# Patient Record
Sex: FEMALE | Race: WHITE | NOT HISPANIC OR LATINO | URBAN - METROPOLITAN AREA
[De-identification: names, ages, dates, MRNs, and addresses within clinical notes are randomized per-mention and may not be internally consistent; named-entity substitution may affect disease eponyms.]

---

## 2017-05-17 ENCOUNTER — INPATIENT (INPATIENT)
Facility: HOSPITAL | Age: 62
LOS: 24 days | Discharge: HOME HEALTH | End: 2017-06-11
Attending: SURGERY | Admitting: INTERNAL MEDICINE

## 2017-05-17 DIAGNOSIS — C16.9 MALIGNANT NEOPLASM OF STOMACH, UNSPECIFIED: ICD-10-CM

## 2017-05-17 DIAGNOSIS — G35 MULTIPLE SCLEROSIS: ICD-10-CM

## 2017-06-28 DIAGNOSIS — K31.1 ADULT HYPERTROPHIC PYLORIC STENOSIS: ICD-10-CM

## 2017-06-28 DIAGNOSIS — E78.00 PURE HYPERCHOLESTEROLEMIA, UNSPECIFIED: ICD-10-CM

## 2017-06-28 DIAGNOSIS — R32 UNSPECIFIED URINARY INCONTINENCE: ICD-10-CM

## 2017-06-28 DIAGNOSIS — J90 PLEURAL EFFUSION, NOT ELSEWHERE CLASSIFIED: ICD-10-CM

## 2017-06-28 DIAGNOSIS — G35 MULTIPLE SCLEROSIS: ICD-10-CM

## 2017-06-28 DIAGNOSIS — E11.43 TYPE 2 DIABETES MELLITUS WITH DIABETIC AUTONOMIC (POLY)NEUROPATHY: ICD-10-CM

## 2017-06-28 DIAGNOSIS — B37.0 CANDIDAL STOMATITIS: ICD-10-CM

## 2017-06-28 DIAGNOSIS — E46 UNSPECIFIED PROTEIN-CALORIE MALNUTRITION: ICD-10-CM

## 2017-06-28 DIAGNOSIS — D63.8 ANEMIA IN OTHER CHRONIC DISEASES CLASSIFIED ELSEWHERE: ICD-10-CM

## 2017-06-28 DIAGNOSIS — K31.84 GASTROPARESIS: ICD-10-CM

## 2017-06-28 DIAGNOSIS — K20.9 ESOPHAGITIS, UNSPECIFIED: ICD-10-CM

## 2017-06-28 DIAGNOSIS — D47.3 ESSENTIAL (HEMORRHAGIC) THROMBOCYTHEMIA: ICD-10-CM

## 2017-06-28 DIAGNOSIS — K26.9 DUODENAL ULCER, UNSPECIFIED AS ACUTE OR CHRONIC, WITHOUT HEMORRHAGE OR PERFORATION: ICD-10-CM

## 2017-06-28 DIAGNOSIS — E83.51 HYPOCALCEMIA: ICD-10-CM

## 2017-06-28 DIAGNOSIS — D50.9 IRON DEFICIENCY ANEMIA, UNSPECIFIED: ICD-10-CM

## 2017-06-28 DIAGNOSIS — K57.10 DIVERTICULOSIS OF SMALL INTESTINE WITHOUT PERFORATION OR ABSCESS WITHOUT BLEEDING: ICD-10-CM

## 2017-06-28 DIAGNOSIS — I10 ESSENTIAL (PRIMARY) HYPERTENSION: ICD-10-CM

## 2017-06-28 DIAGNOSIS — I42.9 CARDIOMYOPATHY, UNSPECIFIED: ICD-10-CM

## 2017-06-28 DIAGNOSIS — K44.9 DIAPHRAGMATIC HERNIA WITHOUT OBSTRUCTION OR GANGRENE: ICD-10-CM

## 2017-06-28 DIAGNOSIS — K29.70 GASTRITIS, UNSPECIFIED, WITHOUT BLEEDING: ICD-10-CM

## 2017-06-28 DIAGNOSIS — I31.3 PERICARDIAL EFFUSION (NONINFLAMMATORY): ICD-10-CM

## 2017-06-28 DIAGNOSIS — D72.829 ELEVATED WHITE BLOOD CELL COUNT, UNSPECIFIED: ICD-10-CM

## 2017-06-28 DIAGNOSIS — C16.9 MALIGNANT NEOPLASM OF STOMACH, UNSPECIFIED: ICD-10-CM

## 2017-06-28 DIAGNOSIS — Z79.4 LONG TERM (CURRENT) USE OF INSULIN: ICD-10-CM

## 2017-06-28 DIAGNOSIS — E78.5 HYPERLIPIDEMIA, UNSPECIFIED: ICD-10-CM

## 2017-08-01 ENCOUNTER — RESULT REVIEW (OUTPATIENT)
Age: 62
End: 2017-08-01

## 2017-08-01 ENCOUNTER — APPOINTMENT (OUTPATIENT)
Dept: HEMATOLOGY ONCOLOGY | Facility: CLINIC | Age: 62
End: 2017-08-01

## 2017-08-01 VITALS
RESPIRATION RATE: 14 BRPM | HEIGHT: 62 IN | HEART RATE: 88 BPM | WEIGHT: 145 LBS | TEMPERATURE: 97.9 F | BODY MASS INDEX: 26.68 KG/M2 | DIASTOLIC BLOOD PRESSURE: 76 MMHG | SYSTOLIC BLOOD PRESSURE: 133 MMHG

## 2017-08-01 DIAGNOSIS — Z86.69 PERSONAL HISTORY OF OTHER DISEASES OF THE NERVOUS SYSTEM AND SENSE ORGANS: ICD-10-CM

## 2017-08-01 DIAGNOSIS — Z86.39 PERSONAL HISTORY OF OTHER ENDOCRINE, NUTRITIONAL AND METABOLIC DISEASE: ICD-10-CM

## 2017-08-01 DIAGNOSIS — Z83.3 FAMILY HISTORY OF DIABETES MELLITUS: ICD-10-CM

## 2017-08-01 DIAGNOSIS — Z82.0 FAMILY HISTORY OF EPILEPSY AND OTHER DISEASES OF THE NERVOUS SYSTEM: ICD-10-CM

## 2017-08-02 LAB
ALBUMIN SERPL-MCNC: 3.4 G/DL
ALBUMIN/GLOB SERPL: 1.21
ALP SERPL-CCNC: 98 IU/L
ALT SERPL-CCNC: 17 IU/L
ANION GAP SERPL CALC-SCNC: 9 MEQ/L
APTT PPP: 28.7 SEC
AST SERPL-CCNC: 15 IU/L
BASOPHILS # BLD: 0.03 TH/MM3
BASOPHILS NFR BLD: 0.3 %
BILIRUB SERPL-MCNC: 0.3 MG/DL
BUN SERPL-MCNC: 16 MG/DL
BUN/CREAT SERPL: 25.8 %
CALCIUM SERPL-MCNC: 9.3 MG/DL
CHLORIDE SERPL-SCNC: 101 MEQ/L
CO2 SERPL-SCNC: 26 MEQ/L
CREAT SERPL-MCNC: 0.62 MG/DL
EOSINOPHIL # BLD: 0.14 TH/MM3
EOSINOPHIL NFR BLD: 1.6 %
ERYTHROCYTE [DISTWIDTH] IN BLOOD BY AUTOMATED COUNT: 17.9 %
GFR SERPL CREATININE-BSD FRML MDRD: 98
GLUCOSE SERPL-MCNC: 177 MG/DL
GRANULOCYTES # BLD: 6.72 TH/MM3
GRANULOCYTES NFR BLD: 77.5 %
HCT VFR BLD AUTO: 30.4 %
HGB BLD-MCNC: 9.2 G/DL
IMM GRANULOCYTES # BLD: 0.03 TH/MM3
IMM GRANULOCYTES NFR BLD: 0.3 %
INR PPP: 1
IRON SERPL-MCNC: 23 UG/DL
LYMPHOCYTES # BLD: 0.92 TH/MM3
LYMPHOCYTES NFR BLD: 10.6 %
MCH RBC QN AUTO: 21.9 PG
MCHC RBC AUTO-ENTMCNC: 30.3 G/DL
MCV RBC AUTO: 72.4 FL
MONOCYTES # BLD: 0.84 TH/MM3
MONOCYTES NFR BLD: 9.7 %
PERCENT SATURATION (NORTH): 6.4 %
PLATELET # BLD: 560 TH/MM3
PMV BLD AUTO: 9.8 FL
POTASSIUM SERPL-SCNC: 4.5 MMOL/L
PROT SERPL-MCNC: 6.2 G/DL
PROTHROMBIN TIME: 10.7 SEC
RBC # BLD AUTO: 4.2 MIL/MM3
SODIUM SERPL-SCNC: 136 MEQ/L
TIBC SERPL-MCNC: 360 UG/DL
TRANSFERRIN SERPL-MCNC: 257 MG/DL
WBC # BLD: 8.68 TH/MM3

## 2017-08-03 ENCOUNTER — RESULT REVIEW (OUTPATIENT)
Age: 62
End: 2017-08-03

## 2017-08-03 LAB
CANCER AG19-9 SERPL-ACNC: 68.2 U/ML
CEA SERPL-MCNC: 3.7 NG/ML
FERRITIN SERPL-MCNC: 10 NG/ML
HAV IGM SER-ACNC: NONREACTIVE
HBV CORE IGM SER-ACNC: NONREACTIVE
HBV SURFACE AG SER-ACNC: NONREACTIVE
HCV AB S/CO SERPL IA: 0.1 S/CO
HCV AB SER QL: NONREACTIVE

## 2017-08-15 ENCOUNTER — APPOINTMENT (OUTPATIENT)
Dept: HEMATOLOGY ONCOLOGY | Facility: CLINIC | Age: 62
End: 2017-08-15

## 2017-08-15 ENCOUNTER — OTHER (OUTPATIENT)
Age: 62
End: 2017-08-15

## 2017-08-16 ENCOUNTER — APPOINTMENT (OUTPATIENT)
Dept: CARDIOLOGY | Facility: CLINIC | Age: 62
End: 2017-08-16

## 2017-08-16 VITALS — DIASTOLIC BLOOD PRESSURE: 70 MMHG | HEART RATE: 81 BPM | SYSTOLIC BLOOD PRESSURE: 110 MMHG

## 2017-08-16 VITALS — BODY MASS INDEX: 26.13 KG/M2 | WEIGHT: 142 LBS | HEIGHT: 62 IN

## 2017-08-16 RX ORDER — ERGOCALCIFEROL (VITAMIN D2) 1250 MCG
50000 CAPSULE ORAL
Refills: 0 | Status: ACTIVE | COMMUNITY

## 2017-08-16 RX ORDER — BACLOFEN 20 MG/1
20 TABLET ORAL 4 TIMES DAILY
Refills: 0 | Status: ACTIVE | COMMUNITY

## 2017-08-16 RX ORDER — ROPINIROLE 0.5 MG/1
0.5 TABLET, FILM COATED ORAL
Refills: 0 | Status: ACTIVE | COMMUNITY

## 2017-08-16 RX ORDER — DULOXETINE HYDROCHLORIDE 60 MG/1
60 CAPSULE, DELAYED RELEASE ORAL
Refills: 0 | Status: ACTIVE | COMMUNITY

## 2017-08-16 RX ORDER — DALFAMPRIDINE 10 MG/1
10 TABLET, FILM COATED, EXTENDED RELEASE ORAL
Refills: 0 | Status: ACTIVE | COMMUNITY

## 2017-08-18 ENCOUNTER — OUTPATIENT (OUTPATIENT)
Dept: OUTPATIENT SERVICES | Facility: HOSPITAL | Age: 62
LOS: 1 days | Discharge: HOME | End: 2017-08-18

## 2017-08-18 DIAGNOSIS — C16.9 MALIGNANT NEOPLASM OF STOMACH, UNSPECIFIED: ICD-10-CM

## 2017-08-18 DIAGNOSIS — Z01.818 ENCOUNTER FOR OTHER PREPROCEDURAL EXAMINATION: ICD-10-CM

## 2017-08-18 DIAGNOSIS — C18.9 MALIGNANT NEOPLASM OF COLON, UNSPECIFIED: ICD-10-CM

## 2017-08-18 DIAGNOSIS — G35 MULTIPLE SCLEROSIS: ICD-10-CM

## 2017-08-21 ENCOUNTER — APPOINTMENT (OUTPATIENT)
Dept: CARDIOLOGY | Facility: CLINIC | Age: 62
End: 2017-08-21

## 2017-08-25 ENCOUNTER — OUTPATIENT (OUTPATIENT)
Dept: OUTPATIENT SERVICES | Facility: HOSPITAL | Age: 62
LOS: 1 days | Discharge: HOME | End: 2017-08-25

## 2017-08-25 DIAGNOSIS — C16.9 MALIGNANT NEOPLASM OF STOMACH, UNSPECIFIED: ICD-10-CM

## 2017-08-25 DIAGNOSIS — G35 MULTIPLE SCLEROSIS: ICD-10-CM

## 2017-08-28 ENCOUNTER — APPOINTMENT (OUTPATIENT)
Dept: INFUSION THERAPY | Facility: CLINIC | Age: 62
End: 2017-08-28

## 2017-08-28 LAB
BASOPHILS # BLD: 0.06 TH/MM3
BASOPHILS NFR BLD: 0.6 %
EOSINOPHIL # BLD: 0.16 TH/MM3
EOSINOPHIL NFR BLD: 1.5 %
ERYTHROCYTE [DISTWIDTH] IN BLOOD BY AUTOMATED COUNT: 22.6 %
GRANULOCYTES # BLD: 8.76 TH/MM3
GRANULOCYTES NFR BLD: 82.9 %
HCT VFR BLD AUTO: 36.5 %
HGB BLD-MCNC: 11.4 G/DL
IMM GRANULOCYTES # BLD: 0.11 TH/MM3
IMM GRANULOCYTES NFR BLD: 1 %
LYMPHOCYTES # BLD: 0.71 TH/MM3
LYMPHOCYTES NFR BLD: 6.7 %
MCH RBC QN AUTO: 23.8 PG
MCHC RBC AUTO-ENTMCNC: 31.2 G/DL
MCV RBC AUTO: 76.4 FL
MONOCYTES # BLD: 0.77 TH/MM3
MONOCYTES NFR BLD: 7.3 %
PLATELET # BLD: 410 TH/MM3
PMV BLD AUTO: 10.2 FL
RBC # BLD AUTO: 4.78 MIL/MM3
WBC # BLD: 10.57 TH/MM3

## 2017-08-30 ENCOUNTER — APPOINTMENT (OUTPATIENT)
Dept: INFUSION THERAPY | Facility: CLINIC | Age: 62
End: 2017-08-30

## 2017-08-30 VITALS
DIASTOLIC BLOOD PRESSURE: 76 MMHG | RESPIRATION RATE: 14 BRPM | TEMPERATURE: 98.3 F | HEART RATE: 98 BPM | SYSTOLIC BLOOD PRESSURE: 116 MMHG

## 2017-08-30 DIAGNOSIS — C18.9 MALIGNANT NEOPLASM OF COLON, UNSPECIFIED: ICD-10-CM

## 2017-08-30 DIAGNOSIS — E11.9 TYPE 2 DIABETES MELLITUS WITHOUT COMPLICATIONS: ICD-10-CM

## 2017-08-30 DIAGNOSIS — E66.9 OBESITY, UNSPECIFIED: ICD-10-CM

## 2017-09-11 ENCOUNTER — APPOINTMENT (OUTPATIENT)
Dept: HEMATOLOGY ONCOLOGY | Facility: CLINIC | Age: 62
End: 2017-09-11

## 2017-09-11 ENCOUNTER — APPOINTMENT (OUTPATIENT)
Dept: INFUSION THERAPY | Facility: CLINIC | Age: 62
End: 2017-09-11

## 2017-09-11 VITALS
BODY MASS INDEX: 25.76 KG/M2 | RESPIRATION RATE: 14 BRPM | DIASTOLIC BLOOD PRESSURE: 74 MMHG | SYSTOLIC BLOOD PRESSURE: 117 MMHG | WEIGHT: 140 LBS | HEART RATE: 93 BPM | TEMPERATURE: 97.4 F | HEIGHT: 62 IN

## 2017-09-11 LAB
BASOPHILS # BLD: 0.04 TH/MM3
BASOPHILS NFR BLD: 0.3 %
EOSINOPHIL # BLD: 0.14 TH/MM3
EOSINOPHIL NFR BLD: 1.1 %
ERYTHROCYTE [DISTWIDTH] IN BLOOD BY AUTOMATED COUNT: 22.1 %
GRANULOCYTES # BLD: 9.95 TH/MM3
GRANULOCYTES NFR BLD: 79.4 %
HCT VFR BLD AUTO: 36.5 %
HGB BLD-MCNC: 11.4 G/DL
IMM GRANULOCYTES # BLD: 0.13 TH/MM3
IMM GRANULOCYTES NFR BLD: 1 %
LYMPHOCYTES # BLD: 0.79 TH/MM3
LYMPHOCYTES NFR BLD: 6.3 %
MCH RBC QN AUTO: 24.3 PG
MCHC RBC AUTO-ENTMCNC: 31.2 G/DL
MCV RBC AUTO: 77.8 FL
MONOCYTES # BLD: 1.49 TH/MM3
MONOCYTES NFR BLD: 11.9 %
PLATELET # BLD: 468 TH/MM3
PMV BLD AUTO: 9.1 FL
RBC # BLD AUTO: 4.69 MIL/MM3
WBC # BLD: 12.54 TH/MM3

## 2017-09-12 LAB
ALBUMIN SERPL-MCNC: 3 G/DL
ALBUMIN/GLOB SERPL: 1.2
ALP SERPL-CCNC: 134 IU/L
ALT SERPL-CCNC: 17 IU/L
ANION GAP SERPL CALC-SCNC: 9 MEQ/L
AST SERPL-CCNC: 16 IU/L
BILIRUB SERPL-MCNC: 0.2 MG/DL
BUN SERPL-MCNC: 18 MG/DL
BUN/CREAT SERPL: 23.1 %
CALCIUM SERPL-MCNC: 9.1 MG/DL
CANCER AG19-9 SERPL-ACNC: 55.7 U/ML
CEA SERPL-MCNC: 4.1 NG/ML
CHLORIDE SERPL-SCNC: 103 MEQ/L
CO2 SERPL-SCNC: 26 MEQ/L
CREAT SERPL-MCNC: 0.78 MG/DL
GFR SERPL CREATININE-BSD FRML MDRD: 75
GLUCOSE SERPL-MCNC: 186 MG/DL
POTASSIUM SERPL-SCNC: 4.8 MMOL/L
PROT SERPL-MCNC: 5.5 G/DL
SODIUM SERPL-SCNC: 138 MEQ/L

## 2017-09-14 ENCOUNTER — APPOINTMENT (OUTPATIENT)
Dept: INFUSION THERAPY | Facility: CLINIC | Age: 62
End: 2017-09-14

## 2017-09-25 ENCOUNTER — APPOINTMENT (OUTPATIENT)
Dept: HEMATOLOGY ONCOLOGY | Facility: CLINIC | Age: 62
End: 2017-09-25

## 2017-09-25 ENCOUNTER — APPOINTMENT (OUTPATIENT)
Dept: INFUSION THERAPY | Facility: CLINIC | Age: 62
End: 2017-09-25

## 2017-09-25 VITALS
SYSTOLIC BLOOD PRESSURE: 135 MMHG | BODY MASS INDEX: 25.21 KG/M2 | TEMPERATURE: 97.2 F | WEIGHT: 137 LBS | HEIGHT: 62 IN | DIASTOLIC BLOOD PRESSURE: 72 MMHG | HEART RATE: 117 BPM | RESPIRATION RATE: 14 BRPM

## 2017-09-25 LAB
BASOPHILS # BLD: 0.02 TH/MM3
BASOPHILS NFR BLD: 0.2 %
EOSINOPHIL # BLD: 0.05 TH/MM3
EOSINOPHIL NFR BLD: 0.5 %
ERYTHROCYTE [DISTWIDTH] IN BLOOD BY AUTOMATED COUNT: 21.9 %
GRANULOCYTES # BLD: 7.81 TH/MM3
GRANULOCYTES NFR BLD: 79.3 %
HCT VFR BLD AUTO: 37.5 %
HGB BLD-MCNC: 12 G/DL
IMM GRANULOCYTES # BLD: 0.05 TH/MM3
IMM GRANULOCYTES NFR BLD: 0.5 %
LYMPHOCYTES # BLD: 0.77 TH/MM3
LYMPHOCYTES NFR BLD: 7.8 %
MCH RBC QN AUTO: 24.9 PG
MCHC RBC AUTO-ENTMCNC: 32 G/DL
MCV RBC AUTO: 78 FL
MONOCYTES # BLD: 1.15 TH/MM3
MONOCYTES NFR BLD: 11.7 %
PLATELET # BLD: 342 TH/MM3
PMV BLD AUTO: 9.2 FL
RBC # BLD AUTO: 4.81 MIL/MM3
WBC # BLD: 9.85 TH/MM3

## 2017-09-27 ENCOUNTER — APPOINTMENT (OUTPATIENT)
Dept: INFUSION THERAPY | Facility: CLINIC | Age: 62
End: 2017-09-27

## 2017-10-10 ENCOUNTER — APPOINTMENT (OUTPATIENT)
Dept: HEMATOLOGY ONCOLOGY | Facility: CLINIC | Age: 62
End: 2017-10-10

## 2017-10-11 ENCOUNTER — APPOINTMENT (OUTPATIENT)
Dept: INFUSION THERAPY | Facility: CLINIC | Age: 62
End: 2017-10-11

## 2017-10-11 LAB
BASOPHILS # BLD: 0.03 TH/MM3
BASOPHILS NFR BLD: 0.4 %
EOSINOPHIL # BLD: 0.1 TH/MM3
EOSINOPHIL NFR BLD: 1.3 %
ERYTHROCYTE [DISTWIDTH] IN BLOOD BY AUTOMATED COUNT: 22.5 %
GRANULOCYTES # BLD: 5.52 TH/MM3
GRANULOCYTES NFR BLD: 73.7 %
HCT VFR BLD AUTO: 40.3 %
HGB BLD-MCNC: 13.2 G/DL
IMM GRANULOCYTES # BLD: 0.08 TH/MM3
IMM GRANULOCYTES NFR BLD: 1.1 %
LYMPHOCYTES # BLD: 0.75 TH/MM3
LYMPHOCYTES NFR BLD: 10 %
MCH RBC QN AUTO: 25.8 PG
MCHC RBC AUTO-ENTMCNC: 32.8 G/DL
MCV RBC AUTO: 78.9 FL
MONOCYTES # BLD: 1.01 TH/MM3
MONOCYTES NFR BLD: 13.5 %
PLATELET # BLD: 324 TH/MM3
PMV BLD AUTO: 9.3 FL
RBC # BLD AUTO: 5.11 MIL/MM3
WBC # BLD: 7.49 TH/MM3

## 2017-10-13 ENCOUNTER — APPOINTMENT (OUTPATIENT)
Dept: INFUSION THERAPY | Facility: CLINIC | Age: 62
End: 2017-10-13

## 2017-10-25 ENCOUNTER — APPOINTMENT (OUTPATIENT)
Dept: INFUSION THERAPY | Facility: CLINIC | Age: 62
End: 2017-10-25

## 2017-10-27 ENCOUNTER — APPOINTMENT (OUTPATIENT)
Dept: INFUSION THERAPY | Facility: CLINIC | Age: 62
End: 2017-10-27

## 2017-10-31 ENCOUNTER — APPOINTMENT (OUTPATIENT)
Dept: HEMATOLOGY ONCOLOGY | Facility: CLINIC | Age: 62
End: 2017-10-31

## 2017-10-31 VITALS
HEART RATE: 111 BPM | RESPIRATION RATE: 16 BRPM | SYSTOLIC BLOOD PRESSURE: 111 MMHG | DIASTOLIC BLOOD PRESSURE: 67 MMHG | TEMPERATURE: 97 F

## 2017-10-31 LAB
BASOPHILS # BLD: 0.14 TH/MM3
BASOPHILS NFR BLD: 2.2 %
EOSINOPHIL # BLD: 0.07 TH/MM3
EOSINOPHIL NFR BLD: 1.1 %
ERYTHROCYTE [DISTWIDTH] IN BLOOD BY AUTOMATED COUNT: 21.2 %
GRANULOCYTES # BLD: 3.72 TH/MM3
GRANULOCYTES NFR BLD: 58.1 %
HCT VFR BLD AUTO: 41 %
HGB BLD-MCNC: 13.6 G/DL
IMM GRANULOCYTES # BLD: 0.42 TH/MM3
IMM GRANULOCYTES NFR BLD: 6.6 %
LYMPHOCYTES # BLD: 0.72 TH/MM3
LYMPHOCYTES NFR BLD: 11.3 %
MCH RBC QN AUTO: 26.9 PG
MCHC RBC AUTO-ENTMCNC: 33.2 G/DL
MCV RBC AUTO: 81 FL
MONOCYTES # BLD: 1.32 TH/MM3
MONOCYTES NFR BLD: 20.7 %
PLATELET # BLD: 447 TH/MM3
PMV BLD AUTO: 9.3 FL
RBC # BLD AUTO: 5.06 MIL/MM3
WBC # BLD: 6.39 TH/MM3

## 2017-11-01 ENCOUNTER — OUTPATIENT (OUTPATIENT)
Dept: OUTPATIENT SERVICES | Facility: HOSPITAL | Age: 62
LOS: 1 days | Discharge: HOME | End: 2017-11-01

## 2017-11-01 ENCOUNTER — APPOINTMENT (OUTPATIENT)
Dept: INFUSION THERAPY | Facility: CLINIC | Age: 62
End: 2017-11-01

## 2017-11-01 VITALS
HEART RATE: 99 BPM | RESPIRATION RATE: 18 BRPM | DIASTOLIC BLOOD PRESSURE: 78 MMHG | TEMPERATURE: 97.8 F | SYSTOLIC BLOOD PRESSURE: 120 MMHG

## 2017-11-01 DIAGNOSIS — C16.9 MALIGNANT NEOPLASM OF STOMACH, UNSPECIFIED: ICD-10-CM

## 2017-11-03 ENCOUNTER — APPOINTMENT (OUTPATIENT)
Dept: INFUSION THERAPY | Facility: CLINIC | Age: 62
End: 2017-11-03

## 2017-11-03 VITALS
HEART RATE: 90 BPM | TEMPERATURE: 96.9 F | SYSTOLIC BLOOD PRESSURE: 129 MMHG | DIASTOLIC BLOOD PRESSURE: 88 MMHG | RESPIRATION RATE: 14 BRPM

## 2017-11-14 ENCOUNTER — APPOINTMENT (OUTPATIENT)
Dept: INFUSION THERAPY | Facility: CLINIC | Age: 62
End: 2017-11-14

## 2017-11-16 ENCOUNTER — APPOINTMENT (OUTPATIENT)
Dept: INFUSION THERAPY | Facility: CLINIC | Age: 62
End: 2017-11-16

## 2017-12-04 ENCOUNTER — APPOINTMENT (OUTPATIENT)
Dept: HEMATOLOGY ONCOLOGY | Facility: CLINIC | Age: 62
End: 2017-12-04

## 2017-12-04 ENCOUNTER — APPOINTMENT (OUTPATIENT)
Dept: INFUSION THERAPY | Facility: CLINIC | Age: 62
End: 2017-12-04

## 2017-12-04 VITALS
WEIGHT: 122 LBS | HEIGHT: 62 IN | TEMPERATURE: 98 F | BODY MASS INDEX: 22.45 KG/M2 | RESPIRATION RATE: 14 BRPM | DIASTOLIC BLOOD PRESSURE: 85 MMHG | HEART RATE: 126 BPM | SYSTOLIC BLOOD PRESSURE: 115 MMHG

## 2017-12-04 LAB
BASOPHILS # BLD: 0.05 TH/MM3
BASOPHILS NFR BLD: 0.4 %
EOSINOPHIL # BLD: 0.03 TH/MM3
EOSINOPHIL NFR BLD: 0.3 %
ERYTHROCYTE [DISTWIDTH] IN BLOOD BY AUTOMATED COUNT: 17.1 %
GRANULOCYTES # BLD: 9.72 TH/MM3
GRANULOCYTES NFR BLD: 82.5 %
HCT VFR BLD AUTO: 43.2 %
HGB BLD-MCNC: 14.1 G/DL
IMM GRANULOCYTES # BLD: 0.14 TH/MM3
IMM GRANULOCYTES NFR BLD: 1.2 %
LYMPHOCYTES # BLD: 0.77 TH/MM3
LYMPHOCYTES NFR BLD: 6.5 %
MCH RBC QN AUTO: 28.1 PG
MCHC RBC AUTO-ENTMCNC: 32.6 G/DL
MCV RBC AUTO: 86.1 FL
MONOCYTES # BLD: 1.07 TH/MM3
MONOCYTES NFR BLD: 9.1 %
PLATELET # BLD: 557 TH/MM3
PMV BLD AUTO: 9.6 FL
RBC # BLD AUTO: 5.02 MIL/MM3
WBC # BLD: 11.78 TH/MM3

## 2017-12-06 ENCOUNTER — APPOINTMENT (OUTPATIENT)
Dept: INFUSION THERAPY | Facility: CLINIC | Age: 62
End: 2017-12-06

## 2017-12-06 LAB
ALBUMIN SERPL-MCNC: 3.3 G/DL
ALBUMIN/GLOB SERPL: 1.14
ALP SERPL-CCNC: 153 IU/L
ALT SERPL-CCNC: 17 IU/L
ANION GAP SERPL CALC-SCNC: 10 MEQ/L
AST SERPL-CCNC: 19 IU/L
BILIRUB SERPL-MCNC: 0.4 MG/DL
BUN SERPL-MCNC: 22 MG/DL
BUN/CREAT SERPL: 28.2 %
CALCIUM SERPL-MCNC: 9.7 MG/DL
CANCER AG19-9 SERPL-ACNC: 67.4 U/ML
CEA SERPL-MCNC: 5.1 NG/ML
CHLORIDE SERPL-SCNC: 99 MEQ/L
CO2 SERPL-SCNC: 28 MEQ/L
CREAT SERPL-MCNC: 0.78 MG/DL
GFR SERPL CREATININE-BSD FRML MDRD: 75
GLUCOSE SERPL-MCNC: 114 MG/DL
POTASSIUM SERPL-SCNC: 4.7 MMOL/L
PROT SERPL-MCNC: 6.2 G/DL
SODIUM SERPL-SCNC: 137 MEQ/L

## 2017-12-12 ENCOUNTER — APPOINTMENT (OUTPATIENT)
Dept: INFUSION THERAPY | Facility: CLINIC | Age: 62
End: 2017-12-12

## 2017-12-14 ENCOUNTER — APPOINTMENT (OUTPATIENT)
Dept: INFUSION THERAPY | Facility: CLINIC | Age: 62
End: 2017-12-14

## 2017-12-18 ENCOUNTER — OUTPATIENT (OUTPATIENT)
Dept: OUTPATIENT SERVICES | Facility: HOSPITAL | Age: 62
LOS: 1 days | Discharge: HOME | End: 2017-12-18

## 2017-12-18 DIAGNOSIS — C16.9 MALIGNANT NEOPLASM OF STOMACH, UNSPECIFIED: ICD-10-CM

## 2017-12-18 DIAGNOSIS — G35 MULTIPLE SCLEROSIS: ICD-10-CM

## 2018-01-03 ENCOUNTER — APPOINTMENT (OUTPATIENT)
Dept: INFUSION THERAPY | Facility: CLINIC | Age: 63
End: 2018-01-03

## 2018-01-08 ENCOUNTER — APPOINTMENT (OUTPATIENT)
Dept: HEMATOLOGY ONCOLOGY | Facility: CLINIC | Age: 63
End: 2018-01-08

## 2018-01-08 VITALS
DIASTOLIC BLOOD PRESSURE: 80 MMHG | TEMPERATURE: 97.8 F | WEIGHT: 115 LBS | RESPIRATION RATE: 14 BRPM | BODY MASS INDEX: 21.16 KG/M2 | SYSTOLIC BLOOD PRESSURE: 126 MMHG | HEART RATE: 102 BPM | HEIGHT: 62 IN

## 2018-01-09 LAB
ALBUMIN SERPL-MCNC: 3.5 G/DL
ALBUMIN/GLOB SERPL: 1.35
ALP SERPL-CCNC: 126 IU/L
ALT SERPL-CCNC: 13 IU/L
ANION GAP SERPL CALC-SCNC: 11 MEQ/L
AST SERPL-CCNC: 17 IU/L
BASOPHILS # BLD: 0.01 TH/MM3
BASOPHILS NFR BLD: 0.1 %
BILIRUB SERPL-MCNC: 0.3 MG/DL
BUN SERPL-MCNC: 17 MG/DL
BUN/CREAT SERPL: 23.6 %
CALCIUM SERPL-MCNC: 9.3 MG/DL
CHLORIDE SERPL-SCNC: 102 MEQ/L
CO2 SERPL-SCNC: 27 MEQ/L
CREAT SERPL-MCNC: 0.72 MG/DL
EOSINOPHIL # BLD: 0.04 TH/MM3
EOSINOPHIL NFR BLD: 0.5 %
ERYTHROCYTE [DISTWIDTH] IN BLOOD BY AUTOMATED COUNT: 13.2 %
GFR SERPL CREATININE-BSD FRML MDRD: 83
GLUCOSE SERPL-MCNC: 137 MG/DL
GRANULOCYTES # BLD: 6.5 TH/MM3
GRANULOCYTES NFR BLD: 85 %
HCT VFR BLD AUTO: 39.3 %
HGB BLD-MCNC: 12.2 G/DL
IMM GRANULOCYTES # BLD: 0.01 TH/MM3
IMM GRANULOCYTES NFR BLD: 0.1 %
LYMPHOCYTES # BLD: 0.64 TH/MM3
LYMPHOCYTES NFR BLD: 8.4 %
MCH RBC QN AUTO: 28.6 PG
MCHC RBC AUTO-ENTMCNC: 31 G/DL
MCV RBC AUTO: 92.3 FL
MONOCYTES # BLD: 0.45 TH/MM3
MONOCYTES NFR BLD: 5.9 %
PLATELET # BLD: 472 TH/MM3
PMV BLD AUTO: 10.9 FL
POTASSIUM SERPL-SCNC: 4.5 MMOL/L
PROT SERPL-MCNC: 6.1 G/DL
RBC # BLD AUTO: 4.26 MIL/MM3
SODIUM SERPL-SCNC: 140 MEQ/L
WBC # BLD: 7.65 TH/MM3

## 2018-01-30 DIAGNOSIS — G35 MULTIPLE SCLEROSIS: ICD-10-CM

## 2018-01-30 DIAGNOSIS — C16.9 MALIGNANT NEOPLASM OF STOMACH, UNSPECIFIED: ICD-10-CM

## 2018-01-31 ENCOUNTER — APPOINTMENT (OUTPATIENT)
Dept: INFUSION THERAPY | Facility: CLINIC | Age: 63
End: 2018-01-31

## 2018-01-31 LAB
BASOPHILS # BLD: 0.05 TH/MM3
BASOPHILS NFR BLD: 0.6 %
EOSINOPHIL # BLD: 0.1 TH/MM3
EOSINOPHIL NFR BLD: 1.1 %
ERYTHROCYTE [DISTWIDTH] IN BLOOD BY AUTOMATED COUNT: 12.3 %
GRANULOCYTES # BLD: 7.6 TH/MM3
GRANULOCYTES NFR BLD: 86.8 %
HCT VFR BLD AUTO: 39.3 %
HGB BLD-MCNC: 12.7 G/DL
IMM GRANULOCYTES # BLD: 0.15 TH/MM3
IMM GRANULOCYTES NFR BLD: 1.7 %
LYMPHOCYTES # BLD: 0.27 TH/MM3
LYMPHOCYTES NFR BLD: 3.1 %
MCH RBC QN AUTO: 29.2 PG
MCHC RBC AUTO-ENTMCNC: 32.3 G/DL
MCV RBC AUTO: 90.3 FL
MONOCYTES # BLD: 0.59 TH/MM3
MONOCYTES NFR BLD: 6.7 %
PLATELET # BLD: 388 TH/MM3
PMV BLD AUTO: 9.9 FL
RBC # BLD AUTO: 4.35 MIL/MM3
WBC # BLD: 8.76 TH/MM3

## 2018-02-07 ENCOUNTER — APPOINTMENT (OUTPATIENT)
Dept: INFUSION THERAPY | Facility: CLINIC | Age: 63
End: 2018-02-07

## 2018-02-14 ENCOUNTER — LABORATORY RESULT (OUTPATIENT)
Age: 63
End: 2018-02-14

## 2018-02-14 ENCOUNTER — APPOINTMENT (OUTPATIENT)
Dept: INFUSION THERAPY | Facility: CLINIC | Age: 63
End: 2018-02-14

## 2018-02-14 LAB
HCT VFR BLD CALC: 40.8 %
HGB BLD-MCNC: 13.4 G/DL
MCHC RBC-ENTMCNC: 29.1 PG
MCHC RBC-ENTMCNC: 32.8 G/DL
MCV RBC AUTO: 88.5 FL
PLATELET # BLD AUTO: 269 K/UL
PMV BLD: 10.1 FL
RBC # BLD: 4.61 M/UL
RBC # FLD: 12 %
WBC # FLD AUTO: 4.27 K/UL

## 2018-02-14 RX ORDER — DEXAMETHASONE 0.5 MG/5ML
10 ELIXIR ORAL ONCE
Qty: 0 | Refills: 0 | Status: COMPLETED | OUTPATIENT
Start: 2018-02-14 | End: 2018-02-14

## 2018-02-14 RX ORDER — PACLITAXEL 6 MG/ML
68 INJECTION, SOLUTION, CONCENTRATE INTRAVENOUS ONCE
Qty: 0 | Refills: 0 | Status: COMPLETED | OUTPATIENT
Start: 2018-02-14 | End: 2018-02-14

## 2018-02-14 RX ORDER — FAMOTIDINE 10 MG/ML
20 INJECTION INTRAVENOUS ONCE
Qty: 0 | Refills: 0 | Status: COMPLETED | OUTPATIENT
Start: 2018-02-14 | End: 2018-02-14

## 2018-02-14 RX ORDER — ONDANSETRON 8 MG/1
16 TABLET, FILM COATED ORAL ONCE
Qty: 0 | Refills: 0 | Status: COMPLETED | OUTPATIENT
Start: 2018-02-14 | End: 2018-02-14

## 2018-02-14 RX ORDER — DIPHENHYDRAMINE HCL 50 MG
50 CAPSULE ORAL ONCE
Qty: 0 | Refills: 0 | Status: COMPLETED | OUTPATIENT
Start: 2018-02-14 | End: 2018-02-14

## 2018-02-14 RX ADMIN — FAMOTIDINE 156 MILLIGRAM(S): 10 INJECTION INTRAVENOUS at 09:41

## 2018-02-14 RX ADMIN — Medication 153 MILLIGRAM(S): at 09:41

## 2018-02-14 RX ADMIN — ONDANSETRON 174 MILLIGRAM(S): 8 TABLET, FILM COATED ORAL at 09:41

## 2018-02-14 RX ADMIN — PACLITAXEL 261.33 MILLIGRAM(S): 6 INJECTION, SOLUTION, CONCENTRATE INTRAVENOUS at 11:01

## 2018-02-21 ENCOUNTER — APPOINTMENT (OUTPATIENT)
Dept: INFUSION THERAPY | Facility: CLINIC | Age: 63
End: 2018-02-21

## 2018-02-21 ENCOUNTER — LABORATORY RESULT (OUTPATIENT)
Age: 63
End: 2018-02-21

## 2018-02-21 LAB
HCT VFR BLD CALC: 38.5 %
HGB BLD-MCNC: 12.6 G/DL
MCHC RBC-ENTMCNC: 28.7 PG
MCHC RBC-ENTMCNC: 32.7 G/DL
MCV RBC AUTO: 87.7 FL
PLATELET # BLD AUTO: 312 K/UL
PMV BLD: 10.2 FL
RBC # BLD: 4.39 M/UL
RBC # FLD: 12 %
WBC # FLD AUTO: 6.95 K/UL

## 2018-02-21 RX ORDER — PACLITAXEL 6 MG/ML
68 INJECTION, SOLUTION, CONCENTRATE INTRAVENOUS ONCE
Qty: 0 | Refills: 0 | Status: COMPLETED | OUTPATIENT
Start: 2018-02-21 | End: 2018-02-21

## 2018-02-21 RX ORDER — DIPHENHYDRAMINE HCL 50 MG
50 CAPSULE ORAL ONCE
Qty: 0 | Refills: 0 | Status: COMPLETED | OUTPATIENT
Start: 2018-02-21 | End: 2018-02-21

## 2018-02-21 RX ORDER — FAMOTIDINE 10 MG/ML
20 INJECTION INTRAVENOUS ONCE
Qty: 0 | Refills: 0 | Status: COMPLETED | OUTPATIENT
Start: 2018-02-21 | End: 2018-02-21

## 2018-02-21 RX ORDER — DEXAMETHASONE 0.5 MG/5ML
10 ELIXIR ORAL ONCE
Qty: 0 | Refills: 0 | Status: COMPLETED | OUTPATIENT
Start: 2018-02-21 | End: 2018-02-21

## 2018-02-21 RX ORDER — ONDANSETRON 8 MG/1
16 TABLET, FILM COATED ORAL ONCE
Qty: 0 | Refills: 0 | Status: COMPLETED | OUTPATIENT
Start: 2018-02-21 | End: 2018-02-21

## 2018-02-21 RX ADMIN — Medication 153 MILLIGRAM(S): at 12:29

## 2018-02-21 RX ADMIN — PACLITAXEL 261.33 MILLIGRAM(S): 6 INJECTION, SOLUTION, CONCENTRATE INTRAVENOUS at 13:14

## 2018-02-21 RX ADMIN — FAMOTIDINE 156 MILLIGRAM(S): 10 INJECTION INTRAVENOUS at 12:29

## 2018-02-21 RX ADMIN — ONDANSETRON 174 MILLIGRAM(S): 8 TABLET, FILM COATED ORAL at 12:29

## 2018-02-26 ENCOUNTER — APPOINTMENT (OUTPATIENT)
Dept: HEMATOLOGY ONCOLOGY | Facility: CLINIC | Age: 63
End: 2018-02-26

## 2018-02-28 ENCOUNTER — APPOINTMENT (OUTPATIENT)
Dept: INFUSION THERAPY | Facility: CLINIC | Age: 63
End: 2018-02-28

## 2018-02-28 ENCOUNTER — LABORATORY RESULT (OUTPATIENT)
Age: 63
End: 2018-02-28

## 2018-02-28 ENCOUNTER — OUTPATIENT (OUTPATIENT)
Dept: OUTPATIENT SERVICES | Facility: HOSPITAL | Age: 63
LOS: 1 days | Discharge: HOME | End: 2018-02-28

## 2018-02-28 DIAGNOSIS — C16.9 MALIGNANT NEOPLASM OF STOMACH, UNSPECIFIED: ICD-10-CM

## 2018-02-28 LAB
HCT VFR BLD CALC: 40.6 %
HGB BLD-MCNC: 13.1 G/DL
MCHC RBC-ENTMCNC: 28.5 PG
MCHC RBC-ENTMCNC: 32.3 G/DL
MCV RBC AUTO: 88.5 FL
PLATELET # BLD AUTO: 294 K/UL
PMV BLD: 11.2 FL
RBC # BLD: 4.59 M/UL
RBC # FLD: 12.4 %
WBC # FLD AUTO: 6.86 K/UL

## 2018-02-28 RX ORDER — DIPHENHYDRAMINE HCL 50 MG
50 CAPSULE ORAL ONCE
Qty: 0 | Refills: 0 | Status: COMPLETED | OUTPATIENT
Start: 2018-02-28 | End: 2018-02-28

## 2018-02-28 RX ORDER — ONDANSETRON 8 MG/1
16 TABLET, FILM COATED ORAL ONCE
Qty: 0 | Refills: 0 | Status: COMPLETED | OUTPATIENT
Start: 2018-02-28 | End: 2018-02-28

## 2018-02-28 RX ORDER — PACLITAXEL 6 MG/ML
68 INJECTION, SOLUTION, CONCENTRATE INTRAVENOUS ONCE
Qty: 0 | Refills: 0 | Status: COMPLETED | OUTPATIENT
Start: 2018-02-28 | End: 2018-02-28

## 2018-02-28 RX ORDER — FAMOTIDINE 10 MG/ML
20 INJECTION INTRAVENOUS ONCE
Qty: 0 | Refills: 0 | Status: COMPLETED | OUTPATIENT
Start: 2018-02-28 | End: 2018-02-28

## 2018-02-28 RX ORDER — DEXAMETHASONE 0.5 MG/5ML
10 ELIXIR ORAL ONCE
Qty: 0 | Refills: 0 | Status: COMPLETED | OUTPATIENT
Start: 2018-02-28 | End: 2018-02-28

## 2018-02-28 RX ADMIN — Medication 153 MILLIGRAM(S): at 11:07

## 2018-02-28 RX ADMIN — ONDANSETRON 174 MILLIGRAM(S): 8 TABLET, FILM COATED ORAL at 11:07

## 2018-02-28 RX ADMIN — PACLITAXEL 261.33 MILLIGRAM(S): 6 INJECTION, SOLUTION, CONCENTRATE INTRAVENOUS at 12:23

## 2018-02-28 RX ADMIN — FAMOTIDINE 156 MILLIGRAM(S): 10 INJECTION INTRAVENOUS at 11:07

## 2018-03-12 ENCOUNTER — OUTPATIENT (OUTPATIENT)
Dept: OUTPATIENT SERVICES | Facility: HOSPITAL | Age: 63
LOS: 1 days | Discharge: HOME | End: 2018-03-12

## 2018-03-12 DIAGNOSIS — C16.3 MALIGNANT NEOPLASM OF PYLORIC ANTRUM: ICD-10-CM

## 2018-03-19 ENCOUNTER — APPOINTMENT (OUTPATIENT)
Dept: HEMATOLOGY ONCOLOGY | Facility: CLINIC | Age: 63
End: 2018-03-19

## 2018-03-19 ENCOUNTER — LABORATORY RESULT (OUTPATIENT)
Age: 63
End: 2018-03-19

## 2018-03-19 VITALS
TEMPERATURE: 97.8 F | SYSTOLIC BLOOD PRESSURE: 121 MMHG | DIASTOLIC BLOOD PRESSURE: 82 MMHG | HEIGHT: 62 IN | HEART RATE: 117 BPM | BODY MASS INDEX: 24.84 KG/M2 | RESPIRATION RATE: 14 BRPM | WEIGHT: 135 LBS

## 2018-03-19 LAB
HCT VFR BLD CALC: 40.5 %
HGB BLD-MCNC: 13.3 G/DL
MCHC RBC-ENTMCNC: 28.4 PG
MCHC RBC-ENTMCNC: 32.8 G/DL
MCV RBC AUTO: 86.5 FL
PLATELET # BLD AUTO: 275 K/UL
PMV BLD: 10.3 FL
RBC # BLD: 4.68 M/UL
RBC # FLD: 12.5 %
WBC # FLD AUTO: 8.66 K/UL

## 2018-04-16 ENCOUNTER — APPOINTMENT (OUTPATIENT)
Dept: INFUSION THERAPY | Facility: CLINIC | Age: 63
End: 2018-04-16

## 2018-04-16 ENCOUNTER — APPOINTMENT (OUTPATIENT)
Dept: HEMATOLOGY ONCOLOGY | Facility: CLINIC | Age: 63
End: 2018-04-16

## 2018-04-30 ENCOUNTER — APPOINTMENT (OUTPATIENT)
Dept: HEMATOLOGY ONCOLOGY | Facility: CLINIC | Age: 63
End: 2018-04-30

## 2018-04-30 ENCOUNTER — LABORATORY RESULT (OUTPATIENT)
Age: 63
End: 2018-04-30

## 2018-04-30 ENCOUNTER — APPOINTMENT (OUTPATIENT)
Dept: INFUSION THERAPY | Facility: CLINIC | Age: 63
End: 2018-04-30

## 2018-04-30 VITALS
TEMPERATURE: 97 F | HEART RATE: 89 BPM | DIASTOLIC BLOOD PRESSURE: 64 MMHG | RESPIRATION RATE: 14 BRPM | SYSTOLIC BLOOD PRESSURE: 109 MMHG

## 2018-04-30 LAB
HCT VFR BLD CALC: 35.1 %
HGB BLD-MCNC: 11.7 G/DL
MCHC RBC-ENTMCNC: 28.7 PG
MCHC RBC-ENTMCNC: 33.3 G/DL
MCV RBC AUTO: 86 FL
PLATELET # BLD AUTO: 329 K/UL
PMV BLD: 10.6 FL
RBC # BLD: 4.08 M/UL
RBC # FLD: 12.7 %
WBC # FLD AUTO: 6.35 K/UL

## 2018-05-01 LAB
ALBUMIN SERPL ELPH-MCNC: 3.8 G/DL
ALP BLD-CCNC: 137 U/L
ALT SERPL-CCNC: 19 U/L
ANION GAP SERPL CALC-SCNC: 16 MMOL/L
AST SERPL-CCNC: 16 U/L
BILIRUB SERPL-MCNC: 0.2 MG/DL
BUN SERPL-MCNC: 22 MG/DL
CALCIUM SERPL-MCNC: 9.3 MG/DL
CANCER AG19-9 SERPL-ACNC: 68.9 U/ML
CEA SERPL-MCNC: 6.5 NG/ML
CHLORIDE SERPL-SCNC: 94 MMOL/L
CO2 SERPL-SCNC: 25 MMOL/L
CREAT SERPL-MCNC: 0.7 MG/DL
GLUCOSE SERPL-MCNC: 340 MG/DL
POTASSIUM SERPL-SCNC: 4.4 MMOL/L
PROT SERPL-MCNC: 6.8 G/DL
SODIUM SERPL-SCNC: 135 MMOL/L

## 2018-05-09 ENCOUNTER — OUTPATIENT (OUTPATIENT)
Dept: OUTPATIENT SERVICES | Facility: HOSPITAL | Age: 63
LOS: 1 days | Discharge: HOME | End: 2018-05-09

## 2018-05-09 VITALS
RESPIRATION RATE: 18 BRPM | WEIGHT: 134.92 LBS | DIASTOLIC BLOOD PRESSURE: 78 MMHG | SYSTOLIC BLOOD PRESSURE: 120 MMHG | TEMPERATURE: 98 F | HEIGHT: 62 IN | HEART RATE: 83 BPM

## 2018-05-09 VITALS
RESPIRATION RATE: 18 BRPM | OXYGEN SATURATION: 95 % | DIASTOLIC BLOOD PRESSURE: 82 MMHG | HEART RATE: 76 BPM | SYSTOLIC BLOOD PRESSURE: 139 MMHG

## 2018-05-09 DIAGNOSIS — Z95.828 PRESENCE OF OTHER VASCULAR IMPLANTS AND GRAFTS: Chronic | ICD-10-CM

## 2018-05-09 DIAGNOSIS — Z90.3 ACQUIRED ABSENCE OF STOMACH [PART OF]: Chronic | ICD-10-CM

## 2018-05-09 DIAGNOSIS — E89.0 POSTPROCEDURAL HYPOTHYROIDISM: Chronic | ICD-10-CM

## 2018-05-09 DIAGNOSIS — Z90.710 ACQUIRED ABSENCE OF BOTH CERVIX AND UTERUS: Chronic | ICD-10-CM

## 2018-05-09 NOTE — ASU PATIENT PROFILE, ADULT - PSH
H/O partial thyroidectomy    H/O total hysterectomy with bilateral salpingo-oophorectomy (BSO)    Portacath in place    S/P partial gastrectomy

## 2018-05-09 NOTE — H&P PST ADULT - ASSESSMENT
64 yo F with PMH mentioned here for outpatient screening colonoscopy.  - Risks and benefits discussed.  - Will proceed with the case.

## 2018-05-11 DIAGNOSIS — Z53.09 PROCEDURE AND TREATMENT NOT CARRIED OUT BECAUSE OF OTHER CONTRAINDICATION: ICD-10-CM

## 2018-05-11 DIAGNOSIS — Z12.11 ENCOUNTER FOR SCREENING FOR MALIGNANT NEOPLASM OF COLON: ICD-10-CM

## 2018-05-11 DIAGNOSIS — K64.8 OTHER HEMORRHOIDS: ICD-10-CM

## 2018-05-11 DIAGNOSIS — E11.9 TYPE 2 DIABETES MELLITUS WITHOUT COMPLICATIONS: ICD-10-CM

## 2018-05-11 DIAGNOSIS — R15.0 INCOMPLETE DEFECATION: ICD-10-CM

## 2018-05-11 DIAGNOSIS — K57.30 DIVERTICULOSIS OF LARGE INTESTINE WITHOUT PERFORATION OR ABSCESS WITHOUT BLEEDING: ICD-10-CM

## 2018-05-25 ENCOUNTER — OUTPATIENT (OUTPATIENT)
Dept: OUTPATIENT SERVICES | Facility: HOSPITAL | Age: 63
LOS: 1 days | Discharge: HOME | End: 2018-05-25

## 2018-05-25 DIAGNOSIS — Z90.710 ACQUIRED ABSENCE OF BOTH CERVIX AND UTERUS: Chronic | ICD-10-CM

## 2018-05-25 DIAGNOSIS — E89.0 POSTPROCEDURAL HYPOTHYROIDISM: Chronic | ICD-10-CM

## 2018-05-25 DIAGNOSIS — Z95.828 PRESENCE OF OTHER VASCULAR IMPLANTS AND GRAFTS: Chronic | ICD-10-CM

## 2018-05-25 DIAGNOSIS — Z90.3 ACQUIRED ABSENCE OF STOMACH [PART OF]: Chronic | ICD-10-CM

## 2018-05-25 DIAGNOSIS — C16.9 MALIGNANT NEOPLASM OF STOMACH, UNSPECIFIED: ICD-10-CM

## 2018-06-04 ENCOUNTER — APPOINTMENT (OUTPATIENT)
Dept: INFUSION THERAPY | Facility: CLINIC | Age: 63
End: 2018-06-04

## 2018-06-04 ENCOUNTER — APPOINTMENT (OUTPATIENT)
Dept: HEMATOLOGY ONCOLOGY | Facility: CLINIC | Age: 63
End: 2018-06-04

## 2018-06-04 ENCOUNTER — LABORATORY RESULT (OUTPATIENT)
Age: 63
End: 2018-06-04

## 2018-06-04 VITALS
TEMPERATURE: 97.8 F | DIASTOLIC BLOOD PRESSURE: 70 MMHG | WEIGHT: 135 LBS | RESPIRATION RATE: 14 BRPM | SYSTOLIC BLOOD PRESSURE: 128 MMHG | HEIGHT: 62 IN | BODY MASS INDEX: 24.84 KG/M2 | HEART RATE: 11 BPM

## 2018-06-04 LAB
HCT VFR BLD CALC: 37 %
HGB BLD-MCNC: 11.9 G/DL
MCHC RBC-ENTMCNC: 28.3 PG
MCHC RBC-ENTMCNC: 32.2 G/DL
MCV RBC AUTO: 88.1 FL
PLATELET # BLD AUTO: 392 K/UL
PMV BLD: 10 FL
RBC # BLD: 4.2 M/UL
RBC # FLD: 12.9 %
WBC # FLD AUTO: 11.49 K/UL

## 2018-06-05 LAB
ALBUMIN SERPL ELPH-MCNC: 3.7 G/DL
ALP BLD-CCNC: 143 U/L
ALT SERPL-CCNC: 19 U/L
ANION GAP SERPL CALC-SCNC: 19 MMOL/L
AST SERPL-CCNC: 17 U/L
BILIRUB SERPL-MCNC: 0.2 MG/DL
BUN SERPL-MCNC: 28 MG/DL
CALCIUM SERPL-MCNC: 9 MG/DL
CANCER AG19-9 SERPL-ACNC: 45.4 U/ML
CHLORIDE SERPL-SCNC: 100 MMOL/L
CO2 SERPL-SCNC: 20 MMOL/L
CREAT SERPL-MCNC: 0.7 MG/DL
GLUCOSE SERPL-MCNC: 218 MG/DL
POTASSIUM SERPL-SCNC: 4.7 MMOL/L
PROT SERPL-MCNC: 6.2 G/DL
SODIUM SERPL-SCNC: 139 MMOL/L

## 2018-06-14 ENCOUNTER — OUTPATIENT (OUTPATIENT)
Dept: OUTPATIENT SERVICES | Facility: HOSPITAL | Age: 63
LOS: 1 days | Discharge: HOME | End: 2018-06-14

## 2018-06-14 DIAGNOSIS — Z90.3 ACQUIRED ABSENCE OF STOMACH [PART OF]: Chronic | ICD-10-CM

## 2018-06-14 DIAGNOSIS — Z90.710 ACQUIRED ABSENCE OF BOTH CERVIX AND UTERUS: Chronic | ICD-10-CM

## 2018-06-14 DIAGNOSIS — Z95.828 PRESENCE OF OTHER VASCULAR IMPLANTS AND GRAFTS: Chronic | ICD-10-CM

## 2018-06-14 DIAGNOSIS — G35 MULTIPLE SCLEROSIS: ICD-10-CM

## 2018-06-14 DIAGNOSIS — E89.0 POSTPROCEDURAL HYPOTHYROIDISM: Chronic | ICD-10-CM

## 2018-06-21 ENCOUNTER — OUTPATIENT (OUTPATIENT)
Dept: OUTPATIENT SERVICES | Facility: HOSPITAL | Age: 63
LOS: 1 days | Discharge: HOME | End: 2018-06-21

## 2018-06-21 DIAGNOSIS — G35 MULTIPLE SCLEROSIS: ICD-10-CM

## 2018-06-21 DIAGNOSIS — Z95.828 PRESENCE OF OTHER VASCULAR IMPLANTS AND GRAFTS: Chronic | ICD-10-CM

## 2018-06-21 DIAGNOSIS — Z90.710 ACQUIRED ABSENCE OF BOTH CERVIX AND UTERUS: Chronic | ICD-10-CM

## 2018-06-21 DIAGNOSIS — Z90.3 ACQUIRED ABSENCE OF STOMACH [PART OF]: Chronic | ICD-10-CM

## 2018-06-21 DIAGNOSIS — E89.0 POSTPROCEDURAL HYPOTHYROIDISM: Chronic | ICD-10-CM

## 2018-06-26 ENCOUNTER — APPOINTMENT (OUTPATIENT)
Dept: CARDIOLOGY | Facility: CLINIC | Age: 63
End: 2018-06-26

## 2018-06-26 VITALS
WEIGHT: 152 LBS | HEART RATE: 78 BPM | DIASTOLIC BLOOD PRESSURE: 82 MMHG | HEIGHT: 62 IN | SYSTOLIC BLOOD PRESSURE: 130 MMHG | BODY MASS INDEX: 27.97 KG/M2

## 2018-06-26 DIAGNOSIS — R07.9 CHEST PAIN, UNSPECIFIED: ICD-10-CM

## 2018-06-26 DIAGNOSIS — I31.3 PERICARDIAL EFFUSION (NONINFLAMMATORY): ICD-10-CM

## 2018-06-26 DIAGNOSIS — G35 MULTIPLE SCLEROSIS: ICD-10-CM

## 2018-06-26 DIAGNOSIS — Z01.818 ENCOUNTER FOR OTHER PREPROCEDURAL EXAMINATION: ICD-10-CM

## 2018-06-26 DIAGNOSIS — K59.00 CONSTIPATION, UNSPECIFIED: ICD-10-CM

## 2018-06-26 DIAGNOSIS — E11.9 TYPE 2 DIABETES MELLITUS W/OUT COMPLICATIONS: ICD-10-CM

## 2018-06-26 RX ORDER — OMEGA-3-ACID ETHYL ESTERS CAPSULES 1 G/1
1 CAPSULE, LIQUID FILLED ORAL
Qty: 360 | Refills: 0 | Status: ACTIVE | COMMUNITY
Start: 2018-05-02

## 2018-06-26 RX ORDER — INSULIN LISPRO 100 [IU]/ML
100 INJECTION, SOLUTION SUBCUTANEOUS
Qty: 15 | Refills: 0 | Status: ACTIVE | COMMUNITY
Start: 2018-05-22

## 2018-06-26 RX ORDER — FLUTICASONE PROPIONATE 50 UG/1
50 SPRAY, METERED NASAL
Qty: 16 | Refills: 0 | Status: ACTIVE | COMMUNITY
Start: 2018-06-04

## 2018-06-26 RX ORDER — MOMETASONE FUROATE 1 MG/G
0.1 CREAM TOPICAL
Qty: 30 | Refills: 0 | Status: ACTIVE | COMMUNITY
Start: 2018-05-22

## 2018-06-26 RX ORDER — LINACLOTIDE 290 UG/1
290 CAPSULE, GELATIN COATED ORAL
Qty: 30 | Refills: 0 | Status: ACTIVE | COMMUNITY
Start: 2018-05-17

## 2018-06-26 RX ORDER — BLOOD SUGAR DIAGNOSTIC
STRIP MISCELLANEOUS
Qty: 100 | Refills: 0 | Status: ACTIVE | COMMUNITY
Start: 2018-05-16

## 2018-06-26 RX ORDER — GLIPIZIDE AND METFORMIN HYDROCHLORIDE 5; 500 MG/1; MG/1
5-500 TABLET, FILM COATED ORAL
Qty: 60 | Refills: 0 | Status: ACTIVE | COMMUNITY
Start: 2018-05-16

## 2018-06-26 RX ORDER — BLOOD-GLUCOSE METER
W/DEVICE KIT MISCELLANEOUS
Qty: 1 | Refills: 0 | Status: ACTIVE | COMMUNITY
Start: 2018-05-16

## 2018-06-26 RX ORDER — PANTOPRAZOLE 40 MG/1
40 TABLET, DELAYED RELEASE ORAL
Qty: 90 | Refills: 0 | Status: ACTIVE | COMMUNITY
Start: 2017-05-16

## 2018-07-02 ENCOUNTER — APPOINTMENT (OUTPATIENT)
Dept: INFUSION THERAPY | Facility: CLINIC | Age: 63
End: 2018-07-02

## 2018-07-10 ENCOUNTER — APPOINTMENT (OUTPATIENT)
Dept: INFUSION THERAPY | Facility: CLINIC | Age: 63
End: 2018-07-10

## 2018-07-10 ENCOUNTER — OUTPATIENT (OUTPATIENT)
Dept: OUTPATIENT SERVICES | Facility: HOSPITAL | Age: 63
LOS: 1 days | Discharge: HOME | End: 2018-07-10

## 2018-07-10 DIAGNOSIS — Z90.710 ACQUIRED ABSENCE OF BOTH CERVIX AND UTERUS: Chronic | ICD-10-CM

## 2018-07-10 DIAGNOSIS — Z95.828 PRESENCE OF OTHER VASCULAR IMPLANTS AND GRAFTS: Chronic | ICD-10-CM

## 2018-07-10 DIAGNOSIS — K56.699 OTHER INTESTINAL OBSTRUCTION UNSPECIFIED AS TO PARTIAL VERSUS COMPLETE OBSTRUCTION: ICD-10-CM

## 2018-07-10 DIAGNOSIS — E89.0 POSTPROCEDURAL HYPOTHYROIDISM: Chronic | ICD-10-CM

## 2018-07-10 DIAGNOSIS — R07.9 CHEST PAIN, UNSPECIFIED: ICD-10-CM

## 2018-07-10 DIAGNOSIS — Z90.3 ACQUIRED ABSENCE OF STOMACH [PART OF]: Chronic | ICD-10-CM

## 2018-07-10 DIAGNOSIS — C16.9 MALIGNANT NEOPLASM OF STOMACH, UNSPECIFIED: ICD-10-CM

## 2018-08-09 ENCOUNTER — LABORATORY RESULT (OUTPATIENT)
Age: 63
End: 2018-08-09

## 2018-08-09 ENCOUNTER — APPOINTMENT (OUTPATIENT)
Dept: HEMATOLOGY ONCOLOGY | Facility: CLINIC | Age: 63
End: 2018-08-09

## 2018-08-09 ENCOUNTER — APPOINTMENT (OUTPATIENT)
Dept: INFUSION THERAPY | Facility: CLINIC | Age: 63
End: 2018-08-09

## 2018-08-09 VITALS
BODY MASS INDEX: 25.58 KG/M2 | DIASTOLIC BLOOD PRESSURE: 82 MMHG | HEART RATE: 103 BPM | RESPIRATION RATE: 14 BRPM | HEIGHT: 62 IN | TEMPERATURE: 98.8 F | WEIGHT: 139 LBS | SYSTOLIC BLOOD PRESSURE: 119 MMHG

## 2018-08-09 PROBLEM — E11.9 TYPE 2 DIABETES MELLITUS WITHOUT COMPLICATIONS: Chronic | Status: ACTIVE | Noted: 2018-05-09

## 2018-08-10 LAB
ALBUMIN SERPL ELPH-MCNC: 3.5 G/DL
ALP BLD-CCNC: 164 U/L
ALT SERPL-CCNC: 72 U/L
ANION GAP SERPL CALC-SCNC: 17 MMOL/L
AST SERPL-CCNC: 49 U/L
BILIRUB SERPL-MCNC: 0.2 MG/DL
BUN SERPL-MCNC: 24 MG/DL
CALCIUM SERPL-MCNC: 8.7 MG/DL
CANCER AG19-9 SERPL-ACNC: 41.5 U/ML
CHLORIDE SERPL-SCNC: 101 MMOL/L
CO2 SERPL-SCNC: 22 MMOL/L
CREAT SERPL-MCNC: 0.7 MG/DL
GLUCOSE SERPL-MCNC: 201 MG/DL
HCT VFR BLD CALC: 36.2 %
HGB BLD-MCNC: 11.6 G/DL
MCHC RBC-ENTMCNC: 27.6 PG
MCHC RBC-ENTMCNC: 32 G/DL
MCV RBC AUTO: 86 FL
PLATELET # BLD AUTO: 364 K/UL
PMV BLD: 10.1 FL
POTASSIUM SERPL-SCNC: 4 MMOL/L
PROT SERPL-MCNC: 5.9 G/DL
RBC # BLD: 4.21 M/UL
RBC # FLD: 13.9 %
SODIUM SERPL-SCNC: 140 MMOL/L
WBC # FLD AUTO: 11.17 K/UL

## 2018-08-13 LAB — CEA SERPL-MCNC: 8.5 NG/ML

## 2018-08-20 ENCOUNTER — OUTPATIENT (OUTPATIENT)
Dept: OUTPATIENT SERVICES | Facility: HOSPITAL | Age: 63
LOS: 1 days | Discharge: HOME | End: 2018-08-20

## 2018-08-20 DIAGNOSIS — E89.0 POSTPROCEDURAL HYPOTHYROIDISM: Chronic | ICD-10-CM

## 2018-08-20 DIAGNOSIS — Z90.710 ACQUIRED ABSENCE OF BOTH CERVIX AND UTERUS: Chronic | ICD-10-CM

## 2018-08-20 DIAGNOSIS — C16.9 MALIGNANT NEOPLASM OF STOMACH, UNSPECIFIED: ICD-10-CM

## 2018-08-20 DIAGNOSIS — Z90.3 ACQUIRED ABSENCE OF STOMACH [PART OF]: Chronic | ICD-10-CM

## 2018-08-20 DIAGNOSIS — Z95.828 PRESENCE OF OTHER VASCULAR IMPLANTS AND GRAFTS: Chronic | ICD-10-CM

## 2018-08-30 ENCOUNTER — APPOINTMENT (OUTPATIENT)
Dept: INFUSION THERAPY | Facility: CLINIC | Age: 63
End: 2018-08-30

## 2018-08-30 ENCOUNTER — APPOINTMENT (OUTPATIENT)
Dept: HEMATOLOGY ONCOLOGY | Facility: CLINIC | Age: 63
End: 2018-08-30

## 2018-09-06 ENCOUNTER — FORM ENCOUNTER (OUTPATIENT)
Age: 63
End: 2018-09-06

## 2018-09-07 ENCOUNTER — OUTPATIENT (OUTPATIENT)
Dept: OUTPATIENT SERVICES | Facility: HOSPITAL | Age: 63
LOS: 1 days | Discharge: HOME | End: 2018-09-07

## 2018-09-07 DIAGNOSIS — E89.0 POSTPROCEDURAL HYPOTHYROIDISM: Chronic | ICD-10-CM

## 2018-09-07 DIAGNOSIS — Z90.3 ACQUIRED ABSENCE OF STOMACH [PART OF]: Chronic | ICD-10-CM

## 2018-09-07 DIAGNOSIS — C16.9 MALIGNANT NEOPLASM OF STOMACH, UNSPECIFIED: ICD-10-CM

## 2018-09-07 DIAGNOSIS — Z90.710 ACQUIRED ABSENCE OF BOTH CERVIX AND UTERUS: Chronic | ICD-10-CM

## 2018-09-07 DIAGNOSIS — Z95.828 PRESENCE OF OTHER VASCULAR IMPLANTS AND GRAFTS: Chronic | ICD-10-CM

## 2018-09-07 LAB — GLUCOSE BLDC GLUCOMTR-MCNC: 132 MG/DL — HIGH (ref 70–99)

## 2018-09-27 ENCOUNTER — APPOINTMENT (OUTPATIENT)
Dept: INFUSION THERAPY | Facility: CLINIC | Age: 63
End: 2018-09-27

## 2018-10-10 ENCOUNTER — OUTPATIENT (OUTPATIENT)
Dept: OUTPATIENT SERVICES | Facility: HOSPITAL | Age: 63
LOS: 1 days | Discharge: HOME | End: 2018-10-10

## 2018-10-10 VITALS
SYSTOLIC BLOOD PRESSURE: 116 MMHG | WEIGHT: 143.08 LBS | HEIGHT: 62 IN | TEMPERATURE: 98 F | RESPIRATION RATE: 20 BRPM | HEART RATE: 88 BPM | DIASTOLIC BLOOD PRESSURE: 75 MMHG

## 2018-10-10 VITALS — DIASTOLIC BLOOD PRESSURE: 88 MMHG | HEART RATE: 84 BPM | SYSTOLIC BLOOD PRESSURE: 124 MMHG | RESPIRATION RATE: 18 BRPM

## 2018-10-10 DIAGNOSIS — Z90.710 ACQUIRED ABSENCE OF BOTH CERVIX AND UTERUS: Chronic | ICD-10-CM

## 2018-10-10 DIAGNOSIS — E89.0 POSTPROCEDURAL HYPOTHYROIDISM: Chronic | ICD-10-CM

## 2018-10-10 DIAGNOSIS — Z95.828 PRESENCE OF OTHER VASCULAR IMPLANTS AND GRAFTS: Chronic | ICD-10-CM

## 2018-10-10 DIAGNOSIS — Z90.3 ACQUIRED ABSENCE OF STOMACH [PART OF]: Chronic | ICD-10-CM

## 2018-10-10 LAB — GLUCOSE BLDC GLUCOMTR-MCNC: 84 MG/DL — SIGNIFICANT CHANGE UP (ref 70–99)

## 2018-10-10 RX ORDER — OMEGA-3 ACID ETHYL ESTERS 1 G
2 CAPSULE ORAL
Qty: 0 | Refills: 0 | COMMUNITY

## 2018-10-10 NOTE — CHART NOTE - NSCHARTNOTEFT_GEN_A_CORE
PACU ANESTHESIA ADMISSION NOTE      Procedure:   Post op diagnosis:      ____  Intubated  TV:______       Rate: ______      FiO2: ______    _x___  Patent Airway    _x___  Full return of protective reflexes    _x___  Full recovery from anesthesia / back to baseline status  144/83  Vitals:            T:                BP :      144/82          R: 20             Sat:   97            P:105      Mental Status:  _x___ Awake   _____ Alert   _____ Drowsy   _____ Sedated    Nausea/Vomiting:  _x___  NO       ______Yes,   See Post - Op Orders         Pain Scale (0-10):  __0___    Treatment: _x___ None    ____ See Post - Op/PCA Orders    Post - Operative Fluids:   __x__ Oral   ____ See Post - Op Orders    Plan: Discharge:   _x___Home       _____Floor     _____Critical Care    _____  Other:_________________    Comments:  No anesthesia issues or complications noted.  Discharge when criteria met.

## 2018-10-15 DIAGNOSIS — K29.50 UNSPECIFIED CHRONIC GASTRITIS WITHOUT BLEEDING: ICD-10-CM

## 2018-10-15 DIAGNOSIS — R11.2 NAUSEA WITH VOMITING, UNSPECIFIED: ICD-10-CM

## 2018-10-15 DIAGNOSIS — K20.9 ESOPHAGITIS, UNSPECIFIED: ICD-10-CM

## 2018-10-15 DIAGNOSIS — E11.9 TYPE 2 DIABETES MELLITUS WITHOUT COMPLICATIONS: ICD-10-CM

## 2018-10-15 DIAGNOSIS — K22.10 ULCER OF ESOPHAGUS WITHOUT BLEEDING: ICD-10-CM

## 2018-10-18 ENCOUNTER — RESULT REVIEW (OUTPATIENT)
Age: 63
End: 2018-10-18

## 2018-10-18 ENCOUNTER — OUTPATIENT (OUTPATIENT)
Dept: OUTPATIENT SERVICES | Facility: HOSPITAL | Age: 63
LOS: 1 days | Discharge: HOME | End: 2018-10-18

## 2018-10-18 VITALS
DIASTOLIC BLOOD PRESSURE: 80 MMHG | HEART RATE: 68 BPM | RESPIRATION RATE: 18 BRPM | OXYGEN SATURATION: 98 % | SYSTOLIC BLOOD PRESSURE: 142 MMHG

## 2018-10-18 VITALS — WEIGHT: 138.89 LBS | TEMPERATURE: 98 F | HEIGHT: 62 IN

## 2018-10-18 DIAGNOSIS — Z90.710 ACQUIRED ABSENCE OF BOTH CERVIX AND UTERUS: Chronic | ICD-10-CM

## 2018-10-18 DIAGNOSIS — Z90.3 ACQUIRED ABSENCE OF STOMACH [PART OF]: Chronic | ICD-10-CM

## 2018-10-18 DIAGNOSIS — Z95.828 PRESENCE OF OTHER VASCULAR IMPLANTS AND GRAFTS: Chronic | ICD-10-CM

## 2018-10-18 DIAGNOSIS — E89.0 POSTPROCEDURAL HYPOTHYROIDISM: Chronic | ICD-10-CM

## 2018-10-22 LAB — SURGICAL PATHOLOGY STUDY: SIGNIFICANT CHANGE UP

## 2018-10-24 ENCOUNTER — APPOINTMENT (OUTPATIENT)
Dept: CARDIOLOGY | Facility: CLINIC | Age: 63
End: 2018-10-24

## 2018-10-24 DIAGNOSIS — E11.9 TYPE 2 DIABETES MELLITUS WITHOUT COMPLICATIONS: ICD-10-CM

## 2018-10-24 DIAGNOSIS — K44.9 DIAPHRAGMATIC HERNIA WITHOUT OBSTRUCTION OR GANGRENE: ICD-10-CM

## 2018-10-24 DIAGNOSIS — Z79.84 LONG TERM (CURRENT) USE OF ORAL HYPOGLYCEMIC DRUGS: ICD-10-CM

## 2018-10-24 DIAGNOSIS — Z90.722 ACQUIRED ABSENCE OF OVARIES, BILATERAL: ICD-10-CM

## 2018-10-24 DIAGNOSIS — G35 MULTIPLE SCLEROSIS: ICD-10-CM

## 2018-10-24 DIAGNOSIS — Z90.3 ACQUIRED ABSENCE OF STOMACH [PART OF]: ICD-10-CM

## 2018-10-24 DIAGNOSIS — K29.50 UNSPECIFIED CHRONIC GASTRITIS WITHOUT BLEEDING: ICD-10-CM

## 2018-10-24 DIAGNOSIS — Z92.21 PERSONAL HISTORY OF ANTINEOPLASTIC CHEMOTHERAPY: ICD-10-CM

## 2018-10-24 DIAGNOSIS — Z85.028 PERSONAL HISTORY OF OTHER MALIGNANT NEOPLASM OF STOMACH: ICD-10-CM

## 2018-10-24 DIAGNOSIS — Z90.710 ACQUIRED ABSENCE OF BOTH CERVIX AND UTERUS: ICD-10-CM

## 2018-10-24 DIAGNOSIS — K22.10 ULCER OF ESOPHAGUS WITHOUT BLEEDING: ICD-10-CM

## 2018-10-26 ENCOUNTER — APPOINTMENT (OUTPATIENT)
Dept: INFUSION THERAPY | Facility: CLINIC | Age: 63
End: 2018-10-26

## 2018-11-26 ENCOUNTER — APPOINTMENT (OUTPATIENT)
Dept: HEMATOLOGY ONCOLOGY | Facility: CLINIC | Age: 63
End: 2018-11-26

## 2018-11-26 ENCOUNTER — APPOINTMENT (OUTPATIENT)
Dept: INFUSION THERAPY | Facility: CLINIC | Age: 63
End: 2018-11-26

## 2018-12-20 ENCOUNTER — APPOINTMENT (OUTPATIENT)
Dept: INFUSION THERAPY | Facility: CLINIC | Age: 63
End: 2018-12-20

## 2018-12-20 ENCOUNTER — OUTPATIENT (OUTPATIENT)
Dept: OUTPATIENT SERVICES | Facility: HOSPITAL | Age: 63
LOS: 1 days | Discharge: HOME | End: 2018-12-20

## 2018-12-20 ENCOUNTER — APPOINTMENT (OUTPATIENT)
Dept: HEMATOLOGY ONCOLOGY | Facility: CLINIC | Age: 63
End: 2018-12-20

## 2018-12-20 ENCOUNTER — TRANSCRIPTION ENCOUNTER (OUTPATIENT)
Age: 63
End: 2018-12-20

## 2018-12-20 ENCOUNTER — LABORATORY RESULT (OUTPATIENT)
Age: 63
End: 2018-12-20

## 2018-12-20 VITALS
RESPIRATION RATE: 14 BRPM | SYSTOLIC BLOOD PRESSURE: 143 MMHG | DIASTOLIC BLOOD PRESSURE: 79 MMHG | HEIGHT: 62 IN | HEART RATE: 86 BPM | BODY MASS INDEX: 27.6 KG/M2 | TEMPERATURE: 97 F | WEIGHT: 150 LBS

## 2018-12-20 DIAGNOSIS — Z95.828 PRESENCE OF OTHER VASCULAR IMPLANTS AND GRAFTS: Chronic | ICD-10-CM

## 2018-12-20 DIAGNOSIS — Z90.710 ACQUIRED ABSENCE OF BOTH CERVIX AND UTERUS: Chronic | ICD-10-CM

## 2018-12-20 DIAGNOSIS — Z90.3 ACQUIRED ABSENCE OF STOMACH [PART OF]: Chronic | ICD-10-CM

## 2018-12-20 DIAGNOSIS — C16.9 MALIGNANT NEOPLASM OF STOMACH, UNSPECIFIED: ICD-10-CM

## 2018-12-20 DIAGNOSIS — E89.0 POSTPROCEDURAL HYPOTHYROIDISM: Chronic | ICD-10-CM

## 2018-12-20 NOTE — PHYSICAL EXAM
[Normal] : normoactive bowel sounds, soft and nontender, no hepatosplenomegaly or masses appreciated [de-identified] : mild RUQ tenderness. liver 5 finger bcm , soft , non-tender . significant protuberance on the right , no evidence of hernia .  [de-identified] : significant scoliosis like spine malalignment .

## 2018-12-20 NOTE — HISTORY OF PRESENT ILLNESS
[de-identified] : 62 year old female with gastric cancer on adjuvant folfox , well tolerated except for one episode of mild diarrhea, she denies numbness , mouth sores , nausea or vomiting . She feels slightly worse from neurologic standpoint , mobility and gait more impaired.  She denies numbness.  [de-identified] : Had PET CT Dec 2017: Multiple FDG avid upper abdominal lymph nodes, SUV max 8.5 compatible with biological tumor activity. \par  \par Patient status post FOLFOX, still residual disease on PET CT.\par \par Patient feels ok. No nausea or vomiting. weight is stable. \par Saw Dr Arzola, plan for six weeks of RT therapy.\par \par 03/19/2018:\par \par Patient returns after completion of chemoRT, last Monday. received 4 cycles of Taxol and 6 weeks of RT.\par Feels tired and dizzy. Tolerated treatment well, with no esophagitis or vomiting.\par Has some nausea for which she takes zofran.\par Also complains of headache, could be a part of MS, due to see Dr Vance soon.\par Other review of system is negative.\par \par 04/30/18\par Pt is here for follow up for a repeat PET scan. She feels well with no symptoms of nausea, vomiting. She is able to tolerate diet\par She has c/o constipation, not better with colace, senna, miralax. Also feels tired. She passes gas. \par \par 06/04/2018:\par Since last visit, patient had a PET CT: Near complete resolution of all previously seen FDG uptake in the abdomen  consistent with good treatment response.  No new sites of disease.  One residual gideon pancreatic lymph node demonstrates a 55% decrease in  FDG uptake (SUV 3.9, previously 8.5).\par \par Colonoscopy: Normal, done last month.\par Constipation is better, on Linzess now.\par Some abdominal pain on deep palpation.\par No weight loss. No new symptoms. \par biopsy test showed MMR deficienct tumor  and PDL1 >1 % .\par \par 08/09/2018 : Mariaa returns for follow up complaining of vomiting mainly on awakening . also notes slight increase in abdominal girth. no abdominal pain  or weight loss. \par \par 08/30/2018 : Patient returns for follow up , she feels better however she continues to have mild epigastric discomfort , nausea and intermittent vomiting . There is no further weight loss. CT scan shows abnormal  duodenum and new soft tissue density around  common hepatic artery   , LFTs and tumor markers are slightly elevated . \par \par 12/20/2018 : Patient returns for follow up , she feels well and gained weight .EGD was negative , she continues to complain of dumping symptoms alternating with constipation . \par She also notes persistent painless  swelling and protuberance of the abdomen on the right side. Neuro status and mobility slightly improved . \par \par

## 2018-12-20 NOTE — ASSESSMENT
[FreeTextEntry1] : 64 y/o F PMH of MS, diagnosis of gastric ca ( HER2 negative , MMR deficient , PDL1 >1% )  s/p partial gastrectomy and completed  adjuvant folfox , well tolerated except for one episode of mild diarrhea and also completed  6 cycles of radiation therapy with concurrent taxol  in March 2018.  \par PET scan in September showed peripancreatic LN unchanged . no new sited of disease . \par Mild dumping symptoms. \par right sided abdominal protuberance , ( due to abnormal posture? ) \par will repeat blood work flush port and PET scan .

## 2018-12-21 LAB
ALBUMIN SERPL ELPH-MCNC: 3.3 G/DL
ALP BLD-CCNC: 223 U/L
ALT SERPL-CCNC: 31 U/L
ANION GAP SERPL CALC-SCNC: 15 MMOL/L
AST SERPL-CCNC: 44 U/L
BILIRUB SERPL-MCNC: 0.3 MG/DL
BUN SERPL-MCNC: 17 MG/DL
CALCIUM SERPL-MCNC: 8.6 MG/DL
CANCER AG19-9 SERPL-ACNC: 66.5 U/ML
CEA SERPL-MCNC: 6.6 NG/ML
CHLORIDE SERPL-SCNC: 102 MMOL/L
CO2 SERPL-SCNC: 24 MMOL/L
CREAT SERPL-MCNC: 0.8 MG/DL
GLUCOSE SERPL-MCNC: 94 MG/DL
HCT VFR BLD CALC: 33.9 %
HGB BLD-MCNC: 10.9 G/DL
MCHC RBC-ENTMCNC: 27.8 PG
MCHC RBC-ENTMCNC: 32.2 G/DL
MCV RBC AUTO: 86.5 FL
PLATELET # BLD AUTO: 401 K/UL
PMV BLD: 10.4 FL
POTASSIUM SERPL-SCNC: 4 MMOL/L
PROT SERPL-MCNC: 5.7 G/DL
RBC # BLD: 3.92 M/UL
RBC # FLD: 14.6 %
SODIUM SERPL-SCNC: 141 MMOL/L
WBC # FLD AUTO: 10.65 K/UL

## 2019-01-01 ENCOUNTER — APPOINTMENT (OUTPATIENT)
Dept: INFUSION THERAPY | Facility: CLINIC | Age: 64
End: 2019-01-01

## 2019-01-01 ENCOUNTER — OUTPATIENT (OUTPATIENT)
Dept: OUTPATIENT SERVICES | Facility: HOSPITAL | Age: 64
LOS: 1 days | Discharge: HOME | End: 2019-01-01

## 2019-01-01 ENCOUNTER — TRANSCRIPTION ENCOUNTER (OUTPATIENT)
Age: 64
End: 2019-01-01

## 2019-01-01 ENCOUNTER — OTHER (OUTPATIENT)
Age: 64
End: 2019-01-01

## 2019-01-01 ENCOUNTER — OUTPATIENT (OUTPATIENT)
Dept: OUTPATIENT SERVICES | Facility: HOSPITAL | Age: 64
LOS: 1 days | Discharge: HOME | End: 2019-01-01
Payer: MEDICARE

## 2019-01-01 ENCOUNTER — LABORATORY RESULT (OUTPATIENT)
Age: 64
End: 2019-01-01

## 2019-01-01 ENCOUNTER — INPATIENT (INPATIENT)
Facility: HOSPITAL | Age: 64
LOS: 9 days | Discharge: NURSING, REHAB OR LONG-TERM CARE | End: 2019-12-24
Attending: INTERNAL MEDICINE | Admitting: INTERNAL MEDICINE
Payer: MEDICARE

## 2019-01-01 ENCOUNTER — APPOINTMENT (OUTPATIENT)
Dept: HEMATOLOGY ONCOLOGY | Facility: CLINIC | Age: 64
End: 2019-01-01

## 2019-01-01 ENCOUNTER — RX RENEWAL (OUTPATIENT)
Age: 64
End: 2019-01-01

## 2019-01-01 ENCOUNTER — APPOINTMENT (OUTPATIENT)
Dept: HEMATOLOGY ONCOLOGY | Facility: CLINIC | Age: 64
End: 2019-01-01
Payer: MEDICARE

## 2019-01-01 ENCOUNTER — RECORD ABSTRACTING (OUTPATIENT)
Age: 64
End: 2019-01-01

## 2019-01-01 ENCOUNTER — FORM ENCOUNTER (OUTPATIENT)
Age: 64
End: 2019-01-01

## 2019-01-01 ENCOUNTER — OUTPATIENT (OUTPATIENT)
Dept: OUTPATIENT SERVICES | Facility: HOSPITAL | Age: 64
LOS: 1 days | End: 2019-01-01
Payer: MEDICARE

## 2019-01-01 ENCOUNTER — APPOINTMENT (OUTPATIENT)
Dept: INFUSION THERAPY | Facility: CLINIC | Age: 64
End: 2019-01-01
Payer: MEDICARE

## 2019-01-01 ENCOUNTER — RESULT REVIEW (OUTPATIENT)
Age: 64
End: 2019-01-01

## 2019-01-01 ENCOUNTER — INBOUND DOCUMENT (OUTPATIENT)
Age: 64
End: 2019-01-01

## 2019-01-01 ENCOUNTER — EMERGENCY (EMERGENCY)
Facility: HOSPITAL | Age: 64
LOS: 0 days | Discharge: HOME | End: 2019-12-11
Attending: EMERGENCY MEDICINE | Admitting: EMERGENCY MEDICINE
Payer: MEDICARE

## 2019-01-01 VITALS
HEART RATE: 103 BPM | WEIGHT: 134 LBS | BODY MASS INDEX: 24.66 KG/M2 | TEMPERATURE: 97.5 F | RESPIRATION RATE: 14 BRPM | SYSTOLIC BLOOD PRESSURE: 94 MMHG | HEIGHT: 62 IN | DIASTOLIC BLOOD PRESSURE: 50 MMHG

## 2019-01-01 VITALS
WEIGHT: 130.95 LBS | RESPIRATION RATE: 18 BRPM | HEIGHT: 62 IN | HEART RATE: 105 BPM | DIASTOLIC BLOOD PRESSURE: 64 MMHG | TEMPERATURE: 98 F | SYSTOLIC BLOOD PRESSURE: 101 MMHG

## 2019-01-01 VITALS
HEART RATE: 77 BPM | DIASTOLIC BLOOD PRESSURE: 55 MMHG | RESPIRATION RATE: 18 BRPM | TEMPERATURE: 97 F | SYSTOLIC BLOOD PRESSURE: 104 MMHG

## 2019-01-01 VITALS
HEART RATE: 111 BPM | RESPIRATION RATE: 18 BRPM | TEMPERATURE: 99 F | DIASTOLIC BLOOD PRESSURE: 60 MMHG | OXYGEN SATURATION: 98 % | SYSTOLIC BLOOD PRESSURE: 86 MMHG

## 2019-01-01 VITALS
DIASTOLIC BLOOD PRESSURE: 65 MMHG | BODY MASS INDEX: 26.13 KG/M2 | HEART RATE: 95 BPM | HEIGHT: 62 IN | RESPIRATION RATE: 14 BRPM | TEMPERATURE: 97.7 F | WEIGHT: 142 LBS | SYSTOLIC BLOOD PRESSURE: 116 MMHG

## 2019-01-01 VITALS
HEART RATE: 107 BPM | DIASTOLIC BLOOD PRESSURE: 54 MMHG | SYSTOLIC BLOOD PRESSURE: 86 MMHG | OXYGEN SATURATION: 99 % | RESPIRATION RATE: 20 BRPM | TEMPERATURE: 97 F

## 2019-01-01 VITALS
TEMPERATURE: 98 F | HEART RATE: 101 BPM | DIASTOLIC BLOOD PRESSURE: 57 MMHG | SYSTOLIC BLOOD PRESSURE: 109 MMHG | OXYGEN SATURATION: 99 % | RESPIRATION RATE: 18 BRPM

## 2019-01-01 VITALS — HEART RATE: 93 BPM | RESPIRATION RATE: 18 BRPM | SYSTOLIC BLOOD PRESSURE: 116 MMHG | DIASTOLIC BLOOD PRESSURE: 76 MMHG

## 2019-01-01 DIAGNOSIS — Z90.710 ACQUIRED ABSENCE OF BOTH CERVIX AND UTERUS: Chronic | ICD-10-CM

## 2019-01-01 DIAGNOSIS — Z90.3 ACQUIRED ABSENCE OF STOMACH [PART OF]: Chronic | ICD-10-CM

## 2019-01-01 DIAGNOSIS — Z95.828 PRESENCE OF OTHER VASCULAR IMPLANTS AND GRAFTS: Chronic | ICD-10-CM

## 2019-01-01 DIAGNOSIS — K21.9 GASTRO-ESOPHAGEAL REFLUX DISEASE WITHOUT ESOPHAGITIS: ICD-10-CM

## 2019-01-01 DIAGNOSIS — E11.9 TYPE 2 DIABETES MELLITUS WITHOUT COMPLICATIONS: ICD-10-CM

## 2019-01-01 DIAGNOSIS — C16.9 MALIGNANT NEOPLASM OF STOMACH, UNSPECIFIED: ICD-10-CM

## 2019-01-01 DIAGNOSIS — R10.13 EPIGASTRIC PAIN: ICD-10-CM

## 2019-01-01 DIAGNOSIS — E89.0 POSTPROCEDURAL HYPOTHYROIDISM: Chronic | ICD-10-CM

## 2019-01-01 DIAGNOSIS — M54.2 CERVICALGIA: ICD-10-CM

## 2019-01-01 DIAGNOSIS — R19.7 DIARRHEA, UNSPECIFIED: ICD-10-CM

## 2019-01-01 DIAGNOSIS — K20.8 OTHER ESOPHAGITIS: ICD-10-CM

## 2019-01-01 DIAGNOSIS — R11.2 NAUSEA WITH VOMITING, UNSPECIFIED: ICD-10-CM

## 2019-01-01 DIAGNOSIS — K90.0 CELIAC DISEASE: ICD-10-CM

## 2019-01-01 DIAGNOSIS — D64.9 ANEMIA, UNSPECIFIED: ICD-10-CM

## 2019-01-01 DIAGNOSIS — R11.10 VOMITING, UNSPECIFIED: ICD-10-CM

## 2019-01-01 DIAGNOSIS — C16.3 MALIGNANT NEOPLASM OF PYLORIC ANTRUM: ICD-10-CM

## 2019-01-01 DIAGNOSIS — K56.3 GALLSTONE ILEUS: ICD-10-CM

## 2019-01-01 DIAGNOSIS — R84.5 ABNORMAL MICROBIOLOGICAL FINDINGS IN SPECIMENS FROM RESPIRATORY ORGANS AND THORAX: ICD-10-CM

## 2019-01-01 DIAGNOSIS — R79.9 ABNORMAL FINDING OF BLOOD CHEMISTRY, UNSPECIFIED: ICD-10-CM

## 2019-01-01 DIAGNOSIS — K29.50 UNSPECIFIED CHRONIC GASTRITIS WITHOUT BLEEDING: ICD-10-CM

## 2019-01-01 DIAGNOSIS — R63.4 ABNORMAL WEIGHT LOSS: ICD-10-CM

## 2019-01-01 DIAGNOSIS — A09 INFECTIOUS GASTROENTERITIS AND COLITIS, UNSPECIFIED: ICD-10-CM

## 2019-01-01 DIAGNOSIS — Z90.49 ACQUIRED ABSENCE OF OTHER SPECIFIED PARTS OF DIGESTIVE TRACT: ICD-10-CM

## 2019-01-01 DIAGNOSIS — Z00.00 ENCOUNTER FOR GENERAL ADULT MEDICAL EXAMINATION W/OUT ABNORMAL FINDINGS: ICD-10-CM

## 2019-01-01 DIAGNOSIS — Z98.890 OTHER SPECIFIED POSTPROCEDURAL STATES: ICD-10-CM

## 2019-01-01 DIAGNOSIS — E83.81 HUNGRY BONE SYNDROME: ICD-10-CM

## 2019-01-01 LAB
-  AMIKACIN: SIGNIFICANT CHANGE UP
-  AMPICILLIN/SULBACTAM: SIGNIFICANT CHANGE UP
-  AMPICILLIN: SIGNIFICANT CHANGE UP
-  AZTREONAM: SIGNIFICANT CHANGE UP
-  CEFAZOLIN: SIGNIFICANT CHANGE UP
-  CEFEPIME: SIGNIFICANT CHANGE UP
-  CEFOXITIN: SIGNIFICANT CHANGE UP
-  CEFTRIAXONE: SIGNIFICANT CHANGE UP
-  CIPROFLOXACIN: SIGNIFICANT CHANGE UP
-  GENTAMICIN: SIGNIFICANT CHANGE UP
-  IMIPENEM: SIGNIFICANT CHANGE UP
-  LEVOFLOXACIN: SIGNIFICANT CHANGE UP
-  MEROPENEM: SIGNIFICANT CHANGE UP
-  NITROFURANTOIN: SIGNIFICANT CHANGE UP
-  PIPERACILLIN/TAZOBACTAM: SIGNIFICANT CHANGE UP
-  TIGECYCLINE: SIGNIFICANT CHANGE UP
-  TOBRAMYCIN: SIGNIFICANT CHANGE UP
-  TRIMETHOPRIM/SULFAMETHOXAZOLE: SIGNIFICANT CHANGE UP
ALBUMIN SERPL ELPH-MCNC: 2.1 G/DL — LOW (ref 3.5–5.2)
ALBUMIN SERPL ELPH-MCNC: 2.1 G/DL — LOW (ref 3.5–5.2)
ALBUMIN SERPL ELPH-MCNC: 2.2 G/DL — LOW (ref 3.5–5.2)
ALBUMIN SERPL ELPH-MCNC: 2.3 G/DL — LOW (ref 3.5–5.2)
ALBUMIN SERPL ELPH-MCNC: 2.3 G/DL — LOW (ref 3.5–5.2)
ALBUMIN SERPL ELPH-MCNC: 2.4 G/DL — LOW (ref 3.5–5.2)
ALBUMIN SERPL ELPH-MCNC: 2.4 G/DL — LOW (ref 3.5–5.2)
ALBUMIN SERPL ELPH-MCNC: 2.6 G/DL — LOW (ref 3.5–5.2)
ALBUMIN SERPL ELPH-MCNC: 2.7 G/DL — LOW (ref 3.5–5.2)
ALBUMIN SERPL ELPH-MCNC: 2.9 G/DL
ALBUMIN SERPL ELPH-MCNC: 3.4 G/DL
ALP BLD-CCNC: 209 U/L
ALP BLD-CCNC: 278 U/L
ALP SERPL-CCNC: 136 U/L — HIGH (ref 30–115)
ALP SERPL-CCNC: 141 U/L — HIGH (ref 30–115)
ALP SERPL-CCNC: 156 U/L — HIGH (ref 30–115)
ALP SERPL-CCNC: 160 U/L — HIGH (ref 30–115)
ALP SERPL-CCNC: 161 U/L — HIGH (ref 30–115)
ALP SERPL-CCNC: 170 U/L — HIGH (ref 30–115)
ALP SERPL-CCNC: 171 U/L — HIGH (ref 30–115)
ALP SERPL-CCNC: 173 U/L — HIGH (ref 30–115)
ALP SERPL-CCNC: 195 U/L — HIGH (ref 30–115)
ALT FLD-CCNC: 22 U/L — SIGNIFICANT CHANGE UP (ref 0–41)
ALT FLD-CCNC: 23 U/L — SIGNIFICANT CHANGE UP (ref 0–41)
ALT FLD-CCNC: 24 U/L — SIGNIFICANT CHANGE UP (ref 0–41)
ALT FLD-CCNC: 24 U/L — SIGNIFICANT CHANGE UP (ref 0–41)
ALT FLD-CCNC: 25 U/L — SIGNIFICANT CHANGE UP (ref 0–41)
ALT FLD-CCNC: 26 U/L — SIGNIFICANT CHANGE UP (ref 0–41)
ALT FLD-CCNC: 27 U/L — SIGNIFICANT CHANGE UP (ref 0–41)
ALT FLD-CCNC: 29 U/L — SIGNIFICANT CHANGE UP (ref 0–41)
ALT FLD-CCNC: 31 U/L — SIGNIFICANT CHANGE UP (ref 0–41)
ALT SERPL-CCNC: 42 U/L
ALT SERPL-CCNC: 84 U/L
ANION GAP SERPL CALC-SCNC: 10 MMOL/L — SIGNIFICANT CHANGE UP (ref 7–14)
ANION GAP SERPL CALC-SCNC: 11 MMOL/L
ANION GAP SERPL CALC-SCNC: 11 MMOL/L
ANION GAP SERPL CALC-SCNC: 11 MMOL/L — SIGNIFICANT CHANGE UP (ref 7–14)
ANION GAP SERPL CALC-SCNC: 12 MMOL/L — SIGNIFICANT CHANGE UP (ref 7–14)
ANION GAP SERPL CALC-SCNC: 13 MMOL/L — SIGNIFICANT CHANGE UP (ref 7–14)
ANION GAP SERPL CALC-SCNC: 13 MMOL/L — SIGNIFICANT CHANGE UP (ref 7–14)
ANION GAP SERPL CALC-SCNC: 14 MMOL/L — SIGNIFICANT CHANGE UP (ref 7–14)
ANION GAP SERPL CALC-SCNC: 14 MMOL/L — SIGNIFICANT CHANGE UP (ref 7–14)
ANION GAP SERPL CALC-SCNC: 15 MMOL/L
ANION GAP SERPL CALC-SCNC: 15 MMOL/L — HIGH (ref 7–14)
ANION GAP SERPL CALC-SCNC: 17 MMOL/L — HIGH (ref 7–14)
APPEARANCE UR: ABNORMAL
APTT BLD: 30.9 SEC — SIGNIFICANT CHANGE UP (ref 27–39.2)
AST SERPL-CCNC: 24 U/L — SIGNIFICANT CHANGE UP (ref 0–41)
AST SERPL-CCNC: 26 U/L — SIGNIFICANT CHANGE UP (ref 0–41)
AST SERPL-CCNC: 30 U/L — SIGNIFICANT CHANGE UP (ref 0–41)
AST SERPL-CCNC: 34 U/L — SIGNIFICANT CHANGE UP (ref 0–41)
AST SERPL-CCNC: 35 U/L — SIGNIFICANT CHANGE UP (ref 0–41)
AST SERPL-CCNC: 36 U/L — SIGNIFICANT CHANGE UP (ref 0–41)
AST SERPL-CCNC: 37 U/L — SIGNIFICANT CHANGE UP (ref 0–41)
AST SERPL-CCNC: 38 U/L — SIGNIFICANT CHANGE UP (ref 0–41)
AST SERPL-CCNC: 41 U/L — SIGNIFICANT CHANGE UP (ref 0–41)
AST SERPL-CCNC: 54 U/L
AST SERPL-CCNC: 73 U/L
BACTERIA # UR AUTO: ABNORMAL
BASE EXCESS BLDV CALC-SCNC: 0.7 MMOL/L — SIGNIFICANT CHANGE UP (ref -2–2)
BASE EXCESS BLDV CALC-SCNC: 2.6 MMOL/L — HIGH (ref -2–2)
BASOPHILS # BLD AUTO: 0.01 K/UL — SIGNIFICANT CHANGE UP (ref 0–0.2)
BASOPHILS # BLD AUTO: 0.01 K/UL — SIGNIFICANT CHANGE UP (ref 0–0.2)
BASOPHILS # BLD AUTO: 0.02 K/UL — SIGNIFICANT CHANGE UP (ref 0–0.2)
BASOPHILS # BLD AUTO: 0.03 K/UL — SIGNIFICANT CHANGE UP (ref 0–0.2)
BASOPHILS # BLD AUTO: 0.04 K/UL — SIGNIFICANT CHANGE UP (ref 0–0.2)
BASOPHILS NFR BLD AUTO: 0.1 % — SIGNIFICANT CHANGE UP (ref 0–1)
BASOPHILS NFR BLD AUTO: 0.1 % — SIGNIFICANT CHANGE UP (ref 0–1)
BASOPHILS NFR BLD AUTO: 0.2 % — SIGNIFICANT CHANGE UP (ref 0–1)
BASOPHILS NFR BLD AUTO: 0.3 % — SIGNIFICANT CHANGE UP (ref 0–1)
BASOPHILS NFR BLD AUTO: 0.4 % — SIGNIFICANT CHANGE UP (ref 0–1)
BASOPHILS NFR BLD AUTO: 0.5 % — SIGNIFICANT CHANGE UP (ref 0–1)
BILIRUB DIRECT SERPL-MCNC: 0.2 MG/DL — SIGNIFICANT CHANGE UP (ref 0–0.2)
BILIRUB INDIRECT FLD-MCNC: 0.1 MG/DL — LOW (ref 0.2–1.2)
BILIRUB SERPL-MCNC: 0.2 MG/DL — SIGNIFICANT CHANGE UP (ref 0.2–1.2)
BILIRUB SERPL-MCNC: 0.3 MG/DL
BILIRUB SERPL-MCNC: 0.3 MG/DL — SIGNIFICANT CHANGE UP (ref 0.2–1.2)
BILIRUB SERPL-MCNC: 0.4 MG/DL
BILIRUB SERPL-MCNC: 0.4 MG/DL — SIGNIFICANT CHANGE UP (ref 0.2–1.2)
BILIRUB SERPL-MCNC: 0.4 MG/DL — SIGNIFICANT CHANGE UP (ref 0.2–1.2)
BILIRUB SERPL-MCNC: <0.2 MG/DL — SIGNIFICANT CHANGE UP (ref 0.2–1.2)
BILIRUB UR-MCNC: NEGATIVE — SIGNIFICANT CHANGE UP
BLD GP AB SCN SERPL QL: SIGNIFICANT CHANGE UP
BUN SERPL-MCNC: 12 MG/DL — SIGNIFICANT CHANGE UP (ref 10–20)
BUN SERPL-MCNC: 13 MG/DL — SIGNIFICANT CHANGE UP (ref 10–20)
BUN SERPL-MCNC: 15 MG/DL — SIGNIFICANT CHANGE UP (ref 10–20)
BUN SERPL-MCNC: 15 MG/DL — SIGNIFICANT CHANGE UP (ref 10–20)
BUN SERPL-MCNC: 16 MG/DL — SIGNIFICANT CHANGE UP (ref 10–20)
BUN SERPL-MCNC: 17 MG/DL — SIGNIFICANT CHANGE UP (ref 10–20)
BUN SERPL-MCNC: 17 MG/DL — SIGNIFICANT CHANGE UP (ref 10–20)
BUN SERPL-MCNC: 18 MG/DL — SIGNIFICANT CHANGE UP (ref 10–20)
BUN SERPL-MCNC: 22 MG/DL
BUN SERPL-MCNC: 25 MG/DL — HIGH (ref 10–20)
BUN SERPL-MCNC: 26 MG/DL
BUN SERPL-MCNC: 30 MG/DL
CA-I SERPL-SCNC: 1.12 MMOL/L — SIGNIFICANT CHANGE UP (ref 1.12–1.3)
CA-I SERPL-SCNC: 1.18 MMOL/L — SIGNIFICANT CHANGE UP (ref 1.12–1.3)
CALCIUM SERPL-MCNC: 7.4 MG/DL — LOW (ref 8.5–10.1)
CALCIUM SERPL-MCNC: 7.4 MG/DL — LOW (ref 8.5–10.1)
CALCIUM SERPL-MCNC: 7.5 MG/DL — LOW (ref 8.5–10.1)
CALCIUM SERPL-MCNC: 7.6 MG/DL — LOW (ref 8.5–10.1)
CALCIUM SERPL-MCNC: 7.7 MG/DL — LOW (ref 8.5–10.1)
CALCIUM SERPL-MCNC: 7.8 MG/DL — LOW (ref 8.5–10.1)
CALCIUM SERPL-MCNC: 8 MG/DL — LOW (ref 8.5–10.1)
CALCIUM SERPL-MCNC: 8.1 MG/DL — LOW (ref 8.5–10.1)
CALCIUM SERPL-MCNC: 8.2 MG/DL — LOW (ref 8.5–10.1)
CALCIUM SERPL-MCNC: 8.2 MG/DL — LOW (ref 8.5–10.1)
CALCIUM SERPL-MCNC: 8.4 MG/DL
CALCIUM SERPL-MCNC: 8.5 MG/DL — SIGNIFICANT CHANGE UP (ref 8.5–10.1)
CALCIUM SERPL-MCNC: 8.6 MG/DL
CALCIUM SERPL-MCNC: 8.6 MG/DL
CANCER AG19-9 SERPL-ACNC: 46 U/ML
CANCER AG19-9 SERPL-ACNC: 51 U/ML
CEA SERPL-MCNC: 5.8 NG/ML
CEA SERPL-MCNC: 6 NG/ML — HIGH (ref 0–3.8)
CEA SERPL-MCNC: 7.3 NG/ML
CHLORIDE SERPL-SCNC: 102 MMOL/L — SIGNIFICANT CHANGE UP (ref 98–110)
CHLORIDE SERPL-SCNC: 103 MMOL/L — SIGNIFICANT CHANGE UP (ref 98–110)
CHLORIDE SERPL-SCNC: 104 MMOL/L — SIGNIFICANT CHANGE UP (ref 98–110)
CHLORIDE SERPL-SCNC: 105 MMOL/L
CHLORIDE SERPL-SCNC: 105 MMOL/L — SIGNIFICANT CHANGE UP (ref 98–110)
CHLORIDE SERPL-SCNC: 105 MMOL/L — SIGNIFICANT CHANGE UP (ref 98–110)
CHLORIDE SERPL-SCNC: 106 MMOL/L — SIGNIFICANT CHANGE UP (ref 98–110)
CHLORIDE SERPL-SCNC: 108 MMOL/L — SIGNIFICANT CHANGE UP (ref 98–110)
CHLORIDE SERPL-SCNC: 98 MMOL/L
CHLORIDE SERPL-SCNC: 98 MMOL/L
CK SERPL-CCNC: 35 U/L — SIGNIFICANT CHANGE UP (ref 0–225)
CO2 SERPL-SCNC: 19 MMOL/L — SIGNIFICANT CHANGE UP (ref 17–32)
CO2 SERPL-SCNC: 19 MMOL/L — SIGNIFICANT CHANGE UP (ref 17–32)
CO2 SERPL-SCNC: 21 MMOL/L
CO2 SERPL-SCNC: 21 MMOL/L — SIGNIFICANT CHANGE UP (ref 17–32)
CO2 SERPL-SCNC: 21 MMOL/L — SIGNIFICANT CHANGE UP (ref 17–32)
CO2 SERPL-SCNC: 22 MMOL/L — SIGNIFICANT CHANGE UP (ref 17–32)
CO2 SERPL-SCNC: 23 MMOL/L
CO2 SERPL-SCNC: 23 MMOL/L
CO2 SERPL-SCNC: 23 MMOL/L — SIGNIFICANT CHANGE UP (ref 17–32)
CO2 SERPL-SCNC: 25 MMOL/L — SIGNIFICANT CHANGE UP (ref 17–32)
COLOR SPEC: YELLOW — SIGNIFICANT CHANGE UP
CREAT SERPL-MCNC: 0.5 MG/DL — LOW (ref 0.7–1.5)
CREAT SERPL-MCNC: 0.6 MG/DL
CREAT SERPL-MCNC: 0.6 MG/DL
CREAT SERPL-MCNC: 0.6 MG/DL — LOW (ref 0.7–1.5)
CREAT SERPL-MCNC: 0.7 MG/DL
CREAT SERPL-MCNC: 0.7 MG/DL — SIGNIFICANT CHANGE UP (ref 0.7–1.5)
CREAT SERPL-MCNC: <0.5 MG/DL — LOW (ref 0.7–1.5)
CULTURE RESULTS: SIGNIFICANT CHANGE UP
CYTOLOGY CVX/VAG DOC THIN PREP: NORMAL
DIFF PNL FLD: NEGATIVE — SIGNIFICANT CHANGE UP
EOSINOPHIL # BLD AUTO: 0.01 K/UL — SIGNIFICANT CHANGE UP (ref 0–0.7)
EOSINOPHIL # BLD AUTO: 0.01 K/UL — SIGNIFICANT CHANGE UP (ref 0–0.7)
EOSINOPHIL # BLD AUTO: 0.04 K/UL — SIGNIFICANT CHANGE UP (ref 0–0.7)
EOSINOPHIL # BLD AUTO: 0.05 K/UL — SIGNIFICANT CHANGE UP (ref 0–0.7)
EOSINOPHIL # BLD AUTO: 0.06 K/UL — SIGNIFICANT CHANGE UP (ref 0–0.7)
EOSINOPHIL # BLD AUTO: 0.07 K/UL — SIGNIFICANT CHANGE UP (ref 0–0.7)
EOSINOPHIL # BLD AUTO: 0.07 K/UL — SIGNIFICANT CHANGE UP (ref 0–0.7)
EOSINOPHIL # BLD AUTO: 0.08 K/UL — SIGNIFICANT CHANGE UP (ref 0–0.7)
EOSINOPHIL # BLD AUTO: 0.09 K/UL — SIGNIFICANT CHANGE UP (ref 0–0.7)
EOSINOPHIL # BLD AUTO: 0.1 K/UL — SIGNIFICANT CHANGE UP (ref 0–0.7)
EOSINOPHIL # BLD AUTO: 0.1 K/UL — SIGNIFICANT CHANGE UP (ref 0–0.7)
EOSINOPHIL # BLD AUTO: 0.11 K/UL — SIGNIFICANT CHANGE UP (ref 0–0.7)
EOSINOPHIL # BLD AUTO: 0.12 K/UL — SIGNIFICANT CHANGE UP (ref 0–0.7)
EOSINOPHIL NFR BLD AUTO: 0 % — SIGNIFICANT CHANGE UP (ref 0–8)
EOSINOPHIL NFR BLD AUTO: 0.1 % — SIGNIFICANT CHANGE UP (ref 0–8)
EOSINOPHIL NFR BLD AUTO: 0.3 % — SIGNIFICANT CHANGE UP (ref 0–8)
EOSINOPHIL NFR BLD AUTO: 0.4 % — SIGNIFICANT CHANGE UP (ref 0–8)
EOSINOPHIL NFR BLD AUTO: 0.7 % — SIGNIFICANT CHANGE UP (ref 0–8)
EOSINOPHIL NFR BLD AUTO: 0.8 % — SIGNIFICANT CHANGE UP (ref 0–8)
EOSINOPHIL NFR BLD AUTO: 0.9 % — SIGNIFICANT CHANGE UP (ref 0–8)
EOSINOPHIL NFR BLD AUTO: 1 % — SIGNIFICANT CHANGE UP (ref 0–8)
EOSINOPHIL NFR BLD AUTO: 1.1 % — SIGNIFICANT CHANGE UP (ref 0–8)
EOSINOPHIL NFR BLD AUTO: 1.2 % — SIGNIFICANT CHANGE UP (ref 0–8)
EOSINOPHIL NFR BLD AUTO: 1.4 % — SIGNIFICANT CHANGE UP (ref 0–8)
EPI CELLS # UR: 1 /HPF — SIGNIFICANT CHANGE UP (ref 0–5)
ERYTHROCYTE [SEDIMENTATION RATE] IN BLOOD: 107 MM/HR — HIGH (ref 0–20)
ERYTHROCYTE [SEDIMENTATION RATE] IN BLOOD: 72 MM/HR — HIGH (ref 0–20)
ESTIMATED AVERAGE GLUCOSE: 105 MG/DL — SIGNIFICANT CHANGE UP (ref 68–114)
FERRITIN SERPL-MCNC: 175 NG/ML
FERRITIN SERPL-MCNC: 187 NG/ML
FERRITIN SERPL-MCNC: 24 NG/ML — SIGNIFICANT CHANGE UP (ref 15–150)
FOLATE SERPL-MCNC: 6.1 NG/ML
GAS PNL BLDV: 138 MMOL/L — SIGNIFICANT CHANGE UP (ref 136–145)
GAS PNL BLDV: 138 MMOL/L — SIGNIFICANT CHANGE UP (ref 136–145)
GAS PNL BLDV: SIGNIFICANT CHANGE UP
GAS PNL BLDV: SIGNIFICANT CHANGE UP
GLUCOSE BLDC GLUCOMTR-MCNC: 107 MG/DL — HIGH (ref 70–99)
GLUCOSE BLDC GLUCOMTR-MCNC: 109 MG/DL — HIGH (ref 70–99)
GLUCOSE BLDC GLUCOMTR-MCNC: 109 MG/DL — HIGH (ref 70–99)
GLUCOSE BLDC GLUCOMTR-MCNC: 110 MG/DL — HIGH (ref 70–99)
GLUCOSE BLDC GLUCOMTR-MCNC: 110 MG/DL — HIGH (ref 70–99)
GLUCOSE BLDC GLUCOMTR-MCNC: 112 MG/DL — HIGH (ref 70–99)
GLUCOSE BLDC GLUCOMTR-MCNC: 112 MG/DL — HIGH (ref 70–99)
GLUCOSE BLDC GLUCOMTR-MCNC: 114 MG/DL — HIGH (ref 70–99)
GLUCOSE BLDC GLUCOMTR-MCNC: 115 MG/DL — HIGH (ref 70–99)
GLUCOSE BLDC GLUCOMTR-MCNC: 115 MG/DL — HIGH (ref 70–99)
GLUCOSE BLDC GLUCOMTR-MCNC: 130 MG/DL — HIGH (ref 70–99)
GLUCOSE BLDC GLUCOMTR-MCNC: 135 MG/DL — HIGH (ref 70–99)
GLUCOSE BLDC GLUCOMTR-MCNC: 137 MG/DL — HIGH (ref 70–99)
GLUCOSE BLDC GLUCOMTR-MCNC: 155 MG/DL — HIGH (ref 70–99)
GLUCOSE BLDC GLUCOMTR-MCNC: 165 MG/DL — HIGH (ref 70–99)
GLUCOSE BLDC GLUCOMTR-MCNC: 165 MG/DL — HIGH (ref 70–99)
GLUCOSE BLDC GLUCOMTR-MCNC: 170 MG/DL — HIGH (ref 70–99)
GLUCOSE BLDC GLUCOMTR-MCNC: 172 MG/DL — HIGH (ref 70–99)
GLUCOSE BLDC GLUCOMTR-MCNC: 219 MG/DL — HIGH (ref 70–99)
GLUCOSE BLDC GLUCOMTR-MCNC: 220 MG/DL — HIGH (ref 70–99)
GLUCOSE BLDC GLUCOMTR-MCNC: 59 MG/DL — LOW (ref 70–99)
GLUCOSE BLDC GLUCOMTR-MCNC: 60 MG/DL — LOW (ref 70–99)
GLUCOSE BLDC GLUCOMTR-MCNC: 60 MG/DL — LOW (ref 70–99)
GLUCOSE BLDC GLUCOMTR-MCNC: 68 MG/DL — LOW (ref 70–99)
GLUCOSE BLDC GLUCOMTR-MCNC: 70 MG/DL — SIGNIFICANT CHANGE UP (ref 70–99)
GLUCOSE BLDC GLUCOMTR-MCNC: 73 MG/DL — SIGNIFICANT CHANGE UP (ref 70–99)
GLUCOSE BLDC GLUCOMTR-MCNC: 75 MG/DL — SIGNIFICANT CHANGE UP (ref 70–99)
GLUCOSE BLDC GLUCOMTR-MCNC: 75 MG/DL — SIGNIFICANT CHANGE UP (ref 70–99)
GLUCOSE BLDC GLUCOMTR-MCNC: 76 MG/DL — SIGNIFICANT CHANGE UP (ref 70–99)
GLUCOSE BLDC GLUCOMTR-MCNC: 78 MG/DL — SIGNIFICANT CHANGE UP (ref 70–99)
GLUCOSE BLDC GLUCOMTR-MCNC: 79 MG/DL — SIGNIFICANT CHANGE UP (ref 70–99)
GLUCOSE BLDC GLUCOMTR-MCNC: 81 MG/DL — SIGNIFICANT CHANGE UP (ref 70–99)
GLUCOSE BLDC GLUCOMTR-MCNC: 83 MG/DL — SIGNIFICANT CHANGE UP (ref 70–99)
GLUCOSE BLDC GLUCOMTR-MCNC: 83 MG/DL — SIGNIFICANT CHANGE UP (ref 70–99)
GLUCOSE BLDC GLUCOMTR-MCNC: 85 MG/DL — SIGNIFICANT CHANGE UP (ref 70–99)
GLUCOSE BLDC GLUCOMTR-MCNC: 88 MG/DL — SIGNIFICANT CHANGE UP (ref 70–99)
GLUCOSE BLDC GLUCOMTR-MCNC: 90 MG/DL — SIGNIFICANT CHANGE UP (ref 70–99)
GLUCOSE BLDC GLUCOMTR-MCNC: 92 MG/DL — SIGNIFICANT CHANGE UP (ref 70–99)
GLUCOSE BLDC GLUCOMTR-MCNC: 92 MG/DL — SIGNIFICANT CHANGE UP (ref 70–99)
GLUCOSE BLDC GLUCOMTR-MCNC: 95 MG/DL — SIGNIFICANT CHANGE UP (ref 70–99)
GLUCOSE BLDC GLUCOMTR-MCNC: 96 MG/DL — SIGNIFICANT CHANGE UP (ref 70–99)
GLUCOSE BLDC GLUCOMTR-MCNC: 96 MG/DL — SIGNIFICANT CHANGE UP (ref 70–99)
GLUCOSE BLDC GLUCOMTR-MCNC: 98 MG/DL — SIGNIFICANT CHANGE UP (ref 70–99)
GLUCOSE BLDC GLUCOMTR-MCNC: 98 MG/DL — SIGNIFICANT CHANGE UP (ref 70–99)
GLUCOSE BLDC GLUCOMTR-MCNC: 99 MG/DL — SIGNIFICANT CHANGE UP (ref 70–99)
GLUCOSE SERPL-MCNC: 105 MG/DL — HIGH (ref 70–99)
GLUCOSE SERPL-MCNC: 117 MG/DL — HIGH (ref 70–99)
GLUCOSE SERPL-MCNC: 137 MG/DL
GLUCOSE SERPL-MCNC: 179 MG/DL
GLUCOSE SERPL-MCNC: 214 MG/DL — HIGH (ref 70–99)
GLUCOSE SERPL-MCNC: 322 MG/DL
GLUCOSE SERPL-MCNC: 49 MG/DL — LOW (ref 70–99)
GLUCOSE SERPL-MCNC: 52 MG/DL — LOW (ref 70–99)
GLUCOSE SERPL-MCNC: 64 MG/DL — LOW (ref 70–99)
GLUCOSE SERPL-MCNC: 69 MG/DL — LOW (ref 70–99)
GLUCOSE SERPL-MCNC: 71 MG/DL — SIGNIFICANT CHANGE UP (ref 70–99)
GLUCOSE SERPL-MCNC: 73 MG/DL — SIGNIFICANT CHANGE UP (ref 70–99)
GLUCOSE SERPL-MCNC: 82 MG/DL — SIGNIFICANT CHANGE UP (ref 70–99)
GLUCOSE SERPL-MCNC: 84 MG/DL — SIGNIFICANT CHANGE UP (ref 70–99)
GLUCOSE UR QL: NEGATIVE — SIGNIFICANT CHANGE UP
HAPTOGLOB SERPL-MCNC: 217 MG/DL — HIGH (ref 34–200)
HBA1C BLD-MCNC: 5.3 % — SIGNIFICANT CHANGE UP (ref 4–5.6)
HCO3 BLDV-SCNC: 25 MMOL/L — SIGNIFICANT CHANGE UP (ref 22–29)
HCO3 BLDV-SCNC: 28 MMOL/L — SIGNIFICANT CHANGE UP (ref 22–29)
HCT VFR BLD CALC: 21.6 % — LOW (ref 37–47)
HCT VFR BLD CALC: 24.1 % — LOW (ref 37–47)
HCT VFR BLD CALC: 24.4 % — LOW (ref 37–47)
HCT VFR BLD CALC: 25.7 % — LOW (ref 37–47)
HCT VFR BLD CALC: 25.8 % — LOW (ref 37–47)
HCT VFR BLD CALC: 26.1 % — LOW (ref 37–47)
HCT VFR BLD CALC: 27.1 % — LOW (ref 37–47)
HCT VFR BLD CALC: 28.2 % — LOW (ref 37–47)
HCT VFR BLD CALC: 29.2 % — LOW (ref 37–47)
HCT VFR BLD CALC: 29.3 % — LOW (ref 37–47)
HCT VFR BLD CALC: 29.7 % — LOW (ref 37–47)
HCT VFR BLD CALC: 29.8 % — LOW (ref 37–47)
HCT VFR BLD CALC: 30.8 % — LOW (ref 37–47)
HCT VFR BLD CALC: 31 % — LOW (ref 37–47)
HCT VFR BLD CALC: 31.8 % — LOW (ref 37–47)
HCT VFR BLD CALC: 31.8 % — LOW (ref 37–47)
HCT VFR BLD CALC: 33.4 %
HCT VFR BLD CALC: 39.1 %
HCT VFR BLDA CALC: 28.3 % — LOW (ref 34–44)
HCT VFR BLDA CALC: 31.4 % — LOW (ref 34–44)
HCV AB S/CO SERPL IA: 0.15 S/CO — SIGNIFICANT CHANGE UP (ref 0–0.99)
HCV AB SERPL-IMP: SIGNIFICANT CHANGE UP
HGB BLD CALC-MCNC: 10.2 G/DL — LOW (ref 14–18)
HGB BLD CALC-MCNC: 9.2 G/DL — LOW (ref 14–18)
HGB BLD-MCNC: 10.6 G/DL
HGB BLD-MCNC: 12.5 G/DL
HGB BLD-MCNC: 6.6 G/DL — CRITICAL LOW (ref 12–16)
HGB BLD-MCNC: 7.4 G/DL — LOW (ref 12–16)
HGB BLD-MCNC: 7.5 G/DL — LOW (ref 12–16)
HGB BLD-MCNC: 7.8 G/DL — LOW (ref 12–16)
HGB BLD-MCNC: 8 G/DL — LOW (ref 12–16)
HGB BLD-MCNC: 8 G/DL — LOW (ref 12–16)
HGB BLD-MCNC: 8.3 G/DL — LOW (ref 12–16)
HGB BLD-MCNC: 8.5 G/DL — LOW (ref 12–16)
HGB BLD-MCNC: 9 G/DL — LOW (ref 12–16)
HGB BLD-MCNC: 9.1 G/DL — LOW (ref 12–16)
HGB BLD-MCNC: 9.1 G/DL — LOW (ref 12–16)
HGB BLD-MCNC: 9.2 G/DL — LOW (ref 12–16)
HGB BLD-MCNC: 9.3 G/DL — LOW (ref 12–16)
HGB BLD-MCNC: 9.4 G/DL — LOW (ref 12–16)
HGB BLD-MCNC: 9.7 G/DL — LOW (ref 12–16)
HGB BLD-MCNC: 9.8 G/DL — LOW (ref 12–16)
HYALINE CASTS # UR AUTO: 1 /LPF — SIGNIFICANT CHANGE UP (ref 0–7)
IMM GRANULOCYTES NFR BLD AUTO: 0.5 % — HIGH (ref 0.1–0.3)
IMM GRANULOCYTES NFR BLD AUTO: 0.6 % — HIGH (ref 0.1–0.3)
IMM GRANULOCYTES NFR BLD AUTO: 0.7 % — HIGH (ref 0.1–0.3)
IMM GRANULOCYTES NFR BLD AUTO: 0.8 % — HIGH (ref 0.1–0.3)
IMM GRANULOCYTES NFR BLD AUTO: 0.9 % — HIGH (ref 0.1–0.3)
IMM GRANULOCYTES NFR BLD AUTO: 1.1 % — HIGH (ref 0.1–0.3)
IMM GRANULOCYTES NFR BLD AUTO: 1.1 % — HIGH (ref 0.1–0.3)
IMM GRANULOCYTES NFR BLD AUTO: 1.3 % — HIGH (ref 0.1–0.3)
INR BLD: 1.31 RATIO — HIGH (ref 0.65–1.3)
IRON SATN MFR SERPL: 17 UG/DL — LOW (ref 35–150)
IRON SATN MFR SERPL: 39 %
IRON SATN MFR SERPL: 9 % — LOW (ref 15–50)
IRON SERPL-MCNC: 75 UG/DL
KETONES UR-MCNC: NEGATIVE — SIGNIFICANT CHANGE UP
LACTATE BLDV-MCNC: 1.5 MMOL/L — SIGNIFICANT CHANGE UP (ref 0.5–1.6)
LACTATE BLDV-MCNC: 1.6 MMOL/L — SIGNIFICANT CHANGE UP (ref 0.5–1.6)
LACTATE SERPL-SCNC: 0.9 MMOL/L — SIGNIFICANT CHANGE UP (ref 0.7–2)
LEUKOCYTE ESTERASE UR-ACNC: NEGATIVE — SIGNIFICANT CHANGE UP
LIDOCAIN IGE QN: 3 U/L — LOW (ref 7–60)
LYMPHOCYTES # BLD AUTO: 0.54 K/UL — LOW (ref 1.2–3.4)
LYMPHOCYTES # BLD AUTO: 0.56 K/UL — LOW (ref 1.2–3.4)
LYMPHOCYTES # BLD AUTO: 0.56 K/UL — LOW (ref 1.2–3.4)
LYMPHOCYTES # BLD AUTO: 0.58 K/UL — LOW (ref 1.2–3.4)
LYMPHOCYTES # BLD AUTO: 0.61 K/UL — LOW (ref 1.2–3.4)
LYMPHOCYTES # BLD AUTO: 0.62 K/UL — LOW (ref 1.2–3.4)
LYMPHOCYTES # BLD AUTO: 0.62 K/UL — LOW (ref 1.2–3.4)
LYMPHOCYTES # BLD AUTO: 0.64 K/UL — LOW (ref 1.2–3.4)
LYMPHOCYTES # BLD AUTO: 0.66 K/UL — LOW (ref 1.2–3.4)
LYMPHOCYTES # BLD AUTO: 0.69 K/UL — LOW (ref 1.2–3.4)
LYMPHOCYTES # BLD AUTO: 0.7 K/UL — LOW (ref 1.2–3.4)
LYMPHOCYTES # BLD AUTO: 0.7 K/UL — LOW (ref 1.2–3.4)
LYMPHOCYTES # BLD AUTO: 1 K/UL — LOW (ref 1.2–3.4)
LYMPHOCYTES # BLD AUTO: 3.2 % — LOW (ref 20.5–51.1)
LYMPHOCYTES # BLD AUTO: 5 % — LOW (ref 20.5–51.1)
LYMPHOCYTES # BLD AUTO: 5.6 % — LOW (ref 20.5–51.1)
LYMPHOCYTES # BLD AUTO: 5.6 % — LOW (ref 20.5–51.1)
LYMPHOCYTES # BLD AUTO: 6.2 % — LOW (ref 20.5–51.1)
LYMPHOCYTES # BLD AUTO: 6.6 % — LOW (ref 20.5–51.1)
LYMPHOCYTES # BLD AUTO: 6.8 % — LOW (ref 20.5–51.1)
LYMPHOCYTES # BLD AUTO: 7.1 % — LOW (ref 20.5–51.1)
LYMPHOCYTES # BLD AUTO: 7.1 % — LOW (ref 20.5–51.1)
LYMPHOCYTES # BLD AUTO: 7.4 % — LOW (ref 20.5–51.1)
LYMPHOCYTES # BLD AUTO: 7.6 % — LOW (ref 20.5–51.1)
LYMPHOCYTES # BLD AUTO: 7.9 % — LOW (ref 20.5–51.1)
LYMPHOCYTES # BLD AUTO: 8 % — LOW (ref 20.5–51.1)
MAGNESIUM SERPL-MCNC: 1.8 MG/DL — SIGNIFICANT CHANGE UP (ref 1.8–2.4)
MAGNESIUM SERPL-MCNC: 1.9 MG/DL — SIGNIFICANT CHANGE UP (ref 1.8–2.4)
MAGNESIUM SERPL-MCNC: 2 MG/DL — SIGNIFICANT CHANGE UP (ref 1.8–2.4)
MAGNESIUM SERPL-MCNC: 2.1 MG/DL
MCHC RBC-ENTMCNC: 26.8 PG — LOW (ref 27–31)
MCHC RBC-ENTMCNC: 26.9 PG — LOW (ref 27–31)
MCHC RBC-ENTMCNC: 27 PG — SIGNIFICANT CHANGE UP (ref 27–31)
MCHC RBC-ENTMCNC: 27.1 PG — SIGNIFICANT CHANGE UP (ref 27–31)
MCHC RBC-ENTMCNC: 27.2 PG — SIGNIFICANT CHANGE UP (ref 27–31)
MCHC RBC-ENTMCNC: 27.2 PG — SIGNIFICANT CHANGE UP (ref 27–31)
MCHC RBC-ENTMCNC: 27.3 PG — SIGNIFICANT CHANGE UP (ref 27–31)
MCHC RBC-ENTMCNC: 27.3 PG — SIGNIFICANT CHANGE UP (ref 27–31)
MCHC RBC-ENTMCNC: 27.4 PG — SIGNIFICANT CHANGE UP (ref 27–31)
MCHC RBC-ENTMCNC: 27.5 PG — SIGNIFICANT CHANGE UP (ref 27–31)
MCHC RBC-ENTMCNC: 27.8 PG — SIGNIFICANT CHANGE UP (ref 27–31)
MCHC RBC-ENTMCNC: 27.9 PG — SIGNIFICANT CHANGE UP (ref 27–31)
MCHC RBC-ENTMCNC: 28.1 PG — SIGNIFICANT CHANGE UP (ref 27–31)
MCHC RBC-ENTMCNC: 28.7 PG
MCHC RBC-ENTMCNC: 28.8 PG
MCHC RBC-ENTMCNC: 30.1 G/DL — LOW (ref 32–37)
MCHC RBC-ENTMCNC: 30.2 G/DL — LOW (ref 32–37)
MCHC RBC-ENTMCNC: 30.3 G/DL — LOW (ref 32–37)
MCHC RBC-ENTMCNC: 30.4 G/DL — LOW (ref 32–37)
MCHC RBC-ENTMCNC: 30.5 G/DL — LOW (ref 32–37)
MCHC RBC-ENTMCNC: 30.5 G/DL — LOW (ref 32–37)
MCHC RBC-ENTMCNC: 30.6 G/DL — LOW (ref 32–37)
MCHC RBC-ENTMCNC: 30.7 G/DL — LOW (ref 32–37)
MCHC RBC-ENTMCNC: 30.8 G/DL — LOW (ref 32–37)
MCHC RBC-ENTMCNC: 30.8 G/DL — LOW (ref 32–37)
MCHC RBC-ENTMCNC: 31 G/DL — LOW (ref 32–37)
MCHC RBC-ENTMCNC: 31.4 G/DL — LOW (ref 32–37)
MCHC RBC-ENTMCNC: 31.7 G/DL
MCHC RBC-ENTMCNC: 32 G/DL
MCV RBC AUTO: 87.7 FL — SIGNIFICANT CHANGE UP (ref 81–99)
MCV RBC AUTO: 87.8 FL — SIGNIFICANT CHANGE UP (ref 81–99)
MCV RBC AUTO: 88.5 FL — SIGNIFICANT CHANGE UP (ref 81–99)
MCV RBC AUTO: 88.9 FL — SIGNIFICANT CHANGE UP (ref 81–99)
MCV RBC AUTO: 89 FL — SIGNIFICANT CHANGE UP (ref 81–99)
MCV RBC AUTO: 89.1 FL — SIGNIFICANT CHANGE UP (ref 81–99)
MCV RBC AUTO: 89.5 FL — SIGNIFICANT CHANGE UP (ref 81–99)
MCV RBC AUTO: 89.5 FL — SIGNIFICANT CHANGE UP (ref 81–99)
MCV RBC AUTO: 89.6 FL — SIGNIFICANT CHANGE UP (ref 81–99)
MCV RBC AUTO: 89.8 FL — SIGNIFICANT CHANGE UP (ref 81–99)
MCV RBC AUTO: 89.9 FL
MCV RBC AUTO: 90 FL — SIGNIFICANT CHANGE UP (ref 81–99)
MCV RBC AUTO: 90.1 FL — SIGNIFICANT CHANGE UP (ref 81–99)
MCV RBC AUTO: 90.6 FL — SIGNIFICANT CHANGE UP (ref 81–99)
MCV RBC AUTO: 90.8 FL
MCV RBC AUTO: 92 FL — SIGNIFICANT CHANGE UP (ref 81–99)
METHOD TYPE: SIGNIFICANT CHANGE UP
METHYLMALONATE SERPL-SCNC: 189 NMOL/L
MONOCYTES # BLD AUTO: 0.71 K/UL — HIGH (ref 0.1–0.6)
MONOCYTES # BLD AUTO: 0.88 K/UL — HIGH (ref 0.1–0.6)
MONOCYTES # BLD AUTO: 0.88 K/UL — HIGH (ref 0.1–0.6)
MONOCYTES # BLD AUTO: 0.9 K/UL — HIGH (ref 0.1–0.6)
MONOCYTES # BLD AUTO: 0.91 K/UL — HIGH (ref 0.1–0.6)
MONOCYTES # BLD AUTO: 0.93 K/UL — HIGH (ref 0.1–0.6)
MONOCYTES # BLD AUTO: 0.94 K/UL — HIGH (ref 0.1–0.6)
MONOCYTES # BLD AUTO: 0.95 K/UL — HIGH (ref 0.1–0.6)
MONOCYTES # BLD AUTO: 0.97 K/UL — HIGH (ref 0.1–0.6)
MONOCYTES # BLD AUTO: 0.97 K/UL — HIGH (ref 0.1–0.6)
MONOCYTES # BLD AUTO: 0.98 K/UL — HIGH (ref 0.1–0.6)
MONOCYTES # BLD AUTO: 1.13 K/UL — HIGH (ref 0.1–0.6)
MONOCYTES # BLD AUTO: 1.27 K/UL — HIGH (ref 0.1–0.6)
MONOCYTES NFR BLD AUTO: 10.2 % — HIGH (ref 1.7–9.3)
MONOCYTES NFR BLD AUTO: 10.3 % — HIGH (ref 1.7–9.3)
MONOCYTES NFR BLD AUTO: 10.9 % — HIGH (ref 1.7–9.3)
MONOCYTES NFR BLD AUTO: 11.1 % — HIGH (ref 1.7–9.3)
MONOCYTES NFR BLD AUTO: 11.3 % — HIGH (ref 1.7–9.3)
MONOCYTES NFR BLD AUTO: 11.6 % — HIGH (ref 1.7–9.3)
MONOCYTES NFR BLD AUTO: 4.7 % — SIGNIFICANT CHANGE UP (ref 1.7–9.3)
MONOCYTES NFR BLD AUTO: 6.9 % — SIGNIFICANT CHANGE UP (ref 1.7–9.3)
MONOCYTES NFR BLD AUTO: 9 % — SIGNIFICANT CHANGE UP (ref 1.7–9.3)
MONOCYTES NFR BLD AUTO: 9.2 % — SIGNIFICANT CHANGE UP (ref 1.7–9.3)
MONOCYTES NFR BLD AUTO: 9.4 % — HIGH (ref 1.7–9.3)
MONOCYTES NFR BLD AUTO: 9.4 % — HIGH (ref 1.7–9.3)
MONOCYTES NFR BLD AUTO: 9.7 % — HIGH (ref 1.7–9.3)
NEUTROPHILS # BLD AUTO: 10.74 K/UL — HIGH (ref 1.4–6.5)
NEUTROPHILS # BLD AUTO: 12.25 K/UL — HIGH (ref 1.4–6.5)
NEUTROPHILS # BLD AUTO: 18.39 K/UL — HIGH (ref 1.4–6.5)
NEUTROPHILS # BLD AUTO: 6.03 K/UL — SIGNIFICANT CHANGE UP (ref 1.4–6.5)
NEUTROPHILS # BLD AUTO: 6.14 K/UL — SIGNIFICANT CHANGE UP (ref 1.4–6.5)
NEUTROPHILS # BLD AUTO: 6.82 K/UL — HIGH (ref 1.4–6.5)
NEUTROPHILS # BLD AUTO: 6.83 K/UL — HIGH (ref 1.4–6.5)
NEUTROPHILS # BLD AUTO: 6.89 K/UL — HIGH (ref 1.4–6.5)
NEUTROPHILS # BLD AUTO: 7.09 K/UL — HIGH (ref 1.4–6.5)
NEUTROPHILS # BLD AUTO: 8.14 K/UL — HIGH (ref 1.4–6.5)
NEUTROPHILS # BLD AUTO: 8.26 K/UL — HIGH (ref 1.4–6.5)
NEUTROPHILS # BLD AUTO: 8.35 K/UL — HIGH (ref 1.4–6.5)
NEUTROPHILS # BLD AUTO: 9 K/UL — HIGH (ref 1.4–6.5)
NEUTROPHILS NFR BLD AUTO: 78.8 % — HIGH (ref 42.2–75.2)
NEUTROPHILS NFR BLD AUTO: 79.2 % — HIGH (ref 42.2–75.2)
NEUTROPHILS NFR BLD AUTO: 79.2 % — HIGH (ref 42.2–75.2)
NEUTROPHILS NFR BLD AUTO: 79.8 % — HIGH (ref 42.2–75.2)
NEUTROPHILS NFR BLD AUTO: 80.1 % — HIGH (ref 42.2–75.2)
NEUTROPHILS NFR BLD AUTO: 81.2 % — HIGH (ref 42.2–75.2)
NEUTROPHILS NFR BLD AUTO: 81.6 % — HIGH (ref 42.2–75.2)
NEUTROPHILS NFR BLD AUTO: 81.9 % — HIGH (ref 42.2–75.2)
NEUTROPHILS NFR BLD AUTO: 81.9 % — HIGH (ref 42.2–75.2)
NEUTROPHILS NFR BLD AUTO: 83.3 % — HIGH (ref 42.2–75.2)
NEUTROPHILS NFR BLD AUTO: 83.4 % — HIGH (ref 42.2–75.2)
NEUTROPHILS NFR BLD AUTO: 86.8 % — HIGH (ref 42.2–75.2)
NEUTROPHILS NFR BLD AUTO: 91.3 % — HIGH (ref 42.2–75.2)
NITRITE UR-MCNC: POSITIVE
NRBC # BLD: 0 /100 WBCS — SIGNIFICANT CHANGE UP (ref 0–0)
ORGANISM # SPEC MICROSCOPIC CNT: SIGNIFICANT CHANGE UP
ORGANISM # SPEC MICROSCOPIC CNT: SIGNIFICANT CHANGE UP
PCO2 BLDV: 40 MMHG — LOW (ref 41–51)
PCO2 BLDV: 43 MMHG — SIGNIFICANT CHANGE UP (ref 41–51)
PH BLDV: 7.41 — SIGNIFICANT CHANGE UP (ref 7.26–7.43)
PH BLDV: 7.41 — SIGNIFICANT CHANGE UP (ref 7.26–7.43)
PH UR: 6 — SIGNIFICANT CHANGE UP (ref 5–8)
PHOSPHATE SERPL-MCNC: 3.7 MG/DL — SIGNIFICANT CHANGE UP (ref 2.1–4.9)
PHOSPHATE SERPL-MCNC: 3.7 MG/DL — SIGNIFICANT CHANGE UP (ref 2.1–4.9)
PHOSPHATE SERPL-MCNC: 3.9 MG/DL — SIGNIFICANT CHANGE UP (ref 2.1–4.9)
PLATELET # BLD AUTO: 289 K/UL — SIGNIFICANT CHANGE UP (ref 130–400)
PLATELET # BLD AUTO: 303 K/UL — SIGNIFICANT CHANGE UP (ref 130–400)
PLATELET # BLD AUTO: 324 K/UL — SIGNIFICANT CHANGE UP (ref 130–400)
PLATELET # BLD AUTO: 325 K/UL — SIGNIFICANT CHANGE UP (ref 130–400)
PLATELET # BLD AUTO: 341 K/UL — SIGNIFICANT CHANGE UP (ref 130–400)
PLATELET # BLD AUTO: 344 K/UL — SIGNIFICANT CHANGE UP (ref 130–400)
PLATELET # BLD AUTO: 365 K/UL — SIGNIFICANT CHANGE UP (ref 130–400)
PLATELET # BLD AUTO: 375 K/UL — SIGNIFICANT CHANGE UP (ref 130–400)
PLATELET # BLD AUTO: 378 K/UL
PLATELET # BLD AUTO: 397 K/UL — SIGNIFICANT CHANGE UP (ref 130–400)
PLATELET # BLD AUTO: 411 K/UL — HIGH (ref 130–400)
PLATELET # BLD AUTO: 412 K/UL — HIGH (ref 130–400)
PLATELET # BLD AUTO: 413 K/UL — HIGH (ref 130–400)
PLATELET # BLD AUTO: 414 K/UL — HIGH (ref 130–400)
PLATELET # BLD AUTO: 424 K/UL
PLATELET # BLD AUTO: 443 K/UL — HIGH (ref 130–400)
PLATELET # BLD AUTO: 455 K/UL — HIGH (ref 130–400)
PLATELET # BLD AUTO: 469 K/UL — HIGH (ref 130–400)
PMV BLD: 10.4 FL
PMV BLD: 10.9 FL
PO2 BLDV: 23 MMHG — SIGNIFICANT CHANGE UP (ref 20–40)
PO2 BLDV: 41 MMHG — HIGH (ref 20–40)
POTASSIUM BLDV-SCNC: 3.5 MMOL/L — SIGNIFICANT CHANGE UP (ref 3.3–5.6)
POTASSIUM BLDV-SCNC: 3.7 MMOL/L — SIGNIFICANT CHANGE UP (ref 3.3–5.6)
POTASSIUM SERPL-MCNC: 3.6 MMOL/L — SIGNIFICANT CHANGE UP (ref 3.5–5)
POTASSIUM SERPL-MCNC: 4 MMOL/L — SIGNIFICANT CHANGE UP (ref 3.5–5)
POTASSIUM SERPL-MCNC: 4.2 MMOL/L — SIGNIFICANT CHANGE UP (ref 3.5–5)
POTASSIUM SERPL-MCNC: 4.2 MMOL/L — SIGNIFICANT CHANGE UP (ref 3.5–5)
POTASSIUM SERPL-MCNC: 4.3 MMOL/L — SIGNIFICANT CHANGE UP (ref 3.5–5)
POTASSIUM SERPL-MCNC: 4.3 MMOL/L — SIGNIFICANT CHANGE UP (ref 3.5–5)
POTASSIUM SERPL-MCNC: 4.4 MMOL/L — SIGNIFICANT CHANGE UP (ref 3.5–5)
POTASSIUM SERPL-MCNC: 4.4 MMOL/L — SIGNIFICANT CHANGE UP (ref 3.5–5)
POTASSIUM SERPL-MCNC: 4.6 MMOL/L — SIGNIFICANT CHANGE UP (ref 3.5–5)
POTASSIUM SERPL-MCNC: 4.6 MMOL/L — SIGNIFICANT CHANGE UP (ref 3.5–5)
POTASSIUM SERPL-MCNC: 4.8 MMOL/L — SIGNIFICANT CHANGE UP (ref 3.5–5)
POTASSIUM SERPL-SCNC: 3.6 MMOL/L — SIGNIFICANT CHANGE UP (ref 3.5–5)
POTASSIUM SERPL-SCNC: 3.9 MMOL/L
POTASSIUM SERPL-SCNC: 4 MMOL/L — SIGNIFICANT CHANGE UP (ref 3.5–5)
POTASSIUM SERPL-SCNC: 4.2 MMOL/L — SIGNIFICANT CHANGE UP (ref 3.5–5)
POTASSIUM SERPL-SCNC: 4.2 MMOL/L — SIGNIFICANT CHANGE UP (ref 3.5–5)
POTASSIUM SERPL-SCNC: 4.3 MMOL/L
POTASSIUM SERPL-SCNC: 4.3 MMOL/L — SIGNIFICANT CHANGE UP (ref 3.5–5)
POTASSIUM SERPL-SCNC: 4.3 MMOL/L — SIGNIFICANT CHANGE UP (ref 3.5–5)
POTASSIUM SERPL-SCNC: 4.4 MMOL/L — SIGNIFICANT CHANGE UP (ref 3.5–5)
POTASSIUM SERPL-SCNC: 4.4 MMOL/L — SIGNIFICANT CHANGE UP (ref 3.5–5)
POTASSIUM SERPL-SCNC: 4.6 MMOL/L — SIGNIFICANT CHANGE UP (ref 3.5–5)
POTASSIUM SERPL-SCNC: 4.6 MMOL/L — SIGNIFICANT CHANGE UP (ref 3.5–5)
POTASSIUM SERPL-SCNC: 4.7 MMOL/L
POTASSIUM SERPL-SCNC: 4.8 MMOL/L — SIGNIFICANT CHANGE UP (ref 3.5–5)
PROT SERPL-MCNC: 4.4 G/DL — LOW (ref 6–8)
PROT SERPL-MCNC: 4.4 G/DL — LOW (ref 6–8)
PROT SERPL-MCNC: 4.5 G/DL — LOW (ref 6–8)
PROT SERPL-MCNC: 4.6 G/DL — LOW (ref 6–8)
PROT SERPL-MCNC: 4.7 G/DL — LOW (ref 6–8)
PROT SERPL-MCNC: 4.8 G/DL — LOW (ref 6–8)
PROT SERPL-MCNC: 4.9 G/DL — LOW (ref 6–8)
PROT SERPL-MCNC: 5.3 G/DL — LOW (ref 6–8)
PROT SERPL-MCNC: 5.9 G/DL — LOW (ref 6–8)
PROT SERPL-MCNC: 6 G/DL
PROT SERPL-MCNC: 6.6 G/DL
PROT UR-MCNC: SIGNIFICANT CHANGE UP
PROTHROM AB SERPL-ACNC: 15 SEC — HIGH (ref 9.95–12.87)
RBC # BLD: 2.44 M/UL — LOW (ref 4.2–5.4)
RBC # BLD: 2.71 M/UL — LOW (ref 4.2–5.4)
RBC # BLD: 2.74 M/UL — LOW (ref 4.2–5.4)
RBC # BLD: 2.87 M/UL — LOW (ref 4.2–5.4)
RBC # BLD: 2.94 M/UL — LOW (ref 4.2–5.4)
RBC # BLD: 2.95 M/UL — LOW (ref 4.2–5.4)
RBC # BLD: 3.09 M/UL — LOW (ref 4.2–5.4)
RBC # BLD: 3.17 M/UL — LOW (ref 4.2–5.4)
RBC # BLD: 3.17 M/UL — LOW (ref 4.2–5.4)
RBC # BLD: 3.27 M/UL — LOW (ref 4.2–5.4)
RBC # BLD: 3.3 M/UL — LOW (ref 4.2–5.4)
RBC # BLD: 3.31 M/UL — LOW (ref 4.2–5.4)
RBC # BLD: 3.32 M/UL — LOW (ref 4.2–5.4)
RBC # BLD: 3.37 M/UL — LOW (ref 4.2–5.4)
RBC # BLD: 3.4 M/UL — LOW (ref 4.2–5.4)
RBC # BLD: 3.53 M/UL — LOW (ref 4.2–5.4)
RBC # BLD: 3.54 M/UL — LOW (ref 4.2–5.4)
RBC # BLD: 3.68 M/UL
RBC # BLD: 4.35 M/UL
RBC # FLD: 13.1 %
RBC # FLD: 14.3 %
RBC # FLD: 15.4 % — HIGH (ref 11.5–14.5)
RBC # FLD: 15.5 % — HIGH (ref 11.5–14.5)
RBC # FLD: 15.8 % — HIGH (ref 11.5–14.5)
RBC # FLD: 15.8 % — HIGH (ref 11.5–14.5)
RBC # FLD: 15.9 % — HIGH (ref 11.5–14.5)
RBC # FLD: 15.9 % — HIGH (ref 11.5–14.5)
RBC # FLD: 16.1 % — HIGH (ref 11.5–14.5)
RBC # FLD: 16.3 % — HIGH (ref 11.5–14.5)
RBC # FLD: 16.4 % — HIGH (ref 11.5–14.5)
RBC # FLD: 16.6 % — HIGH (ref 11.5–14.5)
RBC # FLD: 16.8 % — HIGH (ref 11.5–14.5)
RBC # FLD: 17 % — HIGH (ref 11.5–14.5)
RBC # FLD: 17 % — HIGH (ref 11.5–14.5)
RBC # FLD: 17.2 % — HIGH (ref 11.5–14.5)
RBC CASTS # UR COMP ASSIST: 4 /HPF — SIGNIFICANT CHANGE UP (ref 0–4)
RETICS #: 120.1 K/UL — SIGNIFICANT CHANGE UP (ref 25–125)
RETICS/RBC NFR: 3.8 % — HIGH (ref 0.5–1.5)
SAO2 % BLDV: 35 % — SIGNIFICANT CHANGE UP
SAO2 % BLDV: 72 % — SIGNIFICANT CHANGE UP
SODIUM SERPL-SCNC: 132 MMOL/L
SODIUM SERPL-SCNC: 134 MMOL/L
SODIUM SERPL-SCNC: 136 MMOL/L — SIGNIFICANT CHANGE UP (ref 135–146)
SODIUM SERPL-SCNC: 137 MMOL/L — SIGNIFICANT CHANGE UP (ref 135–146)
SODIUM SERPL-SCNC: 138 MMOL/L — SIGNIFICANT CHANGE UP (ref 135–146)
SODIUM SERPL-SCNC: 139 MMOL/L
SODIUM SERPL-SCNC: 139 MMOL/L — SIGNIFICANT CHANGE UP (ref 135–146)
SODIUM SERPL-SCNC: 139 MMOL/L — SIGNIFICANT CHANGE UP (ref 135–146)
SODIUM SERPL-SCNC: 140 MMOL/L — SIGNIFICANT CHANGE UP (ref 135–146)
SODIUM SERPL-SCNC: 140 MMOL/L — SIGNIFICANT CHANGE UP (ref 135–146)
SODIUM SERPL-SCNC: 143 MMOL/L — SIGNIFICANT CHANGE UP (ref 135–146)
SP GR SPEC: 1.01 — SIGNIFICANT CHANGE UP (ref 1.01–1.02)
SPECIMEN SOURCE: SIGNIFICANT CHANGE UP
SURGICAL PATHOLOGY STUDY: SIGNIFICANT CHANGE UP
TIBC SERPL-MCNC: 183 UG/DL — LOW (ref 220–430)
TIBC SERPL-MCNC: 190 UG/DL
TRANSFERRIN SERPL-MCNC: 154 MG/DL — LOW (ref 200–360)
TROPONIN T SERPL-MCNC: <0.01 NG/ML — SIGNIFICANT CHANGE UP
TROPONIN T SERPL-MCNC: <0.01 NG/ML — SIGNIFICANT CHANGE UP
TSH SERPL-ACNC: 2.22 UIU/ML
TSH SERPL-MCNC: 1.88 UIU/ML — SIGNIFICANT CHANGE UP (ref 0.27–4.2)
UIBC SERPL-MCNC: 115 UG/DL
UIBC SERPL-MCNC: 166 UG/DL — SIGNIFICANT CHANGE UP (ref 110–370)
UROBILINOGEN FLD QL: SIGNIFICANT CHANGE UP
VIT B12 SERPL-MCNC: 510 PG/ML
VIT B12 SERPL-MCNC: 576 PG/ML
VIT B12 SERPL-MCNC: 584 PG/ML — SIGNIFICANT CHANGE UP (ref 232–1245)
WBC # BLD: 10.01 K/UL — SIGNIFICANT CHANGE UP (ref 4.8–10.8)
WBC # BLD: 10.52 K/UL — SIGNIFICANT CHANGE UP (ref 4.8–10.8)
WBC # BLD: 10.98 K/UL — HIGH (ref 4.8–10.8)
WBC # BLD: 12.17 K/UL — HIGH (ref 4.8–10.8)
WBC # BLD: 13.16 K/UL — HIGH (ref 4.8–10.8)
WBC # BLD: 14.1 K/UL — HIGH (ref 4.8–10.8)
WBC # BLD: 20.14 K/UL — HIGH (ref 4.8–10.8)
WBC # BLD: 7.56 K/UL — SIGNIFICANT CHANGE UP (ref 4.8–10.8)
WBC # BLD: 7.61 K/UL — SIGNIFICANT CHANGE UP (ref 4.8–10.8)
WBC # BLD: 8.53 K/UL — SIGNIFICANT CHANGE UP (ref 4.8–10.8)
WBC # BLD: 8.65 K/UL — SIGNIFICANT CHANGE UP (ref 4.8–10.8)
WBC # BLD: 8.69 K/UL — SIGNIFICANT CHANGE UP (ref 4.8–10.8)
WBC # BLD: 8.88 K/UL — SIGNIFICANT CHANGE UP (ref 4.8–10.8)
WBC # BLD: 9.32 K/UL — SIGNIFICANT CHANGE UP (ref 4.8–10.8)
WBC # BLD: 9.92 K/UL — SIGNIFICANT CHANGE UP (ref 4.8–10.8)
WBC # BLD: 9.94 K/UL — SIGNIFICANT CHANGE UP (ref 4.8–10.8)
WBC # FLD AUTO: 10.01 K/UL — SIGNIFICANT CHANGE UP (ref 4.8–10.8)
WBC # FLD AUTO: 10.52 K/UL — SIGNIFICANT CHANGE UP (ref 4.8–10.8)
WBC # FLD AUTO: 10.98 K/UL — HIGH (ref 4.8–10.8)
WBC # FLD AUTO: 12.17 K/UL — HIGH (ref 4.8–10.8)
WBC # FLD AUTO: 12.94 K/UL
WBC # FLD AUTO: 13.16 K/UL — HIGH (ref 4.8–10.8)
WBC # FLD AUTO: 14.1 K/UL — HIGH (ref 4.8–10.8)
WBC # FLD AUTO: 20.14 K/UL — HIGH (ref 4.8–10.8)
WBC # FLD AUTO: 7.56 K/UL — SIGNIFICANT CHANGE UP (ref 4.8–10.8)
WBC # FLD AUTO: 7.61 K/UL — SIGNIFICANT CHANGE UP (ref 4.8–10.8)
WBC # FLD AUTO: 8.53 K/UL — SIGNIFICANT CHANGE UP (ref 4.8–10.8)
WBC # FLD AUTO: 8.58 K/UL
WBC # FLD AUTO: 8.65 K/UL — SIGNIFICANT CHANGE UP (ref 4.8–10.8)
WBC # FLD AUTO: 8.69 K/UL — SIGNIFICANT CHANGE UP (ref 4.8–10.8)
WBC # FLD AUTO: 8.88 K/UL — SIGNIFICANT CHANGE UP (ref 4.8–10.8)
WBC # FLD AUTO: 9.32 K/UL — SIGNIFICANT CHANGE UP (ref 4.8–10.8)
WBC # FLD AUTO: 9.92 K/UL — SIGNIFICANT CHANGE UP (ref 4.8–10.8)
WBC # FLD AUTO: 9.94 K/UL — SIGNIFICANT CHANGE UP (ref 4.8–10.8)
WBC UR QL: 3 /HPF — SIGNIFICANT CHANGE UP (ref 0–5)

## 2019-01-01 PROCEDURE — 99231 SBSQ HOSP IP/OBS SF/LOW 25: CPT

## 2019-01-01 PROCEDURE — 99214 OFFICE O/P EST MOD 30 MIN: CPT

## 2019-01-01 PROCEDURE — 99233 SBSQ HOSP IP/OBS HIGH 50: CPT

## 2019-01-01 PROCEDURE — 93970 EXTREMITY STUDY: CPT | Mod: 26

## 2019-01-01 PROCEDURE — 78227 HEPATOBIL SYST IMAGE W/DRUG: CPT | Mod: 26

## 2019-01-01 PROCEDURE — 72156 MRI NECK SPINE W/O & W/DYE: CPT | Mod: 26

## 2019-01-01 PROCEDURE — 74177 CT ABD & PELVIS W/CONTRAST: CPT | Mod: 26

## 2019-01-01 PROCEDURE — 72170 X-RAY EXAM OF PELVIS: CPT | Mod: 26

## 2019-01-01 PROCEDURE — 73552 X-RAY EXAM OF FEMUR 2/>: CPT | Mod: 26,RT

## 2019-01-01 PROCEDURE — 70553 MRI BRAIN STEM W/O & W/DYE: CPT | Mod: 26

## 2019-01-01 PROCEDURE — 71045 X-RAY EXAM CHEST 1 VIEW: CPT | Mod: 26

## 2019-01-01 PROCEDURE — 88312 SPECIAL STAINS GROUP 1: CPT | Mod: 26

## 2019-01-01 PROCEDURE — 71260 CT THORAX DX C+: CPT | Mod: 26

## 2019-01-01 PROCEDURE — 76700 US EXAM ABDOM COMPLETE: CPT | Mod: 26

## 2019-01-01 PROCEDURE — G9001: CPT

## 2019-01-01 PROCEDURE — 99291 CRITICAL CARE FIRST HOUR: CPT

## 2019-01-01 PROCEDURE — 88305 TISSUE EXAM BY PATHOLOGIST: CPT | Mod: 26

## 2019-01-01 PROCEDURE — 99285 EMERGENCY DEPT VISIT HI MDM: CPT

## 2019-01-01 PROCEDURE — 73562 X-RAY EXAM OF KNEE 3: CPT | Mod: 26,RT

## 2019-01-01 PROCEDURE — 93010 ELECTROCARDIOGRAM REPORT: CPT

## 2019-01-01 PROCEDURE — 99222 1ST HOSP IP/OBS MODERATE 55: CPT

## 2019-01-01 PROCEDURE — 71250 CT THORAX DX C-: CPT | Mod: 26

## 2019-01-01 PROCEDURE — 74176 CT ABD & PELVIS W/O CONTRAST: CPT | Mod: 26

## 2019-01-01 PROCEDURE — 93010 ELECTROCARDIOGRAM REPORT: CPT | Mod: 76

## 2019-01-01 RX ORDER — FUROSEMIDE 40 MG
1 TABLET ORAL
Qty: 0 | Refills: 0 | DISCHARGE
Start: 2019-01-01

## 2019-01-01 RX ORDER — IRON SUCROSE 20 MG/ML
10 INJECTION, SOLUTION INTRAVENOUS
Qty: 40 | Refills: 0
Start: 2019-01-01 | End: 2020-01-01

## 2019-01-01 RX ORDER — SODIUM CHLORIDE 9 MG/ML
1000 INJECTION INTRAMUSCULAR; INTRAVENOUS; SUBCUTANEOUS ONCE
Refills: 0 | Status: COMPLETED | OUTPATIENT
Start: 2019-01-01 | End: 2019-01-01

## 2019-01-01 RX ORDER — ACETAMINOPHEN 500 MG
650 TABLET ORAL EVERY 6 HOURS
Refills: 0 | Status: DISCONTINUED | OUTPATIENT
Start: 2019-01-01 | End: 2019-01-01

## 2019-01-01 RX ORDER — SODIUM CHLORIDE 9 MG/ML
2200 INJECTION INTRAMUSCULAR; INTRAVENOUS; SUBCUTANEOUS ONCE
Refills: 0 | Status: COMPLETED | OUTPATIENT
Start: 2019-01-01 | End: 2019-01-01

## 2019-01-01 RX ORDER — ENOXAPARIN SODIUM 100 MG/ML
40 INJECTION SUBCUTANEOUS DAILY
Refills: 0 | Status: DISCONTINUED | OUTPATIENT
Start: 2019-01-01 | End: 2019-01-01

## 2019-01-01 RX ORDER — DEXTROSE 50 % IN WATER 50 %
12.5 SYRINGE (ML) INTRAVENOUS ONCE
Refills: 0 | Status: DISCONTINUED | OUTPATIENT
Start: 2019-01-01 | End: 2019-01-01

## 2019-01-01 RX ORDER — DALFAMPRIDINE 10 MG/1
10 TABLET, FILM COATED, EXTENDED RELEASE ORAL EVERY 12 HOURS
Refills: 0 | Status: DISCONTINUED | OUTPATIENT
Start: 2019-01-01 | End: 2019-01-01

## 2019-01-01 RX ORDER — PANTOPRAZOLE SODIUM 20 MG/1
1 TABLET, DELAYED RELEASE ORAL
Qty: 0 | Refills: 0 | DISCHARGE

## 2019-01-01 RX ORDER — TAMSULOSIN HYDROCHLORIDE 0.4 MG/1
0.4 CAPSULE ORAL AT BEDTIME
Refills: 0 | Status: DISCONTINUED | OUTPATIENT
Start: 2019-01-01 | End: 2019-01-01

## 2019-01-01 RX ORDER — METOCLOPRAMIDE HCL 10 MG
10 TABLET ORAL ONCE
Refills: 0 | Status: COMPLETED | OUTPATIENT
Start: 2019-01-01 | End: 2019-01-01

## 2019-01-01 RX ORDER — ONDANSETRON 8 MG/1
8 TABLET ORAL
Qty: 30 | Refills: 2 | Status: ACTIVE | COMMUNITY
Start: 2018-08-09 | End: 1900-01-01

## 2019-01-01 RX ORDER — CHLORHEXIDINE GLUCONATE 213 G/1000ML
1 SOLUTION TOPICAL
Refills: 0 | Status: DISCONTINUED | OUTPATIENT
Start: 2019-01-01 | End: 2019-01-01

## 2019-01-01 RX ORDER — ALEMTUZUMAB 12 MG/1.2ML
INJECTION, SOLUTION, CONCENTRATE INTRAVENOUS
Refills: 0 | Status: ACTIVE | COMMUNITY

## 2019-01-01 RX ORDER — SODIUM CHLORIDE 9 MG/ML
1000 INJECTION, SOLUTION INTRAVENOUS
Refills: 0 | Status: DISCONTINUED | OUTPATIENT
Start: 2019-01-01 | End: 2019-01-01

## 2019-01-01 RX ORDER — METOCLOPRAMIDE HCL 10 MG
10 TABLET ORAL
Refills: 0 | Status: DISCONTINUED | OUTPATIENT
Start: 2019-01-01 | End: 2019-01-01

## 2019-01-01 RX ORDER — ONDANSETRON 8 MG/1
4 TABLET, FILM COATED ORAL ONCE
Refills: 0 | Status: COMPLETED | OUTPATIENT
Start: 2019-01-01 | End: 2019-01-01

## 2019-01-01 RX ORDER — ONDANSETRON 8 MG/1
8 TABLET, FILM COATED ORAL THREE TIMES A DAY
Refills: 0 | Status: DISCONTINUED | OUTPATIENT
Start: 2019-01-01 | End: 2019-01-01

## 2019-01-01 RX ORDER — LANOLIN ALCOHOL/MO/W.PET/CERES
5 CREAM (GRAM) TOPICAL AT BEDTIME
Refills: 0 | Status: DISCONTINUED | OUTPATIENT
Start: 2019-01-01 | End: 2019-01-01

## 2019-01-01 RX ORDER — GLUCAGON INJECTION, SOLUTION 0.5 MG/.1ML
1 INJECTION, SOLUTION SUBCUTANEOUS ONCE
Refills: 0 | Status: DISCONTINUED | OUTPATIENT
Start: 2019-01-01 | End: 2019-01-01

## 2019-01-01 RX ORDER — PANTOPRAZOLE SODIUM 20 MG/1
40 TABLET, DELAYED RELEASE ORAL
Refills: 0 | Status: DISCONTINUED | OUTPATIENT
Start: 2019-01-01 | End: 2019-01-01

## 2019-01-01 RX ORDER — DALFAMPRIDINE 10 MG/1
1 TABLET, FILM COATED, EXTENDED RELEASE ORAL
Qty: 0 | Refills: 0 | DISCHARGE

## 2019-01-01 RX ORDER — IRON SUCROSE 20 MG/ML
200 INJECTION, SOLUTION INTRAVENOUS ONCE
Refills: 0 | Status: COMPLETED | OUTPATIENT
Start: 2019-01-01 | End: 2019-01-01

## 2019-01-01 RX ORDER — FUROSEMIDE 40 MG
20 TABLET ORAL ONCE
Refills: 0 | Status: DISCONTINUED | OUTPATIENT
Start: 2019-01-01 | End: 2019-01-01

## 2019-01-01 RX ORDER — SODIUM CHLORIDE 9 MG/ML
500 INJECTION INTRAMUSCULAR; INTRAVENOUS; SUBCUTANEOUS ONCE
Refills: 0 | Status: COMPLETED | OUTPATIENT
Start: 2019-01-01 | End: 2019-01-01

## 2019-01-01 RX ORDER — CEFTRIAXONE 500 MG/1
1000 INJECTION, POWDER, FOR SOLUTION INTRAMUSCULAR; INTRAVENOUS EVERY 24 HOURS
Refills: 0 | Status: DISCONTINUED | OUTPATIENT
Start: 2019-01-01 | End: 2019-01-01

## 2019-01-01 RX ORDER — METFORMIN HYDROCHLORIDE 850 MG/1
0 TABLET ORAL
Qty: 0 | Refills: 0 | DISCHARGE

## 2019-01-01 RX ORDER — FUROSEMIDE 40 MG
20 TABLET ORAL ONCE
Refills: 0 | Status: COMPLETED | OUTPATIENT
Start: 2019-01-01 | End: 2019-01-01

## 2019-01-01 RX ORDER — ONDANSETRON 8 MG/1
4 TABLET, FILM COATED ORAL EVERY 8 HOURS
Refills: 0 | Status: DISCONTINUED | OUTPATIENT
Start: 2019-01-01 | End: 2019-01-01

## 2019-01-01 RX ORDER — ONDANSETRON 8 MG/1
4 TABLET, FILM COATED ORAL EVERY 6 HOURS
Refills: 0 | Status: DISCONTINUED | OUTPATIENT
Start: 2019-01-01 | End: 2019-01-01

## 2019-01-01 RX ORDER — LINACLOTIDE 145 UG/1
1 CAPSULE, GELATIN COATED ORAL
Qty: 0 | Refills: 0 | DISCHARGE

## 2019-01-01 RX ORDER — CHOLECALCIFEROL (VITAMIN D3) 125 MCG
1 CAPSULE ORAL
Qty: 0 | Refills: 0 | DISCHARGE

## 2019-01-01 RX ORDER — DEXTROSE 50 % IN WATER 50 %
25 SYRINGE (ML) INTRAVENOUS ONCE
Refills: 0 | Status: DISCONTINUED | OUTPATIENT
Start: 2019-01-01 | End: 2019-01-01

## 2019-01-01 RX ORDER — INSULIN GLARGINE 100 [IU]/ML
6 INJECTION, SOLUTION SUBCUTANEOUS AT BEDTIME
Refills: 0 | Status: DISCONTINUED | OUTPATIENT
Start: 2019-01-01 | End: 2019-01-01

## 2019-01-01 RX ORDER — FOLIC ACID 0.8 MG
1 TABLET ORAL DAILY
Refills: 0 | Status: DISCONTINUED | OUTPATIENT
Start: 2019-01-01 | End: 2019-01-01

## 2019-01-01 RX ORDER — METOCLOPRAMIDE 5 MG/1
TABLET ORAL
Refills: 0 | Status: ACTIVE | COMMUNITY

## 2019-01-01 RX ORDER — INSULIN LISPRO 100/ML
3 VIAL (ML) SUBCUTANEOUS
Refills: 0 | Status: DISCONTINUED | OUTPATIENT
Start: 2019-01-01 | End: 2019-01-01

## 2019-01-01 RX ORDER — CEFPODOXIME PROXETIL 100 MG
200 TABLET ORAL EVERY 12 HOURS
Refills: 0 | Status: DISCONTINUED | OUTPATIENT
Start: 2019-01-01 | End: 2019-01-01

## 2019-01-01 RX ORDER — OXYBUTYNIN CHLORIDE 5 MG
1 TABLET ORAL
Qty: 0 | Refills: 0 | DISCHARGE

## 2019-01-01 RX ORDER — DULOXETINE HYDROCHLORIDE 30 MG/1
60 CAPSULE, DELAYED RELEASE ORAL DAILY
Refills: 0 | Status: DISCONTINUED | OUTPATIENT
Start: 2019-01-01 | End: 2019-01-01

## 2019-01-01 RX ORDER — FUROSEMIDE 40 MG
20 TABLET ORAL DAILY
Refills: 0 | Status: DISCONTINUED | OUTPATIENT
Start: 2019-01-01 | End: 2019-01-01

## 2019-01-01 RX ORDER — BACLOFEN 100 %
20 POWDER (GRAM) MISCELLANEOUS
Refills: 0 | Status: DISCONTINUED | OUTPATIENT
Start: 2019-01-01 | End: 2019-01-01

## 2019-01-01 RX ORDER — INSULIN LISPRO 100/ML
VIAL (ML) SUBCUTANEOUS
Refills: 0 | Status: DISCONTINUED | OUTPATIENT
Start: 2019-01-01 | End: 2019-01-01

## 2019-01-01 RX ORDER — IRON SUCROSE 20 MG/ML
200 INJECTION, SOLUTION INTRAVENOUS
Refills: 0 | Status: DISCONTINUED | OUTPATIENT
Start: 2019-01-01 | End: 2019-01-01

## 2019-01-01 RX ORDER — ROPINIROLE 8 MG/1
0.5 TABLET, FILM COATED, EXTENDED RELEASE ORAL THREE TIMES A DAY
Refills: 0 | Status: DISCONTINUED | OUTPATIENT
Start: 2019-01-01 | End: 2019-01-01

## 2019-01-01 RX ORDER — DEXTROSE 50 % IN WATER 50 %
15 SYRINGE (ML) INTRAVENOUS ONCE
Refills: 0 | Status: DISCONTINUED | OUTPATIENT
Start: 2019-01-01 | End: 2019-01-01

## 2019-01-01 RX ORDER — OXYBUTYNIN CHLORIDE 2.5 MG/1
TABLET ORAL
Refills: 0 | Status: ACTIVE | COMMUNITY

## 2019-01-01 RX ORDER — CEFTRIAXONE 500 MG/1
1000 INJECTION, POWDER, FOR SOLUTION INTRAMUSCULAR; INTRAVENOUS ONCE
Refills: 0 | Status: COMPLETED | OUTPATIENT
Start: 2019-01-01 | End: 2019-01-01

## 2019-01-01 RX ORDER — CHOLECALCIFEROL (VITAMIN D3) 125 MCG
1000 CAPSULE ORAL DAILY
Refills: 0 | Status: DISCONTINUED | OUTPATIENT
Start: 2019-01-01 | End: 2019-01-01

## 2019-01-01 RX ORDER — HEPARIN SODIUM 5000 [USP'U]/ML
5000 INJECTION INTRAVENOUS; SUBCUTANEOUS EVERY 8 HOURS
Refills: 0 | Status: DISCONTINUED | OUTPATIENT
Start: 2019-01-01 | End: 2019-01-01

## 2019-01-01 RX ORDER — INSULIN GLARGINE 100 [IU]/ML
10 INJECTION, SOLUTION SUBCUTANEOUS AT BEDTIME
Refills: 0 | Status: DISCONTINUED | OUTPATIENT
Start: 2019-01-01 | End: 2019-01-01

## 2019-01-01 RX ORDER — DALFAMPRIDINE 10 MG/1
1 TABLET, FILM COATED, EXTENDED RELEASE ORAL
Qty: 0 | Refills: 0 | DISCHARGE
Start: 2019-01-01

## 2019-01-01 RX ORDER — CEFPODOXIME PROXETIL 100 MG
200 TABLET ORAL EVERY 12 HOURS
Refills: 0 | Status: COMPLETED | OUTPATIENT
Start: 2019-01-01 | End: 2019-01-01

## 2019-01-01 RX ORDER — PANTOPRAZOLE SODIUM 20 MG/1
1 TABLET, DELAYED RELEASE ORAL
Qty: 0 | Refills: 0 | DISCHARGE
Start: 2019-01-01

## 2019-01-01 RX ORDER — INSULIN GLARGINE 100 [IU]/ML
4 INJECTION, SOLUTION SUBCUTANEOUS AT BEDTIME
Refills: 0 | Status: DISCONTINUED | OUTPATIENT
Start: 2019-01-01 | End: 2019-01-01

## 2019-01-01 RX ORDER — INSULIN GLARGINE 100 [IU]/ML
7 INJECTION, SOLUTION SUBCUTANEOUS AT BEDTIME
Refills: 0 | Status: DISCONTINUED | OUTPATIENT
Start: 2019-01-01 | End: 2019-01-01

## 2019-01-01 RX ADMIN — PANTOPRAZOLE SODIUM 40 MILLIGRAM(S): 20 TABLET, DELAYED RELEASE ORAL at 05:21

## 2019-01-01 RX ADMIN — ROPINIROLE 0.5 MILLIGRAM(S): 8 TABLET, FILM COATED, EXTENDED RELEASE ORAL at 14:27

## 2019-01-01 RX ADMIN — ROPINIROLE 0.5 MILLIGRAM(S): 8 TABLET, FILM COATED, EXTENDED RELEASE ORAL at 13:22

## 2019-01-01 RX ADMIN — Medication 20 MILLIGRAM(S): at 05:22

## 2019-01-01 RX ADMIN — Medication 200 MILLIGRAM(S): at 05:35

## 2019-01-01 RX ADMIN — Medication 1000 UNIT(S): at 11:53

## 2019-01-01 RX ADMIN — ROPINIROLE 0.5 MILLIGRAM(S): 8 TABLET, FILM COATED, EXTENDED RELEASE ORAL at 13:32

## 2019-01-01 RX ADMIN — PANTOPRAZOLE SODIUM 40 MILLIGRAM(S): 20 TABLET, DELAYED RELEASE ORAL at 17:37

## 2019-01-01 RX ADMIN — Medication 1000 UNIT(S): at 11:08

## 2019-01-01 RX ADMIN — Medication 1 MILLIGRAM(S): at 12:18

## 2019-01-01 RX ADMIN — IRON SUCROSE 110 MILLIGRAM(S): 20 INJECTION, SOLUTION INTRAVENOUS at 18:19

## 2019-01-01 RX ADMIN — Medication 1: at 17:20

## 2019-01-01 RX ADMIN — ROPINIROLE 0.5 MILLIGRAM(S): 8 TABLET, FILM COATED, EXTENDED RELEASE ORAL at 21:48

## 2019-01-01 RX ADMIN — DULOXETINE HYDROCHLORIDE 60 MILLIGRAM(S): 30 CAPSULE, DELAYED RELEASE ORAL at 12:18

## 2019-01-01 RX ADMIN — TAMSULOSIN HYDROCHLORIDE 0.4 MILLIGRAM(S): 0.4 CAPSULE ORAL at 21:54

## 2019-01-01 RX ADMIN — Medication 20 MILLIGRAM(S): at 05:06

## 2019-01-01 RX ADMIN — Medication 10 MILLIGRAM(S): at 17:17

## 2019-01-01 RX ADMIN — HEPARIN SODIUM 5000 UNIT(S): 5000 INJECTION INTRAVENOUS; SUBCUTANEOUS at 13:32

## 2019-01-01 RX ADMIN — HEPARIN SODIUM 5000 UNIT(S): 5000 INJECTION INTRAVENOUS; SUBCUTANEOUS at 05:41

## 2019-01-01 RX ADMIN — INSULIN GLARGINE 6 UNIT(S): 100 INJECTION, SOLUTION SUBCUTANEOUS at 21:48

## 2019-01-01 RX ADMIN — ROPINIROLE 0.5 MILLIGRAM(S): 8 TABLET, FILM COATED, EXTENDED RELEASE ORAL at 21:36

## 2019-01-01 RX ADMIN — Medication 10 MILLIGRAM(S): at 11:59

## 2019-01-01 RX ADMIN — ONDANSETRON 4 MILLIGRAM(S): 8 TABLET, FILM COATED ORAL at 20:43

## 2019-01-01 RX ADMIN — HEPARIN SODIUM 5000 UNIT(S): 5000 INJECTION INTRAVENOUS; SUBCUTANEOUS at 05:18

## 2019-01-01 RX ADMIN — ROPINIROLE 0.5 MILLIGRAM(S): 8 TABLET, FILM COATED, EXTENDED RELEASE ORAL at 13:36

## 2019-01-01 RX ADMIN — DULOXETINE HYDROCHLORIDE 60 MILLIGRAM(S): 30 CAPSULE, DELAYED RELEASE ORAL at 11:25

## 2019-01-01 RX ADMIN — Medication 1 MILLIGRAM(S): at 11:57

## 2019-01-01 RX ADMIN — Medication 1 MILLIGRAM(S): at 12:11

## 2019-01-01 RX ADMIN — DALFAMPRIDINE 10 MILLIGRAM(S): 10 TABLET, FILM COATED, EXTENDED RELEASE ORAL at 17:55

## 2019-01-01 RX ADMIN — PANTOPRAZOLE SODIUM 40 MILLIGRAM(S): 20 TABLET, DELAYED RELEASE ORAL at 05:06

## 2019-01-01 RX ADMIN — PANTOPRAZOLE SODIUM 40 MILLIGRAM(S): 20 TABLET, DELAYED RELEASE ORAL at 17:09

## 2019-01-01 RX ADMIN — Medication 10 MILLIGRAM(S): at 08:24

## 2019-01-01 RX ADMIN — CHLORHEXIDINE GLUCONATE 1 APPLICATION(S): 213 SOLUTION TOPICAL at 05:41

## 2019-01-01 RX ADMIN — Medication 10 MILLIGRAM(S): at 17:39

## 2019-01-01 RX ADMIN — PANTOPRAZOLE SODIUM 40 MILLIGRAM(S): 20 TABLET, DELAYED RELEASE ORAL at 05:18

## 2019-01-01 RX ADMIN — Medication 5 MILLIGRAM(S): at 01:51

## 2019-01-01 RX ADMIN — CHLORHEXIDINE GLUCONATE 1 APPLICATION(S): 213 SOLUTION TOPICAL at 05:21

## 2019-01-01 RX ADMIN — ROPINIROLE 0.5 MILLIGRAM(S): 8 TABLET, FILM COATED, EXTENDED RELEASE ORAL at 13:04

## 2019-01-01 RX ADMIN — CHLORHEXIDINE GLUCONATE 1 APPLICATION(S): 213 SOLUTION TOPICAL at 05:35

## 2019-01-01 RX ADMIN — Medication 650 MILLIGRAM(S): at 09:27

## 2019-01-01 RX ADMIN — ROPINIROLE 0.5 MILLIGRAM(S): 8 TABLET, FILM COATED, EXTENDED RELEASE ORAL at 21:22

## 2019-01-01 RX ADMIN — Medication 1000 UNIT(S): at 12:18

## 2019-01-01 RX ADMIN — HEPARIN SODIUM 5000 UNIT(S): 5000 INJECTION INTRAVENOUS; SUBCUTANEOUS at 05:06

## 2019-01-01 RX ADMIN — DULOXETINE HYDROCHLORIDE 60 MILLIGRAM(S): 30 CAPSULE, DELAYED RELEASE ORAL at 11:57

## 2019-01-01 RX ADMIN — Medication 20 MILLIGRAM(S): at 05:34

## 2019-01-01 RX ADMIN — Medication 1000 UNIT(S): at 11:32

## 2019-01-01 RX ADMIN — CEFTRIAXONE 100 MILLIGRAM(S): 500 INJECTION, POWDER, FOR SOLUTION INTRAMUSCULAR; INTRAVENOUS at 05:20

## 2019-01-01 RX ADMIN — HEPARIN SODIUM 5000 UNIT(S): 5000 INJECTION INTRAVENOUS; SUBCUTANEOUS at 13:04

## 2019-01-01 RX ADMIN — DULOXETINE HYDROCHLORIDE 60 MILLIGRAM(S): 30 CAPSULE, DELAYED RELEASE ORAL at 11:32

## 2019-01-01 RX ADMIN — ROPINIROLE 0.5 MILLIGRAM(S): 8 TABLET, FILM COATED, EXTENDED RELEASE ORAL at 05:18

## 2019-01-01 RX ADMIN — PANTOPRAZOLE SODIUM 40 MILLIGRAM(S): 20 TABLET, DELAYED RELEASE ORAL at 17:17

## 2019-01-01 RX ADMIN — Medication 200 MILLIGRAM(S): at 17:26

## 2019-01-01 RX ADMIN — PANTOPRAZOLE SODIUM 40 MILLIGRAM(S): 20 TABLET, DELAYED RELEASE ORAL at 08:37

## 2019-01-01 RX ADMIN — INSULIN GLARGINE 10 UNIT(S): 100 INJECTION, SOLUTION SUBCUTANEOUS at 21:35

## 2019-01-01 RX ADMIN — DULOXETINE HYDROCHLORIDE 60 MILLIGRAM(S): 30 CAPSULE, DELAYED RELEASE ORAL at 12:12

## 2019-01-01 RX ADMIN — PANTOPRAZOLE SODIUM 40 MILLIGRAM(S): 20 TABLET, DELAYED RELEASE ORAL at 17:26

## 2019-01-01 RX ADMIN — ROPINIROLE 0.5 MILLIGRAM(S): 8 TABLET, FILM COATED, EXTENDED RELEASE ORAL at 15:41

## 2019-01-01 RX ADMIN — Medication 1000 UNIT(S): at 11:19

## 2019-01-01 RX ADMIN — Medication 3 UNIT(S): at 17:10

## 2019-01-01 RX ADMIN — Medication 1 MILLIGRAM(S): at 11:34

## 2019-01-01 RX ADMIN — Medication 1: at 17:04

## 2019-01-01 RX ADMIN — Medication 200 MILLIGRAM(S): at 17:39

## 2019-01-01 RX ADMIN — Medication 20 MILLIGRAM(S): at 17:34

## 2019-01-01 RX ADMIN — TAMSULOSIN HYDROCHLORIDE 0.4 MILLIGRAM(S): 0.4 CAPSULE ORAL at 21:22

## 2019-01-01 RX ADMIN — ROPINIROLE 0.5 MILLIGRAM(S): 8 TABLET, FILM COATED, EXTENDED RELEASE ORAL at 21:54

## 2019-01-01 RX ADMIN — IRON SUCROSE 110 MILLIGRAM(S): 20 INJECTION, SOLUTION INTRAVENOUS at 18:33

## 2019-01-01 RX ADMIN — SODIUM CHLORIDE 1000 MILLILITER(S): 9 INJECTION INTRAMUSCULAR; INTRAVENOUS; SUBCUTANEOUS at 15:03

## 2019-01-01 RX ADMIN — ONDANSETRON 4 MILLIGRAM(S): 8 TABLET, FILM COATED ORAL at 11:55

## 2019-01-01 RX ADMIN — SODIUM CHLORIDE 1000 MILLILITER(S): 9 INJECTION INTRAMUSCULAR; INTRAVENOUS; SUBCUTANEOUS at 11:55

## 2019-01-01 RX ADMIN — ROPINIROLE 0.5 MILLIGRAM(S): 8 TABLET, FILM COATED, EXTENDED RELEASE ORAL at 14:07

## 2019-01-01 RX ADMIN — Medication 200 MILLIGRAM(S): at 05:41

## 2019-01-01 RX ADMIN — ROPINIROLE 0.5 MILLIGRAM(S): 8 TABLET, FILM COATED, EXTENDED RELEASE ORAL at 05:42

## 2019-01-01 RX ADMIN — CHLORHEXIDINE GLUCONATE 1 APPLICATION(S): 213 SOLUTION TOPICAL at 05:34

## 2019-01-01 RX ADMIN — CEFTRIAXONE 100 MILLIGRAM(S): 500 INJECTION, POWDER, FOR SOLUTION INTRAMUSCULAR; INTRAVENOUS at 05:35

## 2019-01-01 RX ADMIN — Medication 20 MILLIGRAM(S): at 05:20

## 2019-01-01 RX ADMIN — HEPARIN SODIUM 5000 UNIT(S): 5000 INJECTION INTRAVENOUS; SUBCUTANEOUS at 05:46

## 2019-01-01 RX ADMIN — DALFAMPRIDINE 10 MILLIGRAM(S): 10 TABLET, FILM COATED, EXTENDED RELEASE ORAL at 17:35

## 2019-01-01 RX ADMIN — Medication 650 MILLIGRAM(S): at 13:30

## 2019-01-01 RX ADMIN — ROPINIROLE 0.5 MILLIGRAM(S): 8 TABLET, FILM COATED, EXTENDED RELEASE ORAL at 05:21

## 2019-01-01 RX ADMIN — Medication 650 MILLIGRAM(S): at 06:47

## 2019-01-01 RX ADMIN — Medication 20 MILLIGRAM(S): at 17:17

## 2019-01-01 RX ADMIN — ENOXAPARIN SODIUM 40 MILLIGRAM(S): 100 INJECTION SUBCUTANEOUS at 12:18

## 2019-01-01 RX ADMIN — ROPINIROLE 0.5 MILLIGRAM(S): 8 TABLET, FILM COATED, EXTENDED RELEASE ORAL at 13:54

## 2019-01-01 RX ADMIN — CEFTRIAXONE 100 MILLIGRAM(S): 500 INJECTION, POWDER, FOR SOLUTION INTRAMUSCULAR; INTRAVENOUS at 05:21

## 2019-01-01 RX ADMIN — IRON SUCROSE 110 MILLIGRAM(S): 20 INJECTION, SOLUTION INTRAVENOUS at 17:56

## 2019-01-01 RX ADMIN — ROPINIROLE 0.5 MILLIGRAM(S): 8 TABLET, FILM COATED, EXTENDED RELEASE ORAL at 13:19

## 2019-01-01 RX ADMIN — Medication 3 UNIT(S): at 11:59

## 2019-01-01 RX ADMIN — Medication 1000 UNIT(S): at 11:25

## 2019-01-01 RX ADMIN — Medication 20 MILLIGRAM(S): at 18:37

## 2019-01-01 RX ADMIN — Medication 10 MILLIGRAM(S): at 11:34

## 2019-01-01 RX ADMIN — Medication 10 MILLIGRAM(S): at 10:25

## 2019-01-01 RX ADMIN — PANTOPRAZOLE SODIUM 40 MILLIGRAM(S): 20 TABLET, DELAYED RELEASE ORAL at 05:42

## 2019-01-01 RX ADMIN — PANTOPRAZOLE SODIUM 40 MILLIGRAM(S): 20 TABLET, DELAYED RELEASE ORAL at 05:41

## 2019-01-01 RX ADMIN — Medication 650 MILLIGRAM(S): at 06:17

## 2019-01-01 RX ADMIN — Medication 10 MILLIGRAM(S): at 12:28

## 2019-01-01 RX ADMIN — PANTOPRAZOLE SODIUM 40 MILLIGRAM(S): 20 TABLET, DELAYED RELEASE ORAL at 05:35

## 2019-01-01 RX ADMIN — INSULIN GLARGINE 7 UNIT(S): 100 INJECTION, SOLUTION SUBCUTANEOUS at 21:55

## 2019-01-01 RX ADMIN — Medication 10 MILLIGRAM(S): at 17:26

## 2019-01-01 RX ADMIN — Medication 1000 UNIT(S): at 11:34

## 2019-01-01 RX ADMIN — Medication 20 MILLIGRAM(S): at 05:46

## 2019-01-01 RX ADMIN — HEPARIN SODIUM 5000 UNIT(S): 5000 INJECTION INTRAVENOUS; SUBCUTANEOUS at 05:21

## 2019-01-01 RX ADMIN — CHLORHEXIDINE GLUCONATE 1 APPLICATION(S): 213 SOLUTION TOPICAL at 05:46

## 2019-01-01 RX ADMIN — CHLORHEXIDINE GLUCONATE 1 APPLICATION(S): 213 SOLUTION TOPICAL at 05:17

## 2019-01-01 RX ADMIN — Medication 10 MILLIGRAM(S): at 16:24

## 2019-01-01 RX ADMIN — PANTOPRAZOLE SODIUM 40 MILLIGRAM(S): 20 TABLET, DELAYED RELEASE ORAL at 17:11

## 2019-01-01 RX ADMIN — Medication 3 UNIT(S): at 11:57

## 2019-01-01 RX ADMIN — Medication 1: at 11:57

## 2019-01-01 RX ADMIN — Medication 1 MILLIGRAM(S): at 11:54

## 2019-01-01 RX ADMIN — HEPARIN SODIUM 5000 UNIT(S): 5000 INJECTION INTRAVENOUS; SUBCUTANEOUS at 21:54

## 2019-01-01 RX ADMIN — Medication 1 MILLIGRAM(S): at 11:25

## 2019-01-01 RX ADMIN — Medication 200 MILLIGRAM(S): at 17:17

## 2019-01-01 RX ADMIN — Medication 10 MILLIGRAM(S): at 06:00

## 2019-01-01 RX ADMIN — Medication 1 MILLIGRAM(S): at 11:33

## 2019-01-01 RX ADMIN — Medication 10 MILLIGRAM(S): at 16:38

## 2019-01-01 RX ADMIN — Medication 10 MILLIGRAM(S): at 08:07

## 2019-01-01 RX ADMIN — HEPARIN SODIUM 5000 UNIT(S): 5000 INJECTION INTRAVENOUS; SUBCUTANEOUS at 13:36

## 2019-01-01 RX ADMIN — Medication 20 MILLIGRAM(S): at 17:09

## 2019-01-01 RX ADMIN — DALFAMPRIDINE 10 MILLIGRAM(S): 10 TABLET, FILM COATED, EXTENDED RELEASE ORAL at 18:38

## 2019-01-01 RX ADMIN — ROPINIROLE 0.5 MILLIGRAM(S): 8 TABLET, FILM COATED, EXTENDED RELEASE ORAL at 21:31

## 2019-01-01 RX ADMIN — Medication 20 MILLIGRAM(S): at 13:51

## 2019-01-01 RX ADMIN — Medication 3 UNIT(S): at 17:05

## 2019-01-01 RX ADMIN — HEPARIN SODIUM 5000 UNIT(S): 5000 INJECTION INTRAVENOUS; SUBCUTANEOUS at 21:48

## 2019-01-01 RX ADMIN — Medication 10 MILLIGRAM(S): at 17:04

## 2019-01-01 RX ADMIN — ROPINIROLE 0.5 MILLIGRAM(S): 8 TABLET, FILM COATED, EXTENDED RELEASE ORAL at 13:37

## 2019-01-01 RX ADMIN — Medication 20 MILLIGRAM(S): at 05:35

## 2019-01-01 RX ADMIN — Medication 20 MILLIGRAM(S): at 05:19

## 2019-01-01 RX ADMIN — Medication 10 MILLIGRAM(S): at 05:21

## 2019-01-01 RX ADMIN — Medication 10 MILLIGRAM(S): at 17:11

## 2019-01-01 RX ADMIN — DULOXETINE HYDROCHLORIDE 60 MILLIGRAM(S): 30 CAPSULE, DELAYED RELEASE ORAL at 11:35

## 2019-01-01 RX ADMIN — Medication 10 MILLIGRAM(S): at 11:19

## 2019-01-01 RX ADMIN — SODIUM CHLORIDE 2200 MILLILITER(S): 9 INJECTION INTRAMUSCULAR; INTRAVENOUS; SUBCUTANEOUS at 09:55

## 2019-01-01 RX ADMIN — Medication 200 MILLIGRAM(S): at 17:11

## 2019-01-01 RX ADMIN — SODIUM CHLORIDE 500 MILLILITER(S): 9 INJECTION INTRAMUSCULAR; INTRAVENOUS; SUBCUTANEOUS at 15:40

## 2019-01-01 RX ADMIN — Medication 200 MILLIGRAM(S): at 17:09

## 2019-01-01 RX ADMIN — HEPARIN SODIUM 5000 UNIT(S): 5000 INJECTION INTRAVENOUS; SUBCUTANEOUS at 21:22

## 2019-01-01 RX ADMIN — Medication 20 MILLIGRAM(S): at 05:41

## 2019-01-01 RX ADMIN — PANTOPRAZOLE SODIUM 40 MILLIGRAM(S): 20 TABLET, DELAYED RELEASE ORAL at 05:46

## 2019-01-01 RX ADMIN — Medication 200 MILLIGRAM(S): at 05:20

## 2019-01-01 RX ADMIN — DALFAMPRIDINE 10 MILLIGRAM(S): 10 TABLET, FILM COATED, EXTENDED RELEASE ORAL at 05:19

## 2019-01-01 RX ADMIN — ONDANSETRON 4 MILLIGRAM(S): 8 TABLET, FILM COATED ORAL at 10:41

## 2019-01-01 RX ADMIN — Medication 1000 UNIT(S): at 12:11

## 2019-01-01 RX ADMIN — Medication 10 MILLIGRAM(S): at 15:26

## 2019-01-01 RX ADMIN — Medication 3 UNIT(S): at 17:20

## 2019-01-01 RX ADMIN — DULOXETINE HYDROCHLORIDE 60 MILLIGRAM(S): 30 CAPSULE, DELAYED RELEASE ORAL at 11:08

## 2019-01-01 RX ADMIN — HEPARIN SODIUM 5000 UNIT(S): 5000 INJECTION INTRAVENOUS; SUBCUTANEOUS at 15:41

## 2019-01-01 RX ADMIN — TAMSULOSIN HYDROCHLORIDE 0.4 MILLIGRAM(S): 0.4 CAPSULE ORAL at 21:31

## 2019-01-01 RX ADMIN — Medication 10 MILLIGRAM(S): at 11:57

## 2019-01-01 RX ADMIN — ROPINIROLE 0.5 MILLIGRAM(S): 8 TABLET, FILM COATED, EXTENDED RELEASE ORAL at 21:07

## 2019-01-01 RX ADMIN — PANTOPRAZOLE SODIUM 40 MILLIGRAM(S): 20 TABLET, DELAYED RELEASE ORAL at 17:39

## 2019-01-01 RX ADMIN — Medication 10 MILLIGRAM(S): at 11:32

## 2019-01-01 RX ADMIN — CHLORHEXIDINE GLUCONATE 1 APPLICATION(S): 213 SOLUTION TOPICAL at 05:06

## 2019-01-01 RX ADMIN — Medication 20 MILLIGRAM(S): at 17:35

## 2019-01-01 RX ADMIN — ROPINIROLE 0.5 MILLIGRAM(S): 8 TABLET, FILM COATED, EXTENDED RELEASE ORAL at 13:11

## 2019-01-01 RX ADMIN — Medication 20 MILLIGRAM(S): at 05:21

## 2019-01-01 RX ADMIN — HEPARIN SODIUM 5000 UNIT(S): 5000 INJECTION INTRAVENOUS; SUBCUTANEOUS at 21:31

## 2019-01-01 RX ADMIN — CHLORHEXIDINE GLUCONATE 1 APPLICATION(S): 213 SOLUTION TOPICAL at 05:20

## 2019-01-01 RX ADMIN — Medication 200 MILLIGRAM(S): at 05:46

## 2019-01-01 RX ADMIN — Medication 20 MILLIGRAM(S): at 17:37

## 2019-01-01 RX ADMIN — ROPINIROLE 0.5 MILLIGRAM(S): 8 TABLET, FILM COATED, EXTENDED RELEASE ORAL at 05:22

## 2019-01-01 RX ADMIN — Medication 20 MILLIGRAM(S): at 17:26

## 2019-01-01 RX ADMIN — ROPINIROLE 0.5 MILLIGRAM(S): 8 TABLET, FILM COATED, EXTENDED RELEASE ORAL at 05:35

## 2019-01-01 RX ADMIN — DULOXETINE HYDROCHLORIDE 60 MILLIGRAM(S): 30 CAPSULE, DELAYED RELEASE ORAL at 11:54

## 2019-01-01 RX ADMIN — INSULIN GLARGINE 7 UNIT(S): 100 INJECTION, SOLUTION SUBCUTANEOUS at 21:22

## 2019-01-01 RX ADMIN — Medication 100 MILLIGRAM(S): at 06:17

## 2019-01-01 RX ADMIN — Medication 10 MILLIGRAM(S): at 05:50

## 2019-01-01 RX ADMIN — Medication 1000 UNIT(S): at 11:57

## 2019-01-01 RX ADMIN — ROPINIROLE 0.5 MILLIGRAM(S): 8 TABLET, FILM COATED, EXTENDED RELEASE ORAL at 21:35

## 2019-01-01 RX ADMIN — ROPINIROLE 0.5 MILLIGRAM(S): 8 TABLET, FILM COATED, EXTENDED RELEASE ORAL at 05:46

## 2019-01-01 RX ADMIN — Medication 20 MILLIGRAM(S): at 05:18

## 2019-01-01 RX ADMIN — Medication 10 MILLIGRAM(S): at 06:09

## 2019-01-01 RX ADMIN — Medication 600 MILLIGRAM(S): at 15:36

## 2019-01-01 RX ADMIN — PANTOPRAZOLE SODIUM 40 MILLIGRAM(S): 20 TABLET, DELAYED RELEASE ORAL at 05:22

## 2019-01-01 RX ADMIN — Medication 1 MILLIGRAM(S): at 11:08

## 2019-01-01 RX ADMIN — ROPINIROLE 0.5 MILLIGRAM(S): 8 TABLET, FILM COATED, EXTENDED RELEASE ORAL at 05:41

## 2019-01-01 RX ADMIN — DULOXETINE HYDROCHLORIDE 60 MILLIGRAM(S): 30 CAPSULE, DELAYED RELEASE ORAL at 11:34

## 2019-01-01 RX ADMIN — HEPARIN SODIUM 5000 UNIT(S): 5000 INJECTION INTRAVENOUS; SUBCUTANEOUS at 05:34

## 2019-01-01 RX ADMIN — Medication 10 MILLIGRAM(S): at 08:23

## 2019-01-01 RX ADMIN — Medication 200 MILLIGRAM(S): at 05:42

## 2019-01-01 RX ADMIN — INSULIN GLARGINE 7 UNIT(S): 100 INJECTION, SOLUTION SUBCUTANEOUS at 21:31

## 2019-01-01 RX ADMIN — PANTOPRAZOLE SODIUM 40 MILLIGRAM(S): 20 TABLET, DELAYED RELEASE ORAL at 18:37

## 2019-01-01 RX ADMIN — Medication 20 MILLIGRAM(S): at 17:11

## 2019-01-01 RX ADMIN — HEPARIN SODIUM 5000 UNIT(S): 5000 INJECTION INTRAVENOUS; SUBCUTANEOUS at 13:19

## 2019-01-01 RX ADMIN — CHLORHEXIDINE GLUCONATE 1 APPLICATION(S): 213 SOLUTION TOPICAL at 05:42

## 2019-01-01 RX ADMIN — DALFAMPRIDINE 10 MILLIGRAM(S): 10 TABLET, FILM COATED, EXTENDED RELEASE ORAL at 05:07

## 2019-01-01 RX ADMIN — Medication 100 MILLIGRAM(S): at 20:39

## 2019-01-01 RX ADMIN — Medication 10 MILLIGRAM(S): at 17:56

## 2019-01-01 RX ADMIN — ROPINIROLE 0.5 MILLIGRAM(S): 8 TABLET, FILM COATED, EXTENDED RELEASE ORAL at 21:55

## 2019-01-01 RX ADMIN — DULOXETINE HYDROCHLORIDE 60 MILLIGRAM(S): 30 CAPSULE, DELAYED RELEASE ORAL at 11:18

## 2019-01-01 RX ADMIN — HEPARIN SODIUM 5000 UNIT(S): 5000 INJECTION INTRAVENOUS; SUBCUTANEOUS at 13:11

## 2019-01-01 RX ADMIN — Medication 20 MILLIGRAM(S): at 13:42

## 2019-01-01 RX ADMIN — ROPINIROLE 0.5 MILLIGRAM(S): 8 TABLET, FILM COATED, EXTENDED RELEASE ORAL at 05:06

## 2019-01-01 RX ADMIN — CEFTRIAXONE 100 MILLIGRAM(S): 500 INJECTION, POWDER, FOR SOLUTION INTRAMUSCULAR; INTRAVENOUS at 10:31

## 2019-01-01 RX ADMIN — Medication 1 MILLIGRAM(S): at 11:18

## 2019-01-01 RX ADMIN — Medication 20 MILLIGRAM(S): at 05:42

## 2019-01-01 RX ADMIN — Medication 10 MILLIGRAM(S): at 17:09

## 2019-01-01 RX ADMIN — INSULIN GLARGINE 7 UNIT(S): 100 INJECTION, SOLUTION SUBCUTANEOUS at 21:53

## 2019-01-01 RX ADMIN — HEPARIN SODIUM 5000 UNIT(S): 5000 INJECTION INTRAVENOUS; SUBCUTANEOUS at 21:55

## 2019-01-01 RX ADMIN — Medication 10 MILLIGRAM(S): at 08:32

## 2019-01-01 RX ADMIN — Medication 10 MILLIGRAM(S): at 11:54

## 2019-01-01 RX ADMIN — Medication 20 MILLIGRAM(S): at 17:05

## 2019-01-01 RX ADMIN — Medication 10 MILLIGRAM(S): at 11:25

## 2019-01-01 RX ADMIN — PANTOPRAZOLE SODIUM 40 MILLIGRAM(S): 20 TABLET, DELAYED RELEASE ORAL at 17:35

## 2019-01-01 RX ADMIN — Medication 20 MILLIGRAM(S): at 17:39

## 2019-01-01 RX ADMIN — Medication 10 MILLIGRAM(S): at 08:37

## 2019-01-17 ENCOUNTER — APPOINTMENT (OUTPATIENT)
Dept: INFUSION THERAPY | Facility: CLINIC | Age: 64
End: 2019-01-17

## 2019-02-14 ENCOUNTER — APPOINTMENT (OUTPATIENT)
Dept: INFUSION THERAPY | Facility: CLINIC | Age: 64
End: 2019-02-14

## 2019-02-28 ENCOUNTER — FORM ENCOUNTER (OUTPATIENT)
Age: 64
End: 2019-02-28

## 2019-03-01 ENCOUNTER — OUTPATIENT (OUTPATIENT)
Dept: OUTPATIENT SERVICES | Facility: HOSPITAL | Age: 64
LOS: 1 days | Discharge: HOME | End: 2019-03-01

## 2019-03-01 DIAGNOSIS — Z90.3 ACQUIRED ABSENCE OF STOMACH [PART OF]: Chronic | ICD-10-CM

## 2019-03-01 DIAGNOSIS — Z90.710 ACQUIRED ABSENCE OF BOTH CERVIX AND UTERUS: Chronic | ICD-10-CM

## 2019-03-01 DIAGNOSIS — E89.0 POSTPROCEDURAL HYPOTHYROIDISM: Chronic | ICD-10-CM

## 2019-03-01 DIAGNOSIS — C16.9 MALIGNANT NEOPLASM OF STOMACH, UNSPECIFIED: ICD-10-CM

## 2019-03-01 DIAGNOSIS — Z95.828 PRESENCE OF OTHER VASCULAR IMPLANTS AND GRAFTS: Chronic | ICD-10-CM

## 2019-03-01 LAB — GLUCOSE BLDC GLUCOMTR-MCNC: 78 MG/DL — SIGNIFICANT CHANGE UP (ref 70–99)

## 2019-03-03 ENCOUNTER — TRANSCRIPTION ENCOUNTER (OUTPATIENT)
Age: 64
End: 2019-03-03

## 2019-03-21 ENCOUNTER — APPOINTMENT (OUTPATIENT)
Dept: INFUSION THERAPY | Facility: CLINIC | Age: 64
End: 2019-03-21

## 2019-03-21 ENCOUNTER — OTHER (OUTPATIENT)
Age: 64
End: 2019-03-21

## 2019-03-21 ENCOUNTER — LABORATORY RESULT (OUTPATIENT)
Age: 64
End: 2019-03-21

## 2019-03-21 ENCOUNTER — APPOINTMENT (OUTPATIENT)
Dept: HEMATOLOGY ONCOLOGY | Facility: CLINIC | Age: 64
End: 2019-03-21

## 2019-03-21 ENCOUNTER — OUTPATIENT (OUTPATIENT)
Dept: OUTPATIENT SERVICES | Facility: HOSPITAL | Age: 64
LOS: 1 days | Discharge: HOME | End: 2019-03-21

## 2019-03-21 VITALS
RESPIRATION RATE: 14 BRPM | DIASTOLIC BLOOD PRESSURE: 76 MMHG | HEIGHT: 62 IN | HEART RATE: 94 BPM | BODY MASS INDEX: 27.6 KG/M2 | SYSTOLIC BLOOD PRESSURE: 128 MMHG | WEIGHT: 150 LBS | TEMPERATURE: 97.3 F

## 2019-03-21 DIAGNOSIS — Z90.3 ACQUIRED ABSENCE OF STOMACH [PART OF]: Chronic | ICD-10-CM

## 2019-03-21 DIAGNOSIS — Z95.828 PRESENCE OF OTHER VASCULAR IMPLANTS AND GRAFTS: Chronic | ICD-10-CM

## 2019-03-21 DIAGNOSIS — Z90.710 ACQUIRED ABSENCE OF BOTH CERVIX AND UTERUS: Chronic | ICD-10-CM

## 2019-03-21 DIAGNOSIS — E89.0 POSTPROCEDURAL HYPOTHYROIDISM: Chronic | ICD-10-CM

## 2019-03-21 LAB
ALBUMIN SERPL ELPH-MCNC: 2.8 G/DL
ALP BLD-CCNC: 291 U/L
ALT SERPL-CCNC: 76 U/L
ANION GAP SERPL CALC-SCNC: 11 MMOL/L
AST SERPL-CCNC: 78 U/L
BILIRUB SERPL-MCNC: <0.2 MG/DL
BUN SERPL-MCNC: 17 MG/DL
CALCIUM SERPL-MCNC: 8 MG/DL
CHLORIDE SERPL-SCNC: 109 MMOL/L
CHOLEST SERPL-MCNC: 139 MG/DL
CHOLEST/HDLC SERPL: 8.2 RATIO
CO2 SERPL-SCNC: 19 MMOL/L
CREAT SERPL-MCNC: 0.7 MG/DL
GLUCOSE SERPL-MCNC: 195 MG/DL
HCT VFR BLD CALC: 32.8 %
HDLC SERPL-MCNC: 17 MG/DL
HGB BLD-MCNC: 10.4 G/DL
LDLC SERPL CALC-MCNC: 100 MG/DL
MCHC RBC-ENTMCNC: 28.4 PG
MCHC RBC-ENTMCNC: 31.7 G/DL
MCV RBC AUTO: 89.6 FL
PLATELET # BLD AUTO: 362 K/UL
PMV BLD: 10.1 FL
POTASSIUM SERPL-SCNC: 4.1 MMOL/L
PROT SERPL-MCNC: 5.4 G/DL
RBC # BLD: 3.66 M/UL
RBC # FLD: 15.5 %
SODIUM SERPL-SCNC: 139 MMOL/L
TRIGL SERPL-MCNC: 157 MG/DL
WBC # FLD AUTO: 8.94 K/UL

## 2019-03-22 LAB — CANCER AG19-9 SERPL-ACNC: 49 U/ML

## 2019-03-22 NOTE — HISTORY OF PRESENT ILLNESS
[de-identified] : 62 year old female with gastric cancer on adjuvant folfox , well tolerated except for one episode of mild diarrhea, she denies numbness , mouth sores , nausea or vomiting . She feels slightly worse from neurologic standpoint , mobility and gait more impaired.  She denies numbness.  [de-identified] : Had PET CT Dec 2017: Multiple FDG avid upper abdominal lymph nodes, SUV max 8.5 compatible with biological tumor activity. \par  \par Patient status post FOLFOX, still residual disease on PET CT.\par \par Patient feels ok. No nausea or vomiting. weight is stable. \par Saw Dr Arzola, plan for six weeks of RT therapy.\par \par 03/19/2018:\par \par Patient returns after completion of chemoRT, last Monday. received 4 cycles of Taxol and 6 weeks of RT.\par Feels tired and dizzy. Tolerated treatment well, with no esophagitis or vomiting.\par Has some nausea for which she takes zofran.\par Also complains of headache, could be a part of MS, due to see Dr Vance soon.\par Other review of system is negative.\par \par 04/30/18\par Pt is here for follow up for a repeat PET scan. She feels well with no symptoms of nausea, vomiting. She is able to tolerate diet\par She has c/o constipation, not better with colace, senna, miralax. Also feels tired. She passes gas. \par \par 06/04/2018:\par Since last visit, patient had a PET CT: Near complete resolution of all previously seen FDG uptake in the abdomen  consistent with good treatment response.  No new sites of disease.  One residual gideon pancreatic lymph node demonstrates a 55% decrease in  FDG uptake (SUV 3.9, previously 8.5).\par \par Colonoscopy: Normal, done last month.\par Constipation is better, on Linzess now.\par Some abdominal pain on deep palpation.\par No weight loss. No new symptoms. \par biopsy test showed MMR deficienct tumor  and PDL1 >1 % .\par \par 08/09/2018 : Mariaa returns for follow up complaining of vomiting mainly on awakening . also notes slight increase in abdominal girth. no abdominal pain  or weight loss. \par \par 08/30/2018 : Patient returns for follow up , she feels better however she continues to have mild epigastric discomfort , nausea and intermittent vomiting . There is no further weight loss. CT scan shows abnormal  duodenum and new soft tissue density around  common hepatic artery   , LFTs and tumor markers are slightly elevated . \par \par 12/20/2018 : Patient returns for follow up , she feels well and gained weight .EGD was negative , she continues to complain of dumping symptoms alternating with constipation . \par She also notes persistent painless  swelling and protuberance of the abdomen on the right side. Neuro status and mobility slightly improved . \par \par 03/21/2019 Patient returns for routine follow up , PET scan is stable with persistent FDG avid peripancreatic adenopathy . she complains of recurrence of diarrhea, worse after meals , present also at night and relieved with imodium . also reports episodes of vomiting , mild LLQ pain and tenderness. Weight is stable . \par \par

## 2019-03-22 NOTE — ASSESSMENT
[FreeTextEntry1] : 62 y/o F PMH of MS, diagnosis of gastric ca ( HER2 negative , MMR deficient , PDL1 >1% )  s/p partial gastrectomy , adjuvant chemotherapy . Chemoradiation for regional lymph node recurrence . \par PET scan shows persistent FDG  peripancreatic adenopathy  . no new sites of disease .  Dumping symptoms. \par diarrhea. abdominal pain , rule out carcinomatosis . \par will repeat tumor markers , CMP . follow up with GI . \par she will start on IV IG per neurology for multiple sclerosis .

## 2019-03-22 NOTE — PHYSICAL EXAM
[Normal] : normoactive bowel sounds, soft and nontender, no hepatosplenomegaly or masses appreciated [de-identified] : mild LLQ  tenderness. significant protuberance on the right , no evidence of hernia .  [de-identified] : significant scoliosis like spine malalignment .

## 2019-04-01 ENCOUNTER — APPOINTMENT (OUTPATIENT)
Dept: CARDIOLOGY | Facility: CLINIC | Age: 64
End: 2019-04-01

## 2019-04-03 DIAGNOSIS — C16.9 MALIGNANT NEOPLASM OF STOMACH, UNSPECIFIED: ICD-10-CM

## 2019-06-17 NOTE — ASSESSMENT
[FreeTextEntry1] : 64 y/o F PMH of MS, diagnosis of gastric ca ( HER2 negative , MMR deficient , PDL1 >1% )  s/p partial gastrectomy , adjuvant chemotherapy . Chemoradiation for regional lymph node recurrence . \par PET scan shows persistent FDG  peripancreatic adenopathy  . no new sites of disease .  Dumping symptoms. \par diarrhea. fatty liver . Gastrin level normal .\par she will continue  on IV IG per neurology for multiple sclerosis . \par repeat blood work , check , B12 , ferritin , follow up in 3 months .

## 2019-06-17 NOTE — HISTORY OF PRESENT ILLNESS
[de-identified] : 62 year old female with gastric cancer on adjuvant folfox , well tolerated except for one episode of mild diarrhea, she denies numbness , mouth sores , nausea or vomiting . She feels slightly worse from neurologic standpoint , mobility and gait more impaired.  She denies numbness.  [de-identified] : Had PET CT Dec 2017: Multiple FDG avid upper abdominal lymph nodes, SUV max 8.5 compatible with biological tumor activity. \par  \par Patient status post FOLFOX, still residual disease on PET CT.\par \par Patient feels ok. No nausea or vomiting. weight is stable. \par Saw Dr Arzola, plan for six weeks of RT therapy.\par \par 03/19/2018:\par \par Patient returns after completion of chemoRT, last Monday. received 4 cycles of Taxol and 6 weeks of RT.\par Feels tired and dizzy. Tolerated treatment well, with no esophagitis or vomiting.\par Has some nausea for which she takes zofran.\par Also complains of headache, could be a part of MS, due to see Dr Vance soon.\par Other review of system is negative.\par \par 04/30/18\par Pt is here for follow up for a repeat PET scan. She feels well with no symptoms of nausea, vomiting. She is able to tolerate diet\par She has c/o constipation, not better with colace, senna, miralax. Also feels tired. She passes gas. \par \par 06/04/2018:\par Since last visit, patient had a PET CT: Near complete resolution of all previously seen FDG uptake in the abdomen  consistent with good treatment response.  No new sites of disease.  One residual gideon pancreatic lymph node demonstrates a 55% decrease in  FDG uptake (SUV 3.9, previously 8.5).\par \par Colonoscopy: Normal, done last month.\par Constipation is better, on Linzess now.\par Some abdominal pain on deep palpation.\par No weight loss. No new symptoms. \par biopsy test showed MMR deficienct tumor  and PDL1 >1 % .\par \par 08/09/2018 : Mariaa returns for follow up complaining of vomiting mainly on awakening . also notes slight increase in abdominal girth. no abdominal pain  or weight loss. \par \par 08/30/2018 : Patient returns for follow up , she feels better however she continues to have mild epigastric discomfort , nausea and intermittent vomiting . There is no further weight loss. CT scan shows abnormal  duodenum and new soft tissue density around  common hepatic artery   , LFTs and tumor markers are slightly elevated . \par \par 12/20/2018 : Patient returns for follow up , she feels well and gained weight .EGD was negative , she continues to complain of dumping symptoms alternating with constipation . \par She also notes persistent painless  swelling and protuberance of the abdomen on the right side. Neuro status and mobility slightly improved . \par \par 03/21/2019 Patient returns for routine follow up , PET scan is stable with persistent FDG avid peripancreatic adenopathy . she complains of recurrence of diarrhea, worse after meals , present also at night and relieved with imodium . also reports episodes of vomiting , mild LLQ pain and tenderness. Weight is stable . \par \par 06/17/2019 Patient returns for follow up for recurrent gastric cancer s/p chemoradiation . Two weeks ago she had a self limited episode of diarrhea. sonogram showed hepatomegaly with steatosis . Her weight is stable , she remains neurologically stable on Iv IG . PET scan from march showed hypermetabolically active peripancreatic and hepatoduodenal LNs , slightly increased from prior study, CA 19.9 is slightly elevated and stable . She is on po iron 2X daily , no B12 , CBC shows mild anemia. \par \par

## 2019-06-17 NOTE — PHYSICAL EXAM
[Normal] : no JVD, no calf tenderness, venous stasis changes, varices [de-identified] : mild LLQ  tenderness. significant protuberance on the right , no evidence of hernia .  [de-identified] : significant scoliosis like spine malalignment .

## 2019-09-16 PROBLEM — C16.9 GASTRIC CANCER: Status: ACTIVE | Noted: 2017-08-01

## 2019-09-17 NOTE — CONSULT LETTER
[Consult Letter:] : I had the pleasure of evaluating your patient, [unfilled]. [Dear  ___] : Dear  [unfilled], [Sincerely,] : Sincerely, [Consult Closing:] : Thank you very much for allowing me to participate in the care of this patient.  If you have any questions, please do not hesitate to contact me. [Please see my note below.] : Please see my note below. [FreeTextEntry3] : Dr. Toussaint

## 2019-09-17 NOTE — PHYSICAL EXAM
[Normal] : normoactive bowel sounds, soft and nontender, no hepatosplenomegaly or masses appreciated [de-identified] : significant scoliosis like spine malalignment . [de-identified] : mild LLQ  tenderness. significant protuberance on the right , no evidence of hernia .

## 2019-09-17 NOTE — ASSESSMENT
[FreeTextEntry1] : 63 y/o F returns for f/u visit of her gastric cancer S/P partial gastrectomy in 5/2017. Final pathology showed PT3N2\par HER 2 -ve, PD-L1 >1%, MMR deficient (HMSH2, HMSH6) poorly differentiated gastric adenocarcinoma.\par S/P adjuvant FOLFOX x 6 finished in Dec 2017\par Course c/b recurrence in regional LNs s/p CRT (4 cycles of taxol + 6 weeks of RT, finished in Mar 2018)\par \par \par A/P:\par #Gastric cancer:  Last EGD in 10/2018 did not show any e/o malignancy. Last PET in 3/2019 continued to show Persistent focal increased FDG uptake at peripancreatic/hepatoduodenal ligament station, max SUV 8, previously 4.4, suggestive of lymphadenopathy. \par --Pt now c/o projectile vomiting of 2 weeks duration with no relief from zofran. HIDA negative\par --Scheduled for repeat EGD on 10/1/19 by Dr Velazquez\par --Repeat CEA and PET, routine bloodwork\par \par #Vomiting: Unclear etiology (H/o hiatal hernia vs gastroparesis)\par --On PPI and zofran. Consider reglan\par --HIDA negative. F/U with GI for EGD\par --PET scan, labs\par \par #Anemia: Check iron studies, B12, folate\par \par #Fatty liver with hepatomegaly: F/U with GI

## 2019-09-17 NOTE — HISTORY OF PRESENT ILLNESS
[de-identified] : 62 year old female with gastric cancer on adjuvant folfox , well tolerated except for one episode of mild diarrhea, she denies numbness , mouth sores , nausea or vomiting . She feels slightly worse from neurologic standpoint , mobility and gait more impaired.  She denies numbness.  [de-identified] : Had PET CT Dec 2017: Multiple FDG avid upper abdominal lymph nodes, SUV max 8.5 compatible with biological tumor activity. \par  \par Patient status post FOLFOX, still residual disease on PET CT.\par \par Patient feels ok. No nausea or vomiting. weight is stable. \par Saw Dr Arzola, plan for six weeks of RT therapy.\par \par 03/19/2018:\par \par Patient returns after completion of chemoRT, last Monday. received 4 cycles of Taxol and 6 weeks of RT.\par Feels tired and dizzy. Tolerated treatment well, with no esophagitis or vomiting.\par Has some nausea for which she takes zofran.\par Also complains of headache, could be a part of MS, due to see Dr Vance soon.\par Other review of system is negative.\par \par 04/30/18\par Pt is here for follow up for a repeat PET scan. She feels well with no symptoms of nausea, vomiting. She is able to tolerate diet\par She has c/o constipation, not better with colace, senna, miralax. Also feels tired. She passes gas. \par \par 06/04/2018:\par Since last visit, patient had a PET CT: Near complete resolution of all previously seen FDG uptake in the abdomen  consistent with good treatment response.  No new sites of disease.  One residual gideon pancreatic lymph node demonstrates a 55% decrease in  FDG uptake (SUV 3.9, previously 8.5).\par \par Colonoscopy: Normal, done last month.\par Constipation is better, on Linzess now.\par Some abdominal pain on deep palpation.\par No weight loss. No new symptoms. \par biopsy test showed MMR deficienct tumor  and PDL1 >1 % .\par \par 08/09/2018 : Mariaa returns for follow up complaining of vomiting mainly on awakening . also notes slight increase in abdominal girth. no abdominal pain  or weight loss. \par \par 08/30/2018 : Patient returns for follow up , she feels better however she continues to have mild epigastric discomfort , nausea and intermittent vomiting . There is no further weight loss. CT scan shows abnormal  duodenum and new soft tissue density around  common hepatic artery   , LFTs and tumor markers are slightly elevated . \par \par 12/20/2018 : Patient returns for follow up , she feels well and gained weight .EGD was negative , she continues to complain of dumping symptoms alternating with constipation . \par She also notes persistent painless  swelling and protuberance of the abdomen on the right side. Neuro status and mobility slightly improved . \par \par 03/21/2019 Patient returns for routine follow up , PET scan is stable with persistent FDG avid peripancreatic adenopathy . she complains of recurrence of diarrhea, worse after meals , present also at night and relieved with imodium . also reports episodes of vomiting , mild LLQ pain and tenderness. Weight is stable . \par \par 06/17/2019 Patient returns for follow up for recurrent gastric cancer s/p chemoradiation . Two weeks ago she had a self limited episode of diarrhea. sonogram showed hepatomegaly with steatosis . Her weight is stable , she remains neurologically stable on Iv IG . PET scan from march showed hypermetabolically active peripancreatic and hepatoduodenal LNs , slightly increased from prior study, CA 19.9 is slightly elevated and stable . She is on po iron 2X daily , no B12 , CBC shows mild anemia. \par \par 9/16/19: Pt returns for a f/u visit. She c/o projectile vomiting not preceded nausea. No relief with zofran. Pt had a HIDA scan which was negative. She will also undergo EGD by Dr Velazquez on Oct 1st, 2019. Last EGD in 10/2018 did not show any e/o malignancy. Last PET in 3/2019 continued to show Persistent focal increased FDG uptake at peripancreatic/hepatoduodenal ligament station, max SUV 8, previously 4.4, suggestive of lymphadenopathy.\par  \par

## 2019-10-09 NOTE — CHART NOTE - NSCHARTNOTEFT_GEN_A_CORE
PACU ANESTHESIA ADMISSION NOTE      Procedure:   Post op diagnosis:      ____  Intubated  TV:______       Rate: ______      FiO2: ______    _x___  Patent Airway    _x___  Full return of protective reflexes    _x___  Full recovery from anesthesia / back to baseline status    Vitals:            T:                BP :  107/63              R:   18           97P:100      Mental Status:  _x___ Awake   _____ Alert   _____ Drowsy   _____ Sedated    Nausea/Vomiting:  _x___  NO       ______Yes,   See Post - Op Orders         Pain Scale (0-10):  __0___    Treatment: _x___ None    ____ See Post - Op/PCA Orders    Post - Operative Fluids:   __x__ Oral   ____ See Post - Op Orders    Plan: Discharge:   _x___Home       _____Floor     _____Critical Care    _____  Other:_________________    Comments:  No anesthesia issues or complications noted.  Discharge when criteria met.

## 2019-11-05 PROBLEM — Z00.00 ENCOUNTER FOR PREVENTIVE HEALTH EXAMINATION: Status: ACTIVE | Noted: 2017-06-11

## 2019-12-11 NOTE — ED PROVIDER NOTE - PHYSICAL EXAMINATION
CONST: Well appearing in NAD  EYES: PERRL, EOMI, Sclera and conjunctiva clear. Vision 20/20  ENT: No nasal discharge. TM's clear B/L without drainage. Oropharynx normal appearing, no erythema or exudates. No abscess or swelling. Uvula midline. No temporal artery or mastoid tenderness.  NECK: Non-tender, normal ROM, neck supple   CARD: Normal S1 S2; Normal rate and rhythm  RESP: Equal BS B/L, No wheezes, rhonchi or rales. No respiratory distress.   GI: Soft, non-tender, non-distended. no rebound or guarding. +transverse linear surgical scar under epigastrium to umbilicus. no cva tenderness. normal BS  MS: Normal ROM in all extremities. No midline spinal tenderness. no calf tenderness or swelling.   SKIN: Warm, dry, no acute rashes. Good turgor  NEURO: A&Ox3, No focal deficits. Strength 5/5 with no sensory deficits. CN II-XII intact. CONST: Well appearing in NAD  EYES: PERRL, EOMI, Sclera and conjunctiva clear. Vision 20/20  ENT: No nasal discharge. TM's clear B/L without drainage. Oropharynx normal appearing, no erythema or exudates. No abscess or swelling. Uvula midline. No temporal artery or mastoid tenderness.  NECK: Non-tender, normal ROM, neck supple   CARD: Normal S1 S2; Normal rate and rhythm  RESP: Equal BS B/L, No wheezes, rhonchi or rales. No respiratory distress.   GI: Soft, non-tender, non-distended. no rebound or guarding. +transverse linear surgical scar under epigastrium to umbilicus. no cva tenderness. normal BS. ZACHERY: No stool in vault, no BRBPR.  MS: Normal ROM in all extremities. No midline spinal tenderness. no calf tenderness or swelling.   SKIN: Warm, dry, no acute rashes. Good turgor  NEURO: A&Ox3, No focal deficits. Strength 5/5 with no sensory deficits. CN II-XII intact.

## 2019-12-11 NOTE — ED PROVIDER NOTE - OBJECTIVE STATEMENT
64 year old female, pmh DM, MS, gastric cancer s/p remission x1 year, who presents with dark brown vomiting x3 days. Patient with gastric cancer with 30% of stomach resection, last chemotherapy x1 year ago. 64 year old female, pmh DM, MS, gastric cancer s/p remission x1 year, who presents with dark brown vomiting x3 days. Patient with gastric cancer with 30% of stomach resection, last chemotherapy x1 year ago. She has been recommended to take reglan daily for gastroparesis 2/2 stomach resection, however, as per patient's daughter, has not been taking it xseveral days. Patient has not been taking her glipizide x2 weeks as per PMD recommendations given patient's recent finger sticks have been low, last a1c 5.5. Patient has been passing flatus, last BM this morning, normal stool. No fever, chills, headache, neck pain, chest pain, SOB, rash. No recent new foods, no recent travel.

## 2019-12-11 NOTE — ED PROVIDER NOTE - CLINICAL SUMMARY MEDICAL DECISION MAKING FREE TEXT BOX
65yo F history of MS on monthly IVIG, DM2, gastric CA sp partial gastrectomy followed by Dr. Toussaint no longer on active treatment, MARY BSO, GI followed by Dr. Velazquez presenting with nausea/vomiting x5d sx getting progressively worse. Per pt, she is supposed to be taking Reglan daily but hasn't taken for the past week. Vomitus dark. No BRBPR, no melena. No anticoagulation/antiplatelets. No abdominal pain, diarrhea, no other hx abdominal surgeries, no other complaints. labs ekg imaging reviewed. dw family. 65yo F history of MS on monthly IVIG, DM2, gastric CA sp partial gastrectomy followed by Dr. Toussaint no longer on active treatment, MARY BSO, GI followed by Dr. Velazquez presenting with nausea/vomiting x5d sx getting progressively worse. Per pt, she is supposed to be taking Reglan daily but hasn't taken for the past week. Vomitus dark. No BRBPR, no melena. No anticoagulation/antiplatelets. No abdominal pain, diarrhea, no other hx abdominal surgeries, no other complaints. labs ekg imaging reviewed. dw family. Pt feeling improved, tolerating PO, abdomen soft, non-tender, non-distended, no rebound, no guarding. Aware of all results, given a copy of all available results, comfortable with discharge and follow-up outpatient, strict return precautions given. Endorses understanding of all of this and aware that they can return at any time for new or concerning symptoms. No further questions or concerns at this time

## 2019-12-11 NOTE — ED PROVIDER NOTE - PROGRESS NOTE DETAILS
Patient states she is "feeling better" after zofran, reglan and fluids. Patient states she is hungry and is requesting to eat. patient's family requesting social work. spoke with , Lubna, she states she is unable to come down to see patient today, however, would like for the patient's family to have her number to discuss further care plan. Patient daughter agreeable to plan and will contact her tomorrow. patient tolerating PO. has reglan prescription at home, will continue to take for current symptoms. patient to follow-up with GI and PMD.

## 2019-12-11 NOTE — ED PROVIDER NOTE - ATTENDING CONTRIBUTION TO CARE
63yo F history of MS on monthly IVIG, DM2, gastric CA sp partial gastrectomy followed by Dr. Toussaint no longer on active treatment, MARY BSO, GI followed by Dr. Velazquez presenting with nausea/vomiting x5d sx getting progressively worse. Per pt, she is supposed to be taking Reglan daily but hasn't taken for the past week. Vomitus dark. No BRBPR, no melena. No anticoagulation/antiplatelets. No abdominal pain, diarrhea, no other hx abdominal surgeries, no other complaints.   Constitutional: chronically ill appearing   Eyes: PERRLA. Extraocular movements intact, no entrapment. Conjunctiva normal.   ENT: No nasal discharge. dry mucus membranes.  Neck: Supple, non tender, full range of motion.  CV: regular tachycardic no murmurs, rubs, or gallops. +S1S2.   Pulm: Clear to auscultation bilaterally. Normal work of breathing.  Abd: soft +epigastric ttp no rebound no guarding ND +BS.   Ext: Warm and well perfused x4, moving all extremities, no edema.   Psy: Cooperative, appropriate.   Skin: Warm, dry, no rash  Neuro: CN2-12 grossly intact no sensory or motor deficits throughout, no drift

## 2019-12-11 NOTE — ED PROVIDER NOTE - PROVIDER TOKENS
PROVIDER:[TOKEN:[[id number]],FOLLOWUP:[1-3 Days]],FREE:[LAST:[your primary care doctor],PHONE:[(   )    -],FAX:[(   )    -],FOLLOWUP:[1-3 Days]]

## 2019-12-11 NOTE — ED ADULT NURSE NOTE - OBJECTIVE STATEMENT
Pt c/o nausea and vomiting dark brown for a few days. Pt has not been taking prescribed Reglan for several days. Pt is A&Ox4. Denies fever, chest pain, abdominal pain or SOB. Last BM this AM.

## 2019-12-11 NOTE — ED PROVIDER NOTE - CARE PROVIDER_API CALL
Jc Velazquez (DO)  Gastroenterology  [address] [place] [id number]  Phone: [phone]  Fax: [phone]  Follow Up Time: 1-3 Days    your primary care doctor,   Phone: (   )    -  Fax: (   )    -  Follow Up Time: 1-3 Days

## 2019-12-11 NOTE — ED PROVIDER NOTE - NSFOLLOWUPINSTRUCTIONS_ED_ALL_ED_FT
Please follow-up with your primary care doctor and gastroenterology in 1-3 days.   Please continue to take Reglan for treatment of your current symptoms.   Please be aware of any signs or symptoms that should prompt your return to the emergency department.  Please contact  for further evaluation and plan for your care. : Lubna, [phone].    Acute Nausea and Vomiting    WHAT YOU NEED TO KNOW:    Acute nausea and vomiting start suddenly, worsen quickly, and last a short time.    DISCHARGE INSTRUCTIONS:    Return to the emergency department if:     You see blood in your vomit or your bowel movements.      You have sudden, severe pain in your chest and upper abdomen after hard vomiting or retching.      You have swelling in your neck and chest.       You are dizzy, cold, and thirsty and your eyes and mouth are dry.      You are urinating very little or not at all.      You have muscle weakness, leg cramps, and trouble breathing.       Your heart is beating much faster than normal.       You continue to vomit for more than 48 hours.     Contact your healthcare provider if:     You have frequent dry heaves (vomiting but nothing comes out).      Your nausea and vomiting does not get better or go away after you use medicine.      You have questions or concerns about your condition or treatment.    Medicines: You may need any of the following:     Medicines may be given to calm your stomach and stop your vomiting. You may also need medicines to help you feel more relaxed or to stop nausea and vomiting caused by motion sickness.      Gastrointestinal stimulants are used to help empty your stomach and bowels. This may help decrease nausea and vomiting.      Take your medicine as directed. Contact your healthcare provider if you think your medicine is not helping or if you have side effects. Tell him or her if you are allergic to any medicine. Keep a list of the medicines, vitamins, and herbs you take. Include the amounts, and when and why you take them. Bring the list or the pill bottles to follow-up visits. Carry your medicine list with you in case of an emergency.    Prevent or manage acute nausea and vomiting:     Do not drink alcohol. Alcohol may upset or irritate your stomach. Too much alcohol can also cause acute nausea and vomiting.      Control stress. Headaches due to stress may cause nausea and vomiting. Find ways to relax and manage your stress. Get more rest and sleep.      Drink more liquids as directed. Vomiting can lead to dehydration. It is important to drink more liquids to help replace lost body fluids. Ask your healthcare provider how much liquid to drink each day and which liquids are best for you. Your provider may recommend that you drink an oral rehydration solution (ORS). ORS contains water, salts, and sugar that are needed to replace the lost body fluids. Ask what kind of ORS to use, how much to drink, and where to get it.      Eat smaller meals, more often. Eat small amounts of food every 2 to 3 hours, even if you are not hungry. Food in your stomach may decrease your nausea.      Talk to your healthcare provider before you take over-the-counter (OTC) medicines. These medicines can cause serious problems if you use certain other medicines, or you have a medical condition. You may have problems if you use too much or use them for longer than the label says. Follow directions on the label carefully.     Follow up with your healthcare provider as directed: Write down your questions so you remember to ask them during your follow-up visits.       © Copyright [employer] 2019 All illustrations and images included in CareNotes are the copyrighted property of [employer] or [employer].        Dehydration    WHAT YOU NEED TO KNOW:    Dehydration is a condition that develops when your body does not have enough fluid. You may become dehydrated if you do not drink enough water or lose too much fluid. Fluid loss may also cause loss of electrolytes (minerals), such as sodium.    DISCHARGE INSTRUCTIONS:    Return to the emergency department if:     You have a seizure.      You are confused or cannot think clearly.      You are extremely sleepy, or another person cannot wake you.       You become dizzy or faint when you stand.      You are not able to urinate.      You have trouble breathing.      You have a fast or irregular heartbeat.      Your hands or feet are cold, or your face is pale.     Contact your healthcare provider if:     You have trouble drinking liquids because you are vomiting.      Your symptoms get worse.      You have a fever.       You feel very weak or tired.      You have questions or concerns about your condition or care.    Follow up with your healthcare provider as directed: Write down your questions so you remember to ask them during your visits.     Prevent or manage dehydration:     Drink liquids as directed. Liquids that contain water, sugar, and minerals can help your body hold in fluid and help prevent dehydration. Drink liquids throughout the day, not just when you feel thirsty. Men should drink about 3 liters (13 eight-ounce cups) of liquid each day. Women should drink about 2 liters (9 eight-ounce cups) of liquid each day. Drink even more liquid if you will be outdoors, in the sun for a long time, or exercising.       Stay cool. Limit the time you spend outdoors during the hottest part of the day. Dress in lightweight clothes.       Keep track of how often you urinate. If you urinate less than usual or your urine is darker, drink more liquids.         © Copyright [employer] 2019 All illustrations and images included in CareNotes are the copyrighted property of [employer] or [employer].

## 2019-12-11 NOTE — ED PROVIDER NOTE - PATIENT PORTAL LINK FT
You can access the FollowMyHealth Patient Portal offered by [facility] by registering at the following website: [email or web address]. By joining [facility]’s FollowMyHealth portal, you will also be able to view your health information using other applications (apps) compatible with our system.

## 2019-12-11 NOTE — ED PROVIDER NOTE - NS ED ROS FT
Review of Systems:  	•	CONSTITUTIONAL - no fever, no diaphoresis, no chills  	•	SKIN - no rash  	•	HEMATOLOGIC - no bleeding, no bruising  	•	EYES - no eye pain, no blurry vision  	•	ENT - no sore throat   	•	RESPIRATORY - no shortness of breath, no cough  	•	CARDIAC - no chest pain, no palpitations  	•	GI - +vomiting. no abdominal pain, no diarrhea, no constipation  	•	GENITO-URINARY - no dysuria, no hematuria, no increased urinary frequency   	•	MUSCULOSKELETAL - no joint paint, no swelling, no redness  	•	NEUROLOGIC - no weakness, no headache, no paresthesias, no LOC  	•	PSYCH - no anxiety, non suicidal, non homicidal, no hallucination, no depression

## 2019-12-14 NOTE — H&P ADULT - HISTORY OF PRESENT ILLNESS
63 Y/O F with PMHx of MS on IVIG (last dose 3 wks ago), DM type II, Gastric cancer s/p resection and chemo currently in remission (Dr Toussaint), presented to the hospital s/p mechanical fall. Per pt she had progressively worsening lower extremity weakness that started about 2 wks ago, she has chronic RLE weakness and numbness, she ambulates with a walker but no she has weakness in her LLE associated with difficulty ambulation. Previously she would be able to get off the bed and walk with a walker but now she needs assistance with everything. This morning while she was going to use restroom, she felt dizzy described as spinning sensations, her legs gave way and she fell on the floor. Daughter is the HCP and she was helping her mother with walking while this happened. Pt hit her left knee and is complaining of pain in that area. She denied hitting her head, hx of LOC, blacking out, palpitations or any abnormal jerky movements of her limbs. Pt recently had worsening nausea and vomiting that she gets from gastroparesis and gastric resection, she ran out of her medications and it got worse so she came to ER 2 days ago. She was resuscitated with fluids and was sent home, her vomiting has improved since then. Pt had suspicious lesion in the duodenal loop, per daughter pt had EGD and Bx was -ve for malignancy? Pt denied any hx of fever, chills, nausea, vomiting, diarrhea, constipation, melena, pain in abdomen, sob, chest pain, cough, increased urinary frequency, dysuria, headache, any changes in weight, recent travel.     In the ED pt was found to have BP of 86/54, , RR 20/min, SPO2 96% on RA, Temp 96.7. She was given I/V fluid bolus 2.2 L NS and her vitals improved. Her UA showed nitrites so she was given Rocephin?

## 2019-12-14 NOTE — ED ADULT NURSE REASSESSMENT NOTE - NS ED NURSE REASSESS COMMENT FT1
Pt a&ox3, moved to ED hold chakraborty 15. Pt in NAD, report given to RN. JEAN MARIE, pending neuro and social work consults.

## 2019-12-14 NOTE — H&P ADULT - NSHPPHYSICALEXAM_GEN_ALL_CORE
PHYSICAL EXAM:  GENERAL: NAD, lying in bed comfortably  HEAD:  Atraumatic, Normocephalic  EYES: EOMI, PERRLA, conjunctiva and sclera clear  ENT: Moist mucous membranes  NECK: Supple, No JVD  CHEST/LUNG: Clear to auscultation bilaterally; No rales, rhonchi, wheezing, or rubs. Unlabored respirations  HEART: Regular rate and rhythm; No murmurs, rubs, or gallops  ABDOMEN: Bowel sounds present; Soft, Nontender, Nondistended. No hepatomegaly  EXTREMITIES: brisk capillary refill. No clubbing, cyanosis, or edema  NERVOUS SYSTEM:  Alert & Oriented X3, speech clear. RLE weakness power 1/5, LLE weakness power 2/5, b/l UE power 5/5, RLE sensory loss to touch.  SKIN: No rashes or lesions

## 2019-12-14 NOTE — CONSULT NOTE ADULT - SUBJECTIVE AND OBJECTIVE BOX
Pt seen, examined.  C/o worsening of ambulation (had 2 falls; hit right knee, no head trauma) due to right leg more weakness x 1 week.    Hx SPMS, failed mltiple meds in the past (copaxone, Tysabri (due to ROSA virus), now on IVIG qmonth, last dose about 2 weeks ago (?on 11/28/19).  As a baseline woalks w/ cane or walker.  On IVIG over a year; first felt better (more leg strength) but lately does not feel any effect from IVIG.  Had Brain MRI done about a month ago - c/w MS; no new(enhancing) lesions.    On exam - awake, alert, speech fluent, follows commands,   EOMI. Face symmetric, Motor 4/5 x 2 UE, 4-/5 LLE, 2/5 RLE (prox. and distal)  No focal sensory loss appreciated. Gait - deferred.    CBC - low Hb - 9.0  CMP - unremark.  U/A - positive nitrite    A/P. Worsening of right leg weakness w worsening of ambulation. S/p fall w/ right knee trauma/pain.  Hx - see above  U/A - c/w UTI    Likely MS exacerbation due to UTI  - treat UTI  - will consider IV Solumedrol if no improvement in 2 days of UTI treatment  - no need to repeat Brain MRI (just recently done)  - follow xray right knee r/o fx  - rehab eval  - address low Hb  - follow Glucose (Hx DM)  - In general - follow w/ Dr. Vance - her Neurologist- as outpt - seems like not benefiting from IVIG anymore - to discuss options of further treatment of MS  Please, call if any questions

## 2019-12-14 NOTE — H&P ADULT - ATTENDING COMMENTS
The patient was seen and examined at bedside.   Agree with a/p above by resident.  Plan of carte d/w the daughter at bedside.    Will follow clinically and the urine cx / neuro eval ( Dr Vance group).

## 2019-12-14 NOTE — ED ADULT NURSE NOTE - CHIEF COMPLAINT QUOTE
hx of MS base line able to walk with walker - last 2 weeks patient reports weakness. fell today  unable to standno head injury no loc. patient has foul smell of urine noted upon triage.

## 2019-12-14 NOTE — PATIENT PROFILE ADULT - HAS THE PATIENT BEEN ADMITTED FROM SHORT TERM REHAB?
Nursing Note by Karime Zavala MA at 11/16/17 03:54 PM     Author:  Karime Zavala MA Service:  (none) Author Type:  Medical Assistant     Filed:  11/16/17 03:54 PM Encounter Date:  11/16/2017 Status:  Signed     :  Karime Zavala MA (Medical Assistant)            Patient can be reached at[DA1.1T] [phone][[email or web address]]. May leave a message.[[email or web address]]        Revision History        User Key Date/Time User Provider Type Action    > DA1.1 11/16/17 03:54 PM Karime Zavala MA Medical Assistant Sign    M - Manual, T - Template            
no

## 2019-12-14 NOTE — H&P ADULT - NSICDXPASTSURGICALHX_GEN_ALL_CORE_FT
PAST SURGICAL HISTORY:  H/O partial thyroidectomy     H/O total hysterectomy with bilateral salpingo-oophorectomy (BSO)     Portacath in place     S/P partial gastrectomy

## 2019-12-14 NOTE — H&P ADULT - ASSESSMENT
63 Y/O F with PMHx of MS on IVIG (last dose 3 wks ago), DM type II, Gastric cancer s/p resection and chemo currently in remission (Dr Toussaint), presented to the hospital s/p mechanical fall. Per pt she had progressively worsening lower extremity weakness that started about 2 wks ago, she has chronic RLE weakness and numbness, she ambulates with a walker but no she has weakness in her LLE associated with difficulty ambulation.    # Left Lower extremity weakness leading mechanical fall from MS flare vs generalized weakness from N/V and UTI  - f/u X rays of Left lower extremity  - will check for TSH, B12, RPR, Urine Cx.  - pt has chronic RLE weakness now has left lower extremity weakness, will hold off on MRI and steroids as per neuro  - c/w baclofen Cymbalta and Ropinirol. If pt continues to be dizzy f/u with neuro for medications adjustment  - c/w Rocephin 1gm q 24  - fall risk/PT/Rehab eval  - if no improvement f/u neuro again for possible MRI and steroids vs IVIG    # DM type II  - hold oral meds;  check fs;  start insulin basal, bolus, s/s prn if finger stick glucose >180 mg persistently    # Gastric cancer s/p gastrectomy and chemo  - currently in remission  - most recent CT scan showed possible duodenal loop that could be related to malignancy  - per daughter most recent EGD was -ve  - c/w reglan, zofran prn and small frequent meals  - outpt f/u with Dr Toussaint     DVT PPx: Hep 5000 s/c   GI PPX: Protonix 40 mg PO   Diet: DASH/Carb consistent  Activity: Increase as tolerated  Dispo: from home, pending PT eval  Code Status: full code 65 Y/O F with PMHx of MS on IVIG (last dose 3 wks ago), DM type II, Gastric cancer s/p resection and chemo currently in remission (Dr Toussaint), presented to the hospital s/p mechanical fall. Per pt she had progressively worsening lower extremity weakness that started about 2 wks ago, she has chronic RLE weakness and numbness, she ambulates with a walker but no she has weakness in her LLE associated with difficulty ambulation.    # Left Lower extremity weakness leading mechanical fall from MS flare vs generalized weakness from N/V and UTI  - f/u X rays of Left lower extremity  - will check for TSH, B12, RPR, Urine Cx.  - pt has chronic RLE weakness now has left lower extremity weakness, will hold off on MRI and steroids as per neuro  - c/w baclofen Cymbalta and Ropinirol. If pt continues to be dizzy f/u with neuro for medications adjustment. F/U orthostatic VS  - c/w Rocephin 1gm q 24 per neuro pt had outpt UA that was +ve  - fall risk/PT/Rehab eval  - if no improvement f/u neuro again for possible MRI and steroids vs IVIG    # DM type II  - hold oral meds;  check fs;  start insulin basal, bolus, s/s prn if finger stick glucose >180 mg persistently    # Gastric cancer s/p gastrectomy and chemo  - currently in remission  - most recent CT scan showed possible duodenal loop that could be related to malignancy  - per daughter most recent EGD was -ve  - c/w reglan, zofran prn and small frequent meals  - outpt f/u with Dr Toussaint     DVT PPx: Lovenox s/c   GI PPX: Protonix 40 mg PO   Diet: DASH/Carb consistent  Activity: Increase as tolerated  Dispo: from home, pending PT eval  Code Status: full code

## 2019-12-14 NOTE — ED PROVIDER NOTE - CARE PLAN
Principal Discharge DX:	Sepsis  Secondary Diagnosis:	Inability to ambulate due to right knee  Secondary Diagnosis:	Multiple sclerosis

## 2019-12-14 NOTE — ED PROVIDER NOTE - PHYSICAL EXAMINATION
CONSTITUTIONAL: Well-developed; dehydrated; in no acute distress.   SKIN: warm, dry  HEAD: Normocephalic; atraumatic.  EYES: PERRL, EOMI, normal sclera and conjunctiva   ENT: No nasal discharge; airway clear.  NECK: Supple; non tender.  CARD: S1, S2 normal; no murmurs, gallops, or rubs. Regular rate and rhythm.   RESP: No wheezes, rales or rhonchi.  ABD: soft ntnd  EXT: Normal ROM.  No clubbing, cyanosis or edema.   LYMPH: No acute cervical adenopathy.  NEURO: Alert, oriented, grossly unremarkable. 0/5 strength in RLE. 3/5 in RLE, now with pain. no other focal deficits.  PSYCH: Cooperative, appropriate.

## 2019-12-14 NOTE — H&P ADULT - NSICDXPASTMEDICALHX_GEN_ALL_CORE_FT
PAST MEDICAL HISTORY:  DM type 2 (diabetes mellitus, type 2)     Multiple sclerosis     Stomach cancer

## 2019-12-14 NOTE — H&P ADULT - NSHPLABSRESULTS_GEN_ALL_CORE
9.0    .10 )-----------( 397      ( 14 Dec 2019 09:44 )             29.2           140  |  103  |  16  ----------------------------<  105<H>  4.0   |  22  |  0.6<L>    Ca    8.2<L>      14 Dec 2019 09:44  Mg     1.8         TPro  5.3<L>  /  Alb  2.7<L>  /  TBili  0.3  /  DBili  x   /  AST  35  /  ALT  29  /  AlkPhos  171<H>                Urinalysis Basic - ( 14 Dec 2019 09:54 )    Color: Yellow / Appearance: Slightly Turbid / S.013 / pH: x  Gluc: x / Ketone: Negative  / Bili: Negative / Urobili: <2 mg/dL   Blood: x / Protein: Trace / Nitrite: Positive   Leuk Esterase: Negative / RBC: 4 /HPF / WBC 3 /HPF   Sq Epi: x / Non Sq Epi: 1 /HPF / Bacteria: Many        PT/INR - ( 14 Dec 2019 09:44 )   PT: 15.00 sec;   INR: 1.31 ratio         PTT - ( 14 Dec 2019 09:44 )  PTT:30.9 sec    Lactate Trend      CARDIAC MARKERS ( 14 Dec 2019 09:44 )  x     / <0.01 ng/mL / 35 U/L / x     / x            CAPILLARY BLOOD GLUCOSE        < from: Xray Pelvis AP only (19 @ 10:44) >    IMPRESSION:    No acute fracture or dislocation of the hips or pelvis. Diffuse   osteopenia. Partially visualized periosteal reaction along the medial   aspect of the right femoral diaphysis is nonspecific in the absence of   other findings. Radiographs of the right femur are recommended for   further evaluation.    < end of copied text >

## 2019-12-14 NOTE — ED PROVIDER NOTE - CLINICAL SUMMARY MEDICAL DECISION MAKING FREE TEXT BOX
hypotension on arrival - fluid-responsive - nitrite+ urine - no traumatic injury - concern for ms exacerbation - neuro to evaluate - social work and PT to discuss need for services/rehab

## 2019-12-14 NOTE — PATIENT PROFILE ADULT - LIVES WITH
pt lives in the side apartment of the home that her daughter owns/adult child(kellen) pt lives in the side apartment of the home that her daughter owns/alone

## 2019-12-14 NOTE — ED PROVIDER NOTE - NS ED ROS FT
Eyes:  No visual changes, eye pain or discharge.  ENMT:  No hearing changes, pain, discharge or infections. No neck pain or stiffness.  Cardiac:  No chest pain, SOB or edema. No chest pain with exertion.  Respiratory:  No cough or respiratory distress. No hemoptysis. No history of asthma or RAD.  GI:  No nausea, vomiting, diarrhea or abdominal pain.  :  No dysuria, frequency or burning.  MS:  No myalgia, muscle weakness. +R knee pain. no back pain.  Neuro:  No headache. +weakness. No LOC.  Skin:  No skin rash.   Endocrine: No history of thyroid disease or diabetes.

## 2019-12-14 NOTE — ED PROVIDER NOTE - ATTENDING CONTRIBUTION TO CARE
Pt here for weakness typical of her MS flare (last flare 3 years ago).  Gets IVIG q1 month and last dose 3 weeks ago.  Seen in ED 3d ago for vomiting - since resolved.  No diarrhea.  Denies bloody emesis/stools.  No fever, no cough, no dysuria.  Today fell onto knees.  Notes R knee pain (usually doesn't feel in that leg).  Pain to L knee that feels like her MS flare.    Exam: stable pelvis, soft NT abdomen, no chest wall tenderness, NCAT, LE weakness, no other signs of trauma, calm, cooperative, no blood in rectal vault  Plan: labs, ivf, cx, xr, neuro consult, social work, likely admission

## 2019-12-14 NOTE — ED PROVIDER NOTE - OBJECTIVE STATEMENT
pt is a 64 yof w/ MS on IVIG last dose 3 weeks ago, DM, gastritis diagnosed on endoscopy 3 months ago here for 2 weeks of weakness, resulting in a mechanical fall this morning injuring right knee. denies head trauma, seizure, syncope, abd pain, n/v/d, lightheadedness, fever, sob, cough, cp

## 2019-12-14 NOTE — ED ADULT NURSE NOTE - OBJECTIVE STATEMENT
Pt a&ox3, dtr at bedside. Pt came to ED s/p fall to B/L knees today. Pt denies LOC or HT, denies AC. Pt has hx of MS, baseline walks with walker at home, over past 2 weeks worsening weakness in left leg, normally weak in right leg. Pt denies CP/SOB, denies fever/chills, + foul smelling urine. Jensen placed, urine sent to lab. Lab work sent. Pt hypotensive on arrival currently BP WNL.

## 2019-12-15 NOTE — CONSULT NOTE ADULT - ASSESSMENT
63 Y/O F with PMHx of MS on IVIG (last dose 3 wks ago), DM type II, Gastric cancer s/p resection and chemo currently in remission (Dr Toussaint), presented to the hospital s/p mechanical fall. Per pt she had progressively worsening lower extremity weakness that started about 2 wks ago    # UTI / MS flare   - Continue Rocephin for UTI.   - MS - Neuro on board.     # Right femoral periosteal reaction:   - Obtain Xrays of right femur   - Check ESR 65 Y/O F with PMHx of MS on IVIG (last dose 3 wks ago), DM type II, Gastric cancer s/p resection and chemo currently in remission (Dr Toussaint), presented to the hospital s/p mechanical fall. Per pt she had progressively worsening lower extremity weakness that started about 2 wks ago    # UTI / MS flare   - Continue Rocephin for UTI.   - F/U cx.   - MS - Neuro on board.     # Right femoral periosteal reaction:   - Obtain Xrays of right femur   - Check ESR

## 2019-12-15 NOTE — CONSULT NOTE ADULT - SUBJECTIVE AND OBJECTIVE BOX
HPI :   65 Y/O F with PMHx of MS on IVIG (last dose 3 wks ago), DM type II, Gastric cancer s/p resection and chemo currently in remission (Dr Toussaint), presented to the hospital s/p mechanical fall. Per pt she had progressively worsening lower extremity weakness that started about 2 wks ago, she has chronic RLE weakness and numbness, she ambulates with a walker but no she has weakness in her LLE associated with difficulty ambulation. Previously she would be able to get off the bed and walk with a walker but now she needs assistance with everything. This morning while she was going to use restroom, she felt dizzy described as spinning sensations, her legs gave way and she fell on the floor. Daughter is the HCP and she was helping her mother with walking while this happened. Pt hit her left knee and is complaining of pain in that area. She denied hitting her head, hx of LOC, blacking out, palpitations or any abnormal jerky movements of her limbs. Pt recently had worsening nausea and vomiting that she gets from gastroparesis and gastric resection, she ran out of her medications and it got worse so she came to ER 2 days ago. She was resuscitated with fluids and was sent home, her vomiting has improved since then. Pt had suspicious lesion in the duodenal loop, per daughter pt had EGD and Bx was -ve for malignancy? Pt denied any hx of fever, chills, nausea, vomiting, diarrhea, constipation, melena, pain in abdomen, sob, chest pain, cough, increased urinary frequency, dysuria, headache, any changes in weight, recent travel.     In the ED pt was found to have BP of 86/54, , RR 20/min, SPO2 96% on RA, Temp 96.7. She was given I/V fluid bolus 2.2 L NS and her vitals improved. Her UA showed nitrites so she was given Rocephin?    PAST MEDICAL HISTORY:  DM type 2 (diabetes mellitus, type 2)     Multiple sclerosis     Stomach cancer.     PAST SURGICAL HISTORY:  H/O partial thyroidectomy     H/O total hysterectomy with bilateral salpingo-oophorectomy (BSO)     Portacath in place     S/P partial gastrectomy.        acetaminophen   Tablet .. 650 milliGRAM(s) Oral every 6 hours PRN  baclofen 20 milliGRAM(s) Oral two times a day  cefTRIAXone   IVPB 1000 milliGRAM(s) IV Intermittent every 24 hours  chlorhexidine 4% Liquid 1 Application(s) Topical <User Schedule>  cholecalciferol 1000 Unit(s) Oral daily  dextrose 40% Gel 15 Gram(s) Oral once PRN  dextrose 5%. 1000 milliLiter(s) IV Continuous <Continuous>  dextrose 50% Injectable 12.5 Gram(s) IV Push once  dextrose 50% Injectable 25 Gram(s) IV Push once  dextrose 50% Injectable 25 Gram(s) IV Push once  DULoxetine 60 milliGRAM(s) Oral daily  enoxaparin Injectable 40 milliGRAM(s) SubCutaneous daily  folic acid 1 milliGRAM(s) Oral daily  glucagon  Injectable 1 milliGRAM(s) IntraMuscular once PRN  guaiFENesin   Syrup  (Sugar-Free) 100 milliGRAM(s) Oral every 6 hours PRN  insulin glargine Injectable (LANTUS) 10 Unit(s) SubCutaneous at bedtime  insulin lispro (HumaLOG) corrective regimen sliding scale   SubCutaneous three times a day before meals  insulin lispro Injectable (HumaLOG) 3 Unit(s) SubCutaneous three times a day before meals  metoclopramide 10 milliGRAM(s) Oral three times a day before meals  ondansetron Injectable 4 milliGRAM(s) IV Push every 8 hours PRN  pantoprazole    Tablet 40 milliGRAM(s) Oral before breakfast  rOPINIRole 0.5 milliGRAM(s) Oral three times a day    cefTRIAXone   IVPB 1000 milliGRAM(s) IV Intermittent every 24 hours    Vital Signs Last 24 Hrs  T(C): 36.8 (15 Dec 2019 12:45), Max: 37.4 (14 Dec 2019 18:39)  T(F): 98.3 (15 Dec 2019 12:45), Max: 99.3 (14 Dec 2019 18:39)  HR: 94 (15 Dec 2019 12:45) (69 - 95)  BP: 102/56 (15 Dec 2019 12:45) (90/55 - 119/65)  BP(mean): --  RR: 18 (15 Dec 2019 12:45) (18 - 18)  SpO2: 97% (14 Dec 2019 18:00) (97% - 97%)    LABS:                        7.5    13.16 )-----------( 344      ( 15 Dec 2019 12:12 )             24.4     12-15    138  |  106  |  18  ----------------------------<  117<H>  4.4   |  19  |  0.6<L>    Ca    7.5<L>      15 Dec 2019 06:29  Mg     1.8     12-15    TPro  4.4<L>  /  Alb  2.1<L>  /  TBili  0.2  /  DBili  x   /  AST  26  /  ALT  23  /  AlkPhos  141<H>  12-15      Urinalysis Basic - ( 14 Dec 2019 09:54 )    Color: Yellow / Appearance: Slightly Turbid / S.013 / pH: x  Gluc: x / Ketone: Negative  / Bili: Negative / Urobili: <2 mg/dL   Blood: x / Protein: Trace / Nitrite: Positive   Leuk Esterase: Negative / RBC: 4 /HPF / WBC 3 /HPF   Sq Epi: x / Non Sq Epi: 1 /HPF / Bacteria: Many    EXAM:  XR PELVIS AP ONLY 1-2 VIEWS            PROCEDURE DATE:  2019            INTERPRETATION:  CLINICAL HISTORY / REASON FOR EXAM: Fall.    TECHNIQUE: Frontal radiographs of the pelvis. Evaluation is partially   limited by under penetration due to the patient's body habitus.    COMPARISON: Radiograph of the pelvis dated 2010.    FINDINGS/  IMPRESSION:    No acute fracture or dislocation of the hips or pelvis. Diffuse   osteopenia. Partially visualized periosteal reaction along the medial   aspect of the right femoral diaphysis is nonspecific in the absence of   other findings. Radiographs of the right femur are recommended for   further evaluation.      EXAM:  XR CHEST FRONTAL 1V            PROCEDURE DATE:  2019            INTERPRETATION:  Clinical History/Reason for Exam:  Sepsis  Comparison : Chest x-ray-  2019      Findings:    Technique/Positioning: Frontal film. Leads overlie thorax obscuring   anatomy. Rotated to the right    Support devices: Right Port-A-Cath, satisfactory position.      Cardiac/mediastinum/hilum: Rotated cardiac silhouette, unchanged    Lung parenchyma/ Pleura: No consolidation effusion or pneumothorax.      Skeleton/soft tissues: Stable      Impression:    No radiographic evidence of acute cardiopulmonary disease.    EXAM:  CT ABDOMEN AND PELVIS IC            PROCEDURE DATE:  2019            INTERPRETATION:  CLINICAL STATEMENT: Nausea, vomiting. History of gastric   cancer.      TECHNIQUE: Contiguous axial CT images were obtained from the lower chest   tothe pubic symphysis following administration of 100cc Optiray 320   intravenous contrast.  Oral contrast was not administered.  Reformatted   images in the coronal and sagittal planes were acquired.    COMPARISON CT: Abdomen and pelvis dated 2019.    OTHER STUDIES USED FOR CORRELATION: None.       FINDINGS:    LOWER CHEST: Fluid-filled, mildly distended distal esophagus. Trace right   pleural effusion. Bibasilar atelectasis, greater on the right. Mild   bibasilar bronchiectasis.    HEPATOBILIARY: Hepatic steatosis. No intrahepatic or extrahepatic biliary   ductal dilatation. Contracted gallbladder.    SPLEEN: Unremarkable.    PANCREAS: Diminutive but otherwise unremarkable.    ADRENAL GLANDS: Unremarkable.    KIDNEYS: Symmetric nephrograms. No hydronephrosis or renal calculus.   Excreted intravenous contrast is demonstrated in the bilateral collecting   systems. There are bilateral renal cysts and subcentimeter hypodensities   which are too small to further characterize.    ABDOMINOPELVIC NODES: Unremarkable.    PELVIC ORGANS: Prior hysterectomy. Excreted contrast is demonstrated   within a distended urinary bladder.    PERITONEUM/MESENTERY/BOWEL:   The patient is status post partial gastrectomy with Billroth II type   anatomy. The residual stomach is distended with copious debris.  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   Redemonstration of soft tissue attenuation surrounding the common hepatic   artery (series 4 image 95-99), not significantly changed from prior.    Duodenal diverticulum. Colonic diverticulosis. No evidence of bowel   obstruction, pneumoperitoneum, or ascites. Small bowel is fluid-filled.   Normal appendix.    BONES/SOFT TISSUES: No suspicious osseous lesion. Degenerative changes of   the spine     OTHER: Calcified atherosclerotic disease of the aorta and branch vessels      IMPRESSION:     1.  No evidence of bowel obstruction or colitis.   2.  Distended stomach filled with debris.   3.  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 An FDG avid focus was seen immediately superior to   this region on prior PET/CT. Redemonstration of soft tissue attenuation   surrounding the common hepatic artery also similar to multiple prior   studies dating to 2018, possibly representing recurrent tumor.   Continued attention to these areas is recommended on followup CT/PET/CT. HPI :   65 Y/O F with PMHx of MS on IVIG (last dose 3 wks ago), DM type II, Gastric cancer s/p resection and chemo currently in remission (Dr Toussaint), presented to the hospital s/p mechanical fall. Per pt she had progressively worsening lower extremity weakness that started about 2 wks ago, she has chronic RLE weakness and numbness, she ambulates with a walker but no she has weakness in her LLE associated with difficulty ambulation. Previously she would be able to get off the bed and walk with a walker but now she needs assistance with everything. This morning while she was going to use restroom, she felt dizzy described as spinning sensations, her legs gave way and she fell on the floor. Daughter is the HCP and she was helping her mother with walking while this happened. Pt hit her left knee and is complaining of pain in that area. She denied hitting her head, hx of LOC, blacking out, palpitations or any abnormal jerky movements of her limbs. Pt recently had worsening nausea and vomiting that she gets from gastroparesis and gastric resection, she ran out of her medications and it got worse so she came to ER 2 days ago. She was resuscitated with fluids and was sent home, her vomiting has improved since then. Pt had suspicious lesion in the duodenal loop, per daughter pt had EGD and Bx was -ve for malignancy? Pt denied any hx of fever, chills, nausea, vomiting, diarrhea, constipation, melena, pain in abdomen, sob, chest pain, cough, increased urinary frequency, dysuria, headache, any changes in weight, recent travel.     In the ED pt was found to have BP of 86/54, , RR 20/min, SPO2 96% on RA, Temp 96.7. She was given I/V fluid bolus 2.2 L NS and her vitals improved. Her UA showed nitrites so she was given Rocephin?    PAST MEDICAL HISTORY:  DM type 2 (diabetes mellitus, type 2)     Multiple sclerosis     Stomach cancer.     PAST SURGICAL HISTORY:  H/O partial thyroidectomy     H/O total hysterectomy with bilateral salpingo-oophorectomy (BSO)     Portacath in place     S/P partial gastrectomy.      Awake, alert, on cell phone, tim cath site clean,   Abd firm, non tender, healed surgical scar,   No hip pain or tenderness.     acetaminophen   Tablet .. 650 milliGRAM(s) Oral every 6 hours PRN  baclofen 20 milliGRAM(s) Oral two times a day  cefTRIAXone   IVPB 1000 milliGRAM(s) IV Intermittent every 24 hours  chlorhexidine 4% Liquid 1 Application(s) Topical <User Schedule>  cholecalciferol 1000 Unit(s) Oral daily  dextrose 40% Gel 15 Gram(s) Oral once PRN  dextrose 5%. 1000 milliLiter(s) IV Continuous <Continuous>  dextrose 50% Injectable 12.5 Gram(s) IV Push once  dextrose 50% Injectable 25 Gram(s) IV Push once  dextrose 50% Injectable 25 Gram(s) IV Push once  DULoxetine 60 milliGRAM(s) Oral daily  enoxaparin Injectable 40 milliGRAM(s) SubCutaneous daily  folic acid 1 milliGRAM(s) Oral daily  glucagon  Injectable 1 milliGRAM(s) IntraMuscular once PRN  guaiFENesin   Syrup  (Sugar-Free) 100 milliGRAM(s) Oral every 6 hours PRN  insulin glargine Injectable (LANTUS) 10 Unit(s) SubCutaneous at bedtime  insulin lispro (HumaLOG) corrective regimen sliding scale   SubCutaneous three times a day before meals  insulin lispro Injectable (HumaLOG) 3 Unit(s) SubCutaneous three times a day before meals  metoclopramide 10 milliGRAM(s) Oral three times a day before meals  ondansetron Injectable 4 milliGRAM(s) IV Push every 8 hours PRN  pantoprazole    Tablet 40 milliGRAM(s) Oral before breakfast  rOPINIRole 0.5 milliGRAM(s) Oral three times a day    cefTRIAXone   IVPB 1000 milliGRAM(s) IV Intermittent every 24 hours    Vital Signs Last 24 Hrs  T(C): 36.8 (15 Dec 2019 12:45), Max: 37.4 (14 Dec 2019 18:39)  T(F): 98.3 (15 Dec 2019 12:45), Max: 99.3 (14 Dec 2019 18:39)  HR: 94 (15 Dec 2019 12:45) (69 - 95)  BP: 102/56 (15 Dec 2019 12:45) (90/55 - 119/65)  BP(mean): --  RR: 18 (15 Dec 2019 12:45) (18 - 18)  SpO2: 97% (14 Dec 2019 18:00) (97% - 97%)    LABS:                        7.5    13.16 )-----------( 344      ( 15 Dec 2019 12:12 )             24.4     12-15    138  |  106  |  18  ----------------------------<  117<H>  4.4   |  19  |  0.6<L>    Ca    7.5<L>      15 Dec 2019 06:29  Mg     1.8     12-15    TPro  4.4<L>  /  Alb  2.1<L>  /  TBili  0.2  /  DBili  x   /  AST  26  /  ALT  23  /  AlkPhos  141<H>  12-15      Urinalysis Basic - ( 14 Dec 2019 09:54 )    Color: Yellow / Appearance: Slightly Turbid / S.013 / pH: x  Gluc: x / Ketone: Negative  / Bili: Negative / Urobili: <2 mg/dL   Blood: x / Protein: Trace / Nitrite: Positive   Leuk Esterase: Negative / RBC: 4 /HPF / WBC 3 /HPF   Sq Epi: x / Non Sq Epi: 1 /HPF / Bacteria: Many    EXAM:  XR PELVIS AP ONLY 1-2 VIEWS            PROCEDURE DATE:  2019            INTERPRETATION:  CLINICAL HISTORY / REASON FOR EXAM: Fall.    TECHNIQUE: Frontal radiographs of the pelvis. Evaluation is partially   limited by under penetration due to the patient's body habitus.    COMPARISON: Radiograph of the pelvis dated 2010.    FINDINGS/  IMPRESSION:    No acute fracture or dislocation of the hips or pelvis. Diffuse   osteopenia. Partially visualized periosteal reaction along the medial   aspect of the right femoral diaphysis is nonspecific in the absence of   other findings. Radiographs of the right femur are recommended for   further evaluation.      EXAM:  XR CHEST FRONTAL 1V            PROCEDURE DATE:  2019            INTERPRETATION:  Clinical History/Reason for Exam:  Sepsis  Comparison : Chest x-ray-  2019      Findings:    Technique/Positioning: Frontal film. Leads overlie thorax obscuring   anatomy. Rotated to the right    Support devices: Right Port-A-Cath, satisfactory position.      Cardiac/mediastinum/hilum: Rotated cardiac silhouette, unchanged    Lung parenchyma/ Pleura: No consolidation effusion or pneumothorax.      Skeleton/soft tissues: Stable      Impression:    No radiographic evidence of acute cardiopulmonary disease.    EXAM:  CT ABDOMEN AND PELVIS IC            PROCEDURE DATE:  2019            INTERPRETATION:  CLINICAL STATEMENT: Nausea, vomiting. History of gastric   cancer.      TECHNIQUE: Contiguous axial CT images were obtained from the lower chest   tothe pubic symphysis following administration of 100cc Optiray 320   intravenous contrast.  Oral contrast was not administered.  Reformatted   images in the coronal and sagittal planes were acquired.    COMPARISON CT: Abdomen and pelvis dated 2019.    OTHER STUDIES USED FOR CORRELATION: None.       FINDINGS:    LOWER CHEST: Fluid-filled, mildly distended distal esophagus. Trace right   pleural effusion. Bibasilar atelectasis, greater on the right. Mild   bibasilar bronchiectasis.    HEPATOBILIARY: Hepatic steatosis. No intrahepatic or extrahepatic biliary   ductal dilatation. Contracted gallbladder.    SPLEEN: Unremarkable.    PANCREAS: Diminutive but otherwise unremarkable.    ADRENAL GLANDS: Unremarkable.    KIDNEYS: Symmetric nephrograms. No hydronephrosis or renal calculus.   Excreted intravenous contrast is demonstrated in the bilateral collecting   systems. There are bilateral renal cysts and subcentimeter hypodensities   which are too small to further characterize.    ABDOMINOPELVIC NODES: Unremarkable.    PELVIC ORGANS: Prior hysterectomy. Excreted contrast is demonstrated   within a distended urinary bladder.    PERITONEUM/MESENTERY/BOWEL:   The patient is status post partial gastrectomy with Billroth II type   anatomy. The residual stomach is distended with copious debris.  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   Redemonstration of soft tissue attenuation surrounding the common hepatic   artery (series 4 image 95-99), not significantly changed from prior.    Duodenal diverticulum. Colonic diverticulosis. No evidence of bowel   obstruction, pneumoperitoneum, or ascites. Small bowel is fluid-filled.   Normal appendix.    BONES/SOFT TISSUES: No suspicious osseous lesion. Degenerative changes of   the spine     OTHER: Calcified atherosclerotic disease of the aorta and branch vessels      IMPRESSION:     1.  No evidence of bowel obstruction or colitis.   2.  Distended stomach filled with debris.   3.  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 An FDG avid focus was seen immediately superior to   this region on prior PET/CT. Redemonstration of soft tissue attenuation   surrounding the common hepatic artery also similar to multiple prior   studies dating to 2018, possibly representing recurrent tumor.   Continued attention to these areas is recommended on followup CT/PET/CT.

## 2019-12-15 NOTE — PROGRESS NOTE ADULT - SUBJECTIVE AND OBJECTIVE BOX
LEFTY CARRASQUILLO 64y Female  MRN#: [id number]   Hospital Day: 1d    SUBJECTIVE  Patient is a 64y old Female who presents with a chief complaint of lower extremity weakness leading to mechanical fall (14 Dec 2019 12:01)  Currently admitted to medicine with the primary diagnosis of Sepsis    INTERVAL HPI AND OVERNIGHT EVENTS:  Patient was examined and seen at bedside. This morning she is resting comfortably in bed and reports no issues or overnight events.    REVIEW OF SYMPTOMS:  CONSTITUTIONAL: No weakness, fevers or chills; No headaches  EYES: No visual changes, eye pain, or discharge  ENT: No vertigo; No ear pain or change in hearing; No sore throat or difficulty swallowing  NECK: No pain or stiffness  RESPIRATORY: No cough, wheezing, or hemoptysis; No shortness of breath  CARDIOVASCULAR: No chest pain or palpitations  GASTROINTESTINAL: No abdominal or epigastric pain; No nausea, vomiting, or hematemesis; No diarrhea or constipation; No melena or hematochezia  GENITOURINARY: No dysuria, frequency or hematuria  MUSCULOSKELETAL: No joint pain, no muscle pain, no weakness  NEUROLOGICAL: No numbness or weakness  SKIN: No itching or rashes      OBJECTIVE  PAST MEDICAL & SURGICAL HISTORY  Multiple sclerosis  DM type 2 (diabetes mellitus, type 2)  Stomach cancer  Portacath in place  S/P partial gastrectomy  H/O total hysterectomy with bilateral salpingo-oophorectomy (BSO)  H/O partial thyroidectomy    ALLERGIES:  No Known Allergies    MEDICATIONS:  STANDING MEDICATIONS  baclofen 20 milliGRAM(s) Oral two times a day  cefTRIAXone   IVPB 1000 milliGRAM(s) IV Intermittent every 24 hours  chlorhexidine 4% Liquid 1 Application(s) Topical <User Schedule>  cholecalciferol 1000 Unit(s) Oral daily  DULoxetine 60 milliGRAM(s) Oral daily  enoxaparin Injectable 40 milliGRAM(s) SubCutaneous daily  folic acid 1 milliGRAM(s) Oral daily  metoclopramide 10 milliGRAM(s) Oral three times a day before meals  pantoprazole    Tablet 40 milliGRAM(s) Oral before breakfast  rOPINIRole 0.5 milliGRAM(s) Oral three times a day    PRN MEDICATIONS  acetaminophen   Tablet .. 650 milliGRAM(s) Oral every 6 hours PRN  guaiFENesin   Syrup  (Sugar-Free) 100 milliGRAM(s) Oral every 6 hours PRN  ondansetron Injectable 4 milliGRAM(s) IV Push every 8 hours PRN      VITAL SIGNS: Last 24 Hours  T(C): 37.2 (15 Dec 2019 05:00), Max: 37.4 (14 Dec 2019 18:39)  T(F): 98.9 (15 Dec 2019 05:00), Max: 99.3 (14 Dec 2019 18:39)  HR: 89 (15 Dec 2019 05:00) (66 - 107)  BP: 90/55 (15 Dec 2019 05:00) (86/54 - 119/65)  BP(mean): --  RR: 18 (15 Dec 2019 05:00) (18 - 20)  SpO2: 97% (14 Dec 2019 18:00) (97% - 100%)    LABS:                        9.0    14.10 )-----------( 397      ( 14 Dec 2019 09:44 )             29.2         140  |  103  |  16  ----------------------------<  105<H>  4.0   |  22  |  0.6<L>    Ca    8.2<L>      14 Dec 2019 09:44  Mg     1.8         TPro  5.3<L>  /  Alb  2.7<L>  /  TBili  0.3  /  DBili  x   /  AST  35  /  ALT  29  /  AlkPhos  171<H>      PT/INR - ( 14 Dec 2019 09:44 )   PT: 15.00 sec;   INR: 1.31 ratio         PTT - ( 14 Dec 2019 09:44 )  PTT:30.9 sec  Urinalysis Basic - ( 14 Dec 2019 09:54 )    Color: Yellow / Appearance: Slightly Turbid / S.013 / pH: x  Gluc: x / Ketone: Negative  / Bili: Negative / Urobili: <2 mg/dL   Blood: x / Protein: Trace / Nitrite: Positive   Leuk Esterase: Negative / RBC: 4 /HPF / WBC 3 /HPF   Sq Epi: x / Non Sq Epi: 1 /HPF / Bacteria: Many        Creatine Kinase, Serum: 35 U/L (19 @ 09:44)  Troponin T, Serum: <0.01 ng/mL (19 @ 09:44)      CARDIAC MARKERS ( 14 Dec 2019 09:44 )  x     / <0.01 ng/mL / 35 U/L / x     / x          RADIOLOGY:  < from: Xray Knee 3 Views, Right (19 @ 10:43) >  Findings/  Impression:    No acute fracture or dislocation.    Bony demineralization.    Suprapatellar effusion.    < end of copied text >  < from: Xray Chest 1 View AP/PA (19 @ 10:43) >  Findings:    Technique/Positioning: Frontal film. Leads overlie thorax obscuring   anatomy. Rotated to the right    Support devices: Right Port-A-Cath, satisfactory position.      Cardiac/mediastinum/hilum: Rotated cardiac silhouette, unchanged    Lung parenchyma/ Pleura: No consolidation effusion or pneumothorax.      Skeleton/soft tissues: Stable      Impression:    No radiographic evidence of acute cardiopulmonary disease.    < end of copied text >  < from: Xray Pelvis AP only (19 @ 10:44) >  FINDINGS/  IMPRESSION:    No acute fracture or dislocation of the hips or pelvis. Diffuse   osteopenia. Partially visualized periosteal reaction along the medial   aspect of the right femoral diaphysis is nonspecific in the absence of   other findings. Radiographs of the right femur are recommended for   further evaluation.    < end of copied text >  < from: CT Abdomen and Pelvis w/ IV Cont (19 @ 13:11) >  IMPRESSION:     1.  No evidence of bowel obstruction or colitis.   2.  Distended stomach filled with debris.   3.  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 An FDG avid focus was seen immediately superior to   this region on prior PET/CT. Redemonstration of soft tissue attenuation   surrounding the common hepatic artery also similar to multiple prior   studies dating to 2018, possibly representing recurrent tumor.   Continued attention to these areas is recommended on followup CT/PET/CT.    < end of copied text >      PHYSICAL EXAM:  CONSTITUTIONAL: No acute distress, well-developed, well-groomed, AAOx3  HEAD: Atraumatic, normocephalic  EYES: EOM intact, PERRLA, conjunctiva and sclera clear  ENT: Supple, no masses, no thyromegaly, no bruits, no JVD; moist mucous membranes  PULMONARY: Clear to auscultation bilaterally; no wheezes, rales, or rhonchi  CARDIOVASCULAR: Regular rate and rhythm; no murmurs, rubs, or gallops  GASTROINTESTINAL: Soft, non-tender, non-distended; bowel sounds present  MUSCULOSKELETAL: 2+ peripheral pulses; no clubbing, no cyanosis, no edema  NEUROLOGY: non-focal  SKIN: No rashes or lesions; warm and dry    ASSESSMENT & PLAN  #    PAST MEDICAL & SURGICAL HISTORY:  Multiple sclerosis  DM type 2 (diabetes mellitus, type 2)  Stomach cancer  Portacath in place  S/P partial gastrectomy  H/O total hysterectomy with bilateral salpingo-oophorectomy (BSO)  H/O partial thyroidectomy      #Misc  - DVT Prophylaxis:  - Diet:  - GI Prophylaxis:  - Activity:  - IV Fluids:  - Code Status:    Dispo: LEFTY CARRASQUILLO 64y Female  MRN#: [id number]   Hospital Day: 1d    SUBJECTIVE  Patient is a 64y old Female who presents with a chief complaint of lower extremity weakness leading to mechanical fall (14 Dec 2019 12:01)  Currently admitted to medicine with the primary diagnosis of Sepsis    INTERVAL HPI AND OVERNIGHT EVENTS:  Patient was examined and seen at bedside. This morning she is resting comfortably in bed. Accompanied by daughter, son-in-law, and grandson at bedside.    She states that she was nauseous yesterday but that has since resolved. She would like to work with physical therapy here so that she may receive services at work.    REVIEW OF SYMPTOMS:  CONSTITUTIONAL: No weakness, fevers or chills; No headaches  EYES: No visual changes, eye pain, or discharge  ENT: No vertigo; (+) left ear pain and slight hard of hearing; No sore throat or difficulty swallowing  NECK: No pain or stiffness  RESPIRATORY: No cough, wheezing, or hemoptysis; No shortness of breath  CARDIOVASCULAR: No chest pain or palpitations  GASTROINTESTINAL: No abdominal or epigastric pain; No nausea, vomiting, or hematemesis; No diarrhea or constipation; No melena or hematochezia  GENITOURINARY: No dysuria, frequency or hematuria  MUSCULOSKELETAL: No joint pain, no muscle pain, no weakness  NEUROLOGICAL: No numbness or weakness  SKIN: No itching or rashes    OBJECTIVE  PAST MEDICAL & SURGICAL HISTORY  Multiple sclerosis  DM type 2 (diabetes mellitus, type 2)  Stomach cancer  Portacath in place  S/P partial gastrectomy  H/O total hysterectomy with bilateral salpingo-oophorectomy (BSO)  H/O partial thyroidectomy    ALLERGIES:  No Known Allergies    MEDICATIONS:  STANDING MEDICATIONS  baclofen 20 milliGRAM(s) Oral two times a day  cefTRIAXone   IVPB 1000 milliGRAM(s) IV Intermittent every 24 hours  chlorhexidine 4% Liquid 1 Application(s) Topical <User Schedule>  cholecalciferol 1000 Unit(s) Oral daily  DULoxetine 60 milliGRAM(s) Oral daily  enoxaparin Injectable 40 milliGRAM(s) SubCutaneous daily  folic acid 1 milliGRAM(s) Oral daily  metoclopramide 10 milliGRAM(s) Oral three times a day before meals  pantoprazole    Tablet 40 milliGRAM(s) Oral before breakfast  rOPINIRole 0.5 milliGRAM(s) Oral three times a day    PRN MEDICATIONS  acetaminophen   Tablet .. 650 milliGRAM(s) Oral every 6 hours PRN  guaiFENesin   Syrup  (Sugar-Free) 100 milliGRAM(s) Oral every 6 hours PRN  ondansetron Injectable 4 milliGRAM(s) IV Push every 8 hours PRN      VITAL SIGNS: Last 24 Hours  T(C): 37.2 (15 Dec 2019 05:00), Max: 37.4 (14 Dec 2019 18:39)  T(F): 98.9 (15 Dec 2019 05:00), Max: 99.3 (14 Dec 2019 18:39)  HR: 89 (15 Dec 2019 05:00) (66 - 107)  BP: 90/55 (15 Dec 2019 05:00) (86/54 - 119/65)  BP(mean): --  RR: 18 (15 Dec 2019 05:00) (18 - 20)  SpO2: 97% (14 Dec 2019 18:00) (97% - 100%)    LABS:                        9.0    14.10 )-----------( 397      ( 14 Dec 2019 09:44 )             29.2         140  |  103  |  16  ----------------------------<  105<H>  4.0   |  22  |  0.6<L>    Ca    8.2<L>      14 Dec 2019 09:44  Mg     1.8         TPro  5.3<L>  /  Alb  2.7<L>  /  TBili  0.3  /  DBili  x   /  AST  35  /  ALT  29  /  AlkPhos  171<H>      PT/INR - ( 14 Dec 2019 09:44 )   PT: 15.00 sec;   INR: 1.31 ratio         PTT - ( 14 Dec 2019 09:44 )  PTT:30.9 sec  Urinalysis Basic - ( 14 Dec 2019 09:54 )    Color: Yellow / Appearance: Slightly Turbid / S.013 / pH: x  Gluc: x / Ketone: Negative  / Bili: Negative / Urobili: <2 mg/dL   Blood: x / Protein: Trace / Nitrite: Positive   Leuk Esterase: Negative / RBC: 4 /HPF / WBC 3 /HPF   Sq Epi: x / Non Sq Epi: 1 /HPF / Bacteria: Many        Creatine Kinase, Serum: 35 U/L ( @ 09:44)  Troponin T, Serum: <0.01 ng/mL (19 @ 09:44)      CARDIAC MARKERS ( 14 Dec 2019 09:44 )  x     / <0.01 ng/mL / 35 U/L / x     / x          RADIOLOGY:  < from: Xray Knee 3 Views, Right (19 @ 10:43) >  Findings/  Impression:    No acute fracture or dislocation.    Bony demineralization.    Suprapatellar effusion.    < end of copied text >  < from: Xray Chest 1 View AP/PA (19 @ 10:43) >  Findings:    Technique/Positioning: Frontal film. Leads overlie thorax obscuring   anatomy. Rotated to the right    Support devices: Right Port-A-Cath, satisfactory position.      Cardiac/mediastinum/hilum: Rotated cardiac silhouette, unchanged    Lung parenchyma/ Pleura: No consolidation effusion or pneumothorax.      Skeleton/soft tissues: Stable      Impression:    No radiographic evidence of acute cardiopulmonary disease.    < end of copied text >  < from: Xray Pelvis AP only (19 @ 10:44) >  FINDINGS/  IMPRESSION:    No acute fracture or dislocation of the hips or pelvis. Diffuse   osteopenia. Partially visualized periosteal reaction along the medial   aspect of the right femoral diaphysis is nonspecific in the absence of   other findings. Radiographs of the right femur are recommended for   further evaluation.    < end of copied text >  < from: CT Abdomen and Pelvis w/ IV Cont (19 @ 13:11) >  IMPRESSION:     1.  No evidence of bowel obstruction or colitis.   2.  Distended stomach filled with debris.   3.  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 An FDG avid focus was seen immediately superior to   this region on prior PET/CT. Redemonstration of soft tissue attenuation   surrounding the common hepatic artery also similar to multiple prior   studies dating to 2018, possibly representing recurrent tumor.   Continued attention to these areas is recommended on followup CT/PET/CT.    < end of copied text >      PHYSICAL EXAM:  CONSTITUTIONAL: No acute distress, well-developed, well-groomed, AAOx3  HEAD: Atraumatic, normocephalic  EYES: EOM intact, PERRLA, conjunctiva and sclera clear  ENT: Supple, no masses, no thyromegaly, no bruits, no JVD; moist mucous membranes; cerumen in left external ear canal; tympanic membrane is translucent and non-bulging  PULMONARY: Clear to auscultation bilaterally; no wheezes, rales, or rhonchi  CARDIOVASCULAR: Regular rate and rhythm; no murmurs, rubs, or gallops  GASTROINTESTINAL: Soft, non-tender, non-distended; bowel sounds present  MUSCULOSKELETAL: 2+ peripheral pulses; no clubbing, no cyanosis, no edema  NEUROLOGY: 4/5 strength in bilateral upper extremities, 4/5 strength in left lower extremity, 2/5 muscle strength in right lower extremity, reflexes are 2+/4 throughout, did not witness gait  SKIN: No rashes or lesions; warm and dry    ASSESSMENT & PLAN  Patient is a 65yo female with PMH of multiple sclerosis on IVIG, gastric cancer s/p gastrectomy s/p chemotherapy, and diabetes mellitus who presented to the hospital s/p mechanical fall. Patient states that she has always had right lower extremity weakness, but the left lower extremity has become progressively weaker over the past month.     #S/p mechanical fall secondary to UTI vs multiple sclerosis flare  - Patient last received IVIG for MS on 2019-2019  - However, patient feels that she is not benefitting from IVIG treatment any longer  - Patient cancelled her next appointment for IVIG  - TSH and vitamin B12 within normal limits  - Neurology consult appreciated  - Continue with ceftriaxone 1g IV Q24H  - Continue with baclofen 20mg PO BID  - Continue with duloxetine 60mg PO QD  - Continue with ropinirole 0.5mg PO TID  - Continue with acetaminophen 650mg PO Q6H PRN pain  - Follows outpatient with Dr. Vance  - Pending physiatry and PT/rehab evaluation    #Possible acute on chronic anemia  - Hemoglobin on admission: 9.0  - Hemoglobin in AM was 7.4  - Patient is hemodynamically stable with no signs of acute bleed  - Follow repeat CBC and BUN at 11am    #Left ear pain likely secondary to cerumen impaction  - Will irrigate left ear with hydrogen peroxide    #Diabetes mellitus type 2  - Hold home oral medications  - Monitor fingerstick glucose  - If fingerstick glucose >180, will start basal-bolus insulin     #Gastric cancer s/p gastrectomy s/p chemotherapy  - Currently in remission  - Per daughter, most recent endoscopy was negative  - Most recent CT scan shows possible duodenal loop  that could be related to malignancy  - Continue with ondansetron 4mg IV Q8H PRN nausea/vomiting  - Continue with metoclopramide 10mg PO TID before meals  - Follows outpatient with Dr. Toussaint    #Suspected vitamin D deficiency  - Continue with cholecalciferol 1000 units PO QD    #Misc  - DVT Prophylaxis: Lovenox 40mg SQ QD  - Diet: DASH/TLC, carbohydrate consistent with evening snack  - GI Prophylaxis: pantoprazole 40mg PO QD  - Activity: increase as tolerated  - IV Fluids: Not indicated  - Code Status: Full Code    Dispo: From home, would like to receive home services on discharge LEFTY CARRASQUILLO 64y Female  MRN#: [id number]   Hospital Day: 1d    SUBJECTIVE  Patient is a 64y old Female who presents with a chief complaint of lower extremity weakness leading to mechanical fall (14 Dec 2019 12:01)  Currently admitted to medicine with the primary diagnosis of Sepsis    INTERVAL HPI AND OVERNIGHT EVENTS:  Patient was examined and seen at bedside. This morning she is resting comfortably in bed. Accompanied by daughter, son-in-law, and grandson at bedside.    She states that she was nauseous yesterday but that has since resolved. She would like to work with physical therapy here so that she may receive services at work.    REVIEW OF SYMPTOMS:  CONSTITUTIONAL: No weakness, fevers or chills; No headaches  EYES: No visual changes, eye pain, or discharge  ENT: No vertigo; (+) left ear pain and slight hard of hearing; No sore throat or difficulty swallowing  NECK: No pain or stiffness  RESPIRATORY: No cough, wheezing, or hemoptysis; No shortness of breath  CARDIOVASCULAR: No chest pain or palpitations  GASTROINTESTINAL: No abdominal or epigastric pain; No nausea, vomiting, or hematemesis; No diarrhea or constipation; No melena or hematochezia  GENITOURINARY: No dysuria, frequency or hematuria  MUSCULOSKELETAL: No joint pain, no muscle pain, no weakness  NEUROLOGICAL: No numbness or weakness  SKIN: No itching or rashes    OBJECTIVE  PAST MEDICAL & SURGICAL HISTORY  Multiple sclerosis  DM type 2 (diabetes mellitus, type 2)  Stomach cancer  Portacath in place  S/P partial gastrectomy  H/O total hysterectomy with bilateral salpingo-oophorectomy (BSO)  H/O partial thyroidectomy    ALLERGIES:  No Known Allergies    MEDICATIONS:  STANDING MEDICATIONS  baclofen 20 milliGRAM(s) Oral two times a day  cefTRIAXone   IVPB 1000 milliGRAM(s) IV Intermittent every 24 hours  chlorhexidine 4% Liquid 1 Application(s) Topical <User Schedule>  cholecalciferol 1000 Unit(s) Oral daily  DULoxetine 60 milliGRAM(s) Oral daily  enoxaparin Injectable 40 milliGRAM(s) SubCutaneous daily  folic acid 1 milliGRAM(s) Oral daily  metoclopramide 10 milliGRAM(s) Oral three times a day before meals  pantoprazole    Tablet 40 milliGRAM(s) Oral before breakfast  rOPINIRole 0.5 milliGRAM(s) Oral three times a day    PRN MEDICATIONS  acetaminophen   Tablet .. 650 milliGRAM(s) Oral every 6 hours PRN  guaiFENesin   Syrup  (Sugar-Free) 100 milliGRAM(s) Oral every 6 hours PRN  ondansetron Injectable 4 milliGRAM(s) IV Push every 8 hours PRN      VITAL SIGNS: Last 24 Hours  T(C): 37.2 (15 Dec 2019 05:00), Max: 37.4 (14 Dec 2019 18:39)  T(F): 98.9 (15 Dec 2019 05:00), Max: 99.3 (14 Dec 2019 18:39)  HR: 89 (15 Dec 2019 05:00) (66 - 107)  BP: 90/55 (15 Dec 2019 05:00) (86/54 - 119/65)  BP(mean): --  RR: 18 (15 Dec 2019 05:00) (18 - 20)  SpO2: 97% (14 Dec 2019 18:00) (97% - 100%)    LABS:                        9.0    14.10 )-----------( 397      ( 14 Dec 2019 09:44 )             29.2         140  |  103  |  16  ----------------------------<  105<H>  4.0   |  22  |  0.6<L>    Ca    8.2<L>      14 Dec 2019 09:44  Mg     1.8         TPro  5.3<L>  /  Alb  2.7<L>  /  TBili  0.3  /  DBili  x   /  AST  35  /  ALT  29  /  AlkPhos  171<H>      PT/INR - ( 14 Dec 2019 09:44 )   PT: 15.00 sec;   INR: 1.31 ratio         PTT - ( 14 Dec 2019 09:44 )  PTT:30.9 sec  Urinalysis Basic - ( 14 Dec 2019 09:54 )    Color: Yellow / Appearance: Slightly Turbid / S.013 / pH: x  Gluc: x / Ketone: Negative  / Bili: Negative / Urobili: <2 mg/dL   Blood: x / Protein: Trace / Nitrite: Positive   Leuk Esterase: Negative / RBC: 4 /HPF / WBC 3 /HPF   Sq Epi: x / Non Sq Epi: 1 /HPF / Bacteria: Many        Creatine Kinase, Serum: 35 U/L ( @ 09:44)  Troponin T, Serum: <0.01 ng/mL (19 @ 09:44)      CARDIAC MARKERS ( 14 Dec 2019 09:44 )  x     / <0.01 ng/mL / 35 U/L / x     / x          RADIOLOGY:  < from: Xray Knee 3 Views, Right (19 @ 10:43) >  Findings/  Impression:    No acute fracture or dislocation.    Bony demineralization.    Suprapatellar effusion.    < end of copied text >  < from: Xray Chest 1 View AP/PA (19 @ 10:43) >  Findings:    Technique/Positioning: Frontal film. Leads overlie thorax obscuring   anatomy. Rotated to the right    Support devices: Right Port-A-Cath, satisfactory position.      Cardiac/mediastinum/hilum: Rotated cardiac silhouette, unchanged    Lung parenchyma/ Pleura: No consolidation effusion or pneumothorax.      Skeleton/soft tissues: Stable      Impression:    No radiographic evidence of acute cardiopulmonary disease.    < end of copied text >  < from: Xray Pelvis AP only (19 @ 10:44) >  FINDINGS/  IMPRESSION:    No acute fracture or dislocation of the hips or pelvis. Diffuse   osteopenia. Partially visualized periosteal reaction along the medial   aspect of the right femoral diaphysis is nonspecific in the absence of   other findings. Radiographs of the right femur are recommended for   further evaluation.    < end of copied text >  < from: CT Abdomen and Pelvis w/ IV Cont (19 @ 13:11) >  IMPRESSION:     1.  No evidence of bowel obstruction or colitis.   2.  Distended stomach filled with debris.   3.  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 An FDG avid focus was seen immediately superior to   this region on prior PET/CT. Redemonstration of soft tissue attenuation   surrounding the common hepatic artery also similar to multiple prior   studies dating to 2018, possibly representing recurrent tumor.   Continued attention to these areas is recommended on followup CT/PET/CT.    < end of copied text >      PHYSICAL EXAM:  CONSTITUTIONAL: No acute distress, well-developed, well-groomed, AAOx3  HEAD: Atraumatic, normocephalic  EYES: EOM intact, PERRLA, conjunctiva and sclera clear  ENT: Supple, no masses, no thyromegaly, no bruits, no JVD; moist mucous membranes; cerumen in left external ear canal; tympanic membrane is translucent and non-bulging  PULMONARY: Clear to auscultation bilaterally; no wheezes, rales, or rhonchi  CARDIOVASCULAR: Regular rate and rhythm; no murmurs, rubs, or gallops  GASTROINTESTINAL: Soft, non-tender, non-distended; bowel sounds present  MUSCULOSKELETAL: 2+ peripheral pulses; no clubbing, no cyanosis, no edema  NEUROLOGY: 4/5 strength in bilateral upper extremities, 4/5 strength in left lower extremity, 2/5 muscle strength in right lower extremity, reflexes are 2+/4 throughout, did not witness gait  SKIN: No rashes or lesions; warm and dry    ASSESSMENT & PLAN  Patient is a 65yo female with PMH of multiple sclerosis on IVIG, gastric cancer s/p gastrectomy s/p chemotherapy, and diabetes mellitus who presented to the hospital s/p mechanical fall. Patient states that she has always had right lower extremity weakness, but the left lower extremity has become progressively weaker over the past month.     #S/p mechanical fall secondary to UTI vs multiple sclerosis flare  - Patient last received IVIG for MS on 2019-2019  - However, patient feels that she is not benefitting from IVIG treatment any longer  - Patient cancelled her next appointment for IVIG  - TSH and vitamin B12 within normal limits  - Neurology consult appreciated  - Underlying UTI can present as multiple sclerosis flare  - Will treat UTI first to see if there is improvement  - Patient recently had MRI last month that was consistent with multiple sclerosis  - Per neurology, no need for another MRI this admission  - If no improvement with antibiotics, will follow up with neurology for possible steroids vs IVIG  - Continue with ceftriaxone 1g IV Q24H  - Continue with baclofen 20mg PO BID  - Continue with duloxetine 60mg PO QD  - Continue with ropinirole 0.5mg PO TID  - Continue with acetaminophen 650mg PO Q6H PRN pain  - Follow urine and blood cultures  - Follows outpatient with Dr. Vance  - Pending physiatry and PT/rehab evaluation    #Possible acute on chronic anemia  - Hemoglobin on admission: 9.0  - Hemoglobin in AM was 7.4  - Patient is hemodynamically stable with no signs of acute bleed  - Follow repeat CBC and BUN at 11am    #Left ear pain likely secondary to cerumen impaction  - Will irrigate left ear with hydrogen peroxide    #Diabetes mellitus type 2  - Hold home oral medications  - Monitor fingerstick glucose  - If fingerstick glucose >180, will start basal-bolus insulin     #Gastric cancer s/p gastrectomy s/p chemotherapy  - Currently in remission  - Per daughter, most recent endoscopy was negative  - Most recent CT scan shows possible duodenal loop  that could be related to malignancy  - Continue with ondansetron 4mg IV Q8H PRN nausea/vomiting  - Continue with metoclopramide 10mg PO TID before meals  - Follows outpatient with Dr. Toussaint    #Suspected vitamin D deficiency  - Continue with cholecalciferol 1000 units PO QD    #Misc  - DVT Prophylaxis: Lovenox 40mg SQ QD  - Diet: DASH/TLC, carbohydrate consistent with evening snack  - GI Prophylaxis: pantoprazole 40mg PO QD  - Activity: increase as tolerated  - IV Fluids: Not indicated  - Code Status: Full Code    Dispo: From home, would like to receive home services on discharge

## 2019-12-16 NOTE — CONSULT NOTE ADULT - SUBJECTIVE AND OBJECTIVE BOX
Patient is a 64y old  Female who presents with a chief complaint of lower extremity weakness leading to mechanical fall (16 Dec 2019 11:06)    HPI:  63 Y/O F with PMHx of MS on IVIG (last dose 3 wks ago), DM type II, Gastric cancer s/p resection and chemo currently in remission (Dr Toussaint), presented to the hospital s/p mechanical fall. Per pt she had progressively worsening lower extremity weakness that started about 2 wks ago, she has chronic RLE weakness and numbness, she ambulates with a walker but no she has weakness in her LLE associated with difficulty ambulation. Previously she would be able to get off the bed and walk with a walker but now she needs assistance with everything. This morning while she was going to use restroom, she felt dizzy described as spinning sensations, her legs gave way and she fell on the floor. Daughter is the HCP and she was helping her mother with walking while this happened. Pt hit her left knee and is complaining of pain in that area. She denied hitting her head, hx of LOC, blacking out, palpitations or any abnormal jerky movements of her limbs. Pt recently had worsening nausea and vomiting that she gets from gastroparesis and gastric resection, she ran out of her medications and it got worse so she came to ER 2 days ago. She was resuscitated with fluids and was sent home, her vomiting has improved since then. Pt had suspicious lesion in the duodenal loop, per daughter pt had EGD and Bx was -ve for malignancy? Pt denied any hx of fever, chills, nausea, vomiting, diarrhea, constipation, melena, pain in abdomen, sob, chest pain, cough, increased urinary frequency, dysuria, headache, any changes in weight, recent travel.     In the ED pt was found to have BP of 86/54, , RR 20/min, SPO2 96% on RA, Temp 96.7. She was given I/V fluid bolus 2.2 L NS and her vitals improved. Her UA showed nitrites so she was given Rocephin? (14 Dec 2019 11:23)      PAST MEDICAL & SURGICAL HISTORY:  Multiple sclerosis  DM type 2 (diabetes mellitus, type 2)  Stomach cancer  Portacath in place  S/P partial gastrectomy  H/O total hysterectomy with bilateral salpingo-oophorectomy (BSO)  H/O partial thyroidectomy      Hospital Course: /p mechanical fall secondary to UTI vs multiple sclerosis flare  - Patient last received IVIG for MS on 11/28/2019-11/30/2019  - However, patient feels that she is not benefitting from IVIG treatment any longer  - Patient cancelled her next appointment for IVIG  - TSH and vitamin B12 within normal limits  - Underlying UTI can present as multiple sclerosis flare  - Will treat UTI first to see if there is improvement  - Patient recently had MRI last month that was consistent with multiple sclerosis  - Per neurology, no need for another MRI this admission  - Follow urine and blood cultures  - Follows outpatient with Dr. Vance  - Pending physiatry and PT/rehab evaluation  - c/w current medical mgmt.     #Acute on chronic anemia  - Hemoglobin on admission: 9.0  - Hemoglobin in AM was 6.6  - Patient is hemodynamically stable with no signs of acute bleed  - Will transfuse 1 unit PRBC  - Keep active type and screen    # Weight loss x 2-3 weeks for persistent vomiting mostly after meal.     Has hx of stomach cancer s/p chemo /RTX 2016    Will f/u with Dr Toussaint re CT A/P findings as below.     CT A/P 12/16/19     < from: CT Abdomen and Pelvis No Cont (12.16.19 @ 11:00) >  IMPRESSION:   1.  Since December 11, 2019, no evidence of an intraperitoneal or   retroperitoneal hematoma.    2.  Increased debris-filled distention of the stomach and distal   esophagus without definite evidence for obstruction, nonspecific.    3.  New small volume ascites.    4.  New diffuse soft tissue anasarca.    5.  Increased small bilateral pleural effusions and bibasilar atelectasis.    #Left ear pain likely secondary to cerumen impaction  - Will irrigate left ear with hydrogen peroxide    #Diabetes mellitus type 2  - Continue with insulin glargine 10 units SQ QHS  - Continue with insulin lispro 3 units SQ TID  - Insulin sliding scale coverage  - Monitor fingerstick glucose    #Gastric cancer s/p gastrectomy s/p chemotherapy  - Currently in remission  - Per daughter, most recent endoscopy was negative  - Most recent CT scan shows possible duodenal loop  that could be related to malignancy  - Continue with ondansetron 4mg IV Q8H PRN nausea/vomiting  - Continue with metoclopramide 10mg PO TID before meals  - Follows outpatient with Dr. Toussaint    #Suspected vitamin D deficiency  - Continue with cholecalciferol 1000 units PO QD    #Misc  - DVT Prophylaxis: Lovenox 40mg SQ QD  - Diet: DASH/TLC, carbohydrate consistent with evening snack  - GI Prophylaxis: pantoprazole 40mg PO QD  - Activity: increase as tolerated    TODAY'S SUBJECTIVE & REVIEW OF SYMPTOMS:     Constitutional WNL   Cardio WNL   Resp WNL   GI WNL  Heme WNL  Endo WNL  Skin WNL  MSK WNL  Neuro WNL  Cognitive WNL  Psych WNL  +incontinence urine    MEDICATIONS  (STANDING):  baclofen 20 milliGRAM(s) Oral two times a day  cefTRIAXone   IVPB 1000 milliGRAM(s) IV Intermittent every 24 hours  chlorhexidine 4% Liquid 1 Application(s) Topical <User Schedule>  cholecalciferol 1000 Unit(s) Oral daily  dextrose 5%. 1000 milliLiter(s) (50 mL/Hr) IV Continuous <Continuous>  dextrose 50% Injectable 12.5 Gram(s) IV Push once  dextrose 50% Injectable 25 Gram(s) IV Push once  dextrose 50% Injectable 25 Gram(s) IV Push once  DULoxetine 60 milliGRAM(s) Oral daily  folic acid 1 milliGRAM(s) Oral daily  insulin glargine Injectable (LANTUS) 10 Unit(s) SubCutaneous at bedtime  insulin lispro (HumaLOG) corrective regimen sliding scale   SubCutaneous three times a day before meals  insulin lispro Injectable (HumaLOG) 3 Unit(s) SubCutaneous three times a day before meals  metoclopramide 10 milliGRAM(s) Oral three times a day before meals  pantoprazole    Tablet 40 milliGRAM(s) Oral before breakfast  rOPINIRole 0.5 milliGRAM(s) Oral three times a day    MEDICATIONS  (PRN):  acetaminophen   Tablet .. 650 milliGRAM(s) Oral every 6 hours PRN Moderate Pain (4 - 6)  dextrose 40% Gel 15 Gram(s) Oral once PRN Blood Glucose LESS THAN 70 milliGRAM(s)/deciliter  glucagon  Injectable 1 milliGRAM(s) IntraMuscular once PRN Glucose LESS THAN 70 milligrams/deciliter  guaiFENesin   Syrup  (Sugar-Free) 100 milliGRAM(s) Oral every 6 hours PRN Cough  ondansetron Injectable 4 milliGRAM(s) IV Push every 8 hours PRN Nausea and/or Vomiting      FAMILY HISTORY:      Allergies    No Known Allergies    Intolerances        SOCIAL HISTORY:    [    ] Etoh  [    ] Smoking  [    ] Substance abuse     Home Environment:  [    ] Home Alone  [  x  ] Lives with Family DAUGHTER I UPSTAIRS APT  [    ] Home Health Aid    Dwelling:  [  x  ] Apartment  [ x   ] Private House  [    ] Adult Home  [    ] Skilled Nursing Facility      [    ] Short Term  [    ] Long Term  [  x  ] Stairs    3 OUTSIDE                       [    ] Elevator     FUNCTIONAL STATUS PTA: (Check all that apply)  Ambulation: [  x   ]Independent    [    ] Dependent     [    ] Non-Ambulatory  Assistive Device: [    ] SA Cane  [    ]  Q Cane  [ x   ] Walker  [    ]  Wheelchair  ADL : [ x   ] Independent  [    ]  Dependent   +ROLLATOR , NO BRACE RLE    Vital Signs Last 24 Hrs  T(C): 37.3 (16 Dec 2019 05:00), Max: 37.3 (16 Dec 2019 05:00)  T(F): 99.1 (16 Dec 2019 05:00), Max: 99.1 (16 Dec 2019 05:00)  HR: 89 (16 Dec 2019 05:00) (88 - 94)  BP: 100/53 (16 Dec 2019 05:00) (95/52 - 108/61)  BP(mean): --  RR: 18 (16 Dec 2019 05:00) (18 - 19)  SpO2: 96% (16 Dec 2019 08:00) (96% - 96%)      PHYSICAL EXAM: Alert & Oriented X3 being transfused  GENERAL: NAD, well-groomed, well-developed  HEAD:  Atraumatic, Normocephalic  EYES: EOMI, PERRLA, conjunctiva and sclera clear  NECK: Supple  CHEST/LUNG: Clear bilaterally  HEART: Regular rate and rhythm  ABDOMEN: Soft, Nontender, Nondistended; Bowel sounds present  EXTREMITIES:  no calf tenderness,no edema BLES    NERVOUS SYSTEM:  Cranial Nerves 2-12 intact [  x  ] Abnormal  [    ]  ROM: WFL all extremities [ x   ]  Abnormal [     ]  Motor Strength: WFL all extremities  [    ]  Abnormal [  x  ]RLE with gross extension hip only, LLE 3/5  Sensation: intact to light touch [ x   ] Abnormal [    ]  Reflexes: Symmetric [    ]  Abnormal [    ]    FUNCTIONAL STATUS:  Bed Mobility: [   ]  Independent [    ]  Supervision [ x   ]  Needs Assistance [  ]  N/A  Transfers: [    ]  Independent [    ]  Supervision [    ]  Needs Assistance [  x  ]  N/A    Ambulation:  [    ]  Independent [    ]  Supervision [    ]  Needs Assistance [ x   ]  N/A   ADL:  [    ]   Independent [    ] Requires Assistance [x    ] N/A       LABS:                        6.6    8.88  )-----------( 289      ( 16 Dec 2019 06:36 )             21.6     12-16    136  |  102  |  17  ----------------------------<  73  4.3   |  22  |  0.6<L>    Ca    7.4<L>      16 Dec 2019 06:36  Mg     1.8     12-16    TPro  4.4<L>  /  Alb  2.2<L>  /  TBili  <0.2  /  DBili  x   /  AST  24  /  ALT  22  /  AlkPhos  136<H>  12-16          RADIOLOGY & ADDITIONAL STUDIES:

## 2019-12-16 NOTE — PROGRESS NOTE ADULT - SUBJECTIVE AND OBJECTIVE BOX
LEFTY CARRASQUILLO  64y  Female      Patient is a 64y old  Female who presents with a chief complaint of lower extremity weakness leading to mechanical fall (16 Dec 2019 10:19)      INTERVAL HPI/OVERNIGHT EVENTS:      ******************************* REVIEW OF SYSTEMS:**********************************************  Remains comfortable.    All other review of systems negative    *********************** VITALS ******************************************    T(F): 99.1 (12-16-19 @ 05:00)  HR: 89 (12-16-19 @ 05:00) (88 - 94)  BP: 100/53 (12-16-19 @ 05:00) (95/52 - 108/61)  RR: 18 (12-16-19 @ 05:00) (18 - 19)  SpO2: 96% (12-16-19 @ 08:00) (96% - 96%)            ******************************** PHYSICAL EXAM:**************************************************  GENERAL: NAD    PSYCH: no agitation, baseline mentation  HEENT:     NERVOUS SYSTEM:  Alert & Oriented X3     PULMONARY: GERALD, CTA    CARDIOVASCULAR: S1S2 RRR    GI: Soft, NT, ND; BS present.    EXTREMITIES:  2+ Peripheral Pulses, No clubbing, cyanosis, or edema    LYMPH: No lymphadenopathy noted    SKIN: No rashes or lesions    ******************************************************************************************        **************************** LABS *******************************************************                          6.6    8.88  )-----------( 289      ( 16 Dec 2019 06:36 )             21.6     12-16    136  |  102  |  17  ----------------------------<  73  4.3   |  22  |  0.6<L>    Ca    7.4<L>      16 Dec 2019 06:36  Mg     1.8     12-16    TPro  4.4<L>  /  Alb  2.2<L>  /  TBili  <0.2  /  DBili  x   /  AST  24  /  ALT  22  /  AlkPhos  136<H>  12-16          Lactate Trend  12-15 @ 06:29 Lactate:0.9         CAPILLARY BLOOD GLUCOSE      POCT Blood Glucose.: 96 mg/dL (16 Dec 2019 07:31)          **************************Active Medications *******************************************  No Known Allergies      acetaminophen   Tablet .. 650 milliGRAM(s) Oral every 6 hours PRN  baclofen 20 milliGRAM(s) Oral two times a day  cefTRIAXone   IVPB 1000 milliGRAM(s) IV Intermittent every 24 hours  chlorhexidine 4% Liquid 1 Application(s) Topical <User Schedule>  cholecalciferol 1000 Unit(s) Oral daily  dextrose 40% Gel 15 Gram(s) Oral once PRN  dextrose 5%. 1000 milliLiter(s) IV Continuous <Continuous>  dextrose 50% Injectable 12.5 Gram(s) IV Push once  dextrose 50% Injectable 25 Gram(s) IV Push once  dextrose 50% Injectable 25 Gram(s) IV Push once  DULoxetine 60 milliGRAM(s) Oral daily  enoxaparin Injectable 40 milliGRAM(s) SubCutaneous daily  folic acid 1 milliGRAM(s) Oral daily  glucagon  Injectable 1 milliGRAM(s) IntraMuscular once PRN  guaiFENesin   Syrup  (Sugar-Free) 100 milliGRAM(s) Oral every 6 hours PRN  insulin glargine Injectable (LANTUS) 10 Unit(s) SubCutaneous at bedtime  insulin lispro (HumaLOG) corrective regimen sliding scale   SubCutaneous three times a day before meals  insulin lispro Injectable (HumaLOG) 3 Unit(s) SubCutaneous three times a day before meals  metoclopramide 10 milliGRAM(s) Oral three times a day before meals  ondansetron Injectable 4 milliGRAM(s) IV Push every 8 hours PRN  pantoprazole    Tablet 40 milliGRAM(s) Oral before breakfast  rOPINIRole 0.5 milliGRAM(s) Oral three times a day      ***************************************************  RADIOLOGY & ADDITIONAL TESTS:    Imaging Personally Reviewed:  [ ] YES  [ ] NO    HEALTH ISSUES - PROBLEM Dx:

## 2019-12-16 NOTE — PROGRESS NOTE ADULT - SUBJECTIVE AND OBJECTIVE BOX
Neurology consult    LEFTY [id number]    HPI:  65 Y/O F with PMHx of MS on IVIG (last dose 3 wks ago), DM type II, Gastric cancer s/p resection and chemo currently in remission (Dr Toussaint), presented to the hospital s/p mechanical fall. Per pt she had progressively worsening lower extremity weakness that started about 2 wks ago, she has chronic RLE weakness and numbness, she ambulates with a walker but no she has weakness in her LLE associated with difficulty ambulation. Previously she would be able to get off the bed and walk with a walker but now she needs assistance with everything. This morning while she was going to use restroom, she felt dizzy described as spinning sensations, her legs gave way and she fell on the floor. Daughter is the HCP and she was helping her mother with walking while this happened. Pt hit her left knee and is complaining of pain in that area. She denied hitting her head, hx of LOC, blacking out, palpitations or any abnormal jerky movements of her limbs. Pt recently had worsening nausea and vomiting that she gets from gastroparesis and gastric resection, she ran out of her medications and it got worse so she came to ER 2 days ago. She was resuscitated with fluids and was sent home, her vomiting has improved since then. Pt had suspicious lesion in the duodenal loop, per daughter pt had EGD and Bx was -ve for malignancy? Pt denied any hx of fever, chills, nausea, vomiting, diarrhea, constipation, melena, pain in abdomen, sob, chest pain, cough, increased urinary frequency, dysuria, headache, any changes in weight, recent travel.     In the ED pt was found to have BP of 86/54, , RR 20/min, SPO2 96% on RA, Temp 96.7. She was given I/V fluid bolus 2.2 L NS and her vitals improved. Her UA showed nitrites so she was given Rocephin? (14 Dec 2019 11:23)          MEDICATIONS    acetaminophen   Tablet .. 650 milliGRAM(s) Oral every 6 hours PRN  baclofen 20 milliGRAM(s) Oral two times a day  cefTRIAXone   IVPB 1000 milliGRAM(s) IV Intermittent every 24 hours  chlorhexidine 4% Liquid 1 Application(s) Topical <User Schedule>  cholecalciferol 1000 Unit(s) Oral daily  dextrose 40% Gel 15 Gram(s) Oral once PRN  dextrose 5%. 1000 milliLiter(s) IV Continuous <Continuous>  dextrose 50% Injectable 12.5 Gram(s) IV Push once  dextrose 50% Injectable 25 Gram(s) IV Push once  dextrose 50% Injectable 25 Gram(s) IV Push once  DULoxetine 60 milliGRAM(s) Oral daily  enoxaparin Injectable 40 milliGRAM(s) SubCutaneous daily  folic acid 1 milliGRAM(s) Oral daily  glucagon  Injectable 1 milliGRAM(s) IntraMuscular once PRN  guaiFENesin   Syrup  (Sugar-Free) 100 milliGRAM(s) Oral every 6 hours PRN  insulin glargine Injectable (LANTUS) 10 Unit(s) SubCutaneous at bedtime  insulin lispro (HumaLOG) corrective regimen sliding scale   SubCutaneous three times a day before meals  insulin lispro Injectable (HumaLOG) 3 Unit(s) SubCutaneous three times a day before meals  metoclopramide 10 milliGRAM(s) Oral three times a day before meals  ondansetron Injectable 4 milliGRAM(s) IV Push every 8 hours PRN  pantoprazole    Tablet 40 milliGRAM(s) Oral before breakfast  rOPINIRole 0.5 milliGRAM(s) Oral three times a day      PAST MEDICAL & SURGICAL HISTORY:  Multiple sclerosis  DM type 2 (diabetes mellitus, type 2)  Stomach cancer  Portacath in place  S/P partial gastrectomy  H/O total hysterectomy with bilateral salpingo-oophorectomy (BSO)  H/O partial thyroidectomy       Family history: No history of dementia, strokes, or seizures   FAMILY HISTORY:    SOCIAL HISTORY --     Allergies    No Known Allergies    Intolerances            Vital Signs Last 24 Hrs  T(C): 37.3 (16 Dec 2019 05:00), Max: 37.3 (16 Dec 2019 05:00)  T(F): 99.1 (16 Dec 2019 05:00), Max: 99.1 (16 Dec 2019 05:00)  HR: 89 (16 Dec 2019 05:00) (88 - 94)  BP: 100/53 (16 Dec 2019 05:00) (95/52 - 108/61)  BP(mean): --  RR: 18 (16 Dec 2019 05:00) (18 - 19)  SpO2: 96% (16 Dec 2019 08:00) (96% - 96%)    SEPSIS INABILITY TO AMBULATE DUE TO RIGHT KNEE MULTIPLE SCLEROSIS  ^FALL/RIGHT KNEE AND ANKLE PAIN  Handoff  MEWS Score  Multiple sclerosis  DM type 2 (diabetes mellitus, type 2)  Stomach cancer  Sepsis  Portacath in place  S/P partial gastrectomy  H/O total hysterectomy with bilateral salpingo-oophorectomy (BSO)  H/O partial thyroidectomy  FALL/RIGHT KNEE AND ANKLE PAIN  2  Multiple sclerosis  Inability to ambulate due to right knee    LABS:  CBC Full  -  ( 16 Dec 2019 06:36 )  WBC Count : 8.88 K/uL  RBC Count : 2.44 M/uL  Hemoglobin : 6.6 g/dL  Hematocrit : 21.6 %  Platelet Count - Automated : 289 K/uL  Mean Cell Volume : 88.5 fL  Mean Cell Hemoglobin : 27.0 pg  Mean Cell Hemoglobin Concentration : 30.6 g/dL  Auto Neutrophil # : 7.09 K/uL  Auto Lymphocyte # : 0.70 K/uL  Auto Monocyte # : 0.97 K/uL  Auto Eosinophil # : 0.06 K/uL  Auto Basophil # : 0.01 K/uL  Auto Neutrophil % : 79.8 %  Auto Lymphocyte % : 7.9 %  Auto Monocyte % : 10.9 %  Auto Eosinophil % : 0.7 %  Auto Basophil % : 0.1 %    Urinalysis Basic - ( 14 Dec 2019 09:54 )    Color: Yellow / Appearance: Slightly Turbid / S.013 / pH: x  Gluc: x / Ketone: Negative  / Bili: Negative / Urobili: <2 mg/dL   Blood: x / Protein: Trace / Nitrite: Positive   Leuk Esterase: Negative / RBC: 4 /HPF / WBC 3 /HPF   Sq Epi: x / Non Sq Epi: 1 /HPF / Bacteria: Many      -    136  |  102  |  17  ----------------------------<  73  4.3   |  22  |  0.6<L>    Ca    7.4<L>      16 Dec 2019 06:36  Mg     1.8     -    TPro  4.4<L>  /  Alb  2.2<L>  /  TBili  <0.2  /  DBili  x   /  AST  24  /  ALT  22  /  AlkPhos  136<H>  -    Hemoglobin A1C:     LIVER FUNCTIONS - ( 16 Dec 2019 06:36 )  Alb: 2.2 g/dL / Pro: 4.4 g/dL / ALK PHOS: 136 U/L / ALT: 22 U/L / AST: 24 U/L / GGT: x

## 2019-12-16 NOTE — CONSULT NOTE ADULT - ASSESSMENT
IMPRESSION: Rehab of MS with decline , falls , anemia Hx gastric CA    PRECAUTIONS: [  x  ] Cardiac  [    ] Respiratory  [    ] Seizures [    ] Contact Isolation  [    ] Droplet Isolation  [    ] Other    Weight Bearing Status:     RECOMMENDATION:    Out of Bed to Chair     DVT/Decubiti Prophylaxis    REHAB PLAN:     [   x  ] Bedside P/T 3-5 times a week   [   x  ] Bedside O/T  2-3 times a week   [     ] No Rehab Therapy Indicated   [     ]  Speech Therapy   Conditioning/ROM                                 ADL  Bed Mobility                                            Conditioning/ROM  Transfers                                                  Bed Mobility  Sitting /Standing Balance                      Transfers                                        Gait Training                                            Sitting/Standing Balance  Stair Training [  x ]Applicable                 Home equipment Eval                                                                     Splinting  [   ] Only      GOALS:   ADL   [   x ]   Independent         Transfers  [  x  ] Independent            Ambulation  [x     ] Independent     [   x  ] With device                            [    ]  CG                                               [    ]  CG                                                    [     ] CG                            [    ] Min A                                          [    ] Min A                                                [     ] Min  A          DISCHARGE PLAN:   [     ]  Good candidate for Intensive Rehabilitation/Hospital based                                             Will tolerate 3hrs Intensive Rehab Daily                                       [      ]  Short Term Rehab in Skilled Nursing Facility                                       [    x  ]  Home with Outpatient or VN services                                VS                                     [    x  ]  Possible Candidate for Intensive Hospital based Rehab

## 2019-12-16 NOTE — PROGRESS NOTE ADULT - SUBJECTIVE AND OBJECTIVE BOX
LEFTY CARRASQUILLO 64y Female  MRN#: [id number]   Hospital Day: 2d    SUBJECTIVE  Patient is a 64y old Female who presents with a chief complaint of lower extremity weakness leading to mechanical fall (16 Dec 2019 09:49)  Currently admitted to medicine with the primary diagnosis of Sepsis    INTERVAL HPI AND OVERNIGHT EVENTS:  Patient was examined and seen at bedside. This morning she is resting comfortably in bed and reports no issues or overnight events. Patient states that her leg strength is back to baseline and would like to work with physical therapy today. She does not feel safe getting out of bed unassisted.    REVIEW OF SYMPTOMS:  CONSTITUTIONAL: No weakness, fevers or chills; No headaches  EYES: No visual changes, eye pain, or discharge  ENT: No vertigo; No ear pain or change in hearing; No sore throat or difficulty swallowing  NECK: No pain or stiffness  RESPIRATORY: No cough, wheezing, or hemoptysis; No shortness of breath  CARDIOVASCULAR: No chest pain or palpitations  GASTROINTESTINAL: No abdominal or epigastric pain; No nausea, vomiting, or hematemesis; No diarrhea or constipation; No melena or hematochezia  GENITOURINARY: No dysuria, frequency or hematuria  MUSCULOSKELETAL: No joint pain, no muscle pain, no weakness  NEUROLOGICAL: No numbness or weakness  SKIN: No itching or rashes    OBJECTIVE  PAST MEDICAL & SURGICAL HISTORY  Multiple sclerosis  DM type 2 (diabetes mellitus, type 2)  Stomach cancer  Portacath in place  S/P partial gastrectomy  H/O total hysterectomy with bilateral salpingo-oophorectomy (BSO)  H/O partial thyroidectomy    ALLERGIES:  No Known Allergies    MEDICATIONS:  STANDING MEDICATIONS  baclofen 20 milliGRAM(s) Oral two times a day  cefTRIAXone   IVPB 1000 milliGRAM(s) IV Intermittent every 24 hours  chlorhexidine 4% Liquid 1 Application(s) Topical <User Schedule>  cholecalciferol 1000 Unit(s) Oral daily  dextrose 5%. 1000 milliLiter(s) IV Continuous <Continuous>  dextrose 50% Injectable 12.5 Gram(s) IV Push once  dextrose 50% Injectable 25 Gram(s) IV Push once  dextrose 50% Injectable 25 Gram(s) IV Push once  DULoxetine 60 milliGRAM(s) Oral daily  enoxaparin Injectable 40 milliGRAM(s) SubCutaneous daily  folic acid 1 milliGRAM(s) Oral daily  insulin glargine Injectable (LANTUS) 10 Unit(s) SubCutaneous at bedtime  insulin lispro (HumaLOG) corrective regimen sliding scale   SubCutaneous three times a day before meals  insulin lispro Injectable (HumaLOG) 3 Unit(s) SubCutaneous three times a day before meals  metoclopramide 10 milliGRAM(s) Oral three times a day before meals  pantoprazole    Tablet 40 milliGRAM(s) Oral before breakfast  rOPINIRole 0.5 milliGRAM(s) Oral three times a day    PRN MEDICATIONS  acetaminophen   Tablet .. 650 milliGRAM(s) Oral every 6 hours PRN  dextrose 40% Gel 15 Gram(s) Oral once PRN  glucagon  Injectable 1 milliGRAM(s) IntraMuscular once PRN  guaiFENesin   Syrup  (Sugar-Free) 100 milliGRAM(s) Oral every 6 hours PRN  ondansetron Injectable 4 milliGRAM(s) IV Push every 8 hours PRN      VITAL SIGNS: Last 24 Hours  T(C): 37.3 (16 Dec 2019 05:00), Max: 37.3 (16 Dec 2019 05:00)  T(F): 99.1 (16 Dec 2019 05:00), Max: 99.1 (16 Dec 2019 05:00)  HR: 89 (16 Dec 2019 05:00) (88 - 94)  BP: 100/53 (16 Dec 2019 05:00) (95/52 - 108/61)  BP(mean): --  RR: 18 (16 Dec 2019 05:00) (18 - 19)  SpO2: 96% (16 Dec 2019 08:00) (96% - 96%)    LABS:                        6.6    8.88  )-----------( 289      ( 16 Dec 2019 06:36 )             21.6     12-16    136  |  102  |  17  ----------------------------<  73  4.3   |  22  |  0.6<L>    Ca    7.4<L>      16 Dec 2019 06:36  Mg     1.8     12-16    TPro  4.4<L>  /  Alb  2.2<L>  /  TBili  <0.2  /  DBili  x   /  AST  24  /  ALT  22  /  AlkPhos  136<H>  12-16    Sedimentation Rate, Erythrocyte: 107 mm/Hr <H> (12-15-19 @ 21:39)      Culture - Blood (collected 14 Dec 2019 16:25)  Source: .Blood Blood  Preliminary Report (16 Dec 2019 02:06):    No growth to date.    Culture - Urine (collected 14 Dec 2019 09:54)  Source: .Urine Clean Catch (Midstream)  Preliminary Report (16 Dec 2019 07:08):    >100,000 CFU/ml Gram Negative Rods    Culture - Blood (collected 14 Dec 2019 09:44)  Source: .Blood Blood-Peripheral  Preliminary Report (15 Dec 2019 18:01):    No growth to date.    Culture - Blood (collected 14 Dec 2019 09:44)  Source: .Blood Blood-Peripheral  Preliminary Report (15 Dec 2019 18:01):    No growth to date    RADIOLOGY:  < from: Xray Pelvis AP only (12.14.19 @ 10:44) >  FINDINGS/  IMPRESSION:    No acute fracture or dislocation of the hips or pelvis. Diffuse   osteopenia. Partially visualized periosteal reaction along the medial   aspect of the right femoral diaphysis is nonspecific in the absence of   other findings. Radiographs of the right femur are recommended for   further evaluation.    < end of copied text >  < from: Xray Femur 2 Views, Right (12.15.19 @ 17:47) >  INTERPRETATION:  Clinical History / Reason for exam: Status post fall    Technique: 4 views of the right femur   Findings/  impression: Bones are osteopenic without acutely displaced fracture. Knee   degenerative changes with joint effusion better assessed on    knee x-ray of 12/14/2019. Vascular calcifications .    < end of copied text >      PHYSICAL EXAM:  CONSTITUTIONAL: No acute distress, well-developed, well-groomed, AAOx3  HEAD: Atraumatic, normocephalic  EYES: EOM intact, PERRLA, conjunctiva and sclera clear  ENT: Supple, no masses, no thyromegaly, no bruits, no JVD; moist mucous membranes  PULMONARY: Clear to auscultation bilaterally; no wheezes, rales, or rhonchi  CARDIOVASCULAR: Regular rate and rhythm; no murmurs, rubs, or gallops  GASTROINTESTINAL: Soft, non-tender, non-distended; bowel sounds present  MUSCULOSKELETAL: 2+ peripheral pulses; no clubbing, no cyanosis, no edema  NEUROLOGY: non-focal  SKIN: No rashes or lesions; warm and dry    ASSESSMENT & PLAN  Patient is a 63yo female with PMH of multiple sclerosis on IVIG, gastric cancer s/p gastrectomy s/p chemotherapy, and diabetes mellitus who presented to the hospital s/p mechanical fall. Patient states that she has always had right lower extremity weakness, but the left lower extremity has become progressively weaker over the past month.     #S/p mechanical fall secondary to UTI vs multiple sclerosis flare  - Patient last received IVIG for MS on 11/28/2019-11/30/2019  - However, patient feels that she is not benefitting from IVIG treatment any longer  - Patient cancelled her next appointment for IVIG  - TSH and vitamin B12 within normal limits  - Neurology consult appreciated  - Underlying UTI can present as multiple sclerosis flare  - Will treat UTI first to see if there is improvement  - Patient recently had MRI last month that was consistent with multiple sclerosis  - Per neurology, no need for another MRI this admission  - If no improvement with antibiotics, will follow up with neurology for possible steroids vs IVIG  - Continue with ceftriaxone 1g IV Q24H  - Continue with baclofen 20mg PO BID  - Continue with duloxetine 60mg PO QD  - Continue with ropinirole 0.5mg PO TID  - Continue with acetaminophen 650mg PO Q6H PRN pain  - Follow urine and blood cultures  - Follows outpatient with Dr. Vance  - Pending physiatry and PT/rehab evaluation    #Acute on chronic anemia  - Hemoglobin on admission: 9.0  - Hemoglobin in AM was 6.6  - Patient is hemodynamically stable with no signs of acute bleed  - Will transfuse 1 unit PRBC  - Keep active type and screen  -     #Left ear pain likely secondary to cerumen impaction  - Will irrigate left ear with hydrogen peroxide    #Diabetes mellitus type 2  - Continue with insulin glargine 10 units SQ QHS  - Continue with insulin lispro 3 units SQ TID  - Insulin sliding scale coverage  - Monitor fingerstick glucose    #Gastric cancer s/p gastrectomy s/p chemotherapy  - Currently in remission  - Per daughter, most recent endoscopy was negative  - Most recent CT scan shows possible duodenal loop  that could be related to malignancy  - Continue with ondansetron 4mg IV Q8H PRN nausea/vomiting  - Continue with metoclopramide 10mg PO TID before meals  - Follows outpatient with Dr. Toussaint    #Suspected vitamin D deficiency  - Continue with cholecalciferol 1000 units PO QD    #Misc  - DVT Prophylaxis: Lovenox 40mg SQ QD  - Diet: DASH/TLC, carbohydrate consistent with evening snack  - GI Prophylaxis: pantoprazole 40mg PO QD  - Activity: increase as tolerated  - IV Fluids: Not indicated  - Code Status: Full Code    Dispo: From home, would like to receive home services on discharge LEFTY CARRASQUILLO 64y Female  MRN#: [id number]   Hospital Day: 2d    SUBJECTIVE  Patient is a 64y old Female who presents with a chief complaint of lower extremity weakness leading to mechanical fall (16 Dec 2019 09:49)  Currently admitted to medicine with the primary diagnosis of Sepsis    INTERVAL HPI AND OVERNIGHT EVENTS:  Patient was examined and seen at bedside. This morning she is resting comfortably in bed and reports no issues or overnight events. Patient states that her leg strength is back to baseline and would like to work with physical therapy today. She does not feel safe getting out of bed unassisted.    REVIEW OF SYMPTOMS:  CONSTITUTIONAL: No weakness, fevers or chills; No headaches  EYES: No visual changes, eye pain, or discharge  ENT: No vertigo; No ear pain or change in hearing; No sore throat or difficulty swallowing  NECK: No pain or stiffness  RESPIRATORY: No cough, wheezing, or hemoptysis; No shortness of breath  CARDIOVASCULAR: No chest pain or palpitations  GASTROINTESTINAL: No abdominal or epigastric pain; No nausea, vomiting, or hematemesis; No diarrhea or constipation; No melena or hematochezia  GENITOURINARY: No dysuria, frequency or hematuria  MUSCULOSKELETAL: No joint pain, no muscle pain, no weakness  NEUROLOGICAL: No numbness or weakness  SKIN: No itching or rashes    OBJECTIVE  PAST MEDICAL & SURGICAL HISTORY  Multiple sclerosis  DM type 2 (diabetes mellitus, type 2)  Stomach cancer  Portacath in place  S/P partial gastrectomy  H/O total hysterectomy with bilateral salpingo-oophorectomy (BSO)  H/O partial thyroidectomy    ALLERGIES:  No Known Allergies    MEDICATIONS:  STANDING MEDICATIONS  baclofen 20 milliGRAM(s) Oral two times a day  cefTRIAXone   IVPB 1000 milliGRAM(s) IV Intermittent every 24 hours  chlorhexidine 4% Liquid 1 Application(s) Topical <User Schedule>  cholecalciferol 1000 Unit(s) Oral daily  dextrose 5%. 1000 milliLiter(s) IV Continuous <Continuous>  dextrose 50% Injectable 12.5 Gram(s) IV Push once  dextrose 50% Injectable 25 Gram(s) IV Push once  dextrose 50% Injectable 25 Gram(s) IV Push once  DULoxetine 60 milliGRAM(s) Oral daily  enoxaparin Injectable 40 milliGRAM(s) SubCutaneous daily  folic acid 1 milliGRAM(s) Oral daily  insulin glargine Injectable (LANTUS) 10 Unit(s) SubCutaneous at bedtime  insulin lispro (HumaLOG) corrective regimen sliding scale   SubCutaneous three times a day before meals  insulin lispro Injectable (HumaLOG) 3 Unit(s) SubCutaneous three times a day before meals  metoclopramide 10 milliGRAM(s) Oral three times a day before meals  pantoprazole    Tablet 40 milliGRAM(s) Oral before breakfast  rOPINIRole 0.5 milliGRAM(s) Oral three times a day    PRN MEDICATIONS  acetaminophen   Tablet .. 650 milliGRAM(s) Oral every 6 hours PRN  dextrose 40% Gel 15 Gram(s) Oral once PRN  glucagon  Injectable 1 milliGRAM(s) IntraMuscular once PRN  guaiFENesin   Syrup  (Sugar-Free) 100 milliGRAM(s) Oral every 6 hours PRN  ondansetron Injectable 4 milliGRAM(s) IV Push every 8 hours PRN      VITAL SIGNS: Last 24 Hours  T(C): 37.3 (16 Dec 2019 05:00), Max: 37.3 (16 Dec 2019 05:00)  T(F): 99.1 (16 Dec 2019 05:00), Max: 99.1 (16 Dec 2019 05:00)  HR: 89 (16 Dec 2019 05:00) (88 - 94)  BP: 100/53 (16 Dec 2019 05:00) (95/52 - 108/61)  BP(mean): --  RR: 18 (16 Dec 2019 05:00) (18 - 19)  SpO2: 96% (16 Dec 2019 08:00) (96% - 96%)    LABS:                        6.6    8.88  )-----------( 289      ( 16 Dec 2019 06:36 )             21.6     12-16    136  |  102  |  17  ----------------------------<  73  4.3   |  22  |  0.6<L>    Ca    7.4<L>      16 Dec 2019 06:36  Mg     1.8     12-16    TPro  4.4<L>  /  Alb  2.2<L>  /  TBili  <0.2  /  DBili  x   /  AST  24  /  ALT  22  /  AlkPhos  136<H>  12-16    Sedimentation Rate, Erythrocyte: 107 mm/Hr <H> (12-15-19 @ 21:39)      Culture - Blood (collected 14 Dec 2019 16:25)  Source: .Blood Blood  Preliminary Report (16 Dec 2019 02:06):    No growth to date.    Culture - Urine (collected 14 Dec 2019 09:54)  Source: .Urine Clean Catch (Midstream)  Preliminary Report (16 Dec 2019 07:08):    >100,000 CFU/ml Gram Negative Rods    Culture - Blood (collected 14 Dec 2019 09:44)  Source: .Blood Blood-Peripheral  Preliminary Report (15 Dec 2019 18:01):    No growth to date.    Culture - Blood (collected 14 Dec 2019 09:44)  Source: .Blood Blood-Peripheral  Preliminary Report (15 Dec 2019 18:01):    No growth to date    RADIOLOGY:  < from: Xray Pelvis AP only (12.14.19 @ 10:44) >  FINDINGS/  IMPRESSION:    No acute fracture or dislocation of the hips or pelvis. Diffuse   osteopenia. Partially visualized periosteal reaction along the medial   aspect of the right femoral diaphysis is nonspecific in the absence of   other findings. Radiographs of the right femur are recommended for   further evaluation.    < end of copied text >  < from: Xray Femur 2 Views, Right (12.15.19 @ 17:47) >  INTERPRETATION:  Clinical History / Reason for exam: Status post fall    Technique: 4 views of the right femur   Findings/  impression: Bones are osteopenic without acutely displaced fracture. Knee   degenerative changes with joint effusion better assessed on    knee x-ray of 12/14/2019. Vascular calcifications .    < end of copied text >      PHYSICAL EXAM:  CONSTITUTIONAL: No acute distress, well-developed, well-groomed, AAOx3  HEAD: Atraumatic, normocephalic  EYES: EOM intact, PERRLA, conjunctiva and sclera clear  ENT: Supple, no masses, no thyromegaly, no bruits, no JVD; moist mucous membranes  PULMONARY: Clear to auscultation bilaterally; no wheezes, rales, or rhonchi  CARDIOVASCULAR: Regular rate and rhythm; no murmurs, rubs, or gallops  GASTROINTESTINAL: Soft, non-tender, non-distended; bowel sounds present  MUSCULOSKELETAL: 2+ peripheral pulses; no clubbing, no cyanosis, no edema  NEUROLOGY: 4/5 strength in bilateral upper extremities, 4/5 strength in left lower extremity, 2/5 muscle strength in right lower extremity, reflexes are 2+/4 throughout, did not witness gait  SKIN: No rashes or lesions; warm and dry    ASSESSMENT & PLAN  Patient is a 63yo female with PMH of multiple sclerosis on IVIG, gastric cancer s/p gastrectomy s/p chemotherapy, and diabetes mellitus who presented to the hospital s/p mechanical fall. Patient states that she has always had right lower extremity weakness, but the left lower extremity has become progressively weaker over the past month.     #S/p mechanical fall secondary to UTI vs multiple sclerosis flare  - Patient last received IVIG for MS on 11/28/2019-11/30/2019  - However, patient feels that she is not benefitting from IVIG treatment any longer  - Patient cancelled her next appointment for IVIG  - TSH and vitamin B12 within normal limits  - Neurology consult appreciated  - Underlying UTI can present as multiple sclerosis flare  - Will treat UTI first to see if there is improvement  - Patient recently had MRI last month that was consistent with multiple sclerosis  - Per neurology, no need for another MRI this admission  - If no improvement with antibiotics, will follow up with neurology for possible steroids vs IVIG  - Continue with ceftriaxone 1g IV Q24H  - Continue with baclofen 20mg PO BID  - Continue with duloxetine 60mg PO QD  - Continue with ropinirole 0.5mg PO TID  - Continue with acetaminophen 650mg PO Q6H PRN pain  - Blood Cx: no growth to date  - Urine Cx: >100,000 Gram negative rods  - Follow urine culture for sensitivities  - Follows outpatient with Dr. Vance  - Pending physiatry and PT/rehab evaluation    #Acute on chronic iron deficiency anemia  - Hemoglobin on admission: 9.0  - Hemoglobin in AM was 6.6  - Patient is hemodynamically stable with no signs of acute bleed  - Will transfuse 1 unit PRBC today  - Keep active type and screen  - Iron studies show iron deficiency anemia  - Follow ferritin    #Left ear pain likely secondary to cerumen impaction, resolved  - S/p hydrogen peroxide irrigation  - Patient did not complain of ear pain today    #Diabetes mellitus type 2  - Continue with insulin glargine 10 units SQ QHS  - Continue with insulin lispro 3 units SQ TID  - Insulin sliding scale coverage  - Monitor fingerstick glucose    #Gastric cancer s/p gastrectomy s/p chemotherapy  - Currently in remission  - Per daughter, most recent endoscopy was negative  - Most recent CT scan shows possible duodenal loop  that could be related to malignancy  - Continue with ondansetron 4mg IV Q8H PRN nausea/vomiting  - Continue with metoclopramide 10mg PO TID before meals  - Follows outpatient with Dr. Toussaint    #Suspected vitamin D deficiency  - Continue with cholecalciferol 1000 units PO QD    #Misc  - DVT Prophylaxis: Lovenox 40mg SQ QD  - Diet: DASH/TLC, carbohydrate consistent with evening snack  - GI Prophylaxis: pantoprazole 40mg PO QD  - Activity: increase as tolerated  - IV Fluids: Not indicated  - Code Status: Full Code    Dispo: From home, would like to receive home services on discharge

## 2019-12-16 NOTE — PROGRESS NOTE ADULT - ASSESSMENT
Patient is a 65yo female with PMH of multiple sclerosis on IVIG, gastric cancer s/p gastrectomy s/p chemotherapy, and diabetes mellitus who presented to the hospital s/p mechanical fall. Patient states that she has always had right lower extremity weakness, but the left lower extremity has become progressively weaker over the past month.     #S/p mechanical fall secondary to UTI vs multiple sclerosis flare  - Patient last received IVIG for MS on 11/28/2019-11/30/2019  - However, patient feels that she is not benefitting from IVIG treatment any longer  - Patient cancelled her next appointment for IVIG  - TSH and vitamin B12 within normal limits  - Underlying UTI can present as multiple sclerosis flare  - Will treat UTI first to see if there is improvement  - Patient recently had MRI last month that was consistent with multiple sclerosis  - Per neurology, no need for another MRI this admission  - Follow urine and blood cultures  - Follows outpatient with Dr. Vance  - Pending physiatry and PT/rehab evaluation  - c/w current medical mgmt.     #Acute on chronic anemia  - Hemoglobin on admission: 9.0  - Hemoglobin in AM was 6.6  - Patient is hemodynamically stable with no signs of acute bleed  - Will transfuse 1 unit PRBC  - Keep active type and screen    #Left ear pain likely secondary to cerumen impaction  - Will irrigate left ear with hydrogen peroxide    #Diabetes mellitus type 2  - Continue with insulin glargine 10 units SQ QHS  - Continue with insulin lispro 3 units SQ TID  - Insulin sliding scale coverage  - Monitor fingerstick glucose    #Gastric cancer s/p gastrectomy s/p chemotherapy  - Currently in remission  - Per daughter, most recent endoscopy was negative  - Most recent CT scan shows possible duodenal loop  that could be related to malignancy  - Continue with ondansetron 4mg IV Q8H PRN nausea/vomiting  - Continue with metoclopramide 10mg PO TID before meals  - Follows outpatient with Dr. Toussaint    #Suspected vitamin D deficiency  - Continue with cholecalciferol 1000 units PO QD    #Misc  - DVT Prophylaxis: Lovenox 40mg SQ QD  - Diet: DASH/TLC, carbohydrate consistent with evening snack  - GI Prophylaxis: pantoprazole 40mg PO QD  - Activity: increase as tolerated  - IV Fluids: Not indicated  - Code Status: Full Code    #Progress Note Handoff  Pending (specify):  Clinical improvement // Urine cx / PT  Family discussion: with the daughter re plan of care at bedside.   Disposition: Home with [facility] vs STR Patient is a 65yo female with PMH of multiple sclerosis on IVIG, gastric cancer s/p gastrectomy s/p chemotherapy, and diabetes mellitus who presented to the hospital s/p mechanical fall. Patient states that she has always had right lower extremity weakness, but the left lower extremity has become progressively weaker over the past month.     #S/p mechanical fall secondary to UTI vs multiple sclerosis flare  - Patient last received IVIG for MS on 11/28/2019-11/30/2019  - However, patient feels that she is not benefitting from IVIG treatment any longer  - Patient cancelled her next appointment for IVIG  - TSH and vitamin B12 within normal limits  - Underlying UTI can present as multiple sclerosis flare  - Will treat UTI first to see if there is improvement  - Patient recently had MRI last month that was consistent with multiple sclerosis  - Per neurology, no need for another MRI this admission  - Follow urine and blood cultures  - Follows outpatient with Dr. Vance  - Pending physiatry and PT/rehab evaluation  - c/w current medical mgmt.     #Acute on chronic anemia  - Hemoglobin on admission: 9.0  - Hemoglobin in AM was 6.6  - Patient is hemodynamically stable with no signs of acute bleed  - Will transfuse 1 unit PRBC  - Keep active type and screen    # Weight loss x 2-3 weeks for persistent vomiting mostly after meal.     Has hx of stomach cancer s/p chemo /RTX 2016    Will f/u with Dr Breana redman CT A/P findings as below.     CT A/P 12/16/19     < from: CT Abdomen and Pelvis No Cont (12.16.19 @ 11:00) >  IMPRESSION:   1.  Since December 11, 2019, no evidence of an intraperitoneal or   retroperitoneal hematoma.    2.  Increased debris-filled distention of the stomach and distal   esophagus without definite evidence for obstruction, nonspecific.    3.  New small volume ascites.    4.  New diffuse soft tissue anasarca.    5.  Increased small bilateral pleural effusions and bibasilar atelectasis.    #Left ear pain likely secondary to cerumen impaction  - Will irrigate left ear with hydrogen peroxide    #Diabetes mellitus type 2  - Continue with insulin glargine 10 units SQ QHS  - Continue with insulin lispro 3 units SQ TID  - Insulin sliding scale coverage  - Monitor fingerstick glucose    #Gastric cancer s/p gastrectomy s/p chemotherapy  - Currently in remission  - Per daughter, most recent endoscopy was negative  - Most recent CT scan shows possible duodenal loop  that could be related to malignancy  - Continue with ondansetron 4mg IV Q8H PRN nausea/vomiting  - Continue with metoclopramide 10mg PO TID before meals  - Follows outpatient with Dr. Toussaint    #Suspected vitamin D deficiency  - Continue with cholecalciferol 1000 units PO QD    #Misc  - DVT Prophylaxis: Lovenox 40mg SQ QD  - Diet: DASH/TLC, carbohydrate consistent with evening snack  - GI Prophylaxis: pantoprazole 40mg PO QD  - Activity: increase as tolerated  - IV Fluids: Not indicated  - Code Status: Full Code    #Progress Note Handoff  Pending (specify):  Clinical improvement // Urine cx / PT  Family discussion: with the daughter re plan of care at bedside.   Disposition: Home with [facility] vs STR

## 2019-12-16 NOTE — PROGRESS NOTE ADULT - ASSESSMENT
Pt seen, examined.  States that legs are better - left and right leg strength is back to baseline - mod. right LE paresis   But pt states she does think she will be able to get up.  On exam - awake, alert, speech fluent, follows commands. EOMI. Motor 4/5 x 2 UE,  right leg has  2.5/5; left leg - 4/5.  DTR +1 b/l. No focal sensory loss. Toes - downgoing b/l. Gait - deferred    Hb 6.6  ESR - 107  CK - 35  Mg 1.8    A/P. Worsening of legs weakness x 1 week. Hx SPMS - on IVIG qmo (last infusion - 11/28/19). Not happy w/ IVIG effects since recently  Hx Gastric Ca 3 years ago. Had CT abd done now. Dr. Mcgarry - is her Hem/Onc  Current UTI - being treated w/ a/b  already for 3 - 4 days - legs strength improving, as per pt  - get Rehab eval ASAP- start getting pt OOB  Very low Hb today - 6.6 - reportedly is going to get transfusion. ESR - 107      Is going to be seen by her Hem/Onc  Dr. Mcgarry today   - cont. a/b for UTI  - I still hold IV Solumedrol for possible MS exacerbation - could you please, get Hem/Onc and ID clearance for steroids (Hx Gastric Ca, UTI)

## 2019-12-17 NOTE — PROGRESS NOTE ADULT - ASSESSMENT
Patient is a 63yo female with PMH of multiple sclerosis on IVIG, gastric cancer s/p gastrectomy s/p chemotherapy, and diabetes mellitus who presented to the hospital s/p mechanical fall. Patient states that she has always had right lower extremity weakness, but the left lower extremity has become progressively weaker over the past month.     #S/p mechanical fall secondary to multiple sclerosis flare from UTI  - Patient last received IVIG for MS on 11/28/2019-11/30/2019  - Patient recently had MRI last month that was consistent with multiple sclerosis  - Per neurology, no need for another MRI this admission  -  urine cx >> > 100k cfu Ecoli      and blood culture > Negative  - Follows outpatient with Dr. Vance  - Pending physiatry and PT/rehab evaluation    #Acute on chronic anemia  - Hemoglobin on admission: 9.0  - Hemoglobin in AM was 6.6  - Patient is hemodynamically stable with no signs of acute bleed  - s/p 1 unit PRBC yesterday.   - Keep active type and screen    # Weight loss x 2-3 weeks for persistent vomiting mostly after meal.     Has hx of stomach cancer s/p chemo /RTX 2016    Will f/u with Dr Toussaint re CT A/P findings as below.     CT A/P 12/16/19     < from: CT Abdomen and Pelvis No Cont (12.16.19 @ 11:00) >  IMPRESSION:   1.  Since December 11, 2019, no evidence of an intraperitoneal or   retroperitoneal hematoma.    2.  Increased debris-filled distention of the stomach and distal   esophagus without definite evidence for obstruction, nonspecific.    3.  New small volume ascites.    4.  New diffuse soft tissue anasarca.    5.  Increased small bilateral pleural effusions and bibasilar atelectasis.    #Left ear pain likely secondary to cerumen impaction  - will try Debrox if not successful with hydrogen peroxide.     #Diabetes mellitus type 2  - Continue with insulin glargine 10 units SQ QHS  - Continue with insulin lispro 3 units SQ TID  - Insulin sliding scale coverage  - Monitor fingerstick glucose    #Gastric cancer s/p gastrectomy s/p chemotherapy  - Currently in remission  - Per daughter, most recent endoscopy was negative  - Most recent CT scan shows possible duodenal loop  that could be related to malignancy  - Continue with ondansetron 4mg IV Q8H PRN nausea/vomiting  - Continue with metoclopramide 10mg PO TID before meals  - Follows outpatient with Dr. Toussaint    #Suspected vitamin D deficiency  - Continue with cholecalciferol 1000 units PO QD    #Misc  - DVT Prophylaxis: Lovenox 40mg SQ QD  - Diet: DASH/TLC, carbohydrate consistent with evening snack  - GI Prophylaxis: pantoprazole 40mg PO QD  - Activity: increase as tolerated  - IV Fluids: Not indicated  - Code Status: Full Code    #Progress Note Handoff  Pending (specify):   // Urine cx / PT  Family discussion: NA, d/w the patient re plan of care at bedside.   Disposition: Home with [facility] vs STR

## 2019-12-17 NOTE — PROGRESS NOTE ADULT - SUBJECTIVE AND OBJECTIVE BOX
Neurology consult    LEFTY [id number]    HPI:  65 Y/O F with PMHx of MS on IVIG (last dose 3 wks ago), DM type II, Gastric cancer s/p resection and chemo currently in remission (Dr Toussaint), presented to the hospital s/p mechanical fall. Per pt she had progressively worsening lower extremity weakness that started about 2 wks ago, she has chronic RLE weakness and numbness, she ambulates with a walker but no she has weakness in her LLE associated with difficulty ambulation. Previously she would be able to get off the bed and walk with a walker but now she needs assistance with everything. This morning while she was going to use restroom, she felt dizzy described as spinning sensations, her legs gave way and she fell on the floor. Daughter is the HCP and she was helping her mother with walking while this happened. Pt hit her left knee and is complaining of pain in that area. She denied hitting her head, hx of LOC, blacking out, palpitations or any abnormal jerky movements of her limbs. Pt recently had worsening nausea and vomiting that she gets from gastroparesis and gastric resection, she ran out of her medications and it got worse so she came to ER 2 days ago. She was resuscitated with fluids and was sent home, her vomiting has improved since then. Pt had suspicious lesion in the duodenal loop, per daughter pt had EGD and Bx was -ve for malignancy? Pt denied any hx of fever, chills, nausea, vomiting, diarrhea, constipation, melena, pain in abdomen, sob, chest pain, cough, increased urinary frequency, dysuria, headache, any changes in weight, recent travel.     In the ED pt was found to have BP of 86/54, , RR 20/min, SPO2 96% on RA, Temp 96.7. She was given I/V fluid bolus 2.2 L NS and her vitals improved. Her UA showed nitrites so she was given Rocephin? (14 Dec 2019 11:23)          MEDICATIONS    acetaminophen   Tablet .. 650 milliGRAM(s) Oral every 6 hours PRN  baclofen 20 milliGRAM(s) Oral two times a day  cefTRIAXone   IVPB 1000 milliGRAM(s) IV Intermittent every 24 hours  chlorhexidine 4% Liquid 1 Application(s) Topical <User Schedule>  cholecalciferol 1000 Unit(s) Oral daily  dextrose 40% Gel 15 Gram(s) Oral once PRN  dextrose 5%. 1000 milliLiter(s) IV Continuous <Continuous>  dextrose 50% Injectable 12.5 Gram(s) IV Push once  dextrose 50% Injectable 25 Gram(s) IV Push once  dextrose 50% Injectable 25 Gram(s) IV Push once  DULoxetine 60 milliGRAM(s) Oral daily  folic acid 1 milliGRAM(s) Oral daily  glucagon  Injectable 1 milliGRAM(s) IntraMuscular once PRN  guaiFENesin   Syrup  (Sugar-Free) 100 milliGRAM(s) Oral every 6 hours PRN  insulin lispro (HumaLOG) corrective regimen sliding scale   SubCutaneous three times a day before meals  insulin lispro Injectable (HumaLOG) 3 Unit(s) SubCutaneous three times a day before meals  metoclopramide 10 milliGRAM(s) Oral three times a day before meals  ondansetron Injectable 4 milliGRAM(s) IV Push every 8 hours PRN  pantoprazole    Tablet 40 milliGRAM(s) Oral before breakfast  rOPINIRole 0.5 milliGRAM(s) Oral three times a day  tamsulosin 0.4 milliGRAM(s) Oral at bedtime      PAST MEDICAL & SURGICAL HISTORY:  Multiple sclerosis  DM type 2 (diabetes mellitus, type 2)  Stomach cancer  Portacath in place  S/P partial gastrectomy  H/O total hysterectomy with bilateral salpingo-oophorectomy (BSO)  H/O partial thyroidectomy       Family history: No history of dementia, strokes, or seizures   FAMILY HISTORY:    SOCIAL HISTORY --     Allergies    No Known Allergies    Intolerances            Vital Signs Last 24 Hrs  T(C): 37 (17 Dec 2019 05:00), Max: 37.3 (16 Dec 2019 21:41)  T(F): 98.6 (17 Dec 2019 05:00), Max: 99.1 (16 Dec 2019 21:41)  HR: 85 (17 Dec 2019 05:00) (85 - 91)  BP: 108/54 (17 Dec 2019 05:00) (108/54 - 111/56)  BP(mean): --  RR: 18 (17 Dec 2019 05:00) (17 - 18)  SpO2: 95% (17 Dec 2019 07:32) (95% - 95%)    SEPSIS INABILITY TO AMBULATE DUE TO RIGHT KNEE MULTIPLE SCLEROSIS  ^FALL/RIGHT KNEE AND ANKLE PAIN  Handoff  MEWS Score  Multiple sclerosis  DM type 2 (diabetes mellitus, type 2)  Stomach cancer  Sepsis  Portacath in place  S/P partial gastrectomy  H/O total hysterectomy with bilateral salpingo-oophorectomy (BSO)  H/O partial thyroidectomy  FALL/RIGHT KNEE AND ANKLE PAIN  2  Multiple sclerosis  Inability to ambulate due to right knee      LABS:  CBC Full  -  ( 17 Dec 2019 06:18 )  WBC Count : 8.69 K/uL  RBC Count : 2.94 M/uL  Hemoglobin : 8.0 g/dL  Hematocrit : 25.8 %  Platelet Count - Automated : 303 K/uL        12-17    138  |  103  |  16  ----------------------------<  52<L>  4.2   |  21  |  0.6<L>    Ca    7.4<L>      17 Dec 2019 06:18  Mg     1.9     12-17    TPro  4.4<L>  /  Alb  2.2<L>  /  TBili  <0.2  /  DBili  x   /  AST  24  /  ALT  22  /  AlkPhos  136<H>  12-16    Hemoglobin A1C:     LIVER FUNCTIONS - ( 16 Dec 2019 06:36 )  Alb: 2.2 g/dL / Pro: 4.4 g/dL / ALK PHOS: 136 U/L / ALT: 22 U/L / AST: 24 U/L / GGT: x

## 2019-12-17 NOTE — PHYSICAL THERAPY INITIAL EVALUATION ADULT - CRITERIA FOR SKILLED THERAPEUTIC INTERVENTIONS
rehab potential/functional limitations in following categories/risk reduction/prevention/impairments found/therapy frequency/anticipated equipment needs at discharge

## 2019-12-17 NOTE — PHYSICAL THERAPY INITIAL EVALUATION ADULT - PERTINENT HX OF CURRENT PROBLEM, REHAB EVAL
Patient is a 64y old  Female who presents with a chief complaint of lower extremity weakness leading to mechanical fall

## 2019-12-17 NOTE — PROGRESS NOTE ADULT - SUBJECTIVE AND OBJECTIVE BOX
LEFTY CARRASQUILLO 64y Female  MRN#: [id number]   Hospital Day: 3d    SUBJECTIVE  Patient is a 64y old Female who presents with a chief complaint of lower extremity weakness leading to mechanical fall (17 Dec 2019 09:47)  Currently admitted to medicine with the primary diagnosis of Sepsis    INTERVAL HPI AND OVERNIGHT EVENTS:  Patient was examined and seen at bedside. This morning she is resting comfortably in bed and reports no issues or overnight events. Patient states that she feels less fatigued compared to yesterday. She also states that her leg strength is back to baseline.    Patient was also hypoglycemic this morning to 60, which resolved after eating breakfast.    REVIEW OF SYMPTOMS:  CONSTITUTIONAL: No weakness, fevers or chills; No headaches  EYES: No visual changes, eye pain, or discharge  ENT: No vertigo; No ear pain or change in hearing; No sore throat or difficulty swallowing  NECK: No pain or stiffness  RESPIRATORY: No cough, wheezing, or hemoptysis; No shortness of breath  CARDIOVASCULAR: No chest pain or palpitations  GASTROINTESTINAL: No abdominal or epigastric pain; No nausea, vomiting, or hematemesis; No diarrhea or constipation; No melena or hematochezia  GENITOURINARY: No dysuria, frequency or hematuria  MUSCULOSKELETAL: No joint pain, no muscle pain, no weakness  NEUROLOGICAL: No numbness or weakness  SKIN: No itching or rashes    OBJECTIVE  PAST MEDICAL & SURGICAL HISTORY  Multiple sclerosis  DM type 2 (diabetes mellitus, type 2)  Stomach cancer  Portacath in place  S/P partial gastrectomy  H/O total hysterectomy with bilateral salpingo-oophorectomy (BSO)  H/O partial thyroidectomy    ALLERGIES:  No Known Allergies    MEDICATIONS:  STANDING MEDICATIONS  baclofen 20 milliGRAM(s) Oral two times a day  cefTRIAXone   IVPB 1000 milliGRAM(s) IV Intermittent every 24 hours  chlorhexidine 4% Liquid 1 Application(s) Topical <User Schedule>  cholecalciferol 1000 Unit(s) Oral daily  dextrose 5%. 1000 milliLiter(s) IV Continuous <Continuous>  dextrose 50% Injectable 12.5 Gram(s) IV Push once  dextrose 50% Injectable 25 Gram(s) IV Push once  dextrose 50% Injectable 25 Gram(s) IV Push once  DULoxetine 60 milliGRAM(s) Oral daily  folic acid 1 milliGRAM(s) Oral daily  insulin glargine Injectable (LANTUS) 7 Unit(s) SubCutaneous at bedtime  insulin lispro (HumaLOG) corrective regimen sliding scale   SubCutaneous three times a day before meals  insulin lispro Injectable (HumaLOG) 3 Unit(s) SubCutaneous three times a day before meals  metoclopramide 10 milliGRAM(s) Oral three times a day before meals  pantoprazole    Tablet 40 milliGRAM(s) Oral before breakfast  rOPINIRole 0.5 milliGRAM(s) Oral three times a day  tamsulosin 0.4 milliGRAM(s) Oral at bedtime    PRN MEDICATIONS  acetaminophen   Tablet .. 650 milliGRAM(s) Oral every 6 hours PRN  dextrose 40% Gel 15 Gram(s) Oral once PRN  glucagon  Injectable 1 milliGRAM(s) IntraMuscular once PRN  guaiFENesin   Syrup  (Sugar-Free) 100 milliGRAM(s) Oral every 6 hours PRN  ondansetron Injectable 4 milliGRAM(s) IV Push every 8 hours PRN      VITAL SIGNS: Last 24 Hours  T(C): 37 (17 Dec 2019 05:00), Max: 37.3 (16 Dec 2019 21:41)  T(F): 98.6 (17 Dec 2019 05:00), Max: 99.1 (16 Dec 2019 21:41)  HR: 85 (17 Dec 2019 05:00) (85 - 91)  BP: 108/54 (17 Dec 2019 05:00) (108/54 - 111/56)  BP(mean): --  RR: 18 (17 Dec 2019 05:00) (17 - 18)  SpO2: 95% (17 Dec 2019 07:32) (95% - 95%)    LABS:                        8.0    8.69  )-----------( 303      ( 17 Dec 2019 06:18 )             25.8     12-17    138  |  103  |  16  ----------------------------<  52<L>  4.2   |  21  |  0.6<L>    Ca    7.4<L>      17 Dec 2019 06:18  Mg     1.9     12-17    TPro  4.4<L>  /  Alb  2.2<L>  /  TBili  <0.2  /  DBili  x   /  AST  24  /  ALT  22  /  AlkPhos  136<H>  12-16    Culture - Blood (collected 14 Dec 2019 16:25)  Source: .Blood Blood  Preliminary Report (16 Dec 2019 02:06):    No growth to date.    RADIOLOGY:  No interval imaging performed     PHYSICAL EXAM:  CONSTITUTIONAL: No acute distress, well-developed, well-groomed, AAOx3  HEAD: Atraumatic, normocephalic  EYES: EOM intact, PERRLA, conjunctiva and sclera clear  ENT: Supple, no masses, no thyromegaly, no bruits, no JVD; moist mucous membranes  PULMONARY: Clear to auscultation bilaterally; no wheezes, rales, or rhonchi  CARDIOVASCULAR: Regular rate and rhythm; no murmurs, rubs, or gallops  GASTROINTESTINAL: Soft, non-tender, non-distended; bowel sounds present  MUSCULOSKELETAL: 2+ peripheral pulses; no clubbing, no cyanosis, no edema  NEUROLOGY: 4/5 strength in bilateral upper extremities, 4/5 strength in left lower extremity, 2/5 muscle strength in right lower extremity, reflexes are 2-/4 throughout, did not witness gait  SKIN: No rashes or lesions; warm and dry    ASSESSMENT & PLAN  Patient is a 63yo female with PMH of multiple sclerosis on IVIG, gastric cancer s/p gastrectomy s/p chemotherapy, and diabetes mellitus who presented to the hospital s/p mechanical fall. Patient states that she has always had right lower extremity weakness, but the left lower extremity has become progressively weaker over the past month.     #S/p mechanical fall secondary to UTI vs multiple sclerosis flare  - Patient last received IVIG for MS on 11/28/2019-11/30/2019  - However, patient feels that she is not benefitting from IVIG treatment any longer  - Patient cancelled her next appointment for IVIG  - TSH and vitamin B12 within normal limits  - Neurology consult appreciated  - Underlying UTI can present as multiple sclerosis flare  - Will treat UTI first to see if there is improvement  - Patient recently had MRI last month that was consistent with multiple sclerosis  - Per neurology, no need for another MRI this admission  - If no improvement with antibiotics, will follow up with neurology for possible steroids vs IVIG  - Continue with ceftriaxone 1g IV Q24H  - Continue with baclofen 20mg PO BID  - Continue with duloxetine 60mg PO QD  - Continue with ropinirole 0.5mg PO TID  - Continue with acetaminophen 650mg PO Q6H PRN pain  - Blood Cx: no growth to date  - Urine Cx: >100,000 Gram negative rods  - Follow urine culture for sensitivities  - Follows outpatient with Dr. Vance  - Pending physiatry and PT/rehab evaluation    #Acute on chronic iron deficiency anemia  - Hemoglobin on admission: 9.0  - Hemoglobin on 12/16/2019 was 6.6  - S/p 1 unit PRBC on 12/16/2019  - Hemoglobin today 8.0  - Patient is hemodynamically stable with no signs of acute bleed  - Pending hem/onc consult  - Keep active type and screen  - Iron studies show iron deficiency anemia    #Left ear pain likely secondary to cerumen impaction, resolved  - S/p hydrogen peroxide irrigation    #Diabetes mellitus type 2  - Patient was hypoglycemic to 60 this morning  - Decrease insulin glargine to 7 units SQ QHS  - Continue with insulin lispro 3 units SQ TID  - Insulin sliding scale coverage  - Monitor fingerstick glucose    #Urinary retention  - Bladder scans have shown 300+mL of retained urine on 3 separate occasions  - Patient is s/p straight catheter x3  - Start tamsulosin 0.4mg PO QHS  - Will require Jensen catheter and possible urology consult if retention persists    #Gastric cancer s/p gastrectomy s/p chemotherapy  - Currently in remission  - Per daughter, most recent endoscopy was negative  - Most recent CT scan shows possible duodenal loop  that could be related to malignancy  - Continue with ondansetron 4mg IV Q8H PRN nausea/vomiting  - Continue with metoclopramide 10mg PO TID before meals  - Follows outpatient with Dr. Toussaint  - Given recent anemia and history of gastric cancer, will consult Dr. Toussaint    #Suspected vitamin D deficiency  - Continue with cholecalciferol 1000 units PO QD    #Misc  - DVT Prophylaxis: Lovenox 40mg SQ QD  - Diet: DASH/TLC, carbohydrate consistent with evening snack  - GI Prophylaxis: pantoprazole 40mg PO QD  - Activity: increase as tolerated  - IV Fluids: Not indicated  - Code Status: Full Code    Dispo: From home, would like to receive home services on discharge

## 2019-12-17 NOTE — PROGRESS NOTE ADULT - ASSESSMENT
Pt seen, examined.  Pt believes her legs strength is back to baseline.    Awake, alert, speech fluent, follows commands. EOMI. Motor 4/5 x 2 UE, 2+/5 RLE, 4/5 LLE. DTR +1 b/l.   No focal sensory loss. Gait - deferred.    A/P. Hx SPMS with lower paraparesis, right leg worse than left. On IVIG monthly; last treatment - on 11/28/19        Worsening of legs strength/ambulation x 1-2 weeks - likely due to UTI  - improved   - on a/b for UTI  - no need for IV Solumedrol (improved after started on a/b for UTI)  - follow PT recommendations  - follow w/ pt's neurologist Dr. Vance after d/c - pt is not happy w/ IVIG - might discuss other options of Rx  New today - urine retention; s/p straight catheterization  - med. team is aware  Acute anemia upon chronic. S/p transfusion. Hb went from 6 to 8  - anemia w/up;  consider Hem/Onc eval (Dr. Mcgarry - pt has a H x Gastric Ca)    Please, recall if any questions

## 2019-12-17 NOTE — CONSULT NOTE ADULT - ATTENDING COMMENTS
Patient examined by myself , above read , agree with recommendation , also correct iron deficiency with venofer , awaiting GI evaluation for likely upper GI bleed . Patient examined by myself , above read , agree with recommendation , also correct iron deficiency with venofer , awaiting GI evaluation for likely upper GI bleed ( reticulocytosis with no evidence of hemolysis ) .

## 2019-12-17 NOTE — PROGRESS NOTE ADULT - SUBJECTIVE AND OBJECTIVE BOX
INTERVAL HPI/OVERNIGHT EVENTS:    No Known Allergies    Awake, alert, tolerating orals,  tim cath site clean,   Abd firm, non tender, healed surgical scar,   No hip pain or tenderness.   Xrays discussed.       acetaminophen   Tablet .. 650 milliGRAM(s) Oral every 6 hours PRN  baclofen 20 milliGRAM(s) Oral two times a day  cefTRIAXone   IVPB 1000 milliGRAM(s) IV Intermittent every 24 hours  chlorhexidine 4% Liquid 1 Application(s) Topical <User Schedule>  cholecalciferol 1000 Unit(s) Oral daily  dextrose 40% Gel 15 Gram(s) Oral once PRN  dextrose 5%. 1000 milliLiter(s) IV Continuous <Continuous>  dextrose 50% Injectable 12.5 Gram(s) IV Push once  dextrose 50% Injectable 25 Gram(s) IV Push once  dextrose 50% Injectable 25 Gram(s) IV Push once  DULoxetine 60 milliGRAM(s) Oral daily  folic acid 1 milliGRAM(s) Oral daily  glucagon  Injectable 1 milliGRAM(s) IntraMuscular once PRN  guaiFENesin   Syrup  (Sugar-Free) 100 milliGRAM(s) Oral every 6 hours PRN  insulin glargine Injectable (LANTUS) 7 Unit(s) SubCutaneous at bedtime  insulin lispro (HumaLOG) corrective regimen sliding scale   SubCutaneous three times a day before meals  insulin lispro Injectable (HumaLOG) 3 Unit(s) SubCutaneous three times a day before meals  metoclopramide 10 milliGRAM(s) Oral three times a day before meals  ondansetron Injectable 4 milliGRAM(s) IV Push every 8 hours PRN  pantoprazole    Tablet 40 milliGRAM(s) Oral two times a day  rOPINIRole 0.5 milliGRAM(s) Oral three times a day  tamsulosin 0.4 milliGRAM(s) Oral at bedtime    cefTRIAXone   IVPB 1000 milliGRAM(s) IV Intermittent every 24 hours    Vital Signs Last 24 Hrs  T(C): 36.2 (17 Dec 2019 13:31), Max: 37.3 (16 Dec 2019 21:41)  T(F): 97.2 (17 Dec 2019 13:31), Max: 99.1 (16 Dec 2019 21:41)  HR: 86 (17 Dec 2019 13:31) (85 - 86)  BP: 104/64 (17 Dec 2019 13:31) (104/64 - 108/54)  BP(mean): --  RR: 17 (17 Dec 2019 13:31) (17 - 18)  SpO2: 95% (17 Dec 2019 07:32) (95% - 95%)    LABS:                        8.0    8.69  )-----------( 303      ( 17 Dec 2019 06:18 )             25.8         138  |  103  |  16  ----------------------------<  52<L>  4.2   |  21  |  0.6<L>    Ca    7.4<L>      17 Dec 2019 06:18  Mg     1.9         TPro  4.4<L>  /  Alb  2.2<L>  /  TBili  <0.2  /  DBili  x   /  AST  24  /  ALT  22  /  AlkPhos  136<H>                              7.5    13.16 )-----------( 344      ( 15 Dec 2019 12:12 )             24.4         Urinalysis Basic - ( 14 Dec 2019 09:54 )    Color: Yellow / Appearance: Slightly Turbid / S.013 / pH: x  Gluc: x / Ketone: Negative  / Bili: Negative / Urobili: <2 mg/dL   Blood: x / Protein: Trace / Nitrite: Positive   Leuk Esterase: Negative / RBC: 4 /HPF / WBC 3 /HPF   Sq Epi: x / Non Sq Epi: 1 /HPF / Bacteria: Many    Culture - Blood (19 @ 16:25)    Specimen Source: .Blood Blood    Culture Results:   No growth to date.    Culture - Urine (19 @ 09:54)    -  Amikacin: S <=16    -  Ampicillin: R >16 These ampicillin results predict results for amoxicillin    -  Ampicillin/Sulbactam: I  Enterobacter, Citrobacter, and Serratia may develop resistance during prolonged therapy (3-4 days)    -  Aztreonam: S <=4    -  Cefazolin: S <=8 ([id number]) For uncomplicated UTI with K. pneumoniae, E. coli, or P. mirablis: OLU <=16 is sensitive and OLU >=32 is resistant. This also predicts results for oral agents cefaclor, cefdinir, cefpodoxime, cefprozil, cefuroxime axetil, cephalexin and locarbef for uncomplicated UTI. Note that some isolates may be susceptible to these agents while testing resistant to cefazolin.    -  Cefepime: S <=4    -  Cefoxitin: S <=8    -  Ceftriaxone: S <=1 Enterobacter, Citrobacter, and Serratia may develop resistance during prolonged therapy    -  Ciprofloxacin: S <=1    -  Gentamicin: S <=4    -  Imipenem: S <=1    -  Levofloxacin: S <=2    -  Meropenem: S <=1    -  Nitrofurantoin: S <=32 Should not be used to treat pyelonephritis    -  Piperacillin/Tazobactam: S <=16    -  Tigecycline: S <=2    -  Tobramycin: S <=4    -  Trimethoprim/Sulfamethoxazole: S <=2/38    Specimen Source: .Urine Clean Catch (Midstream)    Culture Results:   >100,000 CFU/ml Escherichia coli    Organism Identification: Escherichia coli    Organism: Escherichia coli    Method Type: OLU      EXAM:  CT ABDOMEN AND PELVIS            PROCEDURE DATE:  2019          IMPRESSION:   1.  Since 2019, no evidence of an intraperitoneal or   retroperitoneal hematoma.    2.  Increased debris-filled distention of the stomach and distal   esophagus without definite evidence for obstruction, nonspecific.    3.  New small volume ascites.    4.  New diffuse soft tissue anasarca.    5.  Increased small bilateral pleural effusions and bibasilar atelectasis.      EXAM:  XR FEMUR 2 VIEWS RT            PROCEDURE DATE:  12/15/2019      INTERPRETATION:  Clinical History / Reason for exam: Status post fall    Technique: 4 views of the right femur   Findings/  impression: Bones are osteopenic without acutely displaced fracture. Knee   degenerative changes with joint effusion better assessed on    knee x-ray of 2019. Vascular calcifications .    MELVIN TORRES M.D., ATTENDING RADIOLOGIST  This document has been electronically signed. Dec 16 2019  9:57AM    EXAM:  XR PELVIS AP ONLY 1-2 VIEWS            PROCEDURE DATE:  2019        INTERPRETATION:  CLINICAL HISTORY / REASON FOR EXAM: Fall.    TECHNIQUE: Frontal radiographs of the pelvis. Evaluation is partially   limited by under penetration due to the patient's body habitus.    COMPARISON: Radiograph of the pelvis dated 2010.    FINDINGS/  IMPRESSION:    No acute fracture or dislocation of the hips or pelvis. Diffuse   osteopenia. Partially visualized periosteal reaction along the medial   aspect of the right femoral diaphysis is nonspecific in the absence of   other findings. Radiographs of the right femur are recommended for   further evaluation.      EXAM:  XR CHEST FRONTAL 1V            PROCEDURE DATE:  2019      INTERPRETATION:  Clinical History/Reason for Exam:  Sepsis  Comparison : Chest x-ray-  2019      Findings:    Technique/Positioning: Frontal film. Leads overlie thorax obscuring   anatomy. Rotated to the right    Support devices: Right Port-A-Cath, satisfactory position.      Cardiac/mediastinum/hilum: Rotated cardiac silhouette, unchanged    Lung parenchyma/ Pleura: No consolidation effusion or pneumothorax.      Skeleton/soft tissues: Stable      Impression:    No radiographic evidence of acute cardiopulmonary disease.    EXAM:  CT ABDOMEN AND PELVIS IC            PROCEDURE DATE:  2019            INTERPRETATION:  CLINICAL STATEMENT: Nausea, vomiting. History of gastric   cancer.      TECHNIQUE: Contiguous axial CT images were obtained from the lower chest   tothe pubic symphysis following administration of 100cc Optiray 320   intravenous contrast.  Oral contrast was not administered.  Reformatted   images in the coronal and sagittal planes were acquired.    COMPARISON CT: Abdomen and pelvis dated 2019.    OTHER STUDIES USED FOR CORRELATION: None.       FINDINGS:    LOWER CHEST: Fluid-filled, mildly distended distal esophagus. Trace right   pleural effusion. Bibasilar atelectasis, greater on the right. Mild   bibasilar bronchiectasis.    HEPATOBILIARY: Hepatic steatosis. No intrahepatic or extrahepatic biliary   ductal dilatation. Contracted gallbladder.    SPLEEN: Unremarkable.    PANCREAS: Diminutive but otherwise unremarkable.    ADRENAL GLANDS: Unremarkable.    KIDNEYS: Symmetric nephrograms. No hydronephrosis or renal calculus.   Excreted intravenous contrast is demonstrated in the bilateral collecting   systems. There are bilateral renal cysts and subcentimeter hypodensities   which are too small to further characterize.    ABDOMINOPELVIC NODES: Unremarkable.    PELVIC ORGANS: Prior hysterectomy. Excreted contrast is demonstrated   within a distended urinary bladder.    PERITONEUM/MESENTERY/BOWEL:   The patient is status post partial gastrectomy with Billroth II type   anatomy. The residual stomach is distended with copious debris.  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   Redemonstration of soft tissue attenuation surrounding the common hepatic   artery (series 4 image 95-99), not significantly changed from prior.    Duodenal diverticulum. Colonic diverticulosis. No evidence of bowel   obstruction, pneumoperitoneum, or ascites. Small bowel is fluid-filled.   Normal appendix.    BONES/SOFT TISSUES: No suspicious osseous lesion. Degenerative changes of   the spine     OTHER: Calcified atherosclerotic disease of the aorta and branch vessels      IMPRESSION:     1.  No evidence of bowel obstruction or colitis.   2.  Distended stomach filled with debris.   3.  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 An FDG avid focus was seen immediately superior to   this region on prior PET/CT. Redemonstration of soft tissue attenuation   surrounding the common hepatic artery also similar to multiple prior   studies dating to 2018, possibly representing recurrent tumor.   Continued attention to these areas is recommended on followup CT/PET/CT.

## 2019-12-17 NOTE — CONSULT NOTE ADULT - ASSESSMENT
63 Y/O F with PMHx of MS on IVIG (last dose 3 wks ago), DM type II, Gastric cancer s/p resection and chemo currently in remission (Dr Toussaint), presented to the hospital s/p mechanical fall.  Pt has H/O stage III gastric cancer s/p chemo RT , Heme-onc is now consulted for acute drop in H/H.     # H/O Gastric Cancer Stage III s/p Bilroth II partial gastrectomy followed by chemo RT , In clinical remission since then  # Gastroparesis with intractable vomiting   # Last EGD showing Erosive gastritis   # h/o Normocytic anemia ; Now has acute drop in H/H   - Doubt findings suggestive of recurrence of disease as PET scan findings have been stable for > 1 year . Also recent biopsy was negative but pt did have severe erosive esophagitis on EGD . Drop in H/H could be from slow oozing of blood from erosive stomach lining complicated by repeated vomiting . Will recommend to check Stool for occult blood.  - Also check CEA levels.  - Iron profile noted , will recommend to give IV venofer .  - Monitor CBC , transfuse if Hb < 7 or if actively bleeding .   - Consider GI eval.   - Will recommend out pt PET scan .       # Fall sec to UTI vrs MS flare :  - no objection to solumedrol.   -needs rehab .     will f/u with pt. 63 Y/O F with PMHx of MS on IVIG (last dose 3 wks ago), DM type II, Gastric cancer s/p resection and chemo currently in remission (Dr Toussaint), presented to the hospital s/p mechanical fall.  Pt has H/O stage III gastric cancer s/p chemo RT , Heme-onc is now consulted for acute drop in H/H.     # H/O Gastric Cancer Stage III s/p Bilroth II partial gastrectomy followed by chemo RT , In clinical remission since then  # Gastroparesis with intractable vomiting   # Last EGD showing Erosive gastritis   # h/o Normocytic anemia ; Now has acute drop in H/H GI bleed vrs hemolysis   - Doubt findings suggestive of recurrence of disease as PET scan findings have been stable for > 1 year . Also recent biopsy was negative but pt did have severe erosive esophagitis on EGD . Drop in H/H could be from slow oozing of blood from erosive stomach lining complicated by repeated vomiting . Will recommend to check Stool for occult blood.  - Also check CEA levels.  - Iron profile noted , will recommend to give IV venofer .  - Doubt hemolysis but recommend checking , retic and haptoglobin level.  - Monitor CBC , transfuse if Hb < 7 or if actively bleeding .   - Consider GI eval.   - Will recommend out pt PET scan .       # Fall sec to UTI vrs MS flare :  - no objection to solumedrol.   -needs rehab .     will f/u with pt.

## 2019-12-17 NOTE — PHYSICAL THERAPY INITIAL EVALUATION ADULT - GENERAL OBSERVATIONS, REHAB EVAL
Pt is pending blood transfusion 2* to Hgb currently 6.6. Will f/u for PT when medically appropriate.
16:00-16:25. Pt encountered semifowler in bed in NAD, + sequentials B LE's, pt agreeable to PT. Chris POTTER checked glucometer 79 glucose.

## 2019-12-17 NOTE — CONSULT NOTE ADULT - SUBJECTIVE AND OBJECTIVE BOX
Patient is a 64y old  Female who presents with a chief complaint of lower extremity weakness leading to mechanical fall (17 Dec 2019 11:59)      HPI:  65 Y/O F with PMHx of MS on IVIG (last dose 3 wks ago), DM type II, Gastric cancer s/p resection and chemo currently in remission (Dr Toussaint), presented to the hospital s/p mechanical fall. Per pt she had progressively worsening lower extremity weakness that started about 2 wks ago, she has chronic RLE weakness and numbness, she ambulates with a walker but no she has weakness in her LLE associated with difficulty ambulation. Previously she would be able to get off the bed and walk with a walker but now she needs assistance with everything. This morning while she was going to use restroom, she felt dizzy described as spinning sensations, her legs gave way and she fell on the floor. Daughter is the HCP and she was helping her mother with walking while this happened. Pt hit her left knee and is complaining of pain in that area. She denied hitting her head, hx of LOC, blacking out, palpitations or any abnormal jerky movements of her limbs. Pt recently had worsening nausea and vomiting that she gets from gastroparesis and gastric resection, she ran out of her medications and it got worse so she came to ER 2 days ago. She was resuscitated with fluids and was sent home, her vomiting has improved since then. Pt had suspicious lesion in the duodenal loop, per daughter pt had EGD and Bx was -ve for malignancy? Pt denied any hx of fever, chills, nausea, vomiting, diarrhea, constipation, melena, pain in abdomen, sob, chest pain, cough, increased urinary frequency, dysuria, headache, any changes in weight, recent travel.     In the ED pt was found to have BP of 86/54, , RR 20/min, SPO2 96% on RA, Temp 96.7. She was given I/V fluid bolus 2.2 L NS and her vitals improved. Her UA showed nitrites so she was given Rocephin? (14 Dec 2019 11:23)    Heme-Onc History :  Pt was diagnosed with Gastric cancer in 2017 , had Bilroth II Partial Gastrectomy on 5/24/17 . Was then given 4 cycles of FOLFOX followed by RT with cepcitabine and after RT was given 2 more cycles of FOLFOX to complete 6 cycles in total. Pt is in clinical remission since then , has been c/o gastric paresis with recurrent vomiting . Pt had EGD and biopsy done by GI in Oct that was negative. Last  PET scan showed Persistent focal increased FDG uptake at peripancreatic/hepatoduodenal   ligament station, max SUV 8, previously 4.4, suggestive of lymphadenopathy. Last CEA was 5.8 . Pt reports that for past 2 wks she has been having intractabel vomiting , but denies any hematemesis or melena . Was brought to ER after fall at home. In hospital on presentation pt had Hb of 9 with MCV of 88 , which dropped to 7.7 next day . No active bleeding reported . Ct abd and pelvis negative for retroperitoneal bleed .            PAST MEDICAL & SURGICAL HISTORY:  Multiple sclerosis  DM type 2 (diabetes mellitus, type 2)  Stomach cancer  Portacath in place  S/P partial gastrectomy  H/O total hysterectomy with bilateral salpingo-oophorectomy (BSO)  H/O partial thyroidectomy      SOCIAL HISTORY:  Denies h/o smoking , alcohol or drug use   Allergies    No Known Allergies      HOME MEDICATIONS:  baclofen 20 mg oral tablet: orally 2 times a day (14 Dec 2019 11:42)  DULoxetine 60 mg oral delayed release capsule: 1 cap(s) orally once a day (14 Dec 2019 11:42)  folic acid 1 mg oral tablet: 1 tab(s) orally once a day (14 Dec 2019 11:42)  glimepiride 4 mg oral tablet: 1 tab(s) orally once a day (14 Dec 2019 11:42)  pantoprazole 40 mg oral delayed release tablet: 1 tab(s) orally once a day (14 Dec 2019 11:42)  Reglan 10 mg oral tablet: 1 tab(s) orally 3 times a day (before meals) (14 Dec 2019 11:42)  rOPINIRole 0.5 mg oral tablet: 1 tab(s) orally 3 times a day (14 Dec 2019 11:42)  Vitamin D2 50,000 intl units (1.25 mg) oral capsule: 1 cap(s) orally once a week (14 Dec 2019 11:42)  Zofran 8 mg oral tablet: 1 tab(s) orally 3 times a day (14 Dec 2019 11:42)      Vital Signs Last 24 Hrs  T(C): 37 (17 Dec 2019 05:00), Max: 37.3 (16 Dec 2019 21:41)  T(F): 98.6 (17 Dec 2019 05:00), Max: 99.1 (16 Dec 2019 21:41)  HR: 85 (17 Dec 2019 05:00) (85 - 91)  BP: 108/54 (17 Dec 2019 05:00) (108/54 - 111/56)  BP(mean): --  RR: 18 (17 Dec 2019 05:00) (17 - 18)  SpO2: 95% (17 Dec 2019 07:32) (95% - 95%)    PHYSICAL EXAM  General: lying on bed NAD   HEENT: clear oropharynx, anicteric sclera, pink conjunctiva  CV: normal S1/S2 with no murmur rubs or gallops  Lungs: positive air movement b/l ant lungs,clear to auscultation, no wheezes, no rales  Abdomen: soft non-tender non-distended, no hepatosplenomegaly  Ext: b/l upper extremities swelling   Skin: no rashes and no petechiae  Neuro: alert and oriented X 4, no focal deficits    MEDICATIONS  (STANDING):  baclofen 20 milliGRAM(s) Oral two times a day  cefTRIAXone   IVPB 1000 milliGRAM(s) IV Intermittent every 24 hours  chlorhexidine 4% Liquid 1 Application(s) Topical <User Schedule>  cholecalciferol 1000 Unit(s) Oral daily  dextrose 5%. 1000 milliLiter(s) (50 mL/Hr) IV Continuous <Continuous>  dextrose 50% Injectable 12.5 Gram(s) IV Push once  dextrose 50% Injectable 25 Gram(s) IV Push once  dextrose 50% Injectable 25 Gram(s) IV Push once  DULoxetine 60 milliGRAM(s) Oral daily  folic acid 1 milliGRAM(s) Oral daily  insulin glargine Injectable (LANTUS) 7 Unit(s) SubCutaneous at bedtime  insulin lispro (HumaLOG) corrective regimen sliding scale   SubCutaneous three times a day before meals  insulin lispro Injectable (HumaLOG) 3 Unit(s) SubCutaneous three times a day before meals  metoclopramide 10 milliGRAM(s) Oral three times a day before meals  pantoprazole    Tablet 40 milliGRAM(s) Oral two times a day  rOPINIRole 0.5 milliGRAM(s) Oral three times a day  tamsulosin 0.4 milliGRAM(s) Oral at bedtime    MEDICATIONS  (PRN):  acetaminophen   Tablet .. 650 milliGRAM(s) Oral every 6 hours PRN Moderate Pain (4 - 6)  dextrose 40% Gel 15 Gram(s) Oral once PRN Blood Glucose LESS THAN 70 milliGRAM(s)/deciliter  glucagon  Injectable 1 milliGRAM(s) IntraMuscular once PRN Glucose LESS THAN 70 milligrams/deciliter  guaiFENesin   Syrup  (Sugar-Free) 100 milliGRAM(s) Oral every 6 hours PRN Cough  ondansetron Injectable 4 milliGRAM(s) IV Push every 8 hours PRN Nausea and/or Vomiting      LABS:                          8.0    8.69  )-----------( 303      ( 17 Dec 2019 06:18 )             25.8         Mean Cell Volume : 87.8 fL  Mean Cell Hemoglobin : 27.2 pg  Mean Cell Hemoglobin Concentration : 31.0 g/dL  Auto Neutrophil # : 6.89 K/uL  Auto Lymphocyte # : 0.62 K/uL  Auto Monocyte # : 0.98 K/uL  Auto Eosinophil # : 0.10 K/uL  Auto Basophil # : 0.04 K/uL  Auto Neutrophil % : 79.2 %  Auto Lymphocyte % : 7.1 %  Auto Monocyte % : 11.3 %  Auto Eosinophil % : 1.2 %  Auto Basophil % : 0.5 %      Serial CBC's  12-17 @ 06:18  Hct-25.8 / Hgb-8.0 / Plat-303 / RBC-2.94 / WBC-8.69  Serial CBC's  12-16 @ 18:25  Hct-27.1 / Hgb-8.3 / Plat-324 / RBC-3.09 / WBC-9.32  Serial CBC's  12-16 @ 06:36  Hct-21.6 / Hgb-6.6 / Plat-289 / RBC-2.44 / WBC-8.88  Serial CBC's  12-15 @ 12:12  Hct-24.4 / Hgb-7.5 / Plat-344 / RBC-2.74 / WBC-13.16  Serial CBC's  12-15 @ 06:29  Hct-24.1 / Hgb-7.4 / Plat-325 / RBC-2.71 / WBC-10.01  Serial CBC's  12-14 @ 09:44  Hct-29.2 / Hgb-9.0 / Plat-397 / RBC-3.30 / WBC-14.10      12-17    138  |  103  |  16  ----------------------------<  52<L>  4.2   |  21  |  0.6<L>    Ca    7.4<L>      17 Dec 2019 06:18  Mg     1.9     12-17    TPro  4.4<L>  /  Alb  2.2<L>  /  TBili  <0.2  /  DBili  x   /  AST  24  /  ALT  22  /  AlkPhos  136<H>  12-16          Iron - Total Binding Capacity.: 183 ug/dL (12-16 @ 10:50)  Ferritin, Serum: 24 ng/mL (12-16 @ 10:50)  Vitamin B12, Serum: 584 pg/mL (12-14 @ 16:37)                    Culture - Blood (collected 14 Dec 2019 16:25)  Source: .Blood Blood  Preliminary Report (16 Dec 2019 02:06):    No growth to date.            BLOOD SMEAR INTERPRETATION:       RADIOLOGY & ADDITIONAL STUDIES:    < from: CT Abdomen and Pelvis w/ IV Cont (12.11.19 @ 13:11) >    IMPRESSION:     1.  No evidence of bowel obstruction or colitis.   2.  Distended stomach filled with debris.   3.  Redemonstration of lobulated appearance of the blind ending duodenal   limb, similar to multiple prior studies dating to 8/20/2018- appearance   may be related to collapse/underdistention with recurrent tumor not   entirely excluded. An FDG avid focus was seen immediately superior to   this region on prior PET/CT. Redemonstration of soft tissue attenuation   surrounding the common hepatic artery also similar to multiple prior   studies dating to 8/20/2018, possibly representing recurrent tumor.   Continued attention to these areas is recommended on followup CT/PET/CT.        < end of copied text >  < from: CT Abdomen and Pelvis No Cont (12.16.19 @ 11:00) >  IMPRESSION:   1.  Since December 11, 2019, no evidence of an intraperitoneal or   retroperitoneal hematoma.    2.  Increased debris-filled distention of the stomach and distal   esophagus without definite evidence for obstruction, nonspecific.    3.  New small volume ascites.    4.  New diffuse soft tissue anasarca.    5.  Increased small bilateral pleural effusions and bibasilar atelectasis.        < end of copied text >

## 2019-12-17 NOTE — PROGRESS NOTE ADULT - ASSESSMENT
65 Y/O F with PMHx of MS on IVIG (last dose 3 wks ago), DM type II, Gastric cancer s/p resection and chemo currently in remission (Dr Toussaint), presented to the hospital s/p mechanical fall. Per pt she had progressively worsening lower extremity weakness that started about 2 wks ago    # UTI / retention / possible MS flare   - Day 5 of  Rocephin.   - Pan sensitive E Coli  -  PO Vantin 200 mg q 12 for 5 more days  - No h/o diarrhea such as c-difficle.     # Right femoral periosteal reaction:   - Dedicated Xrays did not reveal periostitis.   - Sedimentation Rate, Erythrocyte: 72 mm/Hr

## 2019-12-17 NOTE — PROGRESS NOTE ADULT - SUBJECTIVE AND OBJECTIVE BOX
LEFTY CRARASQUILLO  64y  Female      Patient is a 64y old  Female who presents with a chief complaint of lower extremity weakness leading to mechanical fall (17 Dec 2019 09:47)      INTERVAL HPI/OVERNIGHT EVENTS:      ******************************* REVIEW OF SYSTEMS:**********************************************    feels better, back to baseline of muscle strength.   All other review of systems negative    *********************** VITALS ******************************************    T(F): 98.6 (12-17-19 @ 05:00)  HR: 85 (12-17-19 @ 05:00) (85 - 91)  BP: 108/54 (12-17-19 @ 05:00) (108/54 - 111/56)  RR: 18 (12-17-19 @ 05:00) (17 - 18)  SpO2: 95% (12-17-19 @ 07:32) (95% - 95%)    12-16-19 @ 07:01  -  12-17-19 @ 07:00  --------------------------------------------------------  IN: 0 mL / OUT: 2100 mL / NET: -2100 mL    12-17-19 @ 07:01  -  12-17-19 @ 12:01  --------------------------------------------------------  IN: 120 mL / OUT: 550 mL / NET: -430 mL            12-16-19 @ 07:01 - 12-17-19 @ 07:00  --------------------------------------------------------  IN: 0 mL / OUT: 2100 mL / NET: -2100 mL    12-17-19 @ 07:01  - 12-17-19 @ 12:01  --------------------------------------------------------  IN: 120 mL / OUT: 550 mL / NET: -430 mL        ******************************** PHYSICAL EXAM:**************************************************  GENERAL: NAD    PSYCH: no agitation, baseline mentation  HEENT:     NERVOUS SYSTEM:  Alert & Oriented X3,   PULMONARY: GERALD, CTA    CARDIOVASCULAR: S1S2 RRR    GI: Soft, NT, ND; BS present.    EXTREMITIES:  2+ Peripheral Pulses, No clubbing, cyanosis, or edema    LYMPH: No lymphadenopathy noted    SKIN: No rashes or lesions    ******************************************************************************************        **************************** LABS *******************************************************                          8.0    8.69  )-----------( 303      ( 17 Dec 2019 06:18 )             25.8     12-17    138  |  103  |  16  ----------------------------<  52<L>  4.2   |  21  |  0.6<L>    Ca    7.4<L>      17 Dec 2019 06:18  Mg     1.9     12-17    TPro  4.4<L>  /  Alb  2.2<L>  /  TBili  <0.2  /  DBili  x   /  AST  24  /  ALT  22  /  AlkPhos  136<H>  12-16          Lactate Trend  12-15 @ 06:29 Lactate:0.9         CAPILLARY BLOOD GLUCOSE      POCT Blood Glucose.: 70 mg/dL (17 Dec 2019 11:25)          **************************Active Medications *******************************************  No Known Allergies      acetaminophen   Tablet .. 650 milliGRAM(s) Oral every 6 hours PRN  baclofen 20 milliGRAM(s) Oral two times a day  cefTRIAXone   IVPB 1000 milliGRAM(s) IV Intermittent every 24 hours  chlorhexidine 4% Liquid 1 Application(s) Topical <User Schedule>  cholecalciferol 1000 Unit(s) Oral daily  dextrose 40% Gel 15 Gram(s) Oral once PRN  dextrose 5%. 1000 milliLiter(s) IV Continuous <Continuous>  dextrose 50% Injectable 12.5 Gram(s) IV Push once  dextrose 50% Injectable 25 Gram(s) IV Push once  dextrose 50% Injectable 25 Gram(s) IV Push once  DULoxetine 60 milliGRAM(s) Oral daily  folic acid 1 milliGRAM(s) Oral daily  glucagon  Injectable 1 milliGRAM(s) IntraMuscular once PRN  guaiFENesin   Syrup  (Sugar-Free) 100 milliGRAM(s) Oral every 6 hours PRN  insulin glargine Injectable (LANTUS) 7 Unit(s) SubCutaneous at bedtime  insulin lispro (HumaLOG) corrective regimen sliding scale   SubCutaneous three times a day before meals  insulin lispro Injectable (HumaLOG) 3 Unit(s) SubCutaneous three times a day before meals  metoclopramide 10 milliGRAM(s) Oral three times a day before meals  ondansetron Injectable 4 milliGRAM(s) IV Push every 8 hours PRN  pantoprazole    Tablet 40 milliGRAM(s) Oral before breakfast  rOPINIRole 0.5 milliGRAM(s) Oral three times a day  tamsulosin 0.4 milliGRAM(s) Oral at bedtime      ***************************************************  RADIOLOGY & ADDITIONAL TESTS:    Imaging Personally Reviewed:  [ ] YES  [ ] NO    HEALTH ISSUES - PROBLEM Dx:

## 2019-12-18 NOTE — PROGRESS NOTE ADULT - SUBJECTIVE AND OBJECTIVE BOX
Progress Note:  Provider Speciality                            Hospitalist      LEFTY CARRASQUILLO [id number] 64y Female     CHIEF PRESENTING COMPLAINT:  Patient is a 64y old  Female who presents with a chief complaint of lower extremity weakness leading to mechanical fall (18 Dec 2019 12:10)        SUBJECTIVE:  Patient was seen and examined at bedside. Reports improvement in  presenting complaint. No significant overnight events reported.     HISTORY OF PRESENTING ILLNESS:  HPI:  63 Y/O F with PMHx of MS on IVIG (last dose 3 wks ago), DM type II, Gastric cancer s/p resection and chemo currently in remission (Dr Toussaint), presented to the hospital s/p mechanical fall. Per pt she had progressively worsening lower extremity weakness that started about 2 wks ago, she has chronic RLE weakness and numbness, she ambulates with a walker but no she has weakness in her LLE associated with difficulty ambulation. Previously she would be able to get off the bed and walk with a walker but now she needs assistance with everything. This morning while she was going to use restroom, she felt dizzy described as spinning sensations, her legs gave way and she fell on the floor. Daughter is the HCP and she was helping her mother with walking while this happened. Pt hit her left knee and is complaining of pain in that area. She denied hitting her head, hx of LOC, blacking out, palpitations or any abnormal jerky movements of her limbs. Pt recently had worsening nausea and vomiting that she gets from gastroparesis and gastric resection, she ran out of her medications and it got worse so she came to ER 2 days ago. She was resuscitated with fluids and was sent home, her vomiting has improved since then. Pt had suspicious lesion in the duodenal loop, per daughter pt had EGD and Bx was -ve for malignancy? Pt denied any hx of fever, chills, nausea, vomiting, diarrhea, constipation, melena, pain in abdomen, sob, chest pain, cough, increased urinary frequency, dysuria, headache, any changes in weight, recent travel.     In the ED pt was found to have BP of 86/54, , RR 20/min, SPO2 96% on RA, Temp 96.7. She was given I/V fluid bolus 2.2 L NS and her vitals improved. Her UA showed nitrites so she was given Rocephin? (14 Dec 2019 11:23)        REVIEW OF SYSTEMS:  Patient denies any headache, any vision complaints, runny nose, fever, chills, sore throat. Denies chest pain, shortness of breath, palpitation. Denies nausea, vomiting, abdominal pain, diarrhoea, Denies urinary burning, urgency, frequency, dysuria. Denies weakness in any part of the body or numbness.   At least 10 systems were reviewed in ROS. All systems reviewed  are within normal limits except for the complaints as described in Subjective.    PAST MEDICAL & SURGICAL HISTORY:  PAST MEDICAL & SURGICAL HISTORY:  Multiple sclerosis  DM type 2 (diabetes mellitus, type 2)  Stomach cancer  Portacath in place  S/P partial gastrectomy  H/O total hysterectomy with bilateral salpingo-oophorectomy (BSO)  H/O partial thyroidectomy          VITAL SIGNS:  Vital Signs Last 24 Hrs  T(C): 36.6 (18 Dec 2019 13:00), Max: 36.7 (17 Dec 2019 20:38)  T(F): 97.9 (18 Dec 2019 13:00), Max: 98.1 (17 Dec 2019 20:38)  HR: 100 (18 Dec 2019 13:00) (86 - 100)  BP: 114/58 (18 Dec 2019 13:00) (99/56 - 114/58)  BP(mean): --  RR: 18 (18 Dec 2019 13:00) (17 - 18)  SpO2: 94% (17 Dec 2019 20:12) (94% - 94%)          PHYSICAL EXAMINATION:  Not in acute distress  General: + pallor, no icterus  HEENT:   EOMI, no JVD,.  Heart: S1+S2 audible  Lungs: bilateral  fair air entry, no wheezing, no crepitations.  Abdomen: Soft, non-tender, non-distended , no  rigidity or guarding.  CNS: AAOx3, CN  grossly intact.  Extremities:  No edema            CONSULTS:  Consultant(s) Notes Reviewed by me.   Care Discussed with Consultants/Other Providers where required.        MEDICATIONS:  MEDICATIONS  (STANDING):  baclofen 20 milliGRAM(s) Oral two times a day  cefpodoxime 200 milliGRAM(s) Oral every 12 hours  chlorhexidine 4% Liquid 1 Application(s) Topical <User Schedule>  cholecalciferol 1000 Unit(s) Oral daily  dextrose 5%. 1000 milliLiter(s) (50 mL/Hr) IV Continuous <Continuous>  dextrose 50% Injectable 12.5 Gram(s) IV Push once  dextrose 50% Injectable 25 Gram(s) IV Push once  dextrose 50% Injectable 25 Gram(s) IV Push once  DULoxetine 60 milliGRAM(s) Oral daily  folic acid 1 milliGRAM(s) Oral daily  insulin glargine Injectable (LANTUS) 7 Unit(s) SubCutaneous at bedtime  insulin lispro (HumaLOG) corrective regimen sliding scale   SubCutaneous three times a day before meals  insulin lispro Injectable (HumaLOG) 3 Unit(s) SubCutaneous three times a day before meals  iron sucrose IVPB 200 milliGRAM(s) IV Intermittent every 7 days  metoclopramide 10 milliGRAM(s) Oral three times a day before meals  pantoprazole    Tablet 40 milliGRAM(s) Oral two times a day  rOPINIRole 0.5 milliGRAM(s) Oral three times a day  tamsulosin 0.4 milliGRAM(s) Oral at bedtime    MEDICATIONS  (PRN):  acetaminophen   Tablet .. 650 milliGRAM(s) Oral every 6 hours PRN Moderate Pain (4 - 6)  dextrose 40% Gel 15 Gram(s) Oral once PRN Blood Glucose LESS THAN 70 milliGRAM(s)/deciliter  glucagon  Injectable 1 milliGRAM(s) IntraMuscular once PRN Glucose LESS THAN 70 milligrams/deciliter  guaiFENesin   Syrup  (Sugar-Free) 100 milliGRAM(s) Oral every 6 hours PRN Cough  ondansetron Injectable 4 milliGRAM(s) IV Push every 8 hours PRN Nausea and/or Vomiting            ASSESSMENT:    Patient is a 65yo female with PMH of multiple sclerosis on IVIG, gastric cancer s/p gastrectomy s/p chemotherapy, and diabetes mellitus who presented to the hospital s/p mechanical fall. Patient states that she has always had right lower extremity weakness, but the left lower extremity has become progressively weaker over the past month.       ASSESSMENT:  Principal Diagnosis:  Low suspicion for MS flare  Urinary tract infection secondary to E coli  Acute on chronic zctack-msisuiwjbiosyb-clukldit iron deficiancy- low suspicion for GIB  Iron deficiency    Associated Active Comorbid Conditions:  chronic normocytic anmeia  Diabetes mellitis type 2   history of gastric CA, billroth procedure & chemo  Suspected vitamin D deficiency    PLAN:   - continue with Vantin x 5 days for Urinary tract infection secondary to E coli, pansensitive  -no MS flare currently  -Low suspicion for GIB  -Iron deficiancy secondary to gastric surgery- Fe supplementation  -Keep Hb >7. Monitor hemoglobin/hematocrit   - Insulin sliding scale with coverage wih meals and QHS . keep current dose of lantus & lispro  -Electrolyte supplementation and follow up with BMP. Keep K+>4, Mg>2   -Gastric cancer s/p gastrectomy s/p chemotherapy. Follows outpatient with Dr. Toussaint  -Suspected vitamin D deficiency- Continue with cholecalciferol 1000 units PO QD    Progress note Handoff:   Pending: GI consult, PT  Discussion: Diagnosis, current management plan and further plan of care discussed with patient  and housestaff in morning  rounds.  Disposition: Anticipate discharge in 24-48 hrs Progress Note:  Provider Speciality                            Hospitalist      LEFTY CARRASQUILLO [id number] 64y Female     CHIEF PRESENTING COMPLAINT:  Patient is a 64y old  Female who presents with a chief complaint of lower extremity weakness leading to mechanical fall (18 Dec 2019 12:10)        SUBJECTIVE:  Patient was seen and examined at bedside. Reports improvement in  presenting complaint. No significant overnight events reported.     HISTORY OF PRESENTING ILLNESS:  HPI:  63 Y/O F with PMHx of MS on IVIG (last dose 3 wks ago), DM type II, Gastric cancer s/p resection and chemo currently in remission (Dr Toussaint), presented to the hospital s/p mechanical fall. Per pt she had progressively worsening lower extremity weakness that started about 2 wks ago, she has chronic RLE weakness and numbness, she ambulates with a walker but no she has weakness in her LLE associated with difficulty ambulation. Previously she would be able to get off the bed and walk with a walker but now she needs assistance with everything. This morning while she was going to use restroom, she felt dizzy described as spinning sensations, her legs gave way and she fell on the floor. Daughter is the HCP and she was helping her mother with walking while this happened. Pt hit her left knee and is complaining of pain in that area. She denied hitting her head, hx of LOC, blacking out, palpitations or any abnormal jerky movements of her limbs. Pt recently had worsening nausea and vomiting that she gets from gastroparesis and gastric resection, she ran out of her medications and it got worse so she came to ER 2 days ago. She was resuscitated with fluids and was sent home, her vomiting has improved since then. Pt had suspicious lesion in the duodenal loop, per daughter pt had EGD and Bx was -ve for malignancy? Pt denied any hx of fever, chills, nausea, vomiting, diarrhea, constipation, melena, pain in abdomen, sob, chest pain, cough, increased urinary frequency, dysuria, headache, any changes in weight, recent travel.     In the ED pt was found to have BP of 86/54, , RR 20/min, SPO2 96% on RA, Temp 96.7. She was given I/V fluid bolus 2.2 L NS and her vitals improved. Her UA showed nitrites so she was given Rocephin? (14 Dec 2019 11:23)        REVIEW OF SYSTEMS:  Patient denies any headache, any vision complaints, runny nose, fever, chills, sore throat. Denies chest pain, shortness of breath, palpitation. Denies nausea, vomiting, abdominal pain, diarrhoea, Denies urinary burning, urgency, frequency, dysuria. Denies weakness in any part of the body or numbness.   At least 10 systems were reviewed in ROS. All systems reviewed  are within normal limits except for the complaints as described in Subjective.    PAST MEDICAL & SURGICAL HISTORY:  PAST MEDICAL & SURGICAL HISTORY:  Multiple sclerosis  DM type 2 (diabetes mellitus, type 2)  Stomach cancer  Portacath in place  S/P partial gastrectomy  H/O total hysterectomy with bilateral salpingo-oophorectomy (BSO)  H/O partial thyroidectomy          VITAL SIGNS:  Vital Signs Last 24 Hrs  T(C): 36.6 (18 Dec 2019 13:00), Max: 36.7 (17 Dec 2019 20:38)  T(F): 97.9 (18 Dec 2019 13:00), Max: 98.1 (17 Dec 2019 20:38)  HR: 100 (18 Dec 2019 13:00) (86 - 100)  BP: 114/58 (18 Dec 2019 13:00) (99/56 - 114/58)  BP(mean): --  RR: 18 (18 Dec 2019 13:00) (17 - 18)  SpO2: 94% (17 Dec 2019 20:12) (94% - 94%)          PHYSICAL EXAMINATION:  Not in acute distress  General: + pallor, no icterus  HEENT:   EOMI, no JVD,.  Heart: S1+S2 audible  Lungs: bilateral  fair air entry, no wheezing, no crepitations.  Abdomen: Soft, non-tender, non-distended , no  rigidity or guarding.  CNS: AAOx3, CN  grossly intact.  Extremities:  No edema            CONSULTS:  Consultant(s) Notes Reviewed by me.   Care Discussed with Consultants/Other Providers where required.        MEDICATIONS:  MEDICATIONS  (STANDING):  baclofen 20 milliGRAM(s) Oral two times a day  cefpodoxime 200 milliGRAM(s) Oral every 12 hours  chlorhexidine 4% Liquid 1 Application(s) Topical <User Schedule>  cholecalciferol 1000 Unit(s) Oral daily  dextrose 5%. 1000 milliLiter(s) (50 mL/Hr) IV Continuous <Continuous>  dextrose 50% Injectable 12.5 Gram(s) IV Push once  dextrose 50% Injectable 25 Gram(s) IV Push once  dextrose 50% Injectable 25 Gram(s) IV Push once  DULoxetine 60 milliGRAM(s) Oral daily  folic acid 1 milliGRAM(s) Oral daily  insulin glargine Injectable (LANTUS) 7 Unit(s) SubCutaneous at bedtime  insulin lispro (HumaLOG) corrective regimen sliding scale   SubCutaneous three times a day before meals  insulin lispro Injectable (HumaLOG) 3 Unit(s) SubCutaneous three times a day before meals  iron sucrose IVPB 200 milliGRAM(s) IV Intermittent every 7 days  metoclopramide 10 milliGRAM(s) Oral three times a day before meals  pantoprazole    Tablet 40 milliGRAM(s) Oral two times a day  rOPINIRole 0.5 milliGRAM(s) Oral three times a day  tamsulosin 0.4 milliGRAM(s) Oral at bedtime    MEDICATIONS  (PRN):  acetaminophen   Tablet .. 650 milliGRAM(s) Oral every 6 hours PRN Moderate Pain (4 - 6)  dextrose 40% Gel 15 Gram(s) Oral once PRN Blood Glucose LESS THAN 70 milliGRAM(s)/deciliter  glucagon  Injectable 1 milliGRAM(s) IntraMuscular once PRN Glucose LESS THAN 70 milligrams/deciliter  guaiFENesin   Syrup  (Sugar-Free) 100 milliGRAM(s) Oral every 6 hours PRN Cough  ondansetron Injectable 4 milliGRAM(s) IV Push every 8 hours PRN Nausea and/or Vomiting            ASSESSMENT:    Patient is a 65yo female with PMH of multiple sclerosis on IVIG, gastric cancer s/p gastrectomy s/p chemotherapy, and diabetes mellitus who presented to the hospital s/p mechanical fall. Patient states that she has always had right lower extremity weakness, but the left lower extremity has become progressively weaker over the past month.       ASSESSMENT:  Principal Diagnosis:  Low suspicion for MS flare  Urinary tract infection secondary to E coli  Acute on chronic iqfgnu-qznvzujxwbdyoc-xsgoanoo iron deficiancy- low suspicion for GIB  Iron deficiency  Gastroparesis  Urinary retention    Associated Active Comorbid Conditions:  chronic normocytic anmeia  Diabetes mellitis type 2   history of gastric CA, billroth procedure & chemo  Suspected vitamin D deficiency    PLAN:   - continue with Vantin x 5 days for Urinary tract infection secondary to E coli sensitive to cephalosporins  -no MS flare currently  -Low suspicion for GIB  -Iron deficiancy secondary to gastric surgery- Fe supplementation  -Keep Hb >7. Monitor hemoglobin/hematocrit   - Insulin sliding scale with coverage wih meals and QHS . keep current dose of lantus & lispro  -Electrolyte supplementation and follow up with BMP. Keep K+>4, Mg>2   -gastroparesis- RTC reglan  -Gastric cancer s/p gastrectomy s/p chemotherapy. Follows outpatient with Dr. Toussaint  -Suspected vitamin D deficiency- Continue with cholecalciferol 1000 units PO QD  -Urinary retention- voiding trial    Progress note Handoff:   Pending: GI consult, PT  Discussion: Diagnosis, current management plan and further plan of care discussed with patient  and housestaff in morning  rounds.  Disposition: Anticipate discharge in 24-48 hrs

## 2019-12-18 NOTE — PROGRESS NOTE ADULT - SUBJECTIVE AND OBJECTIVE BOX
INTERVAL HPI/OVERNIGHT EVENTS:    No Known Allergies    Awake, alert, tolerating orals, no diarrhea  right arm ecchymosis at site of prior IV.  tim cath site clean,   Abd firm, non tender, healed surgical scar,   No hip pain or tenderness.     acetaminophen   Tablet .. 650 milliGRAM(s) Oral every 6 hours PRN  baclofen 20 milliGRAM(s) Oral two times a day  cefpodoxime 200 milliGRAM(s) Oral every 12 hours  chlorhexidine 4% Liquid 1 Application(s) Topical <User Schedule>  cholecalciferol 1000 Unit(s) Oral daily  dextrose 40% Gel 15 Gram(s) Oral once PRN  dextrose 5%. 1000 milliLiter(s) IV Continuous <Continuous>  dextrose 50% Injectable 12.5 Gram(s) IV Push once  dextrose 50% Injectable 25 Gram(s) IV Push once  dextrose 50% Injectable 25 Gram(s) IV Push once  DULoxetine 60 milliGRAM(s) Oral daily  folic acid 1 milliGRAM(s) Oral daily  glucagon  Injectable 1 milliGRAM(s) IntraMuscular once PRN  guaiFENesin   Syrup  (Sugar-Free) 100 milliGRAM(s) Oral every 6 hours PRN  insulin glargine Injectable (LANTUS) 7 Unit(s) SubCutaneous at bedtime  insulin lispro (HumaLOG) corrective regimen sliding scale   SubCutaneous three times a day before meals  insulin lispro Injectable (HumaLOG) 3 Unit(s) SubCutaneous three times a day before meals  iron sucrose IVPB 200 milliGRAM(s) IV Intermittent every 7 days  metoclopramide 10 milliGRAM(s) Oral three times a day before meals  ondansetron Injectable 4 milliGRAM(s) IV Push every 8 hours PRN  pantoprazole    Tablet 40 milliGRAM(s) Oral two times a day  rOPINIRole 0.5 milliGRAM(s) Oral three times a day  tamsulosin 0.4 milliGRAM(s) Oral at bedtime    cefpodoxime 200 milliGRAM(s) Oral every 12 hours    Vital Signs Last 24 Hrs  T(C): 36.6 (18 Dec 2019 13:00), Max: 36.7 (17 Dec 2019 20:38)  T(F): 97.9 (18 Dec 2019 13:00), Max: 98.1 (17 Dec 2019 20:38)  HR: 100 (18 Dec 2019 13:00) (86 - 100)  BP: 114/58 (18 Dec 2019 13:00) (99/56 - 114/58)  BP(mean): --  RR: 18 (18 Dec 2019 13:00) (17 - 18)  SpO2: 94% (17 Dec 2019 20:12) (94% - 94%)    LABS:                        8.5    9.92  )-----------( 341      ( 18 Dec 2019 05:49 )             28.2     1218    138  |  103  |  16  ----------------------------<  84  4.8   |  21  |  0.6<L>                        7.5    13.16 )-----------( 344      ( 15 Dec 2019 12:12 )             24.4         Urinalysis Basic - ( 14 Dec 2019 09:54 )    Color: Yellow / Appearance: Slightly Turbid / S.013 / pH: x  Gluc: x / Ketone: Negative  / Bili: Negative / Urobili: <2 mg/dL   Blood: x / Protein: Trace / Nitrite: Positive   Leuk Esterase: Negative / RBC: 4 /HPF / WBC 3 /HPF   Sq Epi: x / Non Sq Epi: 1 /HPF / Bacteria: Many    Culture - Blood (19 @ 16:25)    Specimen Source: .Blood Blood    Culture Results:   No growth to date.    Culture - Urine (19 @ 09:54)    -  Amikacin: S <=16    -  Ampicillin: R >16 These ampicillin results predict results for amoxicillin    -  Ampicillin/Sulbactam: I 16/8 Enterobacter, Citrobacter, and Serratia may develop resistance during prolonged therapy (3-4 days)    -  Aztreonam: S <=4    -  Cefazolin: S <=8 ([id number]) For uncomplicated UTI with K. pneumoniae, E. coli, or P. mirablis: OLU <=16 is sensitive and OLU >=32 is resistant. This also predicts results for oral agents cefaclor, cefdinir, cefpodoxime, cefprozil, cefuroxime axetil, cephalexin and locarbef for uncomplicated UTI. Note that some isolates may be susceptible to these agents while testing resistant to cefazolin.    -  Cefepime: S <=4    -  Cefoxitin: S <=8    -  Ceftriaxone: S <=1 Enterobacter, Citrobacter, and Serratia may develop resistance during prolonged therapy    -  Ciprofloxacin: S <=1    -  Gentamicin: S <=4    -  Imipenem: S <=1    -  Levofloxacin: S <=2    -  Meropenem: S <=1    -  Nitrofurantoin: S <=32 Should not be used to treat pyelonephritis    -  Piperacillin/Tazobactam: S <=16    -  Tigecycline: S <=2    -  Tobramycin: S <=4    -  Trimethoprim/Sulfamethoxazole: S <=2/38    Specimen Source: .Urine Clean Catch (Midstream)    Culture Results:   >100,000 CFU/ml Escherichia coli    Organism Identification: Escherichia coli    Organism: Escherichia coli    Method Type: OLU      EXAM:  CT ABDOMEN AND PELVIS            PROCEDURE DATE:  2019          IMPRESSION:   1.  Since 2019, no evidence of an intraperitoneal or   retroperitoneal hematoma.    2.  Increased debris-filled distention of the stomach and distal   esophagus without definite evidence for obstruction, nonspecific.    3.  New small volume ascites.    4.  New diffuse soft tissue anasarca.    5.  Increased small bilateral pleural effusions and bibasilar atelectasis.      EXAM:  XR FEMUR 2 VIEWS RT            PROCEDURE DATE:  12/15/2019      INTERPRETATION:  Clinical History / Reason for exam: Status post fall    Technique: 4 views of the right femur   Findings/  impression: Bones are osteopenic without acutely displaced fracture. Knee   degenerative changes with joint effusion better assessed on    knee x-ray of 2019. Vascular calcifications .    MELVIN TORRES M.D., ATTENDING RADIOLOGIST  This document has been electronically signed. Dec 16 2019  9:57AM    EXAM:  XR PELVIS AP ONLY 1-2 VIEWS            PROCEDURE DATE:  2019        INTERPRETATION:  CLINICAL HISTORY / REASON FOR EXAM: Fall.    TECHNIQUE: Frontal radiographs of the pelvis. Evaluation is partially   limited by under penetration due to the patient's body habitus.    COMPARISON: Radiograph of the pelvis dated 2010.    FINDINGS/  IMPRESSION:    No acute fracture or dislocation of the hips or pelvis. Diffuse   osteopenia. Partially visualized periosteal reaction along the medial   aspect of the right femoral diaphysis is nonspecific in the absence of   other findings. Radiographs of the right femur are recommended for   further evaluation.      EXAM:  XR CHEST FRONTAL 1V            PROCEDURE DATE:  2019      INTERPRETATION:  Clinical History/Reason for Exam:  Sepsis  Comparison : Chest x-ray-  2019      Findings:    Technique/Positioning: Frontal film. Leads overlie thorax obscuring   anatomy. Rotated to the right    Support devices: Right Port-A-Cath, satisfactory position.      Cardiac/mediastinum/hilum: Rotated cardiac silhouette, unchanged    Lung parenchyma/ Pleura: No consolidation effusion or pneumothorax.      Skeleton/soft tissues: Stable      Impression:    No radiographic evidence of acute cardiopulmonary disease.    EXAM:  CT ABDOMEN AND PELVIS IC            PROCEDURE DATE:  2019            INTERPRETATION:  CLINICAL STATEMENT: Nausea, vomiting. History of gastric   cancer.      TECHNIQUE: Contiguous axial CT images were obtained from the lower chest   tothe pubic symphysis following administration of 100cc Optiray 320   intravenous contrast.  Oral contrast was not administered.  Reformatted   images in the coronal and sagittal planes were acquired.    COMPARISON CT: Abdomen and pelvis dated 2019.    OTHER STUDIES USED FOR CORRELATION: None.       FINDINGS:    LOWER CHEST: Fluid-filled, mildly distended distal esophagus. Trace right   pleural effusion. Bibasilar atelectasis, greater on the right. Mild   bibasilar bronchiectasis.    HEPATOBILIARY: Hepatic steatosis. No intrahepatic or extrahepatic biliary   ductal dilatation. Contracted gallbladder.    SPLEEN: Unremarkable.    PANCREAS: Diminutive but otherwise unremarkable.    ADRENAL GLANDS: Unremarkable.    KIDNEYS: Symmetric nephrograms. No hydronephrosis or renal calculus.   Excreted intravenous contrast is demonstrated in the bilateral collecting   systems. There are bilateral renal cysts and subcentimeter hypodensities   which are too small to further characterize.    ABDOMINOPELVIC NODES: Unremarkable.    PELVIC ORGANS: Prior hysterectomy. Excreted contrast is demonstrated   within a distended urinary bladder.    PERITONEUM/MESENTERY/BOWEL:   The patient is status post partial gastrectomy with Billroth II type   anatomy. The residual stomach is distended with copious debris.  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   Redemonstration of soft tissue attenuation surrounding the common hepatic   artery (series 4 image [id number]), not significantly changed from prior.    Duodenal diverticulum. Colonic diverticulosis. No evidence of bowel   obstruction, pneumoperitoneum, or ascites. Small bowel is fluid-filled.   Normal appendix.    BONES/SOFT TISSUES: No suspicious osseous lesion. Degenerative changes of   the spine     OTHER: Calcified atherosclerotic disease of the aorta and branch vessels      IMPRESSION:     1.  No evidence of bowel obstruction or colitis.   2.  Distended stomach filled with debris.   3.  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 An FDG avid focus was seen immediately superior to   this region on prior PET/CT. Redemonstration of soft tissue attenuation   surrounding the common hepatic artery also similar to multiple prior   studies dating to 2018, possibly representing recurrent tumor.   Continued attention to these areas is recommended on followup CT/PET/CT.

## 2019-12-18 NOTE — PROGRESS NOTE ADULT - SUBJECTIVE AND OBJECTIVE BOX
LEFTY CARRASQUILLO 64y Female  MRN#: [id number]   Hospital Day: 4d    SUBJECTIVE  Patient is a 64y old Female who presents with a chief complaint of lower extremity weakness leading to mechanical fall (17 Dec 2019 14:39)  Currently admitted to medicine with the primary diagnosis of Sepsis    INTERVAL HPI AND OVERNIGHT EVENTS:  Patient was examined and seen at bedside. This morning she is resting comfortably in bed and reports no issues or overnight events.    REVIEW OF SYMPTOMS:  CONSTITUTIONAL: No weakness, fevers or chills; No headaches  EYES: No visual changes, eye pain, or discharge  ENT: No vertigo; No ear pain or change in hearing; No sore throat or difficulty swallowing  NECK: No pain or stiffness  RESPIRATORY: No cough, wheezing, or hemoptysis; No shortness of breath  CARDIOVASCULAR: No chest pain or palpitations  GASTROINTESTINAL: No abdominal or epigastric pain; No nausea, vomiting, or hematemesis; No diarrhea or constipation; No melena or hematochezia  GENITOURINARY: No dysuria, frequency or hematuria  MUSCULOSKELETAL: No joint pain, no muscle pain, no weakness  NEUROLOGICAL: No numbness or weakness  SKIN: No itching or rashes    OBJECTIVE  PAST MEDICAL & SURGICAL HISTORY  Multiple sclerosis  DM type 2 (diabetes mellitus, type 2)  Stomach cancer  Portacath in place  S/P partial gastrectomy  H/O total hysterectomy with bilateral salpingo-oophorectomy (BSO)  H/O partial thyroidectomy    ALLERGIES:  No Known Allergies    MEDICATIONS:  STANDING MEDICATIONS  baclofen 20 milliGRAM(s) Oral two times a day  cefpodoxime 200 milliGRAM(s) Oral every 12 hours  chlorhexidine 4% Liquid 1 Application(s) Topical <User Schedule>  cholecalciferol 1000 Unit(s) Oral daily  dextrose 5%. 1000 milliLiter(s) IV Continuous <Continuous>  dextrose 50% Injectable 12.5 Gram(s) IV Push once  dextrose 50% Injectable 25 Gram(s) IV Push once  dextrose 50% Injectable 25 Gram(s) IV Push once  DULoxetine 60 milliGRAM(s) Oral daily  folic acid 1 milliGRAM(s) Oral daily  insulin glargine Injectable (LANTUS) 7 Unit(s) SubCutaneous at bedtime  insulin lispro (HumaLOG) corrective regimen sliding scale   SubCutaneous three times a day before meals  insulin lispro Injectable (HumaLOG) 3 Unit(s) SubCutaneous three times a day before meals  metoclopramide 10 milliGRAM(s) Oral three times a day before meals  pantoprazole    Tablet 40 milliGRAM(s) Oral two times a day  rOPINIRole 0.5 milliGRAM(s) Oral three times a day  tamsulosin 0.4 milliGRAM(s) Oral at bedtime    PRN MEDICATIONS  acetaminophen   Tablet .. 650 milliGRAM(s) Oral every 6 hours PRN  dextrose 40% Gel 15 Gram(s) Oral once PRN  glucagon  Injectable 1 milliGRAM(s) IntraMuscular once PRN  guaiFENesin   Syrup  (Sugar-Free) 100 milliGRAM(s) Oral every 6 hours PRN  ondansetron Injectable 4 milliGRAM(s) IV Push every 8 hours PRN      VITAL SIGNS: Last 24 Hours  T(C): 36.6 (18 Dec 2019 05:00), Max: 36.7 (17 Dec 2019 20:38)  T(F): 97.9 (18 Dec 2019 05:00), Max: 98.1 (17 Dec 2019 20:38)  HR: 88 (18 Dec 2019 05:00) (86 - 88)  BP: 109/58 (18 Dec 2019 05:00) (99/56 - 109/58)  BP(mean): --  RR: 18 (18 Dec 2019 05:00) (17 - 18)  SpO2: 94% (17 Dec 2019 20:12) (94% - 94%)    LABS:                        8.5    9.92  )-----------( 341      ( 18 Dec 2019 05:49 )             28.2     12-18    138  |  103  |  16  ----------------------------<  84  4.8   |  21  |  0.6<L>    Ca    7.7<L>      18 Dec 2019 05:49  Mg     2.0     12-18    RADIOLOGY:  < from: CT Abdomen and Pelvis No Cont (12.16.19 @ 11:00) >  IMPRESSION:   1.  Since December 11, 2019, no evidence of an intraperitoneal or   retroperitoneal hematoma.    2.  Increased debris-filled distention of the stomach and distal   esophagus without definite evidence for obstruction, nonspecific.    3.  New small volume ascites.    4.  New diffuse soft tissue anasarca.    5.  Increased small bilateral pleural effusions and bibasilar atelectasis.    < end of copied text >      PHYSICAL EXAM:  CONSTITUTIONAL: No acute distress, well-developed, well-groomed, AAOx3  HEAD: Atraumatic, normocephalic  EYES: EOM intact, PERRLA, conjunctiva and sclera clear  ENT: Supple, no masses, no thyromegaly, no bruits, no JVD; moist mucous membranes  PULMONARY: Clear to auscultation bilaterally; no wheezes, rales, or rhonchi  CARDIOVASCULAR: Regular rate and rhythm; no murmurs, rubs, or gallops  GASTROINTESTINAL: Soft, non-tender, non-distended; bowel sounds present  MUSCULOSKELETAL: 2+ peripheral pulses; no clubbing, no cyanosis, (+) edema in bilateral upper extremities  NEUROLOGY: 4/5 strength in bilateral upper extremities, 4/5 strength in left lower extremity, 2/5 muscle strength in right lower extremity, reflexes are 2-/4 throughout, did not witness gait  SKIN: No rashes or lesions; warm and dry    ASSESSMENT & PLAN  Patient is a 65yo female with PMH of multiple sclerosis on IVIG, gastric cancer s/p gastrectomy s/p chemotherapy, and diabetes mellitus who presented to the hospital s/p mechanical fall. Patient states that she has always had right lower extremity weakness, but the left lower extremity has become progressively weaker over the past month.     #S/p mechanical fall secondary to UTI vs multiple sclerosis flare, resolving  - Patient last received IVIG for MS on 11/28/2019-11/30/2019  - However, patient feels that she is not benefitting from IVIG treatment any longer  - Patient cancelled her next appointment for IVIG  - TSH and vitamin B12 within normal limits  - Neurology consult appreciated  - Underlying UTI can present as multiple sclerosis flare  - Patient current back to baseline  - Patient recently had MRI last month that was consistent with multiple sclerosis  - Per neurology, no need for another MRI or IV solumedrol this admission  - Discontinue ceftriaxone  - Start cefpodoxime 200mg PO Q12H for 5 days (Day #1)  - Continue with baclofen 20mg PO BID  - Continue with duloxetine 60mg PO QD  - Continue with ropinirole 0.5mg PO TID  - Continue with acetaminophen 650mg PO Q6H PRN pain  - Blood Cx: no growth to date  - Urine Cx: >100,000 E. coli resistant to ampicillin  - Follows outpatient with Dr. Vance for neurology    #Acute on chronic iron deficiency anemia  - Hemoglobin on admission: 9.0  - Hemoglobin on 12/16/2019 was 6.6  - S/p 1 unit PRBC on 12/16/2019  - Hemoglobin today 8.0  - Patient is hemodynamically stable with no signs of acute bleed  - Pending hem/onc consult  - Keep active type and screen  - Iron studies show iron deficiency anemia  - Heme/onc consult appreciated  - Start venofer 200mg IV Q7D  - GI consult pending    #Left ear pain likely secondary to cerumen impaction, resolved  - S/p hydrogen peroxide irrigation    #Diabetes mellitus type 2  - Patient was hypoglycemic to 60 this morning  - Continue with insulin glargine 7 units SQ QHS  - Continue with insulin lispro 3 units SQ TID  - Insulin sliding scale coverage  - Monitor fingerstick glucose    #Urinary retention  - Bladder scans have shown 300+mL of retained urine on 3 separate occasions  - Patient is s/p straight catheter x3  - Jensen catheter in place  - Continue with tamsulosin 0.4mg PO QHS    #Gastric cancer s/p gastrectomy s/p chemotherapy  - Currently in remission  - Per daughter, most recent endoscopy was negative  - Most recent CT scan shows possible duodenal loop  that could be related to malignancy  - Continue with ondansetron 4mg IV Q8H PRN nausea/vomiting  - Continue with metoclopramide 10mg PO TID before meals  - Follows outpatient with Dr. Toussaint  - Hem/onc consult appreciated  - Follow haptoglobin and CEA  - Reticulocyte percentage elevated  - Will need outpatient PET scan    #Suspected vitamin D deficiency  - Continue with cholecalciferol 1000 units PO QD    #Misc  - DVT Prophylaxis: SCDs  - Diet: DASH/TLC, carbohydrate consistent with evening snack  - GI Prophylaxis: pantoprazole 40mg PO QD  - Activity: increase as tolerated  - IV Fluids: Not indicated  - Code Status: Full Code    Dispo: Pending GI evaluation

## 2019-12-18 NOTE — CONSULT NOTE ADULT - ASSESSMENT
gastri cancer   anemia secondary to Br 2 surgery  gastroparesis  ,plan- agree with fe transfusion   reglan before meals

## 2019-12-18 NOTE — CONSULT NOTE ADULT - REASON FOR ADMISSION
lower extremity weakness leading to mechanical fall

## 2019-12-18 NOTE — CONSULT NOTE ADULT - SUBJECTIVE AND OBJECTIVE BOX
Chief Complaint: Patient is a 64y old  Female who presents with a chief complaint of lower extremity weakness leading to mechanical fall (18 Dec 2019 10:17)gi called for anemia. feels well today had n/v last week soledad wilkins. known h[facility]        Review Of Systems:    Medications:  acetaminophen   Tablet .. 650 milliGRAM(s) Oral every 6 hours PRN  baclofen 20 milliGRAM(s) Oral two times a day  cefpodoxime 200 milliGRAM(s) Oral every 12 hours  chlorhexidine 4% Liquid 1 Application(s) Topical <User Schedule>  cholecalciferol 1000 Unit(s) Oral daily  dextrose 40% Gel 15 Gram(s) Oral once PRN  dextrose 5%. 1000 milliLiter(s) IV Continuous <Continuous>  dextrose 50% Injectable 12.5 Gram(s) IV Push once  dextrose 50% Injectable 25 Gram(s) IV Push once  dextrose 50% Injectable 25 Gram(s) IV Push once  DULoxetine 60 milliGRAM(s) Oral daily  folic acid 1 milliGRAM(s) Oral daily  glucagon  Injectable 1 milliGRAM(s) IntraMuscular once PRN  guaiFENesin   Syrup  (Sugar-Free) 100 milliGRAM(s) Oral every 6 hours PRN  insulin glargine Injectable (LANTUS) 7 Unit(s) SubCutaneous at bedtime  insulin lispro (HumaLOG) corrective regimen sliding scale   SubCutaneous three times a day before meals  insulin lispro Injectable (HumaLOG) 3 Unit(s) SubCutaneous three times a day before meals  iron sucrose IVPB 200 milliGRAM(s) IV Intermittent every 7 days  metoclopramide 10 milliGRAM(s) Oral three times a day before meals  ondansetron Injectable 4 milliGRAM(s) IV Push every 8 hours PRN  pantoprazole    Tablet 40 milliGRAM(s) Oral two times a day  rOPINIRole 0.5 milliGRAM(s) Oral three times a day  tamsulosin 0.4 milliGRAM(s) Oral at bedtime      PMHX/PSHX:  Multiple sclerosis  DM type 2 (diabetes mellitus, type 2)  Stomach cancer  Portacath in place  S/P partial gastrectomy  H/O total hysterectomy with bilateral salpingo-oophorectomy (BSO)  H/O partial thyroidectomy      Family history:      Social History:       Allergies:  No Known Allergies            PHYSICAL EXAM:   Vital Signs:  Vital Signs Last 24 Hrs  T(C): 36.6 (18 Dec 2019 05:00), Max: 36.7 (17 Dec 2019 20:38)  T(F): 97.9 (18 Dec 2019 05:00), Max: 98.1 (17 Dec 2019 20:38)  HR: 88 (18 Dec 2019 05:00) (86 - 88)  BP: 109/58 (18 Dec 2019 05:00) (99/56 - 109/58)  BP(mean): --  RR: 18 (18 Dec 2019 05:00) (17 - 18)  SpO2: 94% (17 Dec 2019 20:12) (94% - 94%)  Daily     Daily     T(C): 36.6 (12-18-19 @ 05:00), Max: 36.7 (12-17-19 @ 20:38)  HR: 88 (12-18-19 @ 05:00) (86 - 88)  BP: 109/58 (12-18-19 @ 05:00) (99/56 - 109/58)  RR: 18 (12-18-19 @ 05:00) (17 - 18)  SpO2: 94% (12-17-19 @ 20:12) (94% - 94%)    GENERAL:  Appears stated age, well-groomed, well-nourished, no distress  ABDOMEN:  Soft, non-tender, non-distended, normoactive bowel sounds,  no masses ,no hepato-splenomegaly, no signs of chronic liver disease        LABS:                        8.5    9.92  )-----------( 341      ( 18 Dec 2019 05:49 )             28.2     12-18    138  |  103  |  16  ----------------------------<  84  4.8   |  21  |  0.6<L>    Ca    7.7<L>      18 Dec 2019 05:49  Mg     2.0     12-18                Imaging:      KIDNEYS: No nephroureteral calculi or hydronephrosis bilaterally.    ABDOMINOPELVIC NODES: No lymphadenopathy.    PELVIC ORGANS: Post hysterectomy.    PERITONEUM/MESENTERY/BOWEL: Post distal gastrectomy with Billroth II   anatomy. Increased debris filled distention of the stomach without   evidence of obstruction at the level of the gastrojejunostomy. Small   volume right lower quadrant ascites. Colonic diverticulosis, without   evidence for acute diverticulitis. No evidence of an intraperitoneal or   retroperitoneal hematoma.    BONES/SOFT TISSUES: Increased diffuse soft tissue anasarca, most   pronounced in the right lower chest wall. Stable visualized osseous   structures. No acute osseous abnormality or suspicious lesions. Anterior   chest wall/breast skin thickening.     OTHER: Atherosclerotic vascular calcifications.      IMPRESSION:   1.  Since December 11, 2019, no evidence of an intraperitoneal or   retroperitoneal hematoma.    2.  Increased debris-filled distention of the stomach and distal   esophagus without definite evidence for obstruction, nonspecific.    3.  New small volume ascites.    4.  New diffuse soft tissue anasarca.    5.  Increased small bilateral pleural effusions and bibasilar atelectasis.                  SARINA FRANCOIS M.D., ATTENDING RADIOLOGIST  This document has been electronically signed. Dec 16 2019 11:22AM

## 2019-12-18 NOTE — PROGRESS NOTE ADULT - ASSESSMENT
63 Y/O F with PMHx of MS on IVIG (last dose 3 wks ago), DM type II, Gastric cancer s/p resection and chemo currently in remission (Dr Toussaint), presented to the hospital s/p mechanical fall. Per pt she had progressively worsening lower extremity weakness that started about 2 wks ago    # UTI / retention   - No MS flare   - Pan sensitive E Coli  -  PO Vantin 200 mg q 12 for 5 more days    # Right femoral periosteal reaction:   - Dedicated Xrays did not reveal periostitis.   - No sign of osteomyelitis.       Discharge per primary team

## 2019-12-18 NOTE — OCCUPATIONAL THERAPY INITIAL EVALUATION ADULT - PLANNED THERAPY INTERVENTIONS, OT EVAL
ADL retraining/bed mobility training/strengthening/IADL retraining/balance training/parent/caregiver training.../transfer training

## 2019-12-19 NOTE — PROGRESS NOTE ADULT - SUBJECTIVE AND OBJECTIVE BOX
LEFTY CARRASQUILLO 64y Female  MRN#: [id number]   Hospital Day: 5d    SUBJECTIVE  Patient is a 64y old Female who presents with a chief complaint of lower extremity weakness leading to mechanical fall (18 Dec 2019 15:22)  Currently admitted to medicine with the primary diagnosis of Sepsis    INTERVAL HPI AND OVERNIGHT EVENTS:  Patient was examined and seen at bedside. This morning she is resting comfortably in bed and reports no issues or overnight events.    REVIEW OF SYMPTOMS:  CONSTITUTIONAL: No weakness, fevers or chills; No headaches; tired  EYES: No visual changes, eye pain, or discharge  ENT: No vertigo; No ear pain or change in hearing; No sore throat or difficulty swallowing  NECK: No pain or stiffness  RESPIRATORY: No cough, wheezing, or hemoptysis; No shortness of breath  CARDIOVASCULAR: No chest pain or palpitations  GASTROINTESTINAL: No abdominal or epigastric pain; No nausea, vomiting, or hematemesis; No diarrhea or constipation; No melena or hematochezia  GENITOURINARY: No dysuria, frequency or hematuria  MUSCULOSKELETAL: bilateral arm swelling  NEUROLOGICAL: No numbness or weakness  SKIN: bruise on right elbow    OBJECTIVE  PAST MEDICAL & SURGICAL HISTORY  Multiple sclerosis  DM type 2 (diabetes mellitus, type 2)  Stomach cancer  Portacath in place  S/P partial gastrectomy  H/O total hysterectomy with bilateral salpingo-oophorectomy (BSO)  H/O partial thyroidectomy    ALLERGIES:  No Known Allergies    MEDICATIONS:  STANDING MEDICATIONS  baclofen 20 milliGRAM(s) Oral two times a day  cefpodoxime 200 milliGRAM(s) Oral every 12 hours  chlorhexidine 4% Liquid 1 Application(s) Topical <User Schedule>  cholecalciferol 1000 Unit(s) Oral daily  dextrose 5%. 1000 milliLiter(s) IV Continuous <Continuous>  dextrose 50% Injectable 12.5 Gram(s) IV Push once  dextrose 50% Injectable 25 Gram(s) IV Push once  dextrose 50% Injectable 25 Gram(s) IV Push once  DULoxetine 60 milliGRAM(s) Oral daily  folic acid 1 milliGRAM(s) Oral daily  heparin  Injectable 5000 Unit(s) SubCutaneous every 8 hours  insulin glargine Injectable (LANTUS) 7 Unit(s) SubCutaneous at bedtime  insulin lispro (HumaLOG) corrective regimen sliding scale   SubCutaneous three times a day before meals  insulin lispro Injectable (HumaLOG) 3 Unit(s) SubCutaneous three times a day before meals  iron sucrose IVPB 200 milliGRAM(s) IV Intermittent every 7 days  metoclopramide 10 milliGRAM(s) Oral three times a day before meals  pantoprazole    Tablet 40 milliGRAM(s) Oral two times a day  rOPINIRole 0.5 milliGRAM(s) Oral three times a day  tamsulosin 0.4 milliGRAM(s) Oral at bedtime    PRN MEDICATIONS  acetaminophen   Tablet .. 650 milliGRAM(s) Oral every 6 hours PRN  dextrose 40% Gel 15 Gram(s) Oral once PRN  glucagon  Injectable 1 milliGRAM(s) IntraMuscular once PRN  guaiFENesin   Syrup  (Sugar-Free) 100 milliGRAM(s) Oral every 6 hours PRN      VITAL SIGNS: Last 24 Hours  T(C): 36.7 (19 Dec 2019 06:27), Max: 36.7 (18 Dec 2019 20:43)  T(F): 98.1 (19 Dec 2019 06:27), Max: 98.1 (18 Dec 2019 20:43)  HR: 85 (19 Dec 2019 06:27) (85 - 93)  BP: 103/58 (19 Dec 2019 06:27) (98/54 - 103/58)  BP(mean): --  RR: 18 (19 Dec 2019 06:27) (18 - 18)  SpO2: 94% (18 Dec 2019 20:03) (94% - 94%)    LABS:                        8.0    9.94  )-----------( 365      ( 19 Dec 2019 05:38 )             26.1     12-19    139  |  105  |  15  ----------------------------<  82  4.6   |  23  |  0.5<L>    Ca    7.8<L>      19 Dec 2019 05:38  Mg     1.9     12-19    TPro  4.5<L>  /  Alb  2.3<L>  /  TBili  0.3  /  DBili  0.2  /  AST  30  /  ALT  24  /  AlkPhos  156<H>  12-19    RADIOLOGY:  < from: [facility] Duplex Upper Ext Vein Scan, Bilat (12.18.19 @ 20:26) >  INTERPRETATION:  Clinical History / Reason for exam: The patient is a 64-year-old male with arm pain.  The study was performed to evaluate for deep venous thrombosis.    The internal jugular, subclavian, axillary, brachial, radial and ulnar veins were visualized and were free of thrombus.  The veins were compressible with presence of spontaneous flow, augmentation with distal compression and phasicity.    The cephalic vein is thrombosed in the left patent in the right.  The basilic vein is patent.    Impression:    Thrombus noted in the left cephalic vein.    < end of copied text >    PHYSICAL EXAM:  CONSTITUTIONAL: No acute distress, well-developed, well-groomed, AAOx3  HEAD: Atraumatic, normocephalic  EYES: EOM intact, PERRLA, conjunctiva and sclera clear  ENT: Supple, no masses, no thyromegaly, no bruits, no JVD; moist mucous membranes  PULMONARY: Clear to auscultation bilaterally; no wheezes, rales, or rhonchi  CARDIOVASCULAR: Regular rate and rhythm; no murmurs, rubs, or gallops  GASTROINTESTINAL: Soft, non-tender, non-distended; bowel sounds present  MUSCULOSKELETAL: 2+ peripheral pulses; no clubbing, no cyanosis, non-pitting edema in bilateral upper extremities from hands to shoulders  NEUROLOGY: 4/5 strength in bilateral upper extremities, 4/5 strength in left lower extremity, 2/5 muscle strength in right lower extremity, reflexes are 2-/4 throughout, did not witness gait  SKIN: ecchymosis present on posterior aspect of right elbow; warm and dry    ASSESSMENT & PLAN  Patient is a 65yo female with PMH of multiple sclerosis on IVIG, gastric cancer s/p gastrectomy s/p chemotherapy, and diabetes mellitus who presented to the hospital s/p mechanical fall. Patient states that she has always had right lower extremity weakness, but the left lower extremity has become progressively weaker over the past month.     #S/p mechanical fall secondary to UTI vs multiple sclerosis flare, resolving  - Patient last received IVIG for MS on 11/28/2019-11/30/2019  - However, patient feels that she is not benefitting from IVIG treatment any longer  - Patient cancelled her next appointment for IVIG  - TSH and vitamin B12 within normal limits  - Neurology consult appreciated  - Underlying UTI can present as multiple sclerosis flare  - Patient current back to baseline  - Patient recently had MRI last month that was consistent with multiple sclerosis  - Per neurology, no need for another MRI or IV solumedrol this admission  - Discontinue ceftriaxone  - Start cefpodoxime 200mg PO Q12H for 5 days (Day #1)  - Continue with baclofen 20mg PO BID  - Continue with duloxetine 60mg PO QD  - Continue with ropinirole 0.5mg PO TID  - Continue with acetaminophen 650mg PO Q6H PRN pain  - Blood Cx: no growth to date  - Urine Cx: >100,000 E. coli resistant to ampicillin  - Follows outpatient with Dr. Vance for neurology    #Acute on chronic iron deficiency anemia  - Hemoglobin on admission: 9.0  - Hemoglobin on 12/16/2019 was 6.6  - S/p 1 unit PRBC on 12/16/2019  - Hemoglobin today 8.0  - Patient is hemodynamically stable with no signs of acute bleed  - Pending hem/onc consult  - Keep active type and screen  - Iron studies show iron deficiency anemia  - Heme/onc consult appreciated  - Start venofer 200mg IV Q7D  - GI consult pending    #Left ear pain likely secondary to cerumen impaction, resolved  - S/p hydrogen peroxide irrigation    #Diabetes mellitus type 2  - Patient was hypoglycemic to 60 this morning  - Continue with insulin glargine 7 units SQ QHS  - Continue with insulin lispro 3 units SQ TID  - Insulin sliding scale coverage  - Monitor fingerstick glucose    #Urinary retention  - Bladder scans have shown 300+mL of retained urine on 3 separate occasions  - Patient is s/p straight catheter x3  - Jensen catheter in place  - Continue with tamsulosin 0.4mg PO QHS    #Gastric cancer s/p gastrectomy s/p chemotherapy  - Currently in remission  - Per daughter, most recent endoscopy was negative  - Most recent CT scan shows possible duodenal loop  that could be related to malignancy  - Continue with ondansetron 4mg IV Q8H PRN nausea/vomiting  - Continue with metoclopramide 10mg PO TID before meals  - Follows outpatient with Dr. Toussaint  - Hem/onc consult appreciated  - Reticulocyte percentage, haptoglobin, and CEA elevated  - Will need outpatient PET scan    #Suspected vitamin D deficiency  - Continue with cholecalciferol 1000 units PO QD    #Misc  - DVT Prophylaxis: SCDs  - Diet: DASH/TLC, carbohydrate consistent with evening snack  - GI Prophylaxis: pantoprazole 40mg PO QD  - Activity: increase as tolerated  - IV Fluids: Not indicated  - Code Status: Full Code    Dispo: Acute LEFTY CARRASQUILLO 64y Female  MRN#: [id number]   Hospital Day: 5d    SUBJECTIVE  Patient is a 64y old Female who presents with a chief complaint of lower extremity weakness leading to mechanical fall (18 Dec 2019 15:22)  Currently admitted to medicine with the primary diagnosis of Sepsis    INTERVAL HPI AND OVERNIGHT EVENTS:  Patient was examined and seen at bedside. This morning she is resting comfortably in bed and reports no issues or overnight events.    REVIEW OF SYMPTOMS:  CONSTITUTIONAL: No weakness, fevers or chills; No headaches; tired  EYES: No visual changes, eye pain, or discharge  ENT: No vertigo; No ear pain or change in hearing; No sore throat or difficulty swallowing  NECK: No pain or stiffness  RESPIRATORY: No cough, wheezing, or hemoptysis; No shortness of breath  CARDIOVASCULAR: No chest pain or palpitations  GASTROINTESTINAL: No abdominal or epigastric pain; No nausea, vomiting, or hematemesis; No diarrhea or constipation; No melena or hematochezia  GENITOURINARY: No dysuria, frequency or hematuria  MUSCULOSKELETAL: bilateral arm swelling  NEUROLOGICAL: No numbness or weakness  SKIN: bruise on right elbow    OBJECTIVE  PAST MEDICAL & SURGICAL HISTORY  Multiple sclerosis  DM type 2 (diabetes mellitus, type 2)  Stomach cancer  Portacath in place  S/P partial gastrectomy  H/O total hysterectomy with bilateral salpingo-oophorectomy (BSO)  H/O partial thyroidectomy    ALLERGIES:  No Known Allergies    MEDICATIONS:  STANDING MEDICATIONS  baclofen 20 milliGRAM(s) Oral two times a day  cefpodoxime 200 milliGRAM(s) Oral every 12 hours  chlorhexidine 4% Liquid 1 Application(s) Topical <User Schedule>  cholecalciferol 1000 Unit(s) Oral daily  dextrose 5%. 1000 milliLiter(s) IV Continuous <Continuous>  dextrose 50% Injectable 12.5 Gram(s) IV Push once  dextrose 50% Injectable 25 Gram(s) IV Push once  dextrose 50% Injectable 25 Gram(s) IV Push once  DULoxetine 60 milliGRAM(s) Oral daily  folic acid 1 milliGRAM(s) Oral daily  heparin  Injectable 5000 Unit(s) SubCutaneous every 8 hours  insulin glargine Injectable (LANTUS) 7 Unit(s) SubCutaneous at bedtime  insulin lispro (HumaLOG) corrective regimen sliding scale   SubCutaneous three times a day before meals  insulin lispro Injectable (HumaLOG) 3 Unit(s) SubCutaneous three times a day before meals  iron sucrose IVPB 200 milliGRAM(s) IV Intermittent every 7 days  metoclopramide 10 milliGRAM(s) Oral three times a day before meals  pantoprazole    Tablet 40 milliGRAM(s) Oral two times a day  rOPINIRole 0.5 milliGRAM(s) Oral three times a day  tamsulosin 0.4 milliGRAM(s) Oral at bedtime    PRN MEDICATIONS  acetaminophen   Tablet .. 650 milliGRAM(s) Oral every 6 hours PRN  dextrose 40% Gel 15 Gram(s) Oral once PRN  glucagon  Injectable 1 milliGRAM(s) IntraMuscular once PRN  guaiFENesin   Syrup  (Sugar-Free) 100 milliGRAM(s) Oral every 6 hours PRN      VITAL SIGNS: Last 24 Hours  T(C): 36.7 (19 Dec 2019 06:27), Max: 36.7 (18 Dec 2019 20:43)  T(F): 98.1 (19 Dec 2019 06:27), Max: 98.1 (18 Dec 2019 20:43)  HR: 85 (19 Dec 2019 06:27) (85 - 93)  BP: 103/58 (19 Dec 2019 06:27) (98/54 - 103/58)  BP(mean): --  RR: 18 (19 Dec 2019 06:27) (18 - 18)  SpO2: 94% (18 Dec 2019 20:03) (94% - 94%)    LABS:                        8.0    9.94  )-----------( 365      ( 19 Dec 2019 05:38 )             26.1     12-19    139  |  105  |  15  ----------------------------<  82  4.6   |  23  |  0.5<L>    Ca    7.8<L>      19 Dec 2019 05:38  Mg     1.9     12-19    TPro  4.5<L>  /  Alb  2.3<L>  /  TBili  0.3  /  DBili  0.2  /  AST  30  /  ALT  24  /  AlkPhos  156<H>  12-19    RADIOLOGY:  < from: [facility] Duplex Upper Ext Vein Scan, Bilat (12.18.19 @ 20:26) >  INTERPRETATION:  Clinical History / Reason for exam: The patient is a 64-year-old male with arm pain.  The study was performed to evaluate for deep venous thrombosis.    The internal jugular, subclavian, axillary, brachial, radial and ulnar veins were visualized and were free of thrombus.  The veins were compressible with presence of spontaneous flow, augmentation with distal compression and phasicity.    The cephalic vein is thrombosed in the left patent in the right.  The basilic vein is patent.    Impression:    Thrombus noted in the left cephalic vein.    < end of copied text >    PHYSICAL EXAM:  CONSTITUTIONAL: No acute distress, well-developed, well-groomed, AAOx3  HEAD: Atraumatic, normocephalic  EYES: EOM intact, PERRLA, conjunctiva and sclera clear  ENT: Supple, no masses, no thyromegaly, no bruits, no JVD; moist mucous membranes  PULMONARY: Clear to auscultation bilaterally; no wheezes, rales, or rhonchi  CARDIOVASCULAR: Regular rate and rhythm; no murmurs, rubs, or gallops  GASTROINTESTINAL: Soft, non-tender, non-distended; bowel sounds present  MUSCULOSKELETAL: 2+ peripheral pulses; no clubbing, no cyanosis, non-pitting edema in bilateral upper extremities from hands to shoulders  NEUROLOGY: 4/5 strength in bilateral upper extremities, 4/5 strength in left lower extremity, 2/5 muscle strength in right lower extremity, reflexes are 2-/4 throughout, did not witness gait  SKIN: ecchymosis present on posterior aspect of right elbow; warm and dry    ASSESSMENT & PLAN  Patient is a 63yo female with PMH of multiple sclerosis on IVIG, gastric cancer s/p gastrectomy s/p chemotherapy, and diabetes mellitus who presented to the hospital s/p mechanical fall. Patient states that she has always had right lower extremity weakness, but the left lower extremity has become progressively weaker over the past month.     #S/p mechanical fall secondary to UTI vs multiple sclerosis flare, resolving  - Patient last received IVIG for MS on 11/28/2019-11/30/2019  - However, patient feels that she is not benefitting from IVIG treatment any longer  - Patient cancelled her next appointment for IVIG  - TSH and vitamin B12 within normal limits  - Neurology consult appreciated  - Underlying UTI can present as multiple sclerosis flare  - Patient current back to baseline  - Patient recently had MRI last month that was consistent with multiple sclerosis  - Per neurology, no need for another MRI or IV solumedrol this admission  - Continue with cefpodoxime 200mg PO Q12H for 5 days (Day #2)  - Continue with baclofen 20mg PO BID  - Continue with duloxetine 60mg PO QD  - Continue with ropinirole 0.5mg PO TID  - Continue with acetaminophen 650mg PO Q6H PRN pain  - Blood Cx: no growth to date  - Urine Cx: >100,000 E. coli resistant to ampicillin  - Follows outpatient with Dr. Vance for neurology    #Acute on chronic iron deficiency anemia  - Hemoglobin on admission: 9.0  - Hemoglobin on 12/16/2019 was 6.6  - S/p 1 unit PRBC on 12/16/2019  - Hemoglobin today 8.0  - Patient is hemodynamically stable with no signs of acute bleed  - Iron studies show iron deficiency anemia  - Heme/onc consult appreciated  - Continue with venofer 200mg IV Q7D  - GI consult appreciated  - No intervention indicated at this time  - Keep active type and screen  - Trend hemoglobin and hematocrit    #Bilateral upper extremity edema  - Bilateral [facility] Duplex: thrombus noted in left cephalic vein; no thrombus in right upper extremity  - Per attending, no indication for anticoagulation at this time  - Warm compresses  - Keep arms elevated  - Albumin low at 2.3 today    #Left ear pain likely secondary to cerumen impaction, resolved  - S/p hydrogen peroxide irrigation    #Diabetes mellitus type 2  - Patient was hypoglycemic to 60 this morning  - Continue with insulin glargine 7 units SQ QHS  - Continue with insulin lispro 3 units SQ TID  - Insulin sliding scale coverage  - Monitor fingerstick glucose    #Urinary retention  - Bladder scans have shown 300+mL of retained urine on 3 separate occasions  - Patient is s/p straight catheter x3  - Jensen catheter in place  - Continue with tamsulosin 0.4mg PO QHS  - Will attempt trial of void today    #Gastric cancer s/p gastrectomy s/p chemotherapy  - Currently in remission  - Per daughter, most recent endoscopy was negative  - Most recent CT scan shows possible duodenal loop  that could be related to malignancy  - Continue with ondansetron 4mg IV Q8H PRN nausea/vomiting  - Continue with metoclopramide 10mg PO TID before meals  - Follows outpatient with Dr. Toussaint  - Hem/onc consult appreciated  - Reticulocyte percentage, haptoglobin, and CEA elevated  - Will need outpatient PET scan    #Suspected vitamin D deficiency  - Continue with cholecalciferol 1000 units PO QD    #Misc  - DVT Prophylaxis: heparin 5000 units SQ Q8H   - Diet: DASH/TLC, carbohydrate consistent with evening snack  - GI Prophylaxis: pantoprazole 40mg PO QD  - Activity: increase as tolerated  - IV Fluids: Not indicated  - Code Status: Full Code    Dispo: Acute

## 2019-12-20 NOTE — PROGRESS NOTE ADULT - ATTENDING COMMENTS
[place]-regionally advanced gastric cancer with no definite evidence of recurrence .  Gastroparesis , suspected GI bleeding , iron deficiency .  Recommend : give a second dose of venofer 200mg before discharge and follow up as outpatient .

## 2019-12-20 NOTE — PROGRESS NOTE ADULT - SUBJECTIVE AND OBJECTIVE BOX
Progress Note:  Provider Speciality                            Hospitalist      LEFTY CARRASQUILLO [id number] 64y Female     CHIEF PRESENTING COMPLAINT:  Patient is a 64y old  Female who presents with a chief complaint of lower extremity weakness leading to mechanical fall (18 Dec 2019 12:10)        SUBJECTIVE:  Patient was seen and examined at bedside. Reports bilateral UE swelling with some improvement today  . No significant overnight events reported.     HISTORY OF PRESENTING ILLNESS:  HPI:  63 Y/O F with PMHx of MS on IVIG (last dose 3 wks ago), DM type II, Gastric cancer s/p resection and chemo currently in remission (Dr Toussaint), presented to the hospital s/p mechanical fall. Per pt she had progressively worsening lower extremity weakness that started about 2 wks ago, she has chronic RLE weakness and numbness, she ambulates with a walker but no she has weakness in her LLE associated with difficulty ambulation. Previously she would be able to get off the bed and walk with a walker but now she needs assistance with everything. This morning while she was going to use restroom, she felt dizzy described as spinning sensations, her legs gave way and she fell on the floor. Daughter is the HCP and she was helping her mother with walking while this happened. Pt hit her left knee and is complaining of pain in that area. She denied hitting her head, hx of LOC, blacking out, palpitations or any abnormal jerky movements of her limbs. Pt recently had worsening nausea and vomiting that she gets from gastroparesis and gastric resection, she ran out of her medications and it got worse so she came to ER 2 days ago. She was resuscitated with fluids and was sent home, her vomiting has improved since then. Pt had suspicious lesion in the duodenal loop, per daughter pt had EGD and Bx was -ve for malignancy? Pt denied any hx of fever, chills, nausea, vomiting, diarrhea, constipation, melena, pain in abdomen, sob, chest pain, cough, increased urinary frequency, dysuria, headache, any changes in weight, recent travel.     In the ED pt was found to have BP of 86/54, , RR 20/min, SPO2 96% on RA, Temp 96.7. She was given I/V fluid bolus 2.2 L NS and her vitals improved. Her UA showed nitrites so she was given Rocephin? (14 Dec 2019 11:23)        REVIEW OF SYSTEMS:  Patient denies any headache, any vision complaints, runny nose, fever, chills, sore throat. Denies chest pain, shortness of breath, palpitation. Denies nausea, vomiting, abdominal pain, diarrhoea, Denies urinary burning, urgency, frequency, dysuria. Denies weakness in any part of the body or numbness.   At least 10 systems were reviewed in ROS. All systems reviewed  are within normal limits except for the complaints as described in Subjective.    PAST MEDICAL & SURGICAL HISTORY:  PAST MEDICAL & SURGICAL HISTORY:  Multiple sclerosis  DM type 2 (diabetes mellitus, type 2)  Stomach cancer  Portacath in place  S/P partial gastrectomy  H/O total hysterectomy with bilateral salpingo-oophorectomy (BSO)  H/O partial thyroidectomy          VITAL SIGNS:  Vital Signs Last 24 Hrs  T(C): 36.6 (20 Dec 2019 04:44), Max: 36.6 (20 Dec 2019 04:44)  T(F): 97.8 (20 Dec 2019 04:44), Max: 97.8 (20 Dec 2019 04:44)  HR: 86 (20 Dec 2019 04:44) (86 - 93)  BP: 115/56 (20 Dec 2019 04:44) (104/56 - 115/56)  BP(mean): --  RR: 18 (20 Dec 2019 04:44) (16 - 18)  SpO2: 94% (20 Dec 2019 07:43) (94% - 94%)    PHYSICAL EXAMINATION:  Not in acute distress  General: + pallor, no icterus  HEENT:   EOMI, no JVD,.  Heart: S1+S2 audible  Lungs: bilateral  fair air entry, no wheezing, no crepitations.  Abdomen: Soft, non-tender, non-distended , no  rigidity or guarding.  CNS: AAOx3, CN  grossly intact.  Extremities:  No edema    , bilateral UE swelling, RUE ecchymoses        CONSULTS:  Consultant(s) Notes Reviewed by me.   Care Discussed with Consultants/Other Providers where required.        MEDICATIONS:  MEDICATIONS  (STANDING):  baclofen 20 milliGRAM(s) Oral two times a day  cefpodoxime 200 milliGRAM(s) Oral every 12 hours  chlorhexidine 4% Liquid 1 Application(s) Topical <User Schedule>  cholecalciferol 1000 Unit(s) Oral daily  dextrose 5%. 1000 milliLiter(s) (50 mL/Hr) IV Continuous <Continuous>  dextrose 50% Injectable 12.5 Gram(s) IV Push once  dextrose 50% Injectable 25 Gram(s) IV Push once  dextrose 50% Injectable 25 Gram(s) IV Push once  DULoxetine 60 milliGRAM(s) Oral daily  folic acid 1 milliGRAM(s) Oral daily  insulin glargine Injectable (LANTUS) 7 Unit(s) SubCutaneous at bedtime  insulin lispro (HumaLOG) corrective regimen sliding scale   SubCutaneous three times a day before meals  insulin lispro Injectable (HumaLOG) 3 Unit(s) SubCutaneous three times a day before meals  iron sucrose IVPB 200 milliGRAM(s) IV Intermittent every 7 days  metoclopramide 10 milliGRAM(s) Oral three times a day before meals  pantoprazole    Tablet 40 milliGRAM(s) Oral two times a day  rOPINIRole 0.5 milliGRAM(s) Oral three times a day  tamsulosin 0.4 milliGRAM(s) Oral at bedtime    MEDICATIONS  (PRN):  acetaminophen   Tablet .. 650 milliGRAM(s) Oral every 6 hours PRN Moderate Pain (4 - 6)  dextrose 40% Gel 15 Gram(s) Oral once PRN Blood Glucose LESS THAN 70 milliGRAM(s)/deciliter  glucagon  Injectable 1 milliGRAM(s) IntraMuscular once PRN Glucose LESS THAN 70 milligrams/deciliter  guaiFENesin   Syrup  (Sugar-Free) 100 milliGRAM(s) Oral every 6 hours PRN Cough  ondansetron Injectable 4 milliGRAM(s) IV Push every 8 hours PRN Nausea and/or Vomiting            ASSESSMENT:    Patient is a 63yo female with PMH of multiple sclerosis on IVIG, gastric cancer s/p gastrectomy s/p chemotherapy, and diabetes mellitus who presented to the hospital s/p mechanical fall. Patient states that she has always had right lower extremity weakness, but the left lower extremity has become progressively weaker over the past month.       ASSESSMENT:  Principal Diagnosis:  Low suspicion for MS flare  Urinary tract infection secondary to E coli  Acute on chronic uuvavq-lagalgyoqlirea-kqbfclai iron deficiency- low suspicion for GIB  Iron deficiency  Gastroparesis  Urinary retention  L cephalic thrombus  severe hypoalbuminemia with P effusion and mild ascites    Associated Active Comorbid Conditions:  chronic normocytic anmeia  Diabetes mellitis type 2   history of gastric CA, billroth procedure & chemo  Suspected vitamin D deficiency    PLAN:   - continue with Vantin x 5 days for Urinary tract infection secondary to E coli sensitive to cephalosporins  -no MS flare currently  -Low suspicion for GIB  -Iron deficiancy secondary to gastric surgery- Fe supplementation. Repeat Venofer IV  -Keep Hb >7. Monitor hemoglobin/hematocrit   - Insulin sliding scale with coverage with meals and QHS . keep current dose of lantus & lispro  -Electrolyte supplementation and follow up with BMP. Keep K+>4, Mg>2   -gastroparesis- RTC reglan  -Gastric cancer s/p gastrectomy s/p chemotherapy. Follows outpatient with Dr. Toussaint  severe hypoalbuminemia with P effusion and mild ascites- Lasix 20 milligrams IV today, 20 milligrams daily PO    -Suspected vitamin D deficiency- Continue with cholecalciferol 1000 units PO QD  -Urinary retention-resolved . Off Jensen  -L cephalic thrombus- supportive care- no anti-coagulation     Progress note Handoff:   Discussion: Diagnosis, current management plan and further plan of care discussed with patient  and housestaff in morning  rounds.  Disposition: Anticipate discharge in AM pending symptom resolution, may require 4A Progress Note:  Provider Speciality                            Hospitalist      LEFTY CARRASQUILLO [id number] 64y Female     CHIEF PRESENTING COMPLAINT:  Patient is a 64y old  Female who presents with a chief complaint of lower extremity weakness leading to mechanical fall (18 Dec 2019 12:10)        SUBJECTIVE:  Patient was seen and examined at bedside. Reports bilateral UE swelling with some improvement today  . No significant overnight events reported.     HISTORY OF PRESENTING ILLNESS:  HPI:  63 Y/O F with PMHx of MS on IVIG (last dose 3 wks ago), DM type II, Gastric cancer s/p resection and chemo currently in remission (Dr Toussaint), presented to the hospital s/p mechanical fall. Per pt she had progressively worsening lower extremity weakness that started about 2 wks ago, she has chronic RLE weakness and numbness, she ambulates with a walker but no she has weakness in her LLE associated with difficulty ambulation. Previously she would be able to get off the bed and walk with a walker but now she needs assistance with everything. This morning while she was going to use restroom, she felt dizzy described as spinning sensations, her legs gave way and she fell on the floor. Daughter is the HCP and she was helping her mother with walking while this happened. Pt hit her left knee and is complaining of pain in that area. She denied hitting her head, hx of LOC, blacking out, palpitations or any abnormal jerky movements of her limbs. Pt recently had worsening nausea and vomiting that she gets from gastroparesis and gastric resection, she ran out of her medications and it got worse so she came to ER 2 days ago. She was resuscitated with fluids and was sent home, her vomiting has improved since then. Pt had suspicious lesion in the duodenal loop, per daughter pt had EGD and Bx was -ve for malignancy? Pt denied any hx of fever, chills, nausea, vomiting, diarrhea, constipation, melena, pain in abdomen, sob, chest pain, cough, increased urinary frequency, dysuria, headache, any changes in weight, recent travel.     In the ED pt was found to have BP of 86/54, , RR 20/min, SPO2 96% on RA, Temp 96.7. She was given I/V fluid bolus 2.2 L NS and her vitals improved. Her UA showed nitrites so she was given Rocephin? (14 Dec 2019 11:23)        REVIEW OF SYSTEMS:  Patient denies any headache, any vision complaints, runny nose, fever, chills, sore throat. Denies chest pain, shortness of breath, palpitation. Denies nausea, vomiting, abdominal pain, diarrhoea, Denies urinary burning, urgency, frequency, dysuria. Denies weakness in any part of the body or numbness.   At least 10 systems were reviewed in ROS. All systems reviewed  are within normal limits except for the complaints as described in Subjective.    PAST MEDICAL & SURGICAL HISTORY:  PAST MEDICAL & SURGICAL HISTORY:  Multiple sclerosis  DM type 2 (diabetes mellitus, type 2)  Stomach cancer  Portacath in place  S/P partial gastrectomy  H/O total hysterectomy with bilateral salpingo-oophorectomy (BSO)  H/O partial thyroidectomy          VITAL SIGNS:  Vital Signs Last 24 Hrs  T(C): 36.6 (20 Dec 2019 04:44), Max: 36.6 (20 Dec 2019 04:44)  T(F): 97.8 (20 Dec 2019 04:44), Max: 97.8 (20 Dec 2019 04:44)  HR: 86 (20 Dec 2019 04:44) (86 - 93)  BP: 115/56 (20 Dec 2019 04:44) (104/56 - 115/56)  BP(mean): --  RR: 18 (20 Dec 2019 04:44) (16 - 18)  SpO2: 94% (20 Dec 2019 07:43) (94% - 94%)    PHYSICAL EXAMINATION:  Not in acute distress  General: + pallor, no icterus  HEENT:   EOMI, no JVD,.  Heart: S1+S2 audible  Lungs: bilateral  fair air entry, no wheezing, no crepitations.  Abdomen: Soft, non-tender, non-distended , no  rigidity or guarding.  CNS: AAOx3, CN  grossly intact.  Extremities:  No edema    , bilateral UE swelling, RUE ecchymoses        CONSULTS:  Consultant(s) Notes Reviewed by me.   Care Discussed with Consultants/Other Providers where required.        MEDICATIONS:  MEDICATIONS  (STANDING):  baclofen 20 milliGRAM(s) Oral two times a day  cefpodoxime 200 milliGRAM(s) Oral every 12 hours  chlorhexidine 4% Liquid 1 Application(s) Topical <User Schedule>  cholecalciferol 1000 Unit(s) Oral daily  dextrose 5%. 1000 milliLiter(s) (50 mL/Hr) IV Continuous <Continuous>  dextrose 50% Injectable 12.5 Gram(s) IV Push once  dextrose 50% Injectable 25 Gram(s) IV Push once  dextrose 50% Injectable 25 Gram(s) IV Push once  DULoxetine 60 milliGRAM(s) Oral daily  folic acid 1 milliGRAM(s) Oral daily  insulin glargine Injectable (LANTUS) 7 Unit(s) SubCutaneous at bedtime  insulin lispro (HumaLOG) corrective regimen sliding scale   SubCutaneous three times a day before meals  insulin lispro Injectable (HumaLOG) 3 Unit(s) SubCutaneous three times a day before meals  iron sucrose IVPB 200 milliGRAM(s) IV Intermittent every 7 days  metoclopramide 10 milliGRAM(s) Oral three times a day before meals  pantoprazole    Tablet 40 milliGRAM(s) Oral two times a day  rOPINIRole 0.5 milliGRAM(s) Oral three times a day  tamsulosin 0.4 milliGRAM(s) Oral at bedtime    MEDICATIONS  (PRN):  acetaminophen   Tablet .. 650 milliGRAM(s) Oral every 6 hours PRN Moderate Pain (4 - 6)  dextrose 40% Gel 15 Gram(s) Oral once PRN Blood Glucose LESS THAN 70 milliGRAM(s)/deciliter  glucagon  Injectable 1 milliGRAM(s) IntraMuscular once PRN Glucose LESS THAN 70 milligrams/deciliter  guaiFENesin   Syrup  (Sugar-Free) 100 milliGRAM(s) Oral every 6 hours PRN Cough  ondansetron Injectable 4 milliGRAM(s) IV Push every 8 hours PRN Nausea and/or Vomiting            ASSESSMENT:    Patient is a 63yo female with PMH of multiple sclerosis on IVIG, gastric cancer s/p gastrectomy s/p chemotherapy, and diabetes mellitus who presented to the hospital s/p mechanical fall. Patient states that she has always had right lower extremity weakness, but the left lower extremity has become progressively weaker over the past month.       ASSESSMENT:  Principal Diagnosis:  Low suspicion for MS flare  Urinary tract infection secondary to E coli  Acute on chronic hnrryv-wylpvvlbsiehhj-ejytbcwi iron deficiency- low suspicion for GIB  Iron deficiency  Gastroparesis  Urinary retention  L cephalic thrombus  severe hypoalbuminemia with P effusion and mild ascites    Associated Active Comorbid Conditions:  chronic normocytic anmeia  Diabetes mellitis type 2   history of gastric CA, billroth procedure & chemo  Suspected vitamin D & folic acid  deficiency    PLAN:   - continue with Vantin x 5 days for Urinary tract infection secondary to E coli sensitive to cephalosporins  -no MS flare currently  -Low suspicion for GIB  -Iron deficiancy secondary to gastric surgery- Fe supplementation. Repeat Venofer IV  -Keep Hb >7. Monitor hemoglobin/hematocrit   - Insulin sliding scale with coverage with meals and QHS . keep current dose of lantus & lispro  -Electrolyte supplementation and follow up with BMP. Keep K+>4, Mg>2   -gastroparesis- RTC reglan  -Gastric cancer s/p gastrectomy s/p chemotherapy. Follows outpatient with Dr. Toussaint  severe hypoalbuminemia with P effusion and mild ascites- Lasix 20 milligrams IV today, 20 milligrams daily PO    -Suspected vitamin D deficiency- Continue with cholecalciferol 1000 units PO QD  -Urinary retention-resolved . Off Jensen  -L cephalic thrombus- supportive care- no anti-coagulation     Progress note Handoff:   Discussion: Diagnosis, current management plan and further plan of care discussed with patient  and housestaff in morning  rounds.  Disposition: Anticipate discharge in AM pending symptom resolution, may require 4A

## 2019-12-20 NOTE — PROGRESS NOTE ADULT - SUBJECTIVE AND OBJECTIVE BOX
Patient is a 64y old  Female who presents with a chief complaint of lower extremity weakness leading to mechanical fall (19 Dec 2019 15:46)      Hem/Onc : Hb : 7.8 , received one dose of venofer . No plans for GI intervention except resume reglan .       MEDICATIONS  (STANDING):  baclofen 20 milliGRAM(s) Oral two times a day  cefpodoxime 200 milliGRAM(s) Oral every 12 hours  chlorhexidine 4% Liquid 1 Application(s) Topical <User Schedule>  cholecalciferol 1000 Unit(s) Oral daily  dextrose 5%. 1000 milliLiter(s) (50 mL/Hr) IV Continuous <Continuous>  dextrose 50% Injectable 12.5 Gram(s) IV Push once  dextrose 50% Injectable 25 Gram(s) IV Push once  dextrose 50% Injectable 25 Gram(s) IV Push once  DULoxetine 60 milliGRAM(s) Oral daily  folic acid 1 milliGRAM(s) Oral daily  furosemide   Injectable 20 milliGRAM(s) IV Push once  heparin  Injectable 5000 Unit(s) SubCutaneous every 8 hours  insulin glargine Injectable (LANTUS) 7 Unit(s) SubCutaneous at bedtime  insulin lispro (HumaLOG) corrective regimen sliding scale   SubCutaneous three times a day before meals  insulin lispro Injectable (HumaLOG) 3 Unit(s) SubCutaneous three times a day before meals  iron sucrose IVPB 200 milliGRAM(s) IV Intermittent every 7 days  metoclopramide 10 milliGRAM(s) Oral three times a day before meals  pantoprazole    Tablet 40 milliGRAM(s) Oral two times a day  rOPINIRole 0.5 milliGRAM(s) Oral three times a day  tamsulosin 0.4 milliGRAM(s) Oral at bedtime    MEDICATIONS  (PRN):  acetaminophen   Tablet .. 650 milliGRAM(s) Oral every 6 hours PRN Moderate Pain (4 - 6)  dextrose 40% Gel 15 Gram(s) Oral once PRN Blood Glucose LESS THAN 70 milliGRAM(s)/deciliter  glucagon  Injectable 1 milliGRAM(s) IntraMuscular once PRN Glucose LESS THAN 70 milligrams/deciliter  guaiFENesin   Syrup  (Sugar-Free) 100 milliGRAM(s) Oral every 6 hours PRN Cough      Overnight events:    Vital Signs Last 24 Hrs  T(C): 36.6 (20 Dec 2019 04:44), Max: 36.6 (20 Dec 2019 04:44)  T(F): 97.8 (20 Dec 2019 04:44), Max: 97.8 (20 Dec 2019 04:44)  HR: 86 (20 Dec 2019 04:44) (86 - 93)  BP: 115/56 (20 Dec 2019 04:44) (104/56 - 115/56)  BP(mean): --  RR: 18 (20 Dec 2019 04:44) (16 - 18)  SpO2: 94% (20 Dec 2019 07:43) (94% - 94%)  CAPILLARY BLOOD GLUCOSE      POCT Blood Glucose.: 88 mg/dL (20 Dec 2019 11:34)  POCT Blood Glucose.: 75 mg/dL (20 Dec 2019 07:52)  POCT Blood Glucose.: 130 mg/dL (19 Dec 2019 21:49)  POCT Blood Glucose.: 98 mg/dL (19 Dec 2019 16:31)      12-20    139  |  104  |  17  ----------------------------<  64<L>  4.4   |  23  |  0.5<L>    Ca    7.6<L>      20 Dec 2019 05:30  Mg     1.8     12-20    TPro  4.6<L>  /  Alb  2.1<L>  /  TBili  0.3  /  DBili  x   /  AST  34  /  ALT  24  /  AlkPhos  160<H>  12-20  I&O's Summary    19 Dec 2019 07:01  -  20 Dec 2019 07:00  --------------------------------------------------------  IN: 400 mL / OUT: 150 mL / NET: 250 mL      CBC Full  -  ( 20 Dec 2019 05:30 )  WBC Count : 8.65 K/uL  RBC Count : 2.87 M/uL  Hemoglobin : 7.8 g/dL  Hematocrit : 25.7 %  Platelet Count - Automated : 375 K/uL  Mean Cell Volume : 89.5 fL  Mean Cell Hemoglobin : 27.2 pg  Mean Cell Hemoglobin Concentration : 30.4 g/dL  Auto Neutrophil # : 6.82 K/uL  Auto Lymphocyte # : 0.69 K/uL  Auto Monocyte # : 0.88 K/uL  Auto Eosinophil # : 0.12 K/uL  Auto Basophil # : 0.03 K/uL  Auto Neutrophil % : 78.8 %  Auto Lymphocyte % : 8.0 %  Auto Monocyte % : 10.2 %  Auto Eosinophil % : 1.4 %  Auto Basophil % : 0.3 %      radiology:         PHYSICAL EXAM:      Constitutional:    Eyes:    ENMT:    Neck:    Breasts:    Back:    Respiratory:    Cardiovascular:    Gastrointestinal:    Genitourinary:    Rectal:    Extremities:    Vascular:    Neurological:    Skin:    Lymph Nodes:    Musculoskeletal:    Psychiatric:

## 2019-12-20 NOTE — PROGRESS NOTE ADULT - SUBJECTIVE AND OBJECTIVE BOX
LEFTY CARRASQUILLO 64y Female  MRN#: [id number]   Hospital Day: 6d    SUBJECTIVE  Patient is a 64y old Female who presents with a chief complaint of lower extremity weakness leading to mechanical fall (20 Dec 2019 11:57)  Currently admitted to medicine with the primary diagnosis of Sepsis    INTERVAL HPI AND OVERNIGHT EVENTS:  Patient was examined and seen at bedside. This morning she is resting comfortably in bed and reports no issues or overnight events. Patient states that her swelling has gone down since yesterday.    REVIEW OF SYMPTOMS:  CONSTITUTIONAL: No weakness, fevers or chills; No headaches; tired  EYES: No visual changes, eye pain, or discharge  ENT: No vertigo; No ear pain or change in hearing; No sore throat or difficulty swallowing  NECK: No pain or stiffness  RESPIRATORY: No cough, wheezing, or hemoptysis; No shortness of breath  CARDIOVASCULAR: No chest pain or palpitations  GASTROINTESTINAL: No abdominal or epigastric pain; No nausea, vomiting, or hematemesis; No diarrhea or constipation; No melena or hematochezia  GENITOURINARY: No dysuria, frequency or hematuria  MUSCULOSKELETAL: No joint pain, no muscle pain, no weakness  NEUROLOGICAL: No numbness or weakness  SKIN: No itching or rashes    OBJECTIVE  PAST MEDICAL & SURGICAL HISTORY  Multiple sclerosis  DM type 2 (diabetes mellitus, type 2)  Stomach cancer  Portacath in place  S/P partial gastrectomy  H/O total hysterectomy with bilateral salpingo-oophorectomy (BSO)  H/O partial thyroidectomy    ALLERGIES:  No Known Allergies    MEDICATIONS:  STANDING MEDICATIONS  baclofen 20 milliGRAM(s) Oral two times a day  cefpodoxime 200 milliGRAM(s) Oral every 12 hours  chlorhexidine 4% Liquid 1 Application(s) Topical <User Schedule>  cholecalciferol 1000 Unit(s) Oral daily  dextrose 5%. 1000 milliLiter(s) IV Continuous <Continuous>  dextrose 50% Injectable 12.5 Gram(s) IV Push once  dextrose 50% Injectable 25 Gram(s) IV Push once  dextrose 50% Injectable 25 Gram(s) IV Push once  DULoxetine 60 milliGRAM(s) Oral daily  folic acid 1 milliGRAM(s) Oral daily  heparin  Injectable 5000 Unit(s) SubCutaneous every 8 hours  insulin glargine Injectable (LANTUS) 7 Unit(s) SubCutaneous at bedtime  insulin lispro (HumaLOG) corrective regimen sliding scale   SubCutaneous three times a day before meals  insulin lispro Injectable (HumaLOG) 3 Unit(s) SubCutaneous three times a day before meals  iron sucrose IVPB 200 milliGRAM(s) IV Intermittent every 7 days  metoclopramide 10 milliGRAM(s) Oral three times a day before meals  pantoprazole    Tablet 40 milliGRAM(s) Oral two times a day  rOPINIRole 0.5 milliGRAM(s) Oral three times a day  tamsulosin 0.4 milliGRAM(s) Oral at bedtime    PRN MEDICATIONS  acetaminophen   Tablet .. 650 milliGRAM(s) Oral every 6 hours PRN  dextrose 40% Gel 15 Gram(s) Oral once PRN  glucagon  Injectable 1 milliGRAM(s) IntraMuscular once PRN  guaiFENesin   Syrup  (Sugar-Free) 100 milliGRAM(s) Oral every 6 hours PRN      VITAL SIGNS: Last 24 Hours  T(C): 37 (20 Dec 2019 13:46), Max: 37 (20 Dec 2019 12:15)  T(F): 98.6 (20 Dec 2019 13:46), Max: 98.6 (20 Dec 2019 12:15)  HR: 84 (20 Dec 2019 13:46) (84 - 93)  BP: 113/58 (20 Dec 2019 13:46) (104/56 - 115/56)  BP(mean): --  RR: 16 (20 Dec 2019 13:46) (16 - 18)  SpO2: 94% (20 Dec 2019 07:43) (94% - 94%)    LABS:                        7.8    8.65  )-----------( 375      ( 20 Dec 2019 05:30 )             25.7     12-20    139  |  104  |  17  ----------------------------<  64<L>  4.4   |  23  |  0.5<L>    Ca    7.6<L>      20 Dec 2019 05:30  Mg     1.8     12-20    TPro  4.6<L>  /  Alb  2.1<L>  /  TBili  0.3  /  DBili  x   /  AST  34  /  ALT  24  /  AlkPhos  160<H>  12-20    RADIOLOGY:  < from: CT Chest No Cont (12.19.19 @ 18:19) >  Tubes/Lines: There is a right-sided Mediport.    Lungs, Pleura, and Airways:Worsening bilateral pleural effusions are seen. Underlying compressive atelectasis is recognized. In addition, there is been interval development of groundglass opacifications within the left upper lung field as well as left lower lung field. Less apparent right upper lung field groundglass opacities are seen.     Mediastinum/Lymph Nodes:There is no axillary lymphadenopathy. There is no mediastinal lymphadenopathy.    Heart/Great Vessels: The heart size is normal. Coronary calcifications are seen. Great vessels are mildly atherosclerotic. Coronary calcifications are recognized.    Abdomen: There is gastric distention. Surgical clips are seen within the upper abdomen. The esophagus is fluid in the fluid-filled throughout its course.    Bones and soft tissues: The bones and soft tissues are unchanged.    IMPRESSION:    Bilateral pleural effusions with compressive atelectasis.    Interval development of groundglass opacifications.    Gastric distention with adjacent surgical clips as well as secondary esophageal distention.    < end of copied text >      PHYSICAL EXAM:  CONSTITUTIONAL: No acute distress, well-developed, well-groomed, AAOx3  HEAD: Atraumatic, normocephalic  EYES: EOM intact, PERRLA, conjunctiva and sclera clear  ENT: Supple, no masses, no thyromegaly, no bruits, no JVD; moist mucous membranes  PULMONARY: Clear to auscultation bilaterally; no wheezes, rales, or rhonchi  CARDIOVASCULAR: Regular rate and rhythm; no murmurs, rubs, or gallops  GASTROINTESTINAL: Soft, non-tender, non-distended; bowel sounds present  MUSCULOSKELETAL: 2+ peripheral pulses; no clubbing, no cyanosis, non-pitting edema in bilateral upper extremities from hands to shoulders that is improved compared to exam yesterday  NEUROLOGY: 4/5 strength in bilateral upper extremities, 4/5 strength in left lower extremity, 2/5 muscle strength in right lower extremity, reflexes are 2-/4 throughout, did not witness gait  SKIN: ecchymosis present on posterior aspect of right elbow; warm and dry    ASSESSMENT & PLAN  Patient is a 63yo female with PMH of multiple sclerosis on IVIG, gastric cancer s/p gastrectomy s/p chemotherapy, and diabetes mellitus who presented to the hospital s/p mechanical fall. Patient states that she has always had right lower extremity weakness, but the left lower extremity has become progressively weaker over the past month.     #S/p mechanical fall secondary to UTI vs multiple sclerosis flare, resolving  - Neurology consult appreciated  - Underlying UTI can present as multiple sclerosis flare  - Patient current back to baseline  - Per neurology, no need for another MRI or IV solumedrol this admission  - Continue with cefpodoxime 200mg PO Q12H for 5 days (Day #2)  - Continue with baclofen 20mg PO BID  - Continue with duloxetine 60mg PO QD  - Continue with ropinirole 0.5mg PO TID  - Continue with acetaminophen 650mg PO Q6H PRN pain  - Blood Cx: no growth to date  - Urine Cx: >100,000 E. coli resistant to ampicillin  - Follows outpatient with Dr. Vance for neurology    #Acute on chronic iron deficiency anemia  - Hemoglobin on admission: 9.0  - Hemoglobin on 12/16/2019 was 6.6  - S/p 1 unit PRBC on 12/16/2019  - Hemoglobin today 7.8  - Will transfuse 1 unit PRBC today with furosemide 20mg IV x1 dose  - Heme/onc consult appreciated  - Continue with venofer 200mg IV Q7D  - GI consult appreciated  - Spoke with Dr. Velazquez over the phone earlier. He said that he will follow up with the patient for a colonoscopy outpatient.  - Keep active type and screen  - Trend hemoglobin and hematocrit    #Bilateral upper extremity edema, improving  - Bilateral VA Duplex: thrombus noted in left cephalic vein; no thrombus in right upper extremity  - Per attending, no indication for anticoagulation at this time  - Warm compresses  - Keep arms elevated  - Pending nutrition consult  - Follow CT chest  - Start furosemide 20mg PO QD    #Left ear pain likely secondary to cerumen impaction, resolved  - S/p hydrogen peroxide irrigation    #Diabetes mellitus type 2  - Patient was hypoglycemic to 60 this morning  - Continue with insulin glargine 7 units SQ QHS  - Continue with insulin lispro 3 units SQ TID  - Insulin sliding scale coverage  - Monitor fingerstick glucose    #Urinary retention  - Patient completed trial of void yesterday  - Discontinued Jensen catheter  - Continue with tamsulosin 0.4mg PO QHS    #Gastric cancer s/p gastrectomy s/p chemotherapy  - Currently in remission  - Per daughter, most recent endoscopy was negative  - Most recent CT scan shows possible duodenal loop  that could be related to malignancy  - Continue with ondansetron 4mg IV Q8H PRN nausea/vomiting  - Continue with metoclopramide 10mg PO TID before meals  - Follows outpatient with Dr. Odaimi  - Hem/onc consult appreciated  - Reticulocyte percentage, haptoglobin, and CEA elevated  - Will need outpatient PET scan    #Suspected vitamin D deficiency  - Continue with cholecalciferol 1000 units PO QD    #Misc  - DVT Prophylaxis: heparin 5000 units SQ Q8H   - Diet: DASH/TLC, carbohydrate consistent with evening snack  - GI Prophylaxis: pantoprazole 40mg PO QD  - Activity: increase as tolerated  - IV Fluids: Not indicated  - Code Status: Full Code    Dispo: Pending authorization for inpatient rehab

## 2019-12-21 NOTE — PROGRESS NOTE ADULT - SUBJECTIVE AND OBJECTIVE BOX
Progress Note:  Provider Speciality                            Hospitalist      LEFTY CARRASQUILLO MRN-[id number] 64y Female     CHIEF PRESENTING COMPLAINT:  Patient is a 64y old  Female who presents with a chief complaint of lower extremity weakness leading to mechanical fall (18 Dec 2019 12:10)        SUBJECTIVE:  Patient was seen and examined at bedside.  Complaint of nausea vomiting in AM today. Reports bilateral UE swelling  improvement.  . No significant overnight events reported.     HISTORY OF PRESENTING ILLNESS:  HPI:  63 Y/O F with PMHx of MS on IVIG (last dose 3 wks ago), DM type II, Gastric cancer s/p resection and chemo currently in remission (Dr Toussaint), presented to the hospital s/p mechanical fall. Per pt she had progressively worsening lower extremity weakness that started about 2 wks ago, she has chronic RLE weakness and numbness, she ambulates with a walker but no she has weakness in her LLE associated with difficulty ambulation. Previously she would be able to get off the bed and walk with a walker but now she needs assistance with everything. This morning while she was going to use restroom, she felt dizzy described as spinning sensations, her legs gave way and she fell on the floor. Daughter is the HCP and she was helping her mother with walking while this happened. Pt hit her left knee and is complaining of pain in that area. She denied hitting her head, hx of LOC, blacking out, palpitations or any abnormal jerky movements of her limbs. Pt recently had worsening nausea and vomiting that she gets from gastroparesis and gastric resection, she ran out of her medications and it got worse so she came to ER 2 days ago. She was resuscitated with fluids and was sent home, her vomiting has improved since then. Pt had suspicious lesion in the duodenal loop, per daughter pt had EGD and Bx was -ve for malignancy? Pt denied any hx of fever, chills, nausea, vomiting, diarrhea, constipation, melena, pain in abdomen, sob, chest pain, cough, increased urinary frequency, dysuria, headache, any changes in weight, recent travel.     In the ED pt was found to have BP of 86/54, , RR 20/min, SPO2 96% on RA, Temp 96.7. She was given I/V fluid bolus 2.2 L NS and her vitals improved. Her UA showed nitrites so she was given Rocephin? (14 Dec 2019 11:23)        REVIEW OF SYSTEMS:  Patient denies any headache, any vision complaints, runny nose, fever, chills, sore throat. Denies chest pain, shortness of breath, palpitation. Denies  abdominal pain, diarrhoea, Denies urinary burning, urgency, frequency, dysuria. Denies weakness in any part of the body or numbness.   At least 10 systems were reviewed in ROS. All systems reviewed  are within normal limits except for the complaints as described in Subjective.    PAST MEDICAL & SURGICAL HISTORY:  PAST MEDICAL & SURGICAL HISTORY:  Multiple sclerosis  DM type 2 (diabetes mellitus, type 2)  Stomach cancer  Portacath in place  S/P partial gastrectomy  H/O total hysterectomy with bilateral salpingo-oophorectomy (BSO)  H/O partial thyroidectomy          VITAL SIGNS:  Vital Signs Last 24 Hrs  T(C): 36.7 (21 Dec 2019 05:31), Max: 37 (20 Dec 2019 13:46)  T(F): 98 (21 Dec 2019 05:31), Max: 98.6 (20 Dec 2019 13:46)  HR: 79 (21 Dec 2019 05:31) (79 - 89)  BP: 120/61 (21 Dec 2019 05:31) (113/58 - 120/61)  BP(mean): --  RR: 18 (21 Dec 2019 05:31) (16 - 18)  SpO2: --      PHYSICAL EXAMINATION:  Not in acute distress  General: + pallor, no icterus  HEENT:   EOMI, no JVD,.  Heart: S1+S2 audible  Lungs: bilateral  fair air entry, no wheezing, no crepitations.  Abdomen: Soft, non-tender, non-distended , no  rigidity or guarding.  CNS: AAOx3, CN  grossly intact.  Extremities:  No edema    , bilateral UE swelling, RUE ecchymoses        CONSULTS:  Consultant(s) Notes Reviewed by me.   Care Discussed with Consultants/Other Providers where required.        MEDICATIONS:  MEDICATIONS  (STANDING):  baclofen 20 milliGRAM(s) Oral two times a day  cefpodoxime 200 milliGRAM(s) Oral every 12 hours  chlorhexidine 4% Liquid 1 Application(s) Topical <User Schedule>  cholecalciferol 1000 Unit(s) Oral daily  dextrose 5%. 1000 milliLiter(s) (50 mL/Hr) IV Continuous <Continuous>  dextrose 50% Injectable 12.5 Gram(s) IV Push once  dextrose 50% Injectable 25 Gram(s) IV Push once  dextrose 50% Injectable 25 Gram(s) IV Push once  DULoxetine 60 milliGRAM(s) Oral daily  folic acid 1 milliGRAM(s) Oral daily  insulin glargine Injectable (LANTUS) 7 Unit(s) SubCutaneous at bedtime  insulin lispro (HumaLOG) corrective regimen sliding scale   SubCutaneous three times a day before meals  insulin lispro Injectable (HumaLOG) 3 Unit(s) SubCutaneous three times a day before meals  iron sucrose IVPB 200 milliGRAM(s) IV Intermittent every 7 days  metoclopramide 10 milliGRAM(s) Oral three times a day before meals  pantoprazole    Tablet 40 milliGRAM(s) Oral two times a day  rOPINIRole 0.5 milliGRAM(s) Oral three times a day  tamsulosin 0.4 milliGRAM(s) Oral at bedtime    MEDICATIONS  (PRN):  acetaminophen   Tablet .. 650 milliGRAM(s) Oral every 6 hours PRN Moderate Pain (4 - 6)  dextrose 40% Gel 15 Gram(s) Oral once PRN Blood Glucose LESS THAN 70 milliGRAM(s)/deciliter  glucagon  Injectable 1 milliGRAM(s) IntraMuscular once PRN Glucose LESS THAN 70 milligrams/deciliter  guaiFENesin   Syrup  (Sugar-Free) 100 milliGRAM(s) Oral every 6 hours PRN Cough  ondansetron Injectable 4 milliGRAM(s) IV Push every 8 hours PRN Nausea and/or Vomiting            ASSESSMENT:    Patient is a 63yo female with PMH of multiple sclerosis on IVIG, gastric cancer s/p gastrectomy s/p chemotherapy, and diabetes mellitus who presented to the hospital s/p mechanical fall. Patient states that she has always had right lower extremity weakness, but the left lower extremity has become progressively weaker over the past month.       ASSESSMENT:  Principal Diagnosis:  Low suspicion for MS flare  Urinary tract infection secondary to E coli  Acute on chronic [id number] iron deficiency- low suspicion for GIB  Iron deficiency  Gastroparesis  Urinary retention  L cephalic thrombus  severe hypoalbuminemia with P effusion and mild ascites    Associated Active Comorbid Conditions:  chronic normocytic anmeia  Diabetes mellitis type 2   history of gastric CA, billroth procedure & chemo  Suspected vitamin D & folic acid  deficiency    PLAN:   - continue with Vantin x 5 days for Urinary tract infection secondary to E coli sensitive to cephalosporins  -no MS flare currently  -Low suspicion for GIB  -Iron deficiancy secondary to gastric surgery- Fe supplementation.   -Keep Hb >7. Monitor hemoglobin/hematocrit   - Insulin sliding scale with coverage with meals and QHS . keep current dose of lantus & lispro  -Electrolyte supplementation and follow up with BMP. Keep K+>4, Mg>2   -gastroparesis- RTC Reglan Added zofran  -Gastric cancer s/p gastrectomy s/p chemotherapy. Follows outpatient with Dr. Toussaint  severe hypoalbuminemia with P effusion and mild ascites- Lasix 20 milligrams IV today, 20 milligrams daily PO    -Suspected vitamin D deficiency- Continue with cholecalciferol 1000 units PO QD  -Urinary retention-resolved . Off Jensen  -L cephalic thrombus- supportive care- no anti-coagulation     Progress note Handoff:   Discussion: Diagnosis, current management plan and further plan of care discussed with patient  and housestaff in morning  rounds.  Disposition: Awaiting placement to

## 2019-12-21 NOTE — DIETITIAN INITIAL EVALUATION ADULT. - RD TO REMAIN AVAILABLE
Intervention: 1.Meals and Snacks   Monitor/Evaluate: 1.Diet order, energy intake, nutrition focused physical findings, anemia profile/yes

## 2019-12-21 NOTE — DIETITIAN INITIAL EVALUATION ADULT. - FACTORS AFF FOOD INTAKE
The patient reports consuming 100% of full liquid diet. Denies chewing and swallowing difficulty. Reports experiencing an episode of emesis today (12/21); receives and antiemetic and prokinetic agent. The patient reports having a solid bowel movement yesterday (12/20).

## 2019-12-21 NOTE — DIETITIAN INITIAL EVALUATION ADULT. - OTHER INFO
Dx: 65y/o female with pmhx noted above admitted with lower extremity weakness leading to mechanical fall. Hospital course is complicated by UTI secondary to E coli, iron deficiency, gastroparesis, urinary retention, L cephalic thrombus, severe hypoalbuminemia with pleural effusion and mild ascites. Skin is intact (Grayson Score-15).      Subjective Data: The patient reports following a regular diet at home; consumed three small meals at 100%; took a calcium, magnesium and iron supplement daily.

## 2019-12-21 NOTE — PHARMACOTHERAPY INTERVENTION NOTE - COMMENTS
Patient is guiafenesin liquid which has been taken off of formulary. Spoke with Dr Caceres to change order to [email or web address]

## 2019-12-21 NOTE — PROGRESS NOTE ADULT - SUBJECTIVE AND OBJECTIVE BOX
LEFTY CARRASQUILLO 64y Female  MRN#: [id number]   Hospital Day: 7d    SUBJECTIVE  Patient is a 64y old Female who presents with a chief complaint of lower extremity weakness leading to mechanical fall (20 Dec 2019 14:56)  Currently admitted to medicine with the primary diagnosis of Sepsis    INTERVAL HPI AND OVERNIGHT EVENTS:  Patient was examined and seen at bedside. This morning, the patient felt nauseous with abdominal discomfort. During rounds, the patient vomited twice. Both episodes produced non-bloody, non-bilious, brown stomach contents mixed with undigested food. The patient states that the last meal she had was a tuna sandwich at 5pm last night. She had felt a bit uneasy yesterday in the afternoon. She says that this is exactly how she felt over the past couple months and when she was admitted. She remembers vomiting in the ED. Outpatient workup by Dr. Velazquez included upper endoscopy, which showed gastritis and gastroparesis.    REVIEW OF SYMPTOMS:  CONSTITUTIONAL: No weakness, fevers or chills; No headaches  EYES: No visual changes, eye pain, or discharge  ENT: No vertigo; No ear pain or change in hearing; No sore throat or difficulty swallowing  NECK: No pain or stiffness  RESPIRATORY: No cough, wheezing, or hemoptysis; No shortness of breath  CARDIOVASCULAR: No chest pain or palpitations  GASTROINTESTINAL: No abdominal or epigastric pain; nausea, vomiting, no hematemesis; No diarrhea or constipation; No melena or hematochezia  GENITOURINARY: No dysuria, frequency or hematuria  MUSCULOSKELETAL: No joint pain, no muscle pain, no weakness  NEUROLOGICAL: No numbness or weakness  SKIN: No itching or rashes    OBJECTIVE  PAST MEDICAL & SURGICAL HISTORY  Multiple sclerosis  DM type 2 (diabetes mellitus, type 2)  Stomach cancer  Portacath in place  S/P partial gastrectomy  H/O total hysterectomy with bilateral salpingo-oophorectomy (BSO)  H/O partial thyroidectomy    ALLERGIES:  No Known Allergies    MEDICATIONS:  STANDING MEDICATIONS  baclofen 20 milliGRAM(s) Oral two times a day  cefpodoxime 200 milliGRAM(s) Oral every 12 hours  chlorhexidine 4% Liquid 1 Application(s) Topical <User Schedule>  cholecalciferol 1000 Unit(s) Oral daily  dextrose 5%. 1000 milliLiter(s) IV Continuous <Continuous>  dextrose 50% Injectable 12.5 Gram(s) IV Push once  dextrose 50% Injectable 25 Gram(s) IV Push once  dextrose 50% Injectable 25 Gram(s) IV Push once  DULoxetine 60 milliGRAM(s) Oral daily  folic acid 1 milliGRAM(s) Oral daily  heparin  Injectable 5000 Unit(s) SubCutaneous every 8 hours  insulin glargine Injectable (LANTUS) 7 Unit(s) SubCutaneous at bedtime  insulin lispro (HumaLOG) corrective regimen sliding scale   SubCutaneous three times a day before meals  insulin lispro Injectable (HumaLOG) 3 Unit(s) SubCutaneous three times a day before meals  iron sucrose IVPB 200 milliGRAM(s) IV Intermittent every 7 days  metoclopramide 10 milliGRAM(s) Oral three times a day before meals  pantoprazole    Tablet 40 milliGRAM(s) Oral two times a day  rOPINIRole 0.5 milliGRAM(s) Oral three times a day    PRN MEDICATIONS  acetaminophen   Tablet .. 650 milliGRAM(s) Oral every 6 hours PRN  dextrose 40% Gel 15 Gram(s) Oral once PRN  glucagon  Injectable 1 milliGRAM(s) IntraMuscular once PRN  guaiFENesin   Syrup  (Sugar-Free) 100 milliGRAM(s) Oral every 6 hours PRN  ondansetron Injectable 4 milliGRAM(s) IV Push every 6 hours PRN      VITAL SIGNS: Last 24 Hours  T(C): 36.7 (21 Dec 2019 05:31), Max: 37 (20 Dec 2019 12:15)  T(F): 98 (21 Dec 2019 05:31), Max: 98.6 (20 Dec 2019 12:15)  HR: 79 (21 Dec 2019 05:31) (79 - 89)  BP: 120/61 (21 Dec 2019 05:31) (107/57 - 120/61)  BP(mean): --  RR: 18 (21 Dec 2019 05:31) (16 - 18)  SpO2: --    LABS:                        9.2    10.98 )-----------( 413      ( 21 Dec 2019 08:20 )             29.3     12-20    139  |  104  |  17  ----------------------------<  64<L>  4.4   |  23  |  0.5<L>    Ca    7.6<L>      20 Dec 2019 05:30  Mg     1.8     12-20    TPro  4.6<L>  /  Alb  2.1<L>  /  TBili  0.3  /  DBili  x   /  AST  34  /  ALT  24  /  AlkPhos  160<H>  12-20    RADIOLOGY:  Pending official read on chest X-ray    PHYSICAL EXAM:  CONSTITUTIONAL: No acute distress, well-developed, well-groomed, AAOx3  HEAD: Atraumatic, normocephalic  EYES: EOM intact, PERRLA, conjunctiva and sclera clear  ENT: Supple, no masses, no thyromegaly, no bruits, no JVD; moist mucous membranes  PULMONARY: Clear to auscultation bilaterally; no wheezes, rales, or rhonchi  CARDIOVASCULAR: Regular rate and rhythm; no murmurs, rubs, or gallops  GASTROINTESTINAL: Soft, non-tender, non-distended; bowel sounds present  MUSCULOSKELETAL: 2+ peripheral pulses; no clubbing, no cyanosis, non-pitting edema in bilateral upper extremities from hands to shoulders that is improved compared to exam yesterday  NEUROLOGY: 4/5 strength in bilateral upper extremities, 4/5 strength in left lower extremity, 2/5 muscle strength in right lower extremity, reflexes are 2-/4 throughout, did not witness gait  SKIN: ecchymosis present on posterior aspect of right elbow; warm and dry    ASSESSMENT & PLAN  Patient is a 65yo female with PMH of multiple sclerosis on IVIG, gastric cancer s/p gastrectomy s/p chemotherapy, and diabetes mellitus who presented to the hospital s/p mechanical fall. Patient states that she has always had right lower extremity weakness, but the left lower extremity has become progressively weaker over the past month.     #S/p mechanical fall secondary to UTI vs multiple sclerosis flare, resolving  - Neurology consult appreciated  - Underlying UTI can present as multiple sclerosis flare  - Patient current back to baseline  - Per neurology, no need for another MRI or IV solumedrol this admission  - Continue with cefpodoxime 200mg PO Q12H for 5 days (Day #2)  - Continue with baclofen 20mg PO BID  - Continue with duloxetine 60mg PO QD  - Continue with ropinirole 0.5mg PO TID  - Continue with acetaminophen 650mg PO Q6H PRN pain  - Blood Cx: no growth to date  - Urine Cx: >100,000 E. coli resistant to ampicillin  - Follows outpatient with Dr. Vance for neurology    #Gastroparesis  - Continue with metoclopramide 10mg PO TID before meals  - Continue with pantoprazole 40mg PO BID    #Acute on chronic iron deficiency anemia  - Hemoglobin on admission: 9.0  - Hemoglobin on 12/16/2019 was 6.6  - S/p 1 unit PRBC on 12/16/2019 and 1 unit PRBC on 12/20/2019  - Hemoglobin today 7.8  - Heme/onc consult appreciated  - Continue with venofer 200mg IV Q7D  - GI consult appreciated  - Spoke with Dr. Velazquez over the phone. He said that he will follow up with the patient for a colonoscopy outpatient.  - Keep active type and screen  - Trend hemoglobin and hematocrit    #Bilateral upper extremity edema, improving  - Bilateral [facility] Duplex: thrombus noted in left cephalic vein; no thrombus in right upper extremity  - Per attending, no indication for anticoagulation at this time  - Warm compresses  - Keep arms elevated  - Pending nutrition consult  - CT chest: bilateral pleural effusions, ground-glass opacifications  - Start furosemide 20mg PO QD    #Left ear pain likely secondary to cerumen impaction, resolved  - S/p hydrogen peroxide irrigation    #Diabetes mellitus type 2  - Continue with insulin glargine 7 units SQ QHS  - Continue with insulin lispro 3 units SQ TID  - Insulin sliding scale coverage  - Monitor fingerstick glucose    #Urinary retention  - Continue with tamsulosin 0.4mg PO QHS    #Gastric cancer s/p gastrectomy s/p chemotherapy  - Currently in remission  - Per daughter, most recent endoscopy was negative  - Most recent CT scan shows possible duodenal loop  that could be related to malignancy  - Continue with ondansetron 4mg IV Q8H PRN nausea/vomiting  - Continue with metoclopramide 10mg PO TID before meals  - Follows outpatient with Dr. Toussaint  - Hem/onc consult appreciated  - Reticulocyte percentage, haptoglobin, and CEA elevated  - Will need outpatient PET scan    #Suspected vitamin D deficiency  - Continue with cholecalciferol 1000 units PO QD    #Misc  - DVT Prophylaxis: heparin 5000 units SQ Q8H   - Diet: Full liquid  - GI Prophylaxis: pantoprazole 40mg PO BID  - Activity: increase as tolerated  - IV Fluids: Not indicated  - Code Status: Full Code    Dispo: Pending authorization for inpatient rehab LEFTY CARRASQUILLO 64y Female  MRN#: [id number]   Hospital Day: 7d    SUBJECTIVE  Patient is a 64y old Female who presents with a chief complaint of lower extremity weakness leading to mechanical fall (20 Dec 2019 14:56)  Currently admitted to medicine with the primary diagnosis of Sepsis    INTERVAL HPI AND OVERNIGHT EVENTS:  Patient was examined and seen at bedside. This morning, the patient felt nauseous with abdominal discomfort. During rounds, the patient vomited twice. Both episodes produced non-bloody, non-bilious, brown stomach contents mixed with undigested food. The patient states that the last meal she had was a tuna sandwich at 5pm last night. She had felt a bit uneasy yesterday in the afternoon. She says that this is exactly how she felt over the past couple months and when she was admitted. She remembers vomiting in the ED. Outpatient workup by Dr. Velazquez included upper endoscopy, which showed gastritis and gastroparesis.    REVIEW OF SYMPTOMS:  CONSTITUTIONAL: No weakness, fevers or chills; No headaches  EYES: No visual changes, eye pain, or discharge  ENT: No vertigo; No ear pain or change in hearing; No sore throat or difficulty swallowing  NECK: No pain or stiffness  RESPIRATORY: No cough, wheezing, or hemoptysis; No shortness of breath  CARDIOVASCULAR: No chest pain or palpitations  GASTROINTESTINAL: No abdominal or epigastric pain; nausea, vomiting, no hematemesis; No diarrhea or constipation; No melena or hematochezia  GENITOURINARY: No dysuria, frequency or hematuria  MUSCULOSKELETAL: No joint pain, no muscle pain, no weakness  NEUROLOGICAL: No numbness or weakness  SKIN: No itching or rashes    OBJECTIVE  PAST MEDICAL & SURGICAL HISTORY  Multiple sclerosis  DM type 2 (diabetes mellitus, type 2)  Stomach cancer  Portacath in place  S/P partial gastrectomy  H/O total hysterectomy with bilateral salpingo-oophorectomy (BSO)  H/O partial thyroidectomy    ALLERGIES:  No Known Allergies    MEDICATIONS:  STANDING MEDICATIONS  baclofen 20 milliGRAM(s) Oral two times a day  cefpodoxime 200 milliGRAM(s) Oral every 12 hours  chlorhexidine 4% Liquid 1 Application(s) Topical <User Schedule>  cholecalciferol 1000 Unit(s) Oral daily  dextrose 5%. 1000 milliLiter(s) IV Continuous <Continuous>  dextrose 50% Injectable 12.5 Gram(s) IV Push once  dextrose 50% Injectable 25 Gram(s) IV Push once  dextrose 50% Injectable 25 Gram(s) IV Push once  DULoxetine 60 milliGRAM(s) Oral daily  folic acid 1 milliGRAM(s) Oral daily  heparin  Injectable 5000 Unit(s) SubCutaneous every 8 hours  insulin glargine Injectable (LANTUS) 7 Unit(s) SubCutaneous at bedtime  insulin lispro (HumaLOG) corrective regimen sliding scale   SubCutaneous three times a day before meals  insulin lispro Injectable (HumaLOG) 3 Unit(s) SubCutaneous three times a day before meals  iron sucrose IVPB 200 milliGRAM(s) IV Intermittent every 7 days  metoclopramide 10 milliGRAM(s) Oral three times a day before meals  pantoprazole    Tablet 40 milliGRAM(s) Oral two times a day  rOPINIRole 0.5 milliGRAM(s) Oral three times a day    PRN MEDICATIONS  acetaminophen   Tablet .. 650 milliGRAM(s) Oral every 6 hours PRN  dextrose 40% Gel 15 Gram(s) Oral once PRN  glucagon  Injectable 1 milliGRAM(s) IntraMuscular once PRN  guaiFENesin   Syrup  (Sugar-Free) 100 milliGRAM(s) Oral every 6 hours PRN  ondansetron Injectable 4 milliGRAM(s) IV Push every 6 hours PRN      VITAL SIGNS: Last 24 Hours  T(C): 36.7 (21 Dec 2019 05:31), Max: 37 (20 Dec 2019 12:15)  T(F): 98 (21 Dec 2019 05:31), Max: 98.6 (20 Dec 2019 12:15)  HR: 79 (21 Dec 2019 05:31) (79 - 89)  BP: 120/61 (21 Dec 2019 05:31) (107/57 - 120/61)  BP(mean): --  RR: 18 (21 Dec 2019 05:31) (16 - 18)  SpO2: --    LABS:                        9.2    10.98 )-----------( 413      ( 21 Dec 2019 08:20 )             29.3     12-20    139  |  104  |  17  ----------------------------<  64<L>  4.4   |  23  |  0.5<L>    Ca    7.6<L>      20 Dec 2019 05:30  Mg     1.8     12-20    TPro  4.6<L>  /  Alb  2.1<L>  /  TBili  0.3  /  DBili  x   /  AST  34  /  ALT  24  /  AlkPhos  160<H>  12-20    RADIOLOGY:  Pending official read on chest X-ray    PHYSICAL EXAM:  CONSTITUTIONAL: No acute distress, well-developed, well-groomed, AAOx3  HEAD: Atraumatic, normocephalic  EYES: EOM intact, PERRLA, conjunctiva and sclera clear  ENT: Supple, no masses, no thyromegaly, no bruits, no JVD; moist mucous membranes  PULMONARY: Clear to auscultation bilaterally; no wheezes, rales, or rhonchi  CARDIOVASCULAR: Regular rate and rhythm; no murmurs, rubs, or gallops  GASTROINTESTINAL: Soft, non-tender, non-distended; bowel sounds present  MUSCULOSKELETAL: 2+ peripheral pulses; no clubbing, no cyanosis, non-pitting edema in bilateral upper extremities from hands to shoulders that is improved compared to exam yesterday  NEUROLOGY: 4/5 strength in bilateral upper extremities, 4/5 strength in left lower extremity, 2/5 muscle strength in right lower extremity, reflexes are 2-/4 throughout, did not witness gait  SKIN: ecchymosis present on posterior aspect of right elbow; warm and dry    ASSESSMENT & PLAN  Patient is a 65yo female with PMH of multiple sclerosis on IVIG, gastric cancer s/p gastrectomy s/p chemotherapy, and diabetes mellitus who presented to the hospital s/p mechanical fall. Patient states that she has always had right lower extremity weakness, but the left lower extremity has become progressively weaker over the past month.     #Vomiting with history of gastroparesis  - Continue with metoclopramide 10mg PO TID before meals  - Continue with pantoprazole 40mg PO BID  - Start ondansetron 4mg IV Q6H PRN nausea/vomiting    #Acute on chronic iron deficiency anemia  - Hemoglobin on admission: 9.0  - Hemoglobin on 12/16/2019 was 6.6  - S/p 1 unit PRBC on 12/16/2019 and 1 unit PRBC on 12/20/2019  - FOBT positive, but no bright red blood per rectum  - Hemoglobin today 9.2  - Heme/onc consult appreciated  - Continue with venofer 200mg IV Q7D  - GI consult appreciated  - Spoke with Dr. Velazquez over the phone. He said that he will follow up with the patient for a colonoscopy outpatient.  - Keep active type and screen  - Trend hemoglobin and hematocrit    #Bilateral upper extremity edema, improving  - Bilateral [facility] Duplex: thrombus noted in left cephalic vein; no thrombus in right upper extremity  - Per attending, no indication for anticoagulation at this time  - Warm compresses  - Keep arms elevated  - Pending nutrition consult  - CT chest: bilateral pleural effusions, ground-glass opacifications  - Follow chest X-ray from this morning  - Continue with furosemide 20mg PO QD    #S/p mechanical fall secondary to UTI vs multiple sclerosis flare, resolved  - Neurology consult appreciated  - Underlying UTI can present as multiple sclerosis flare  - Patient current back to baseline  - Per neurology, no need for another MRI or IV solumedrol this admission  - Continue with cefpodoxime 200mg PO Q12H for 5 days (Day #4)  - Continue with baclofen 20mg PO BID  - Continue with duloxetine 60mg PO QD  - Continue with ropinirole 0.5mg PO TID  - Continue with acetaminophen 650mg PO Q6H PRN pain  - Blood Cx: no growth to date  - Urine Cx: >100,000 E. coli resistant to ampicillin  - Follows outpatient with Dr. Vance for neurology    #Left ear pain likely secondary to cerumen impaction, resolved  - S/p hydrogen peroxide irrigation    #Diabetes mellitus type 2  - Continue with insulin glargine 7 units SQ QHS  - Continue with insulin lispro 3 units SQ TID  - Insulin sliding scale coverage  - Monitor fingerstick glucose    #Urinary retention  - Continue with tamsulosin 0.4mg PO QHS    #Gastric cancer s/p gastrectomy s/p chemotherapy  - Currently in remission  - Per daughter, most recent endoscopy was negative  - Most recent CT scan shows possible duodenal loop  that could be related to malignancy  - Continue with ondansetron 4mg IV Q8H PRN nausea/vomiting  - Continue with metoclopramide 10mg PO TID before meals  - Follows outpatient with Dr. Toussaint  - Hem/onc consult appreciated  - Reticulocyte percentage, haptoglobin, and CEA elevated  - Will need outpatient PET scan    #Suspected vitamin D deficiency  - Continue with cholecalciferol 1000 units PO QD    #Misc  - DVT Prophylaxis: heparin 5000 units SQ Q8H   - Diet: Full liquid  - GI Prophylaxis: pantoprazole 40mg PO BID  - Activity: increase as tolerated  - IV Fluids: Not indicated  - Code Status: Full Code    Dispo: Pending authorization for inpatient rehab

## 2019-12-21 NOTE — DIETITIAN INITIAL EVALUATION ADULT. - ENERGY NEEDS
Estimated Calorie Needs: [id number] x AF 1.2-1.3=1469-1591kcal/day  Estimated Protein Needs: 71-85grams/day (1-1.2grams/kg of admit weight)   Estimated Fluid Needs: 1469-1591mL/day (1mL/kcal)

## 2019-12-22 NOTE — PROGRESS NOTE ADULT - SUBJECTIVE AND OBJECTIVE BOX
Progress Note:  Provider Speciality                            Hospitalist      LEFTY CARRASQUILLO [id number] 64y Female     CHIEF PRESENTING COMPLAINT:  Patient is a 64y old  Female who presents with a chief complaint of lower extremity weakness leading to mechanical fall (18 Dec 2019 12:10)        SUBJECTIVE:  Patient was seen and examined at bedside.  Complaint of nausea vomiting in AM today. Reports bilateral UE swelling  improvement.  . No significant overnight events reported.     HISTORY OF PRESENTING ILLNESS:  HPI:  63 Y/O F with PMHx of MS on IVIG (last dose 3 wks ago), DM type II, Gastric cancer s/p resection and chemo currently in remission (Dr Toussaint), presented to the hospital s/p mechanical fall. Per pt she had progressively worsening lower extremity weakness that started about 2 wks ago, she has chronic RLE weakness and numbness, she ambulates with a walker but no she has weakness in her LLE associated with difficulty ambulation. Previously she would be able to get off the bed and walk with a walker but now she needs assistance with everything. This morning while she was going to use restroom, she felt dizzy described as spinning sensations, her legs gave way and she fell on the floor. Daughter is the HCP and she was helping her mother with walking while this happened. Pt hit her left knee and is complaining of pain in that area. She denied hitting her head, hx of LOC, blacking out, palpitations or any abnormal jerky movements of her limbs. Pt recently had worsening nausea and vomiting that she gets from gastroparesis and gastric resection, she ran out of her medications and it got worse so she came to ER 2 days ago. She was resuscitated with fluids and was sent home, her vomiting has improved since then. Pt had suspicious lesion in the duodenal loop, per daughter pt had EGD and Bx was -ve for malignancy? Pt denied any hx of fever, chills, nausea, vomiting, diarrhea, constipation, melena, pain in abdomen, sob, chest pain, cough, increased urinary frequency, dysuria, headache, any changes in weight, recent travel.     In the ED pt was found to have BP of 86/54, , RR 20/min, SPO2 96% on RA, Temp 96.7. She was given I/V fluid bolus 2.2 L NS and her vitals improved. Her UA showed nitrites so she was given Rocephin? (14 Dec 2019 11:23)        REVIEW OF SYSTEMS:  Patient denies any headache, any vision complaints, runny nose, fever, chills, sore throat. Denies chest pain, shortness of breath, palpitation. Denies  abdominal pain, diarrhoea, Denies urinary burning, urgency, frequency, dysuria. Denies weakness in any part of the body or numbness.   At least 10 systems were reviewed in ROS. All systems reviewed  are within normal limits except for the complaints as described in Subjective.    PAST MEDICAL & SURGICAL HISTORY:  PAST MEDICAL & SURGICAL HISTORY:  Multiple sclerosis  DM type 2 (diabetes mellitus, type 2)  Stomach cancer  Portacath in place  S/P partial gastrectomy  H/O total hysterectomy with bilateral salpingo-oophorectomy (BSO)  H/O partial thyroidectomy          VITAL SIGNS:  Vital Signs Last 24 Hrs  T(C): 36.5 (22 Dec 2019 04:44), Max: 36.8 (21 Dec 2019 13:22)  T(F): 97.7 (22 Dec 2019 04:44), Max: 98.3 (21 Dec 2019 13:22)  HR: 81 (22 Dec 2019 04:44) (81 - 89)  BP: 105/65 (22 Dec 2019 04:44) (93/54 - 119/59)  BP(mean): --  RR: 18 (22 Dec 2019 04:44) (18 - 18)  SpO2: --    PHYSICAL EXAMINATION:  Not in acute distress  General: + pallor, no icterus  HEENT:   EOMI, no JVD,.  Heart: S1+S2 audible  Lungs: bilateral  fair air entry, no wheezing, no crepitations.  Abdomen: Soft, non-tender, non-distended , no  rigidity or guarding.  CNS: AAOx3, CN  grossly intact.  Extremities:  No edema    , bilateral UE swelling, RUE ecchymoses        CONSULTS:  Consultant(s) Notes Reviewed by me.   Care Discussed with Consultants/Other Providers where required.        MEDICATIONS:  MEDICATIONS  (STANDING):  baclofen 20 milliGRAM(s) Oral two times a day  cefpodoxime 200 milliGRAM(s) Oral every 12 hours  chlorhexidine 4% Liquid 1 Application(s) Topical <User Schedule>  cholecalciferol 1000 Unit(s) Oral daily  dextrose 5%. 1000 milliLiter(s) (50 mL/Hr) IV Continuous <Continuous>  dextrose 50% Injectable 12.5 Gram(s) IV Push once  dextrose 50% Injectable 25 Gram(s) IV Push once  dextrose 50% Injectable 25 Gram(s) IV Push once  DULoxetine 60 milliGRAM(s) Oral daily  folic acid 1 milliGRAM(s) Oral daily  insulin glargine Injectable (LANTUS) 7 Unit(s) SubCutaneous at bedtime  insulin lispro (HumaLOG) corrective regimen sliding scale   SubCutaneous three times a day before meals  insulin lispro Injectable (HumaLOG) 3 Unit(s) SubCutaneous three times a day before meals  iron sucrose IVPB 200 milliGRAM(s) IV Intermittent every 7 days  metoclopramide 10 milliGRAM(s) Oral three times a day before meals  pantoprazole    Tablet 40 milliGRAM(s) Oral two times a day  rOPINIRole 0.5 milliGRAM(s) Oral three times a day  tamsulosin 0.4 milliGRAM(s) Oral at bedtime    MEDICATIONS  (PRN):  acetaminophen   Tablet .. 650 milliGRAM(s) Oral every 6 hours PRN Moderate Pain (4 - 6)  dextrose 40% Gel 15 Gram(s) Oral once PRN Blood Glucose LESS THAN 70 milliGRAM(s)/deciliter  glucagon  Injectable 1 milliGRAM(s) IntraMuscular once PRN Glucose LESS THAN 70 milligrams/deciliter  guaiFENesin   Syrup  (Sugar-Free) 100 milliGRAM(s) Oral every 6 hours PRN Cough  ondansetron Injectable 4 milliGRAM(s) IV Push every 8 hours PRN Nausea and/or Vomiting            ASSESSMENT:    Patient is a 63yo female with PMH of multiple sclerosis on IVIG, gastric cancer s/p gastrectomy s/p chemotherapy, and diabetes mellitus who presented to the hospital s/p mechanical fall. Patient states that she has always had right lower extremity weakness, but the left lower extremity has become progressively weaker over the past month.       ASSESSMENT:  Principal Diagnosis:  Low suspicion for MS flare  Urinary tract infection secondary to E coli  Acute on chronic [id number]-[id number] iron deficiency- low suspicion for GIB  Iron deficiency  Gastroparesis  Urinary retention  L cephalic thrombus  severe hypoalbuminemia with P effusion and mild ascites    Associated Active Comorbid Conditions:  chronic normocytic anmeia  Diabetes mellitis type 2   history of gastric CA, billroth procedure & chemo  Suspected vitamin D & folic acid  deficiency    PLAN:   - Vantin x 5 days -completed  -no MS flare currently  -Low suspicion for GIB  -Iron deficiancy secondary to gastric surgery- Fe supplementation.   -Keep Hb >7. Monitor hemoglobin/hematocrit   - Insulin sliding scale with coverage with meals and QHS . keep current dose of lantus & lispro  -Electrolyte supplementation and follow up with BMP. Keep K+>4, Mg>2   -gastroparesis- RTC Reglan Added zofran  -Gastric cancer s/p gastrectomy s/p chemotherapy. Follows outpatient with Dr. Toussaint  severe hypoalbuminemia with P effusion and mild ascites- Lasix 20 milligrams IV today, 20 milligrams daily PO    -Suspected vitamin D deficiency- Continue with cholecalciferol 1000 units PO QD  -Urinary retention-resolved . Off Ejnsen  -L cephalic thrombus- supportive care- no anti-coagulation     Progress note Handoff:   Discussion: Diagnosis, current management plan and further plan of care discussed with patient  and housestaff in morning  rounds.  Disposition: Awaiting placement to

## 2019-12-23 NOTE — PROGRESS NOTE ADULT - SUBJECTIVE AND OBJECTIVE BOX
LEFTY CARRASQUILLO 64y Female  MRN#: [id number]   Hospital Day: 9d    SUBJECTIVE  Patient is a 64y old Female who presents with a chief complaint of lower extremity weakness leading to mechanical fall (22 Dec 2019 09:45)    INTERVAL HPI AND OVERNIGHT EVENTS:  Patient was examined and seen at bedside. This morning she is resting comfortably in bed. She reports that she wasn't able to sleep well last night. She denies pain anywhere in the body.    REVIEW OF SYMPTOMS:  CONSTITUTIONAL: No weakness, fevers or chills; No headaches  EYES: No visual changes, eye pain, or discharge  ENT: No vertigo; No ear pain or change in hearing; No sore throat or difficulty swallowing  NECK: No pain or stiffness  RESPIRATORY: No cough, wheezing, or hemoptysis; No shortness of breath  CARDIOVASCULAR: No chest pain or palpitations  GASTROINTESTINAL: No abdominal or epigastric pain; no hematemesis; No diarrhea or constipation; No melena or hematochezia  GENITOURINARY: No dysuria, frequency or hematuria  MUSCULOSKELETAL: No joint pain, no muscle pain, no weakness  NEUROLOGICAL: No numbness or weakness  SKIN: No itching or rashes    OBJECTIVE  PAST MEDICAL & SURGICAL HISTORY  Multiple sclerosis  DM type 2 (diabetes mellitus, type 2)  Stomach cancer  Portacath in place  S/P partial gastrectomy  H/O total hysterectomy with bilateral salpingo-oophorectomy (BSO)  H/O partial thyroidectomy    ALLERGIES:  No Known Allergies    MEDICATIONS:  STANDING MEDICATIONS  baclofen 20 milliGRAM(s) Oral two times a day  chlorhexidine 4% Liquid 1 Application(s) Topical <User Schedule>  cholecalciferol 1000 Unit(s) Oral daily  dalfampridine ER 10 milliGRAM(s) Oral every 12 hours  dextrose 5%. 1000 milliLiter(s) IV Continuous <Continuous>  dextrose 50% Injectable 12.5 Gram(s) IV Push once  dextrose 50% Injectable 25 Gram(s) IV Push once  dextrose 50% Injectable 25 Gram(s) IV Push once  DULoxetine 60 milliGRAM(s) Oral daily  folic acid 1 milliGRAM(s) Oral daily  heparin  Injectable 5000 Unit(s) SubCutaneous every 8 hours  insulin glargine Injectable (LANTUS) 6 Unit(s) SubCutaneous at bedtime  insulin lispro (HumaLOG) corrective regimen sliding scale   SubCutaneous three times a day before meals  insulin lispro Injectable (HumaLOG) 3 Unit(s) SubCutaneous three times a day before meals  iron sucrose IVPB 200 milliGRAM(s) IV Intermittent every 7 days  metoclopramide 10 milliGRAM(s) Oral three times a day before meals  pantoprazole    Tablet 40 milliGRAM(s) Oral two times a day  rOPINIRole 0.5 milliGRAM(s) Oral three times a day    PRN MEDICATIONS  acetaminophen   Tablet .. 650 milliGRAM(s) Oral every 6 hours PRN  dextrose 40% Gel 15 Gram(s) Oral once PRN  glucagon  Injectable 1 milliGRAM(s) IntraMuscular once PRN  guaiFENesin  milliGRAM(s) Oral every 12 hours PRN  ondansetron Injectable 4 milliGRAM(s) IV Push every 6 hours PRN      VITAL SIGNS: Last 24 Hours  T(C): 36.3 (23 Dec 2019 04:45), Max: 36.9 (22 Dec 2019 13:15)  T(F): 97.3 (23 Dec 2019 04:45), Max: 98.4 (22 Dec 2019 13:15)  HR: 82 (23 Dec 2019 04:45) (80 - 82)  BP: 118/63 (23 Dec 2019 04:45) (105/58 - 118/63)  BP(mean): --  RR: 18 (23 Dec 2019 04:45) (16 - 18)  SpO2: --    LABS:                        9.3    8.53  )-----------( 443      ( 23 Dec 2019 05:41 )             30.8     12-23    137  |  102  |  15  ----------------------------<  69<L>  4.6   |  22  |  0.5<L>    Ca    8.0<L>      23 Dec 2019 05:41  Phos  3.7     12-23  Mg     1.8     12-23    TPro  4.9<L>  /  Alb  2.4<L>  /  TBili  0.3  /  DBili  x   /  AST  36  /  ALT  25  /  AlkPhos  173<H>  12-23      PHYSICAL EXAM:  CONSTITUTIONAL: No acute distress, well-developed, well-groomed, AAOx3  HEAD: Atraumatic, normocephalic  EYES: EOM intact, PERRLA, conjunctiva and sclera clear  ENT: Supple, no masses, no thyromegaly, no bruits, no JVD; moist mucous membranes  PULMONARY: Clear to auscultation bilaterally; no wheezes, rales, or rhonchi  CARDIOVASCULAR: Regular rate and rhythm; no murmurs, rubs, or gallops  GASTROINTESTINAL: Soft, non-tender, non-distended; bowel sounds present  MUSCULOSKELETAL: 2+ peripheral pulses; no clubbing, no cyanosis, non-pitting edema in bilateral upper extremities from hands to shoulders that is improved compared to exam yesterday  NEUROLOGY: 4/5 strength in bilateral upper extremities, 4/5 strength in left lower extremity, 2/5 muscle strength in right lower extremity, reflexes are 2-/4 throughout, did not witness gait  SKIN: ecchymosis present on posterior aspect of right elbow; warm and dry

## 2019-12-23 NOTE — PROGRESS NOTE ADULT - ASSESSMENT
Patient is a 65yo female with PMH of multiple sclerosis on IVIG, gastric cancer s/p gastrectomy s/p chemotherapy, and diabetes mellitus who presented to the hospital s/p mechanical fall. Patient states that she has always had right lower extremity weakness, but the left lower extremity has become progressively weaker over the past month.     #Vomiting with history of gastroparesis  - Continue with metoclopramide 10mg PO TID before meals  - Continue with pantoprazole 40mg PO BID  - Continue ondansetron 4mg IV Q6H PRN nausea/vomiting  - Patient reports improvement in nausea and vomiting     #Acute on chronic iron deficiency anemia  - Hemoglobin on admission: 9.0  - Hemoglobin on 12/16/2019 was 6.6  - S/p 1 unit PRBC on 12/16/2019 and 1 unit PRBC on 12/20/2019  - FOBT positive, but no bright red blood per rectum  - Hemoglobin today 9.3  - Heme/onc consult appreciated  - Continue with venofer 200mg IV Q7D  - GI consult appreciated  - Dr. Velazquez will follow up with the patient for a colonoscopy outpatient.  - Keep active type and screen  - Trend hemoglobin and hematocrit    #Bilateral upper extremity edema, improving  - Bilateral VA Duplex: thrombus noted in left cephalic vein; no thrombus in right upper extremity  - Per attending, no indication for anticoagulation at this time  - Warm compresses  - Keep arms elevated  - Diet changed as per dietician: consistent carbohydrate (evening snack) and low fat (12/23)  - CT chest: bilateral pleural effusions, ground-glass opacifications  - Chest X-ray showed small right pleural effusion (12/21)   - Furosemide 20mg PO QD    #S/p mechanical fall secondary to UTI vs multiple sclerosis flare, resolved  - Neurology consult appreciated  - Underlying UTI can present as multiple sclerosis flare  - Patient current back to baseline  - Per neurology, no need for another MRI or IV solumedrol this admission  - Done with cefpodoxime 200mg PO Q12H for 5 days (last day 12/23)  - Continue with baclofen 20mg PO BID  - Continue with duloxetine 60mg PO QD  - Continue with ropinirole 0.5mg PO TID  - Continue with acetaminophen 650mg PO Q6H PRN pain  - Blood Cx: no growth to date  - Urine Cx: >100,000 E. coli resistant to ampicillin  - Follows outpatient with Dr. Vance for neurology  - Continue Dalfampridine ER 10mg PO Q12 for MS    #Left ear pain likely secondary to cerumen impaction, resolved  - S/p hydrogen peroxide irrigation    #Diabetes mellitus type 2  - Decreased insulin glargine from 7 units SQ QHS to 6 units SQ QHS (12/23)  - Continue with insulin lispro 3 units SQ TID  - Insulin sliding scale coverage  - Monitor fingerstick glucose    #Urinary retention  - Continue with tamsulosin 0.4mg PO QHS    #Gastric cancer s/p gastrectomy s/p chemotherapy  - Currently in remission  - Per daughter, most recent endoscopy was negative  - Most recent CT scan shows possible duodenal loop that could be related to malignancy  - Continue with ondansetron 4mg IV Q8H PRN nausea/vomiting  - Continue with metoclopramide 10mg PO TID before meals  - Follows outpatient with Dr. Toussaint  - Hem/onc consult appreciated  - Reticulocyte percentage, haptoglobin, and CEA elevated  - Will need outpatient PET scan    #Suspected vitamin D deficiency  - Continue with cholecalciferol 1000 units PO QD    #Misc  - DVT Prophylaxis: heparin 5000 units SQ Q8H   - Diet: Full liquid  - GI Prophylaxis: pantoprazole 40mg PO BID  - Activity: increase as tolerated  - IV Fluids: Not indicated  - Code Status: Full Code    Dispo: Pending authorization for inpatient rehab (4A) Patient is a 65yo female with PMH of multiple sclerosis on IVIG, gastric cancer s/p gastrectomy s/p chemotherapy, and diabetes mellitus who presented to the hospital s/p mechanical fall. Patient states that she has always had right lower extremity weakness, but the left lower extremity has become progressively weaker over the past month.     #Vomiting with history of gastroparesis  - Continue with metoclopramide 10mg PO TID before meals  - Continue with pantoprazole 40mg PO BID  - Continue ondansetron 4mg IV Q6H PRN nausea/vomiting  - Patient reports improvement in nausea and vomiting     #Acute on chronic iron deficiency anemia  - Hemoglobin on admission: 9.0  - Hemoglobin on 12/16/2019 was 6.6  - S/p 1 unit PRBC on 12/16/2019 and 1 unit PRBC on 12/20/2019  - FOBT positive, but no bright red blood per rectum  - Hemoglobin today 9.3  - Heme/onc consult appreciated  - Continue with venofer 200mg IV Q7D  - GI consult appreciated  - Dr. Velazquez will follow up with the patient for a colonoscopy outpatient.  - Keep active type and screen  - Trend hemoglobin and hematocrit    #Bilateral upper extremity edema, improving  - Bilateral VA Duplex: thrombus noted in left cephalic vein; no thrombus in right upper extremity  - Per attending, no indication for anticoagulation at this time  - Warm compresses  - Keep arms elevated  - Diet changed as per dietician: consistent carbohydrate (evening snack) and low fat (12/23)  - CT chest: bilateral pleural effusions, ground-glass opacifications  - Chest X-ray showed small right pleural effusion (12/21)   - Furosemide 20mg PO QD    #S/p mechanical fall secondary to UTI vs multiple sclerosis flare, resolved  - Neurology consult appreciated  - Underlying UTI can present as multiple sclerosis flare  - Patient current back to baseline  - Per neurology, no need for another MRI or IV solumedrol this admission  - Done with cefpodoxime 200mg PO Q12H for 5 days (last day 12/23)  - Continue with baclofen 20mg PO BID  - Continue with duloxetine 60mg PO QD  - Continue with ropinirole 0.5mg PO TID  - Continue with acetaminophen 650mg PO Q6H PRN pain  - Blood Cx: no growth to date  - Urine Cx: >100,000 E. coli resistant to ampicillin  - Follows outpatient with Dr. Vance for neurology  - Continue Dalfampridine ER 10mg PO Q12 for MS    #Left ear pain likely secondary to cerumen impaction, resolved  - S/p hydrogen peroxide irrigation    #Diabetes mellitus type 2  - Decreased insulin glargine from 7 units SQ QHS to 6 units SQ QHS (12/23)  - Continue with insulin lispro 3 units SQ TID  - Insulin sliding scale coverage  - Monitor fingerstick glucose    #Urinary retention - resolved  - Discontinued tamsulosin 0.4mg PO QHS    #Gastric cancer s/p gastrectomy s/p chemotherapy  - Currently in remission  - Per daughter, most recent endoscopy was negative  - Most recent CT scan shows possible duodenal loop that could be related to malignancy  - Continue with ondansetron 4mg IV Q8H PRN nausea/vomiting  - Continue with metoclopramide 10mg PO TID before meals  - Follows outpatient with Dr. Toussaint  - Hem/onc consult appreciated  - Reticulocyte percentage, haptoglobin, and CEA elevated  - Will need outpatient PET scan    #Suspected vitamin D deficiency  - Continue with cholecalciferol 1000 units PO QD    #Misc  - DVT Prophylaxis: heparin 5000 units SQ Q8H   - Diet: Full liquid  - GI Prophylaxis: pantoprazole 40mg PO BID  - Activity: increase as tolerated  - IV Fluids: Not indicated  - Code Status: Full Code    Dispo: Pending authorization for inpatient rehab (4A)

## 2019-12-23 NOTE — PROGRESS NOTE ADULT - SUBJECTIVE AND OBJECTIVE BOX
Progress Note:  Provider Speciality                            Hospitalist      LEFTY CARRASQUILLO [id number] 64y Female     CHIEF PRESENTING COMPLAINT:  Patient is a 64y old  Female who presents with a chief complaint of lower extremity weakness leading to mechanical fall (18 Dec 2019 12:10)        SUBJECTIVE:  Patient was seen and examined at bedside.  Complaint of nausea vomiting in AM today. Reports bilateral UE swelling  improvement.  . No significant overnight events reported.     HISTORY OF PRESENTING ILLNESS:  HPI:  65 Y/O F with PMHx of MS on IVIG (last dose 3 wks ago), DM type II, Gastric cancer s/p resection and chemo currently in remission (Dr Toussaint), presented to the hospital s/p mechanical fall. Per pt she had progressively worsening lower extremity weakness that started about 2 wks ago, she has chronic RLE weakness and numbness, she ambulates with a walker but no she has weakness in her LLE associated with difficulty ambulation. Previously she would be able to get off the bed and walk with a walker but now she needs assistance with everything. This morning while she was going to use restroom, she felt dizzy described as spinning sensations, her legs gave way and she fell on the floor. Daughter is the HCP and she was helping her mother with walking while this happened. Pt hit her left knee and is complaining of pain in that area. She denied hitting her head, hx of LOC, blacking out, palpitations or any abnormal jerky movements of her limbs. Pt recently had worsening nausea and vomiting that she gets from gastroparesis and gastric resection, she ran out of her medications and it got worse so she came to ER 2 days ago. She was resuscitated with fluids and was sent home, her vomiting has improved since then. Pt had suspicious lesion in the duodenal loop, per daughter pt had EGD and Bx was -ve for malignancy? Pt denied any hx of fever, chills, nausea, vomiting, diarrhea, constipation, melena, pain in abdomen, sob, chest pain, cough, increased urinary frequency, dysuria, headache, any changes in weight, recent travel.     In the ED pt was found to have BP of 86/54, , RR 20/min, SPO2 96% on RA, Temp 96.7. She was given I/V fluid bolus 2.2 L NS and her vitals improved. Her UA showed nitrites so she was given Rocephin? (14 Dec 2019 11:23)        REVIEW OF SYSTEMS:  Patient denies any headache, any vision complaints, runny nose, fever, chills, sore throat. Denies chest pain, shortness of breath, palpitation. Denies  abdominal pain, diarrhoea, Denies urinary burning, urgency, frequency, dysuria. Denies weakness in any part of the body or numbness.   At least 10 systems were reviewed in ROS. All systems reviewed  are within normal limits except for the complaints as described in Subjective.    PAST MEDICAL & SURGICAL HISTORY:  PAST MEDICAL & SURGICAL HISTORY:  Multiple sclerosis  DM type 2 (diabetes mellitus, type 2)  Stomach cancer  Portacath in place  S/P partial gastrectomy  H/O total hysterectomy with bilateral salpingo-oophorectomy (BSO)  H/O partial thyroidectomy          VITAL SIGNS:  Vital Signs Last 24 Hrs  T(C): 36.3 (23 Dec 2019 04:45), Max: 36.9 (22 Dec 2019 13:15)  T(F): 97.3 (23 Dec 2019 04:45), Max: 98.4 (22 Dec 2019 13:15)  HR: 82 (23 Dec 2019 04:45) (80 - 82)  BP: 118/63 (23 Dec 2019 04:45) (105/58 - 118/63)  BP(mean): --  RR: 18 (23 Dec 2019 04:45) (16 - 18)  SpO2: --      PHYSICAL EXAMINATION:  Not in acute distress  General: + pallor, no icterus  HEENT:   EOMI, no JVD,.  Heart: S1+S2 audible  Lungs: bilateral  fair air entry, no wheezing, no crepitations.  Abdomen: Soft, non-tender, non-distended , no  rigidity or guarding.  CNS: AAOx3, CN  grossly intact.  Extremities:  No edema    , bilateral UE swelling, RUE ecchymoses        CONSULTS:  Consultant(s) Notes Reviewed by me.   Care Discussed with Consultants/Other Providers where required.        MEDICATIONS:  MEDICATIONS  (STANDING):  baclofen 20 milliGRAM(s) Oral two times a day  cefpodoxime 200 milliGRAM(s) Oral every 12 hours  chlorhexidine 4% Liquid 1 Application(s) Topical <User Schedule>  cholecalciferol 1000 Unit(s) Oral daily  dextrose 5%. 1000 milliLiter(s) (50 mL/Hr) IV Continuous <Continuous>  dextrose 50% Injectable 12.5 Gram(s) IV Push once  dextrose 50% Injectable 25 Gram(s) IV Push once  dextrose 50% Injectable 25 Gram(s) IV Push once  DULoxetine 60 milliGRAM(s) Oral daily  folic acid 1 milliGRAM(s) Oral daily  insulin glargine Injectable (LANTUS) 7 Unit(s) SubCutaneous at bedtime  insulin lispro (HumaLOG) corrective regimen sliding scale   SubCutaneous three times a day before meals  insulin lispro Injectable (HumaLOG) 3 Unit(s) SubCutaneous three times a day before meals  iron sucrose IVPB 200 milliGRAM(s) IV Intermittent every 7 days  metoclopramide 10 milliGRAM(s) Oral three times a day before meals  pantoprazole    Tablet 40 milliGRAM(s) Oral two times a day  rOPINIRole 0.5 milliGRAM(s) Oral three times a day  tamsulosin 0.4 milliGRAM(s) Oral at bedtime    MEDICATIONS  (PRN):  acetaminophen   Tablet .. 650 milliGRAM(s) Oral every 6 hours PRN Moderate Pain (4 - 6)  dextrose 40% Gel 15 Gram(s) Oral once PRN Blood Glucose LESS THAN 70 milliGRAM(s)/deciliter  glucagon  Injectable 1 milliGRAM(s) IntraMuscular once PRN Glucose LESS THAN 70 milligrams/deciliter  guaiFENesin   Syrup  (Sugar-Free) 100 milliGRAM(s) Oral every 6 hours PRN Cough  ondansetron Injectable 4 milliGRAM(s) IV Push every 8 hours PRN Nausea and/or Vomiting            ASSESSMENT:    Patient is a 63yo female with PMH of multiple sclerosis on IVIG, gastric cancer s/p gastrectomy s/p chemotherapy, and diabetes mellitus who presented to the hospital s/p mechanical fall. Patient states that she has always had right lower extremity weakness, but the left lower extremity has become progressively weaker over the past month.       ASSESSMENT:  Principal Diagnosis:  Low suspicion for MS flare  Urinary tract infection secondary to E coli  Acute on chronic eswjex-ydeetjhpqsfxtu-fvszxmtz iron deficiency- low suspicion for GIB  Iron deficiency  Gastroparesis  Urinary retention  L cephalic thrombus  severe hypoalbuminemia with P effusion and mild ascites    Associated Active Comorbid Conditions:  chronic normocytic anemia  Diabetes mellitis type 2   history of gastric CA, Billroth procedure & chemo  Suspected vitamin D & folic acid  deficiency    PLAN:   - Vantin x 5 days -completed  -no MS flare currently  -Low suspicion for GIB  -Iron deficiancy secondary to gastric surgery- Fe supplementation.   -Keep Hb >7. Monitor hemoglobin/hematocrit   - Insulin sliding scale with coverage with meals and QHS . keep current dose of lantus & lispro  -Electrolyte supplementation and follow up with BMP. Keep K+>4, Mg>2   -gastroparesis- RTC Reglan Added zofran  -Gastric cancer s/p gastrectomy s/p chemotherapy. Follows outpatient with Dr. Toussaint  severe hypoalbuminemia with P effusion and mild ascites- Lasix 20 milligrams IV today, 20 milligrams daily PO    -Suspected vitamin D deficiency- Continue with cholecalciferol 1000 units PO QD  -Urinary retention-resolved . Off Jensen  -L cephalic thrombus- supportive care- no anti-coagulation     Progress note Handoff:   Discussion: Diagnosis, current management plan and further plan of care discussed with patient  and housestaff in morning  rounds.  Disposition: Awaiting placement to

## 2019-12-24 NOTE — CHART NOTE - NSCHARTNOTEFT_GEN_A_CORE
Registered Dietitian Follow-Up     ****Scroll for RD Recommendations at bottom of note****    Patient Profile Reviewed                           Yes [x]   No []     Nutrition History Previously Obtained        Yes [x]  No []       Pertinent Subjective Information: Pt reports eating everything, discussed small, frequent meals and moderation of fat d/t delayed gastric emptying.      Pertinent Medical Interventions: Vomiting resolved. Arm swelling improving.   vomiting with hx of gastroparesis, receives metoclopramide, pantoprazole, ondansetron. Pt is s/p mech fall 2/2 UTI vs MS flare- resolved. Noted gastric cancer s/p gastrectomy s/p chemo, current in remission.     Diet order: DASH/TLC, CHO consistent, low fat     Anthropometrics:  - Ht. 157.48cm  - Wt. 70.6kg 12/14  - %wt change  - BMI 28.5  - IBW 50kg+/-10%     Pertinent Lab Data: (12/24) H/H 9.4/31.0, Cr 0.5, s gluc 49, alk phos 170     Pertinent Meds: heparin, humalog, lasix, zofran, venofer, protonix, vit D3, folic acid, reglan, requip     Physical Findings:  - Appearance: WDL  - GI function: last BM 12/24, WDL per pt  - Tubes:  - Oral/Mouth cavity: WDL per pt  - Skin: 1+ L arm swelling, 2+ R arm swelling, intact, ecchymotic     Nutrition Requirements  Weight Used: 70.6kg admit wt     Estimated Calorie Needs: [id number] x AF 1.2-1.3=1469-1591kcal/day  Estimated Protein Needs: 71-85grams/day (1-1.2grams/kg of admit weight)   Estimated Fluid Needs: 1469-1591mL/day (1mL/kcal)      Nutrient Intake: meets needs, PO intake %        [] Previous Nutrition Diagnosis: inadequate energy intake            [] Ongoing          [x] Resolved    [] No active nutrition diagnosis identified at this time    Nutrition Intervention meals and snacks  RECOMMENDATIONS: Continue current regimen: DASH/TLC, CHO consistent, low fat diet modifiers.     Goal/Expected Outcome: Pt to maintain/tolerate PO intake >65% fo meal trays within 7 days     Indicator/Monitoring: RD to monitor diet order, energy intake, NFPF, body comp, glucose and renal profile.    Pt no longer at risk f/u 7 days

## 2019-12-24 NOTE — DISCHARGE NOTE PROVIDER - PROVIDER TOKENS
PROVIDER:[TOKEN:[[id number]],FOLLOWUP:[1-3 days]],PROVIDER:[TOKEN:[[id number]],FOLLOWUP:[1 week]],PROVIDER:[TOKEN:[[id number]],FOLLOWUP:[1 week]],PROVIDER:[TOKEN:[[id number]],FOLLOWUP:[1 week]]

## 2019-12-24 NOTE — DISCHARGE NOTE PROVIDER - NSDCFUSCHEDAPPT_GEN_ALL_CORE_FT
LEFTY CARRASQUILLO ; 03/13/2020 ; NPP [facility] LEFTY CARRASQUILLO ; 03/13/2020 ; NPP Rad [facility] [address] [facility]

## 2019-12-24 NOTE — PROGRESS NOTE ADULT - REASON FOR ADMISSION
lower extremity weakness leading to mechanical fall

## 2019-12-24 NOTE — CHART NOTE - NSCHARTNOTEFT_GEN_A_CORE
Case discussed with Neurologist Dr. Kennedy fro Dr. Vance's group. Patient used to receive IVIG infusion at home and since she will be discharged to SNF, she will not be able to continue the routine infusion. As per patient, she would like to discuss with Dr. Vance about another treatment option because she thinks that IVIG does not help with her condition. Dr. Kennedy agrees that while patient is in the SNF for rehab, we can hold off on IVIG infusion until she discusses it with Dr. Vance. Meanwhile she can continue outpatient follow up with Neurology.

## 2019-12-24 NOTE — DISCHARGE NOTE PROVIDER - NSDCCPCAREPLAN_GEN_ALL_CORE_FT
PRINCIPAL DISCHARGE DIAGNOSIS  Diagnosis: Acute UTI  Assessment and Plan of Treatment: You came in with Lower extremity weakness which could be due to worsening of MS. In your case we think we think it was due to a urinary track infection. We treated you with antibiotics.  If you experience symptoms again please call 911 or go to the nearest emergency department      SECONDARY DISCHARGE DIAGNOSES  Diagnosis: Swelling of upper extremity  Assessment and Plan of Treatment: Please keep taking your lasix as directed. Follow up with your PCP. If you experience worsening of symptoms please call 911 or go to the nearest emergency department    Diagnosis: Iron deficiency anemia  Assessment and Plan of Treatment: Please keep taking your venofer as directed and follow up with hematology.    Diagnosis: Gastroparesis  Assessment and Plan of Treatment: Please keep taking your meds regularly and follow up with your gastroenterologist. If you experience worsening of nausea or vomiting  please call 911 or go to the nearest emergency department    Diagnosis: Multiple sclerosis  Assessment and Plan of Treatment: Please follow up with your neurologist as directed and also discuss IVIG.

## 2019-12-24 NOTE — DISCHARGE NOTE PROVIDER - NSDCMRMEDTOKEN_GEN_ALL_CORE_FT
baclofen 20 mg oral tablet: orally 2 times a day  DULoxetine 60 mg oral delayed release capsule: 1 cap(s) orally once a day  folic acid 1 mg oral tablet: 1 tab(s) orally once a day  glimepiride 4 mg oral tablet: 1 tab(s) orally once a day  pantoprazole 40 mg oral delayed release tablet: 1 tab(s) orally once a day  Reglan 10 mg oral tablet: 1 tab(s) orally 3 times a day (before meals)  rOPINIRole 0.5 mg oral tablet: 1 tab(s) orally 3 times a day  Vitamin D2 50,000 intl units (1.25 mg) oral capsule: 1 cap(s) orally once a week  Zofran 8 mg oral tablet: 1 tab(s) orally 3 times a day baclofen 20 mg oral tablet: orally 2 times a day  dalfampridine 10 mg oral tablet, extended release: 1 tab(s) orally every 12 hours  DULoxetine 60 mg oral delayed release capsule: 1 cap(s) orally once a day  folic acid 1 mg oral tablet: 1 tab(s) orally once a day  furosemide 20 mg oral tablet: 1 tab(s) orally every 48 hours  glimepiride 4 mg oral tablet: 1 tab(s) orally once a day  pantoprazole 40 mg oral delayed release tablet: 1 tab(s) orally 2 times a day  Reglan 10 mg oral tablet: 1 tab(s) orally 3 times a day (before meals)  rOPINIRole 0.5 mg oral tablet: 1 tab(s) orally 3 times a day  Venofer 20 mg/mL intravenous solution: 10 milliliter(s) intravenous every 7 days  Vitamin D2 50,000 intl units (1.25 mg) oral capsule: 1 cap(s) orally once a week  Zofran 8 mg oral tablet: 1 tab(s) orally 3 times a day

## 2019-12-24 NOTE — PROGRESS NOTE ADULT - SUBJECTIVE AND OBJECTIVE BOX
Progress Note:  Provider Speciality                            Hospitalist      LEFTY CARRASQUILLO [id number] 64y Female     CHIEF PRESENTING COMPLAINT:  Patient is a 64y old  Female who presents with a chief complaint of lower extremity weakness leading to mechanical fall (18 Dec 2019 12:10)        SUBJECTIVE:  Patient was seen and examined at bedside. No new complaints described by the patient  in morning round   . No significant overnight events reported.     HISTORY OF PRESENTING ILLNESS:  HPI:  65 Y/O F with PMHx of MS on IVIG (last dose 3 wks ago), DM type II, Gastric cancer s/p resection and chemo currently in remission (Dr Toussaint), presented to the hospital s/p mechanical fall. Per pt she had progressively worsening lower extremity weakness that started about 2 wks ago, she has chronic RLE weakness and numbness, she ambulates with a walker but no she has weakness in her LLE associated with difficulty ambulation. Previously she would be able to get off the bed and walk with a walker but now she needs assistance with everything. This morning while she was going to use restroom, she felt dizzy described as spinning sensations, her legs gave way and she fell on the floor. Daughter is the HCP and she was helping her mother with walking while this happened. Pt hit her left knee and is complaining of pain in that area. She denied hitting her head, hx of LOC, blacking out, palpitations or any abnormal jerky movements of her limbs. Pt recently had worsening nausea and vomiting that she gets from gastroparesis and gastric resection, she ran out of her medications and it got worse so she came to ER 2 days ago. She was resuscitated with fluids and was sent home, her vomiting has improved since then. Pt had suspicious lesion in the duodenal loop, per daughter pt had EGD and Bx was -ve for malignancy? Pt denied any hx of fever, chills, nausea, vomiting, diarrhea, constipation, melena, pain in abdomen, sob, chest pain, cough, increased urinary frequency, dysuria, headache, any changes in weight, recent travel.     In the ED pt was found to have BP of 86/54, , RR 20/min, SPO2 96% on RA, Temp 96.7. She was given I/V fluid bolus 2.2 L NS and her vitals improved. Her UA showed nitrites so she was given Rocephin? (14 Dec 2019 11:23)        REVIEW OF SYSTEMS:  Patient denies any headache, any vision complaints, runny nose, fever, chills, sore throat. Denies chest pain, shortness of breath, palpitation. Denies  abdominal pain, diarrhoea, Denies urinary burning, urgency, frequency, dysuria. Denies weakness in any part of the body or numbness.   At least 10 systems were reviewed in ROS. All systems reviewed  are within normal limits except for the complaints as described in Subjective.    PAST MEDICAL & SURGICAL HISTORY:  PAST MEDICAL & SURGICAL HISTORY:  Multiple sclerosis  DM type 2 (diabetes mellitus, type 2)  Stomach cancer  Portacath in place  S/P partial gastrectomy  H/O total hysterectomy with bilateral salpingo-oophorectomy (BSO)  H/O partial thyroidectomy          VITAL SIGNS:  Vital Signs Last 24 Hrs  T(C): 35.6 (24 Dec 2019 05:33), Max: 36.6 (23 Dec 2019 20:29)  T(F): 96 (24 Dec 2019 05:33), Max: 97.9 (23 Dec 2019 20:29)  HR: 72 (24 Dec 2019 05:33) (72 - 88)  BP: 98/53 (24 Dec 2019 05:33) (98/53 - 109/-)  BP(mean): --  RR: 17 (24 Dec 2019 05:33) (17 - 18)  SpO2: --    PHYSICAL EXAMINATION:  Not in acute distress  General: + pallor, no icterus  HEENT:   EOMI, no JVD,.  Heart: S1+S2 audible  Lungs: bilateral  fair air entry, no wheezing, no crepitations.  Abdomen: Soft, non-tender, non-distended , no  rigidity or guarding.  CNS: AAOx3, CN  grossly intact.  Extremities:  No edema    , bilateral UE swelling, RUE ecchymoses        CONSULTS:  Consultant(s) Notes Reviewed by me.   Care Discussed with Consultants/Other Providers where required.        MEDICATIONS:  MEDICATIONS  (STANDING):  baclofen 20 milliGRAM(s) Oral two times a day  cefpodoxime 200 milliGRAM(s) Oral every 12 hours  chlorhexidine 4% Liquid 1 Application(s) Topical <User Schedule>  cholecalciferol 1000 Unit(s) Oral daily  dextrose 5%. 1000 milliLiter(s) (50 mL/Hr) IV Continuous <Continuous>  dextrose 50% Injectable 12.5 Gram(s) IV Push once  dextrose 50% Injectable 25 Gram(s) IV Push once  dextrose 50% Injectable 25 Gram(s) IV Push once  DULoxetine 60 milliGRAM(s) Oral daily  folic acid 1 milliGRAM(s) Oral daily  insulin glargine Injectable (LANTUS) 7 Unit(s) SubCutaneous at bedtime  insulin lispro (HumaLOG) corrective regimen sliding scale   SubCutaneous three times a day before meals  insulin lispro Injectable (HumaLOG) 3 Unit(s) SubCutaneous three times a day before meals  iron sucrose IVPB 200 milliGRAM(s) IV Intermittent every 7 days  metoclopramide 10 milliGRAM(s) Oral three times a day before meals  pantoprazole    Tablet 40 milliGRAM(s) Oral two times a day  rOPINIRole 0.5 milliGRAM(s) Oral three times a day  tamsulosin 0.4 milliGRAM(s) Oral at bedtime    MEDICATIONS  (PRN):  acetaminophen   Tablet .. 650 milliGRAM(s) Oral every 6 hours PRN Moderate Pain (4 - 6)  dextrose 40% Gel 15 Gram(s) Oral once PRN Blood Glucose LESS THAN 70 milliGRAM(s)/deciliter  glucagon  Injectable 1 milliGRAM(s) IntraMuscular once PRN Glucose LESS THAN 70 milligrams/deciliter  guaiFENesin   Syrup  (Sugar-Free) 100 milliGRAM(s) Oral every 6 hours PRN Cough  ondansetron Injectable 4 milliGRAM(s) IV Push every 8 hours PRN Nausea and/or Vomiting            ASSESSMENT:    Patient is a 63yo female with PMH of multiple sclerosis on IVIG, gastric cancer s/p gastrectomy s/p chemotherapy, and diabetes mellitus who presented to the hospital s/p mechanical fall. Patient states that she has always had right lower extremity weakness, but the left lower extremity has become progressively weaker over the past month.       ASSESSMENT:  Principal Diagnosis:  Low suspicion for MS flare  Urinary tract infection secondary to E coli  Acute on chronic zeuqtb-axibyrcbtthqys-nlfqbkpb iron deficiency- low suspicion for GIB  Iron deficiency  Gastroparesis  Urinary retention  L cephalic thrombus  severe hypoalbuminemia with P effusion and mild ascites    Associated Active Comorbid Conditions:  chronic normocytic anemia  Diabetes mellitis type 2   history of gastric CA, Billroth procedure & chemo  Suspected vitamin D & folic acid  deficiency    PLAN:   - Vantin x 5 days -completed  -no MS flare currently  -Low suspicion for GIB  -Iron deficiancy secondary to gastric surgery- Fe supplementation.   -Keep Hb >7. Monitor hemoglobin/hematocrit   - Insulin sliding scale with coverage with meals and QHS . keep current dose of lantus & lispro  -Electrolyte supplementation and follow up with BMP. Keep K+>4, Mg>2   -gastroparesis- RTC Reglan Added zofran  -Gastric cancer s/p gastrectomy s/p chemotherapy. Follows outpatient with Dr. Toussaint  severe hypoalbuminemia with P effusion and mild ascites- Lasix 20 milligrams IV today, 20 milligrams daily PO    -Suspected vitamin D deficiency- Continue with cholecalciferol 1000 units PO QD  -Urinary retention-resolved . Off Jensen  -L cephalic thrombus- supportive care- no anti-coagulation     Progress note Handoff:   Pending : STR placement  Discussion: Diagnosis, current management plan and further plan of care discussed with patient  and housestaff in morning  rounds.  Disposition: Awaiting placement to STR

## 2019-12-24 NOTE — DISCHARGE NOTE PROVIDER - CARE PROVIDERS DIRECT ADDRESSES
,DirectAddress_Unknown,DirectAddress_Unknown,DirectAddress_Unknown,[email or web address]

## 2019-12-24 NOTE — PROGRESS NOTE ADULT - ASSESSMENT
Patient is a 65yo female with PMH of multiple sclerosis on IVIG, gastric cancer s/p gastrectomy s/p chemotherapy, and diabetes mellitus who presented to the hospital s/p mechanical fall. Patient states that she has always had right lower extremity weakness, but the left lower extremity has become progressively weaker over the past month.     #Vomiting with history of gastroparesis  - Continue with metoclopramide 10mg PO TID before meals  - Continue with pantoprazole 40mg PO BID  - Continue ondansetron 4mg IV Q6H PRN nausea/vomiting  - Patient reports improvement in nausea and vomiting     #Acute on chronic iron deficiency anemia  - Hemoglobin on admission: 9.0  - Hemoglobin on 12/16/2019 was 6.6  - S/p 1 unit PRBC on 12/16/2019 and 1 unit PRBC on 12/20/2019  - FOBT positive, but no bright red blood per rectum  - Heme/onc consult appreciated  - Continue with venofer 200mg IV Q7D  - GI consult appreciated  - Dr. Velazquez will follow up with the patient for a colonoscopy outpatient.  - Keep active type and screen  - Trend hemoglobin and hematocrit    #Bilateral upper extremity edema, improving  - Bilateral VA Duplex: thrombus noted in left cephalic vein; no thrombus in right upper extremity  - Per attending, no indication for anticoagulation at this time  - Warm compresses  - Keep arms elevated  - Diet changed as per dietician: consistent carbohydrate (evening snack) and low fat (12/23)  - CT chest: bilateral pleural effusions, ground-glass opacifications  - Chest X-ray showed small right pleural effusion (12/21)   - Furosemide 20mg PO QD    #S/p mechanical fall secondary to UTI vs multiple sclerosis flare, resolved  - Neurology consult appreciated  - Underlying UTI can present as multiple sclerosis flare  - Patient current back to baseline  - Per neurology, no need for another MRI or IV solumedrol this admission  - Done with cefpodoxime 200mg PO Q12H for 5 days (last day 12/23)  - Continue with baclofen 20mg PO BID  - Continue with duloxetine 60mg PO QD  - Continue with ropinirole 0.5mg PO TID  - Continue with acetaminophen 650mg PO Q6H PRN pain  - Blood Cx: no growth to date  - Urine Cx: >100,000 E. coli resistant to ampicillin  - Follows outpatient with Dr. Vance for neurology  - Continue Dalfampridine ER 10mg PO Q12 for MS    #Left ear pain likely secondary to cerumen impaction, resolved  - S/p hydrogen peroxide irrigation    #Diabetes mellitus type 2  - Decreased insulin glargine from 7 units SQ QHS to 6 units SQ QHS (12/23)  - Continue with insulin lispro 3 units SQ TID  - Insulin sliding scale coverage  - Monitor fingerstick glucose    #Urinary retention - resolved  - Discontinued tamsulosin 0.4mg PO QHS    #Gastric cancer s/p gastrectomy s/p chemotherapy  - Currently in remission  - Per daughter, most recent endoscopy was negative  - Most recent CT scan shows possible duodenal loop that could be related to malignancy  - Continue with ondansetron 4mg IV Q8H PRN nausea/vomiting  - Continue with metoclopramide 10mg PO TID before meals  - Follows outpatient with Dr. Toussaint  - Hem/onc consult appreciated  - Reticulocyte percentage, haptoglobin, and CEA elevated  - Will need outpatient PET scan    #Suspected vitamin D deficiency  - Continue with cholecalciferol 1000 units PO QD    #Misc  - DVT Prophylaxis: heparin 5000 units SQ Q8H   - Diet: Full liquid  - GI Prophylaxis: pantoprazole 40mg PO BID  - Activity: increase as tolerated  - IV Fluids: Not indicated  - Code Status: Full Code    Dispo: Patient not accepted by [facility]. Pending SNF Patient is a 63yo female with PMH of multiple sclerosis on IVIG, gastric cancer s/p gastrectomy s/p chemotherapy, and diabetes mellitus who presented to the hospital s/p mechanical fall. Patient states that she has always had right lower extremity weakness, but the left lower extremity has become progressively weaker over the past month.     #Vomiting with history of gastroparesis  - Continue with metoclopramide 10mg PO TID before meals  - Continue with pantoprazole 40mg PO BID  - Continue ondansetron 4mg IV Q6H PRN nausea/vomiting  - Patient reports improvement in nausea and vomiting     #Acute on chronic iron deficiency anemia  - Hemoglobin on admission: 9.0  - Hemoglobin on 12/16/2019 was 6.6  - S/p 1 unit PRBC on 12/16/2019 and 1 unit PRBC on 12/20/2019  - FOBT positive, but no bright red blood per rectum  - Heme/onc consult appreciated  - Continue with venofer 200mg IV Q7D  - GI consult appreciated  - Dr. Velazquez will follow up with the patient for a colonoscopy outpatient.  - Keep active type and screen  - Trend hemoglobin and hematocrit    #Bilateral upper extremity edema, improving  - Bilateral VA Duplex: thrombus noted in left cephalic vein; no thrombus in right upper extremity  - Per attending, no indication for anticoagulation at this time  - Warm compresses  - Keep arms elevated  - Diet changed as per dietician: consistent carbohydrate (evening snack) and low fat (12/23)  - CT chest: bilateral pleural effusions, ground-glass opacifications  - Chest X-ray showed small right pleural effusion (12/21)   - Furosemide 20mg PO QD    #S/p mechanical fall secondary to UTI vs multiple sclerosis flare, resolved  - Neurology consult appreciated  - Underlying UTI can present as multiple sclerosis flare  - Patient current back to baseline  - Per neurology, no need for another MRI or IV solumedrol this admission  - Done with cefpodoxime 200mg PO Q12H for 5 days (last day 12/23)  - Continue with baclofen 20mg PO BID  - Continue with duloxetine 60mg PO QD  - Continue with ropinirole 0.5mg PO TID  - Continue with acetaminophen 650mg PO Q6H PRN pain  - Blood Cx: no growth to date  - Urine Cx: >100,000 E. coli resistant to ampicillin  - Follows outpatient with Dr. Vance for neurology  - Continue Dalfampridine ER 10mg PO Q12 for MS    #Left ear pain likely secondary to cerumen impaction, resolved  - S/p hydrogen peroxide irrigation    #Diabetes mellitus type 2  - Decreased insulin glargine from 7 units SQ QHS to 6 units SQ QHS (12/23)  - Continue with insulin lispro 3 units SQ TID  - Insulin sliding scale coverage  - Monitor fingerstick glucose    #Urinary retention - resolved  - Discontinued tamsulosin 0.4mg PO QHS    #Gastric cancer s/p gastrectomy s/p chemotherapy  - Currently in remission  - Per daughter, most recent endoscopy was negative  - Most recent CT scan shows possible duodenal loop that could be related to malignancy  - Continue with ondansetron 4mg IV Q8H PRN nausea/vomiting  - Continue with metoclopramide 10mg PO TID before meals  - Follows outpatient with Dr. Toussaint  - Hem/onc consult appreciated  - Reticulocyte percentage, haptoglobin, and CEA elevated  - Will need outpatient PET scan    #Suspected vitamin D deficiency  - Continue with cholecalciferol 1000 units PO QD    #Misc  - DVT Prophylaxis: heparin 5000 units SQ Q8H   - Diet: DASH/TLC  - GI Prophylaxis: pantoprazole 40mg PO BID  - Activity: increase as tolerated  - IV Fluids: Not indicated  - Code Status: Full Code    Dispo: Patient not accepted by [facility]. Pending SNF

## 2019-12-24 NOTE — DISCHARGE NOTE PROVIDER - HOSPITAL COURSE
63 Y/O F with PMHx of MS on IVIG, DM type II, Gastric cancer s/p resection and chemo currently in remission (Dr Toussaint), presented to the hospital s/p mechanical fall. Per pt she had progressively worsening lower extremity weakness, she has chronic RLE weakness and numbness, she ambulates with a walker but no she has weakness in her LLE associated with difficulty ambulation. Previously she would be able to get off the bed and walk with a walker but now she needs assistance with everything. This morning while she was going to use restroom, she felt dizzy described as spinning sensations, her legs gave way and she fell on the floor. Daughter is the HCP and she was helping her mother with walking while this happened. Pt hit her left knee and is complaining of pain in that area. She denied hitting her head, hx of LOC, blacking out, palpitations or any abnormal jerky movements of her limbs. Pt recently had worsening nausea and vomiting that she gets from gastroparesis and gastric resection, she ran out of her medications and it got worse so she came to ER 2 days ago. She was resuscitated with fluids and was sent home, her vomiting has improved since then. Pt had suspicious lesion in the duodenal loop, per daughter pt had EGD and Bx was -ve for malignancy? Pt denied any hx of fever, chills, nausea, vomiting, diarrhea, constipation, melena, pain in abdomen, sob, chest pain, cough, increased urinary frequency, dysuria, headache, any changes in weight, recent travel.         In the ED pt was found to have BP of 86/54, , RR 20/min, SPO2 96% on RA, Temp 96.7. She was given I/V fluid bolus 2.2 L NS and her vitals improved. Her UA showed nitrites so she was given Rocephin        Patient is a 65yo female with PMH of multiple sclerosis on IVIG, gastric cancer s/p gastrectomy s/p chemotherapy, and diabetes mellitus who presented to the hospital s/p mechanical fall. Patient states that she has always had right lower extremity weakness, but the left lower extremity has become progressively weaker over the past month.         #Vomiting with history of gastroparesis    - Continue with metoclopramide 10mg PO TID before meals    - Continue with pantoprazole 40mg PO BID    - Continue ondansetron 4mg IV Q6H PRN nausea/vomiting    - Patient reports improvement in nausea and vomiting         #Acute on chronic iron deficiency anemia    - Hemoglobin on admission: 9.0    - Hemoglobin on 12/16/2019 was 6.6    - S/p 1 unit PRBC on 12/16/2019 and 1 unit PRBC on 12/20/2019    - FOBT positive, but no bright red blood per rectum    - Heme/onc consult appreciated    - Continue with venofer 200mg IV Q7D    - GI consult appreciated    - Dr. Velazquez will fnoscopy outpatient.    - Keep active type and screen    - Trend hemoglobin and hematocrit        #Bilateral upper extremity edema, improving    - Bilateral VA Duplex: thrombus noted in left cephalic vein; no thrombus in right upper extremity    - Per attending, no indication for anticoagulation at this time    - Warm compresses    - Keep arms elevated    - Diet changed as per dietician: consistent carbohydrate (evening snack) and low fat (12/23)    - CT chest: bilateral pleural effusions, ground-glass opacifications    - Chest X-ray showed small right pleural effusion (12/21)     - Furosemide 20mg PO QD        #S/p mechanical fall secondary to UTI vs multiple sclerosis flare, resolved    - Neurology consult appreciated    - Underlying UTI can present as multiple sclerosis flare    - Patient current back to baseline    - Per neurology, no need for another MRI or IV solumedrol this admission    - Done with cefpodoxime 200mg PO Q12H for 5 days (last day 12/23)    - Continue with baclofen 20mg PO BID    - Continue with duloxetine 60mg PO QD    - Continue with ropinirole 0.5mg PO TID    - Continue with acetaminophen 650mg PO Q6H PRN pain    - Blood Cx: no growth to date    - Urine Cx: >100,000 E. coli resistant to ampicillin    - Follows outpatient with Dr. Vance for neurology    - Continue Dalfampridine ER 10mg PO Q12 for MS        #Left ear pain likely secondary to cerumen impaction, resolved    - S/p hydrogen peroxide irrigation        #Diabetes mellitus type 2    - Decreased insulin glargine from 7 units SQ QHS to 6 units SQ QHS (12/23). On 12/24 dec to 4 units lantus at bedtime    - Continue with insulin lispro 3 units SQ TID    - Insulin sliding scale coverage    - Monitor fingerstick glucose        #Urinary retention - resolved    - Discontinued tamsulosin 0.4mg PO QHS        #Gastric cancer s/p gastrectomy s/p chemotherapy    - Currently in remission    - Per daughter, most recent endoscopy was negative    - Most recent CT scan shows possible duodenal loop that could be related to malignancy    - Continue with ondansetron 4mg IV Q8H PRN nausea/vomiting    - Continue with metoclopramide 10mg PO TID before meals    - Follows outpatient with Dr. Toussaint    - Hem/onc consult appreciated    - Reticulocyte percentage, haptoglobin, and CEA elevated    - Will need outpatient PET scan        #Suspected vitamin D deficiency    - Continue with cholecalciferol 1000 units PO QD

## 2019-12-24 NOTE — DISCHARGE NOTE NURSING/CASE MANAGEMENT/SOCIAL WORK - PATIENT PORTAL LINK FT
You can access the FollowMyHealth Patient Portal offered by [facility] by registering at the following website: [email or web address]. By joining [employer]’s FollowMyHealth portal, you will also be able to view your health information using other applications (apps) compatible with our system.

## 2019-12-24 NOTE — PROGRESS NOTE ADULT - SUBJECTIVE AND OBJECTIVE BOX
LEFTY CARRASQUILLO 64y Female  MRN#: [id number]   Hospital Day: 10d    SUBJECTIVE  Patient is a 64y old Female who presents with a chief complaint of lower extremity weakness leading to mechanical fall    INTERVAL HPI AND OVERNIGHT EVENTS:  Patient was examined and seen at bedside. This morning she is resting comfortably in bed and reports no issues or overnight events. She reports her night was better than last night. No more episodes of vomiting. Swelling in her arms is improving.    REVIEW OF SYMPTOMS:  CONSTITUTIONAL: No weakness, fevers or chills; No headaches  EYES: No visual changes, eye pain, or discharge  ENT: No vertigo; No ear pain or change in hearing; No sore throat or difficulty swallowing  NECK: No pain or stiffness  RESPIRATORY: No cough, wheezing, or hemoptysis; No shortness of breath  CARDIOVASCULAR: No chest pain or palpitations  GASTROINTESTINAL: No abdominal or epigastric pain; no hematemesis; No diarrhea or constipation; No melena or hematochezia  GENITOURINARY: No dysuria, frequency or hematuria  MUSCULOSKELETAL: No joint pain, no muscle pain, no weakness  NEUROLOGICAL: No numbness or weakness  SKIN: No itching or rashes    OBJECTIVE  PAST MEDICAL & SURGICAL HISTORY  Multiple sclerosis  DM type 2 (diabetes mellitus, type 2)  Stomach cancer  Portacath in place  S/P partial gastrectomy  H/O total hysterectomy with bilateral salpingo-oophorectomy (BSO)  H/O partial thyroidectomy    ALLERGIES:  No Known Allergies    MEDICATIONS:  STANDING MEDICATIONS  baclofen 20 milliGRAM(s) Oral two times a day  chlorhexidine 4% Liquid 1 Application(s) Topical <User Schedule>  cholecalciferol 1000 Unit(s) Oral daily  dalfampridine ER 10 milliGRAM(s) Oral every 12 hours  dextrose 5%. 1000 milliLiter(s) IV Continuous <Continuous>  dextrose 50% Injectable 12.5 Gram(s) IV Push once  dextrose 50% Injectable 25 Gram(s) IV Push once  dextrose 50% Injectable 25 Gram(s) IV Push once  DULoxetine 60 milliGRAM(s) Oral daily  folic acid 1 milliGRAM(s) Oral daily  furosemide    Tablet 20 milliGRAM(s) Oral daily  heparin  Injectable 5000 Unit(s) SubCutaneous every 8 hours  insulin glargine Injectable (LANTUS) 6 Unit(s) SubCutaneous at bedtime  insulin lispro (HumaLOG) corrective regimen sliding scale   SubCutaneous three times a day before meals  insulin lispro Injectable (HumaLOG) 3 Unit(s) SubCutaneous three times a day before meals  iron sucrose IVPB 200 milliGRAM(s) IV Intermittent every 7 days  melatonin 5 milliGRAM(s) Oral at bedtime  metoclopramide 10 milliGRAM(s) Oral three times a day before meals  pantoprazole    Tablet 40 milliGRAM(s) Oral two times a day  rOPINIRole 0.5 milliGRAM(s) Oral three times a day    PRN MEDICATIONS  acetaminophen   Tablet .. 650 milliGRAM(s) Oral every 6 hours PRN  dextrose 40% Gel 15 Gram(s) Oral once PRN  glucagon  Injectable 1 milliGRAM(s) IntraMuscular once PRN  guaiFENesin  milliGRAM(s) Oral every 12 hours PRN  ondansetron Injectable 4 milliGRAM(s) IV Push every 6 hours PRN      VITAL SIGNS: Last 24 Hours  T(C): 35.6 (24 Dec 2019 05:33), Max: 36.6 (23 Dec 2019 20:29)  T(F): 96 (24 Dec 2019 05:33), Max: 97.9 (23 Dec 2019 20:29)  HR: 72 (24 Dec 2019 05:33) (72 - 88)  BP: 98/53 (24 Dec 2019 05:33) (98/53 - 109/-)  BP(mean): --  RR: 17 (24 Dec 2019 05:33) (17 - 18)  SpO2: --    LABS:                        9.4    7.56  )-----------( 455      ( 24 Dec 2019 05:38 )             31.0     12-24    143  |  108  |  13  ----------------------------<  49<L>  4.3   |  23  |  0.5<L>    Ca    8.2<L>      24 Dec 2019 05:38  Phos  3.9     12-24  Mg     1.9     12-24    TPro  4.8<L>  /  Alb  2.4<L>  /  TBili  0.3  /  DBili  x   /  AST  41  /  ALT  26  /  AlkPhos  170<H>  12-24      PHYSICAL EXAM:  CONSTITUTIONAL: No acute distress, well-developed, well-groomed, AAOx3  HEAD: Atraumatic, normocephalic  EYES: EOM intact, PERRLA, conjunctiva and sclera clear  ENT: Supple, no masses, no thyromegaly, no bruits, no JVD; moist mucous membranes  PULMONARY: Clear to auscultation bilaterally; no wheezes, rales, or rhonchi  CARDIOVASCULAR: Regular rate and rhythm; no murmurs, rubs, or gallops  GASTROINTESTINAL: Soft, non-tender, non-distended; bowel sounds present  MUSCULOSKELETAL: 2+ peripheral pulses; no clubbing, no cyanosis, non-pitting edema in bilateral upper extremities from hands to shoulders that is improved compared to exam yesterday  NEUROLOGY: 4/5 strength in bilateral upper extremities, 4/5 strength in left lower extremity, 2/5 muscle strength in right lower extremity, reflexes are 2-/4 throughout, did not witness gait  SKIN: ecchymosis present on posterior aspect of right elbow; warm and dry

## 2019-12-24 NOTE — DISCHARGE NOTE PROVIDER - CARE PROVIDER_API CALL
Johnson Brasher (DO)  Internal Medicine  [address] [place] [address]  Phone: [phone]  Fax: [phone]  Follow Up Time: 1-3 days    Jc Velazquez (DO)  Gastroenterology  [address] [place] [address]  Phone: [phone]  Fax: [phone]  Follow Up Time: 1 week    Thomas Vance)  Neurology  [address] [place] [address]  Phone: [phone]  Fax: [phone]  Follow Up Time: 1 week    Andi Toussaint)  Internal Medicine; Medical Oncology  [address] [place] [id number]  Phone: [phone]  Fax: [phone]  Follow Up Time: 1 week

## 2020-01-01 ENCOUNTER — APPOINTMENT (OUTPATIENT)
Dept: INFUSION THERAPY | Facility: CLINIC | Age: 65
End: 2020-01-01

## 2020-01-01 ENCOUNTER — APPOINTMENT (OUTPATIENT)
Dept: HEMATOLOGY ONCOLOGY | Facility: CLINIC | Age: 65
End: 2020-01-01
Payer: MEDICARE

## 2020-01-01 ENCOUNTER — TRANSCRIPTION ENCOUNTER (OUTPATIENT)
Age: 65
End: 2020-01-01

## 2020-01-01 ENCOUNTER — OUTPATIENT (OUTPATIENT)
Dept: OUTPATIENT SERVICES | Facility: HOSPITAL | Age: 65
LOS: 1 days | Discharge: HOME | End: 2020-01-01

## 2020-01-01 ENCOUNTER — LABORATORY RESULT (OUTPATIENT)
Age: 65
End: 2020-01-01

## 2020-01-01 ENCOUNTER — INPATIENT (INPATIENT)
Facility: HOSPITAL | Age: 65
LOS: 5 days | Discharge: HOME HEALTH | End: 2020-04-01
Attending: INTERNAL MEDICINE | Admitting: INTERNAL MEDICINE
Payer: MEDICARE

## 2020-01-01 ENCOUNTER — RESULT CHARGE (OUTPATIENT)
Age: 65
End: 2020-01-01

## 2020-01-01 ENCOUNTER — INPATIENT (INPATIENT)
Facility: HOSPITAL | Age: 65
LOS: 0 days | End: 2020-04-13
Attending: INTERNAL MEDICINE | Admitting: INTERNAL MEDICINE
Payer: MEDICARE

## 2020-01-01 ENCOUNTER — APPOINTMENT (OUTPATIENT)
Dept: RADIATION ONCOLOGY | Facility: CLINIC | Age: 65
End: 2020-01-01

## 2020-01-01 ENCOUNTER — INPATIENT (INPATIENT)
Facility: HOSPITAL | Age: 65
LOS: 3 days | Discharge: NURSING, REHAB OR LONG-TERM CARE | End: 2020-02-05
Attending: HOSPITALIST | Admitting: HOSPITALIST
Payer: MEDICARE

## 2020-01-01 ENCOUNTER — APPOINTMENT (OUTPATIENT)
Dept: HEMATOLOGY ONCOLOGY | Facility: CLINIC | Age: 65
End: 2020-01-01

## 2020-01-01 VITALS
DIASTOLIC BLOOD PRESSURE: 57 MMHG | SYSTOLIC BLOOD PRESSURE: 101 MMHG | RESPIRATION RATE: 17 BRPM | HEART RATE: 87 BPM | TEMPERATURE: 97 F

## 2020-01-01 VITALS
SYSTOLIC BLOOD PRESSURE: 85 MMHG | DIASTOLIC BLOOD PRESSURE: 64 MMHG | OXYGEN SATURATION: 96 % | RESPIRATION RATE: 20 BRPM | HEART RATE: 123 BPM | TEMPERATURE: 98 F

## 2020-01-01 VITALS — WEIGHT: 73.41 LBS | RESPIRATION RATE: 22 BRPM | OXYGEN SATURATION: 99 % | HEIGHT: 68 IN | HEART RATE: 131 BPM

## 2020-01-01 VITALS
TEMPERATURE: 98 F | HEART RATE: 95 BPM | SYSTOLIC BLOOD PRESSURE: 97 MMHG | OXYGEN SATURATION: 94 % | RESPIRATION RATE: 22 BRPM | DIASTOLIC BLOOD PRESSURE: 61 MMHG

## 2020-01-01 VITALS
HEART RATE: 103 BPM | RESPIRATION RATE: 17 BRPM | DIASTOLIC BLOOD PRESSURE: 54 MMHG | SYSTOLIC BLOOD PRESSURE: 99 MMHG | TEMPERATURE: 96 F

## 2020-01-01 VITALS
DIASTOLIC BLOOD PRESSURE: 62 MMHG | TEMPERATURE: 96.1 F | WEIGHT: 119 LBS | HEIGHT: 62 IN | RESPIRATION RATE: 14 BRPM | HEART RATE: 111 BPM | SYSTOLIC BLOOD PRESSURE: 94 MMHG | BODY MASS INDEX: 21.9 KG/M2

## 2020-01-01 VITALS — DIASTOLIC BLOOD PRESSURE: 72 MMHG | SYSTOLIC BLOOD PRESSURE: 150 MMHG | TEMPERATURE: 98 F | HEART RATE: 54 BPM

## 2020-01-01 DIAGNOSIS — Z90.722 ACQUIRED ABSENCE OF OVARIES, BILATERAL: ICD-10-CM

## 2020-01-01 DIAGNOSIS — U07.1 COVID-19: ICD-10-CM

## 2020-01-01 DIAGNOSIS — Z95.828 PRESENCE OF OTHER VASCULAR IMPLANTS AND GRAFTS: Chronic | ICD-10-CM

## 2020-01-01 DIAGNOSIS — J90 PLEURAL EFFUSION, NOT ELSEWHERE CLASSIFIED: ICD-10-CM

## 2020-01-01 DIAGNOSIS — R65.21 SEVERE SEPSIS WITH SEPTIC SHOCK: ICD-10-CM

## 2020-01-01 DIAGNOSIS — C16.3 MALIGNANT NEOPLASM OF PYLORIC ANTRUM: ICD-10-CM

## 2020-01-01 DIAGNOSIS — E89.0 POSTPROCEDURAL HYPOTHYROIDISM: Chronic | ICD-10-CM

## 2020-01-01 DIAGNOSIS — E55.9 VITAMIN D DEFICIENCY, UNSPECIFIED: ICD-10-CM

## 2020-01-01 DIAGNOSIS — Z92.21 PERSONAL HISTORY OF ANTINEOPLASTIC CHEMOTHERAPY: ICD-10-CM

## 2020-01-01 DIAGNOSIS — Z90.3 ACQUIRED ABSENCE OF STOMACH [PART OF]: ICD-10-CM

## 2020-01-01 DIAGNOSIS — Z79.84 LONG TERM (CURRENT) USE OF ORAL HYPOGLYCEMIC DRUGS: ICD-10-CM

## 2020-01-01 DIAGNOSIS — A41.9 SEPSIS, UNSPECIFIED ORGANISM: ICD-10-CM

## 2020-01-01 DIAGNOSIS — K31.84 GASTROPARESIS: ICD-10-CM

## 2020-01-01 DIAGNOSIS — Z90.49 ACQUIRED ABSENCE OF OTHER SPECIFIED PARTS OF DIGESTIVE TRACT: ICD-10-CM

## 2020-01-01 DIAGNOSIS — K83.1 OBSTRUCTION OF BILE DUCT: ICD-10-CM

## 2020-01-01 DIAGNOSIS — G35 MULTIPLE SCLEROSIS: ICD-10-CM

## 2020-01-01 DIAGNOSIS — Z90.3 ACQUIRED ABSENCE OF STOMACH [PART OF]: Chronic | ICD-10-CM

## 2020-01-01 DIAGNOSIS — K57.90 DIVERTICULOSIS OF INTESTINE, PART UNSPECIFIED, WITHOUT PERFORATION OR ABSCESS WITHOUT BLEEDING: ICD-10-CM

## 2020-01-01 DIAGNOSIS — Z90.710 ACQUIRED ABSENCE OF BOTH CERVIX AND UTERUS: ICD-10-CM

## 2020-01-01 DIAGNOSIS — R53.1 WEAKNESS: ICD-10-CM

## 2020-01-01 DIAGNOSIS — M25.461 EFFUSION, RIGHT KNEE: ICD-10-CM

## 2020-01-01 DIAGNOSIS — K64.8 OTHER HEMORRHOIDS: ICD-10-CM

## 2020-01-01 DIAGNOSIS — K22.10 ULCER OF ESOPHAGUS WITHOUT BLEEDING: ICD-10-CM

## 2020-01-01 DIAGNOSIS — Z66 DO NOT RESUSCITATE: ICD-10-CM

## 2020-01-01 DIAGNOSIS — Z74.01 BED CONFINEMENT STATUS: ICD-10-CM

## 2020-01-01 DIAGNOSIS — I89.0 LYMPHEDEMA, NOT ELSEWHERE CLASSIFIED: ICD-10-CM

## 2020-01-01 DIAGNOSIS — Z90.710 ACQUIRED ABSENCE OF BOTH CERVIX AND UTERUS: Chronic | ICD-10-CM

## 2020-01-01 DIAGNOSIS — J96.01 ACUTE RESPIRATORY FAILURE WITH HYPOXIA: ICD-10-CM

## 2020-01-01 DIAGNOSIS — Z81.8 FAMILY HISTORY OF OTHER MENTAL AND BEHAVIORAL DISORDERS: ICD-10-CM

## 2020-01-01 DIAGNOSIS — L89.112 PRESSURE ULCER OF RIGHT UPPER BACK, STAGE 2: ICD-10-CM

## 2020-01-01 DIAGNOSIS — G83.11 MONOPLEGIA OF LOWER LIMB AFFECTING RIGHT DOMINANT SIDE: ICD-10-CM

## 2020-01-01 DIAGNOSIS — Z96.89 PRESENCE OF OTHER SPECIFIED FUNCTIONAL IMPLANTS: ICD-10-CM

## 2020-01-01 DIAGNOSIS — T36.3X5A ADVERSE EFFECT OF MACROLIDES, INITIAL ENCOUNTER: ICD-10-CM

## 2020-01-01 DIAGNOSIS — A41.89 OTHER SPECIFIED SEPSIS: ICD-10-CM

## 2020-01-01 DIAGNOSIS — I95.9 HYPOTENSION, UNSPECIFIED: ICD-10-CM

## 2020-01-01 DIAGNOSIS — Z85.028 PERSONAL HISTORY OF OTHER MALIGNANT NEOPLASM OF STOMACH: ICD-10-CM

## 2020-01-01 DIAGNOSIS — Z90.89 ACQUIRED ABSENCE OF OTHER ORGANS: ICD-10-CM

## 2020-01-01 DIAGNOSIS — R41.82 ALTERED MENTAL STATUS, UNSPECIFIED: ICD-10-CM

## 2020-01-01 DIAGNOSIS — Z88.8 ALLERGY STATUS TO OTHER DRUGS, MEDICAMENTS AND BIOLOGICAL SUBSTANCES STATUS: ICD-10-CM

## 2020-01-01 DIAGNOSIS — E83.42 HYPOMAGNESEMIA: ICD-10-CM

## 2020-01-01 DIAGNOSIS — Z88.1 ALLERGY STATUS TO OTHER ANTIBIOTIC AGENTS STATUS: ICD-10-CM

## 2020-01-01 DIAGNOSIS — I34.0 NONRHEUMATIC MITRAL (VALVE) INSUFFICIENCY: ICD-10-CM

## 2020-01-01 DIAGNOSIS — J12.89 OTHER VIRAL PNEUMONIA: ICD-10-CM

## 2020-01-01 DIAGNOSIS — D50.9 IRON DEFICIENCY ANEMIA, UNSPECIFIED: ICD-10-CM

## 2020-01-01 DIAGNOSIS — R77.0 ABNORMALITY OF ALBUMIN: ICD-10-CM

## 2020-01-01 DIAGNOSIS — Z87.11 PERSONAL HISTORY OF PEPTIC ULCER DISEASE: ICD-10-CM

## 2020-01-01 DIAGNOSIS — E11.43 TYPE 2 DIABETES MELLITUS WITH DIABETIC AUTONOMIC (POLY)NEUROPATHY: ICD-10-CM

## 2020-01-01 DIAGNOSIS — E88.09 OTHER DISORDERS OF PLASMA-PROTEIN METABOLISM, NOT ELSEWHERE CLASSIFIED: ICD-10-CM

## 2020-01-01 DIAGNOSIS — E89.0 POSTPROCEDURAL HYPOTHYROIDISM: ICD-10-CM

## 2020-01-01 DIAGNOSIS — J15.6 PNEUMONIA DUE TO OTHER GRAM-NEGATIVE BACTERIA: ICD-10-CM

## 2020-01-01 DIAGNOSIS — I82.612 ACUTE EMBOLISM AND THROMBOSIS OF SUPERFICIAL VEINS OF LEFT UPPER EXTREMITY: ICD-10-CM

## 2020-01-01 DIAGNOSIS — H61.22 IMPACTED CERUMEN, LEFT EAR: ICD-10-CM

## 2020-01-01 DIAGNOSIS — Z90.79 ACQUIRED ABSENCE OF OTHER GENITAL ORGAN(S): ICD-10-CM

## 2020-01-01 DIAGNOSIS — R33.9 RETENTION OF URINE, UNSPECIFIED: ICD-10-CM

## 2020-01-01 DIAGNOSIS — R18.8 OTHER ASCITES: ICD-10-CM

## 2020-01-01 DIAGNOSIS — E11.9 TYPE 2 DIABETES MELLITUS WITHOUT COMPLICATIONS: ICD-10-CM

## 2020-01-01 DIAGNOSIS — Z71.89 OTHER SPECIFIED COUNSELING: ICD-10-CM

## 2020-01-01 DIAGNOSIS — B96.20 UNSPECIFIED ESCHERICHIA COLI [E. COLI] AS THE CAUSE OF DISEASES CLASSIFIED ELSEWHERE: ICD-10-CM

## 2020-01-01 DIAGNOSIS — G93.41 METABOLIC ENCEPHALOPATHY: ICD-10-CM

## 2020-01-01 DIAGNOSIS — M79.89 OTHER SPECIFIED SOFT TISSUE DISORDERS: ICD-10-CM

## 2020-01-01 DIAGNOSIS — R11.10 VOMITING, UNSPECIFIED: ICD-10-CM

## 2020-01-01 DIAGNOSIS — Z92.3 PERSONAL HISTORY OF IRRADIATION: ICD-10-CM

## 2020-01-01 DIAGNOSIS — N39.0 URINARY TRACT INFECTION, SITE NOT SPECIFIED: ICD-10-CM

## 2020-01-01 DIAGNOSIS — Z75.1 PERSON AWAITING ADMISSION TO ADEQUATE FACILITY ELSEWHERE: ICD-10-CM

## 2020-01-01 DIAGNOSIS — Z91.81 HISTORY OF FALLING: ICD-10-CM

## 2020-01-01 LAB
-  CANDIDA ALBICANS: SIGNIFICANT CHANGE UP
-  CANDIDA GLABRATA: SIGNIFICANT CHANGE UP
-  CANDIDA KRUSEI: SIGNIFICANT CHANGE UP
-  CANDIDA PARAPSILOSIS: SIGNIFICANT CHANGE UP
-  CANDIDA TROPICALIS: SIGNIFICANT CHANGE UP
-  COAGULASE NEGATIVE STAPHYLOCOCCUS: SIGNIFICANT CHANGE UP
-  K. PNEUMONIAE GROUP: SIGNIFICANT CHANGE UP
-  KPC RESISTANCE GENE: SIGNIFICANT CHANGE UP
-  STREPTOCOCCUS SP. (NOT GRP A, B OR S PNEUMONIAE): SIGNIFICANT CHANGE UP
A BAUMANNII DNA SPEC QL NAA+PROBE: SIGNIFICANT CHANGE UP
ALBUMIN SERPL ELPH-MCNC: 2 G/DL — LOW (ref 3.5–5.2)
ALBUMIN SERPL ELPH-MCNC: 2 G/DL — LOW (ref 3.5–5.2)
ALBUMIN SERPL ELPH-MCNC: 2.1 G/DL — LOW (ref 3.5–5.2)
ALBUMIN SERPL ELPH-MCNC: 2.2 G/DL — LOW (ref 3.5–5.2)
ALBUMIN SERPL ELPH-MCNC: 2.2 G/DL — LOW (ref 3.5–5.2)
ALP SERPL-CCNC: 188 U/L — HIGH (ref 30–115)
ALP SERPL-CCNC: 202 U/L — HIGH (ref 30–115)
ALP SERPL-CCNC: 217 U/L — HIGH (ref 30–115)
ALP SERPL-CCNC: 251 U/L — HIGH (ref 30–115)
ALP SERPL-CCNC: 330 U/L — HIGH (ref 30–115)
ALT FLD-CCNC: 23 U/L — SIGNIFICANT CHANGE UP (ref 0–41)
ALT FLD-CCNC: 42 U/L — HIGH (ref 0–41)
ALT FLD-CCNC: 46 U/L — HIGH (ref 0–41)
ALT FLD-CCNC: 51 U/L — HIGH (ref 0–41)
ALT FLD-CCNC: 55 U/L — HIGH (ref 0–41)
ANION GAP SERPL CALC-SCNC: 10 MMOL/L — SIGNIFICANT CHANGE UP (ref 7–14)
ANION GAP SERPL CALC-SCNC: 11 MMOL/L — SIGNIFICANT CHANGE UP (ref 7–14)
ANION GAP SERPL CALC-SCNC: 12 MMOL/L — SIGNIFICANT CHANGE UP (ref 7–14)
ANION GAP SERPL CALC-SCNC: 12 MMOL/L — SIGNIFICANT CHANGE UP (ref 7–14)
ANION GAP SERPL CALC-SCNC: 14 MMOL/L — SIGNIFICANT CHANGE UP (ref 7–14)
ANION GAP SERPL CALC-SCNC: 8 MMOL/L — SIGNIFICANT CHANGE UP (ref 7–14)
ANION GAP SERPL CALC-SCNC: 8 MMOL/L — SIGNIFICANT CHANGE UP (ref 7–14)
APPEARANCE UR: ABNORMAL
APPEARANCE UR: CLEAR — SIGNIFICANT CHANGE UP
APPEARANCE UR: CLEAR — SIGNIFICANT CHANGE UP
APTT BLD: 103.9 SEC — CRITICAL HIGH (ref 27–39.2)
APTT BLD: 30.8 SEC — SIGNIFICANT CHANGE UP (ref 27–39.2)
APTT BLD: 31.7 SEC — SIGNIFICANT CHANGE UP (ref 27–39.2)
AST SERPL-CCNC: 24 U/L — SIGNIFICANT CHANGE UP (ref 0–41)
AST SERPL-CCNC: 37 U/L — SIGNIFICANT CHANGE UP (ref 0–41)
AST SERPL-CCNC: 40 U/L — SIGNIFICANT CHANGE UP (ref 0–41)
AST SERPL-CCNC: 46 U/L — HIGH (ref 0–41)
AST SERPL-CCNC: 53 U/L — HIGH (ref 0–41)
BACTERIA # UR AUTO: ABNORMAL
BACTERIA # UR AUTO: ABNORMAL
BACTERIA # UR AUTO: NEGATIVE — SIGNIFICANT CHANGE UP
BASE EXCESS BLDV CALC-SCNC: -2.5 MMOL/L — LOW (ref -2–2)
BASOPHILS # BLD AUTO: 0.01 K/UL — SIGNIFICANT CHANGE UP (ref 0–0.2)
BASOPHILS # BLD AUTO: 0.02 K/UL — SIGNIFICANT CHANGE UP (ref 0–0.2)
BASOPHILS # BLD AUTO: 0.04 K/UL — SIGNIFICANT CHANGE UP (ref 0–0.2)
BASOPHILS # BLD AUTO: 0.07 K/UL — SIGNIFICANT CHANGE UP (ref 0–0.2)
BASOPHILS NFR BLD AUTO: 0.1 % — SIGNIFICANT CHANGE UP (ref 0–1)
BASOPHILS NFR BLD AUTO: 0.2 % — SIGNIFICANT CHANGE UP (ref 0–1)
BILIRUB DIRECT SERPL-MCNC: 0.2 MG/DL — SIGNIFICANT CHANGE UP (ref 0–0.2)
BILIRUB INDIRECT FLD-MCNC: 0.1 MG/DL — LOW (ref 0.2–1.2)
BILIRUB SERPL-MCNC: 0.3 MG/DL — SIGNIFICANT CHANGE UP (ref 0.2–1.2)
BILIRUB SERPL-MCNC: 0.8 MG/DL — SIGNIFICANT CHANGE UP (ref 0.2–1.2)
BILIRUB SERPL-MCNC: <0.2 MG/DL — SIGNIFICANT CHANGE UP (ref 0.2–1.2)
BILIRUB UR-MCNC: ABNORMAL
BILIRUB UR-MCNC: NEGATIVE — SIGNIFICANT CHANGE UP
BILIRUB UR-MCNC: NEGATIVE — SIGNIFICANT CHANGE UP
BLD GP AB SCN SERPL QL: SIGNIFICANT CHANGE UP
BLD GP AB SCN SERPL QL: SIGNIFICANT CHANGE UP
BUN SERPL-MCNC: 17 MG/DL — SIGNIFICANT CHANGE UP (ref 10–20)
BUN SERPL-MCNC: 18 MG/DL — SIGNIFICANT CHANGE UP (ref 10–20)
BUN SERPL-MCNC: 18 MG/DL — SIGNIFICANT CHANGE UP (ref 10–20)
BUN SERPL-MCNC: 21 MG/DL — HIGH (ref 10–20)
BUN SERPL-MCNC: 21 MG/DL — HIGH (ref 10–20)
BUN SERPL-MCNC: 22 MG/DL — HIGH (ref 10–20)
BUN SERPL-MCNC: 23 MG/DL — HIGH (ref 10–20)
BUN SERPL-MCNC: 25 MG/DL — HIGH (ref 10–20)
BUN SERPL-MCNC: 26 MG/DL — HIGH (ref 10–20)
CA-I SERPL-SCNC: 1.07 MMOL/L — LOW (ref 1.12–1.3)
CALCIUM SERPL-MCNC: 7.1 MG/DL — LOW (ref 8.5–10.1)
CALCIUM SERPL-MCNC: 7.4 MG/DL — LOW (ref 8.5–10.1)
CALCIUM SERPL-MCNC: 7.4 MG/DL — LOW (ref 8.5–10.1)
CALCIUM SERPL-MCNC: 7.5 MG/DL — LOW (ref 8.5–10.1)
CALCIUM SERPL-MCNC: 7.6 MG/DL — LOW (ref 8.5–10.1)
CALCIUM SERPL-MCNC: 7.7 MG/DL — LOW (ref 8.5–10.1)
CALCIUM SERPL-MCNC: 7.7 MG/DL — LOW (ref 8.5–10.1)
CALCIUM SERPL-MCNC: 7.8 MG/DL — LOW (ref 8.5–10.1)
CALCIUM SERPL-MCNC: 8 MG/DL — LOW (ref 8.5–10.1)
CEA SERPL-MCNC: 7.8 NG/ML — HIGH (ref 0–3.8)
CHLORIDE SERPL-SCNC: 101 MMOL/L — SIGNIFICANT CHANGE UP (ref 98–110)
CHLORIDE SERPL-SCNC: 101 MMOL/L — SIGNIFICANT CHANGE UP (ref 98–110)
CHLORIDE SERPL-SCNC: 102 MMOL/L — SIGNIFICANT CHANGE UP (ref 98–110)
CHLORIDE SERPL-SCNC: 102 MMOL/L — SIGNIFICANT CHANGE UP (ref 98–110)
CHLORIDE SERPL-SCNC: 103 MMOL/L — SIGNIFICANT CHANGE UP (ref 98–110)
CHLORIDE SERPL-SCNC: 103 MMOL/L — SIGNIFICANT CHANGE UP (ref 98–110)
CHLORIDE SERPL-SCNC: 104 MMOL/L — SIGNIFICANT CHANGE UP (ref 98–110)
CHLORIDE SERPL-SCNC: 94 MMOL/L — LOW (ref 98–110)
CHLORIDE SERPL-SCNC: 99 MMOL/L — SIGNIFICANT CHANGE UP (ref 98–110)
CHOLEST SERPL-MCNC: 98 MG/DL — LOW (ref 100–200)
CO2 SERPL-SCNC: 21 MMOL/L — SIGNIFICANT CHANGE UP (ref 17–32)
CO2 SERPL-SCNC: 22 MMOL/L — SIGNIFICANT CHANGE UP (ref 17–32)
CO2 SERPL-SCNC: 23 MMOL/L — SIGNIFICANT CHANGE UP (ref 17–32)
CO2 SERPL-SCNC: 23 MMOL/L — SIGNIFICANT CHANGE UP (ref 17–32)
CO2 SERPL-SCNC: 24 MMOL/L — SIGNIFICANT CHANGE UP (ref 17–32)
COLOR SPEC: YELLOW — SIGNIFICANT CHANGE UP
COMMENT - URINE: SIGNIFICANT CHANGE UP
CREAT SERPL-MCNC: 0.5 MG/DL — LOW (ref 0.7–1.5)
CREAT SERPL-MCNC: 0.8 MG/DL — SIGNIFICANT CHANGE UP (ref 0.7–1.5)
CREAT SERPL-MCNC: <0.5 MG/DL — LOW (ref 0.7–1.5)
CRP SERPL-MCNC: 18.66 MG/DL — HIGH (ref 0–0.4)
CULTURE RESULTS: NO GROWTH — SIGNIFICANT CHANGE UP
CULTURE RESULTS: SIGNIFICANT CHANGE UP
D DIMER BLD IA.RAPID-MCNC: 933 NG/ML DDU — HIGH (ref 0–230)
DIFF PNL FLD: ABNORMAL
DIFF PNL FLD: NEGATIVE — SIGNIFICANT CHANGE UP
DIFF PNL FLD: SIGNIFICANT CHANGE UP
E CLOAC COMP DNA BLD POS QL NAA+PROBE: SIGNIFICANT CHANGE UP
E COLI DNA BLD POS QL NAA+NON-PROBE: SIGNIFICANT CHANGE UP
ENTEROCOC DNA BLD POS QL NAA+NON-PROBE: SIGNIFICANT CHANGE UP
ENTEROCOC DNA BLD POS QL NAA+NON-PROBE: SIGNIFICANT CHANGE UP
EOSINOPHIL # BLD AUTO: 0 K/UL — SIGNIFICANT CHANGE UP (ref 0–0.7)
EOSINOPHIL # BLD AUTO: 0 K/UL — SIGNIFICANT CHANGE UP (ref 0–0.7)
EOSINOPHIL # BLD AUTO: 0.01 K/UL — SIGNIFICANT CHANGE UP (ref 0–0.7)
EOSINOPHIL # BLD AUTO: 0.02 K/UL — SIGNIFICANT CHANGE UP (ref 0–0.7)
EOSINOPHIL # BLD AUTO: 0.02 K/UL — SIGNIFICANT CHANGE UP (ref 0–0.7)
EOSINOPHIL # BLD AUTO: 0.03 K/UL — SIGNIFICANT CHANGE UP (ref 0–0.7)
EOSINOPHIL # BLD AUTO: 0.04 K/UL — SIGNIFICANT CHANGE UP (ref 0–0.7)
EOSINOPHIL NFR BLD AUTO: 0 % — SIGNIFICANT CHANGE UP (ref 0–8)
EOSINOPHIL NFR BLD AUTO: 0.1 % — SIGNIFICANT CHANGE UP (ref 0–8)
EOSINOPHIL NFR BLD AUTO: 0.1 % — SIGNIFICANT CHANGE UP (ref 0–8)
EOSINOPHIL NFR BLD AUTO: 0.2 % — SIGNIFICANT CHANGE UP (ref 0–8)
EOSINOPHIL NFR BLD AUTO: 0.3 % — SIGNIFICANT CHANGE UP (ref 0–8)
EPI CELLS # UR: 2 /HPF — SIGNIFICANT CHANGE UP (ref 0–5)
EPI CELLS # UR: 6 /HPF — HIGH (ref 0–5)
EPI CELLS # UR: ABNORMAL /HPF
FERRITIN SERPL-MCNC: 132 NG/ML
FERRITIN SERPL-MCNC: 25 NG/ML — SIGNIFICANT CHANGE UP (ref 15–150)
FERRITIN SERPL-MCNC: 340 NG/ML — HIGH (ref 15–150)
FLU A RESULT: NEGATIVE — SIGNIFICANT CHANGE UP
FLU A RESULT: NEGATIVE — SIGNIFICANT CHANGE UP
FLUAV AG NPH QL: NEGATIVE — SIGNIFICANT CHANGE UP
FLUBV AG NPH QL: NEGATIVE — SIGNIFICANT CHANGE UP
FOLATE SERPL-MCNC: >20 NG/ML — SIGNIFICANT CHANGE UP
GAS PNL BLDA: SIGNIFICANT CHANGE UP
GAS PNL BLDA: SIGNIFICANT CHANGE UP
GAS PNL BLDV: 128 MMOL/L — LOW (ref 136–145)
GAS PNL BLDV: SIGNIFICANT CHANGE UP
GAS PNL BLDV: SIGNIFICANT CHANGE UP
GGT SERPL-CCNC: 33 U/L — SIGNIFICANT CHANGE UP (ref 1–40)
GLUCOSE BLDC GLUCOMTR-MCNC: 104 MG/DL — HIGH (ref 70–99)
GLUCOSE BLDC GLUCOMTR-MCNC: 105 MG/DL — HIGH (ref 70–99)
GLUCOSE BLDC GLUCOMTR-MCNC: 109 MG/DL — HIGH (ref 70–99)
GLUCOSE BLDC GLUCOMTR-MCNC: 115 MG/DL — HIGH (ref 70–99)
GLUCOSE BLDC GLUCOMTR-MCNC: 118 MG/DL — HIGH (ref 70–99)
GLUCOSE BLDC GLUCOMTR-MCNC: 120 MG/DL — HIGH (ref 70–99)
GLUCOSE BLDC GLUCOMTR-MCNC: 121 MG/DL — HIGH (ref 70–99)
GLUCOSE BLDC GLUCOMTR-MCNC: 130 MG/DL — HIGH (ref 70–99)
GLUCOSE BLDC GLUCOMTR-MCNC: 134 MG/DL — HIGH (ref 70–99)
GLUCOSE BLDC GLUCOMTR-MCNC: 151 MG/DL — HIGH (ref 70–99)
GLUCOSE BLDC GLUCOMTR-MCNC: 151 MG/DL — HIGH (ref 70–99)
GLUCOSE BLDC GLUCOMTR-MCNC: 154 MG/DL — HIGH (ref 70–99)
GLUCOSE BLDC GLUCOMTR-MCNC: 159 MG/DL — HIGH (ref 70–99)
GLUCOSE BLDC GLUCOMTR-MCNC: 162 MG/DL — HIGH (ref 70–99)
GLUCOSE BLDC GLUCOMTR-MCNC: 162 MG/DL — HIGH (ref 70–99)
GLUCOSE BLDC GLUCOMTR-MCNC: 166 MG/DL — HIGH (ref 70–99)
GLUCOSE BLDC GLUCOMTR-MCNC: 168 MG/DL — HIGH (ref 70–99)
GLUCOSE BLDC GLUCOMTR-MCNC: 169 MG/DL — HIGH (ref 70–99)
GLUCOSE BLDC GLUCOMTR-MCNC: 171 MG/DL — HIGH (ref 70–99)
GLUCOSE BLDC GLUCOMTR-MCNC: 174 MG/DL — HIGH (ref 70–99)
GLUCOSE BLDC GLUCOMTR-MCNC: 177 MG/DL — HIGH (ref 70–99)
GLUCOSE BLDC GLUCOMTR-MCNC: 178 MG/DL — HIGH (ref 70–99)
GLUCOSE BLDC GLUCOMTR-MCNC: 178 MG/DL — HIGH (ref 70–99)
GLUCOSE BLDC GLUCOMTR-MCNC: 179 MG/DL — HIGH (ref 70–99)
GLUCOSE BLDC GLUCOMTR-MCNC: 183 MG/DL — HIGH (ref 70–99)
GLUCOSE BLDC GLUCOMTR-MCNC: 193 MG/DL — HIGH (ref 70–99)
GLUCOSE BLDC GLUCOMTR-MCNC: 213 MG/DL — HIGH (ref 70–99)
GLUCOSE BLDC GLUCOMTR-MCNC: 43 MG/DL — CRITICAL LOW (ref 70–99)
GLUCOSE BLDC GLUCOMTR-MCNC: 50 MG/DL — LOW (ref 70–99)
GLUCOSE BLDC GLUCOMTR-MCNC: 52 MG/DL — LOW (ref 70–99)
GLUCOSE BLDC GLUCOMTR-MCNC: 52 MG/DL — LOW (ref 70–99)
GLUCOSE BLDC GLUCOMTR-MCNC: 77 MG/DL — SIGNIFICANT CHANGE UP (ref 70–99)
GLUCOSE BLDC GLUCOMTR-MCNC: 77 MG/DL — SIGNIFICANT CHANGE UP (ref 70–99)
GLUCOSE BLDC GLUCOMTR-MCNC: 86 MG/DL — SIGNIFICANT CHANGE UP (ref 70–99)
GLUCOSE BLDC GLUCOMTR-MCNC: 86 MG/DL — SIGNIFICANT CHANGE UP (ref 70–99)
GLUCOSE BLDC GLUCOMTR-MCNC: 90 MG/DL — SIGNIFICANT CHANGE UP (ref 70–99)
GLUCOSE BLDC GLUCOMTR-MCNC: 93 MG/DL — SIGNIFICANT CHANGE UP (ref 70–99)
GLUCOSE BLDC GLUCOMTR-MCNC: 95 MG/DL — SIGNIFICANT CHANGE UP (ref 70–99)
GLUCOSE BLDC GLUCOMTR-MCNC: 96 MG/DL — SIGNIFICANT CHANGE UP (ref 70–99)
GLUCOSE BLDC GLUCOMTR-MCNC: 98 MG/DL — SIGNIFICANT CHANGE UP (ref 70–99)
GLUCOSE SERPL-MCNC: 119 MG/DL — HIGH (ref 70–99)
GLUCOSE SERPL-MCNC: 132 MG/DL — HIGH (ref 70–99)
GLUCOSE SERPL-MCNC: 139 MG/DL — HIGH (ref 70–99)
GLUCOSE SERPL-MCNC: 145 MG/DL — HIGH (ref 70–99)
GLUCOSE SERPL-MCNC: 159 MG/DL — HIGH (ref 70–99)
GLUCOSE SERPL-MCNC: 168 MG/DL — HIGH (ref 70–99)
GLUCOSE SERPL-MCNC: 173 MG/DL — HIGH (ref 70–99)
GLUCOSE SERPL-MCNC: 79 MG/DL — SIGNIFICANT CHANGE UP (ref 70–99)
GLUCOSE SERPL-MCNC: 95 MG/DL — SIGNIFICANT CHANGE UP (ref 70–99)
GLUCOSE UR QL: NEGATIVE MG/DL — SIGNIFICANT CHANGE UP
GLUCOSE UR QL: NEGATIVE — SIGNIFICANT CHANGE UP
GLUCOSE UR QL: NEGATIVE — SIGNIFICANT CHANGE UP
GP B STREP DNA BLD POS QL NAA+NON-PROBE: SIGNIFICANT CHANGE UP
GRAM STN FLD: SIGNIFICANT CHANGE UP
HAEM INFLU DNA BLD POS QL NAA+NON-PROBE: SIGNIFICANT CHANGE UP
HCO3 BLDV-SCNC: 22 MMOL/L — SIGNIFICANT CHANGE UP (ref 22–29)
HCT VFR BLD CALC: 20.8 % — LOW (ref 37–47)
HCT VFR BLD CALC: 25.1 % — LOW (ref 37–47)
HCT VFR BLD CALC: 25.6 % — LOW (ref 37–47)
HCT VFR BLD CALC: 26.2 % — LOW (ref 37–47)
HCT VFR BLD CALC: 26.3 % — LOW (ref 37–47)
HCT VFR BLD CALC: 27.4 % — LOW (ref 37–47)
HCT VFR BLD CALC: 30.1 % — LOW (ref 37–47)
HCT VFR BLD CALC: 30.2 % — LOW (ref 37–47)
HCT VFR BLD CALC: 31.6 % — LOW (ref 37–47)
HCT VFR BLD CALC: 32.2 % — LOW (ref 37–47)
HCT VFR BLD CALC: 34.5 % — LOW (ref 37–47)
HCT VFR BLD CALC: 38.7 %
HCT VFR BLDA CALC: 37 % — SIGNIFICANT CHANGE UP (ref 34–44)
HDLC SERPL-MCNC: 24 MG/DL — LOW
HGB BLD CALC-MCNC: 12 G/DL — LOW (ref 14–18)
HGB BLD-MCNC: 10.1 G/DL — LOW (ref 12–16)
HGB BLD-MCNC: 10.2 G/DL — LOW (ref 12–16)
HGB BLD-MCNC: 10.9 G/DL — LOW (ref 12–16)
HGB BLD-MCNC: 12.5 G/DL
HGB BLD-MCNC: 6.6 G/DL — CRITICAL LOW (ref 12–16)
HGB BLD-MCNC: 7.9 G/DL — LOW (ref 12–16)
HGB BLD-MCNC: 8.3 G/DL — LOW (ref 12–16)
HGB BLD-MCNC: 8.4 G/DL — LOW (ref 12–16)
HGB BLD-MCNC: 8.6 G/DL — LOW (ref 12–16)
HGB BLD-MCNC: 9.4 G/DL — LOW (ref 12–16)
HGB BLD-MCNC: 9.8 G/DL — LOW (ref 12–16)
HGB BLD-MCNC: 9.8 G/DL — LOW (ref 12–16)
HOROWITZ INDEX BLDV+IHG-RTO: 100 — SIGNIFICANT CHANGE UP
HYALINE CASTS # UR AUTO: 26 /LPF — HIGH (ref 0–7)
HYALINE CASTS # UR AUTO: 3 /LPF — SIGNIFICANT CHANGE UP (ref 0–7)
IMM GRANULOCYTES NFR BLD AUTO: 0.7 % — HIGH (ref 0.1–0.3)
IMM GRANULOCYTES NFR BLD AUTO: 0.8 % — HIGH (ref 0.1–0.3)
IMM GRANULOCYTES NFR BLD AUTO: 0.9 % — HIGH (ref 0.1–0.3)
IMM GRANULOCYTES NFR BLD AUTO: 0.9 % — HIGH (ref 0.1–0.3)
IMM GRANULOCYTES NFR BLD AUTO: 1 % — HIGH (ref 0.1–0.3)
IMM GRANULOCYTES NFR BLD AUTO: 1 % — HIGH (ref 0.1–0.3)
IMM GRANULOCYTES NFR BLD AUTO: 1.3 % — HIGH (ref 0.1–0.3)
INR BLD: 1.23 RATIO — SIGNIFICANT CHANGE UP (ref 0.65–1.3)
INR BLD: 1.37 RATIO — HIGH (ref 0.65–1.3)
INR BLD: 1.42 RATIO — HIGH (ref 0.65–1.3)
IRON SATN MFR SERPL: 14 UG/DL — LOW (ref 35–150)
IRON SATN MFR SERPL: 6 % — LOW (ref 15–50)
K OXYTOCA DNA BLD POS QL NAA+NON-PROBE: SIGNIFICANT CHANGE UP
KETONES UR-MCNC: NEGATIVE — SIGNIFICANT CHANGE UP
KETONES UR-MCNC: NEGATIVE — SIGNIFICANT CHANGE UP
KETONES UR-MCNC: SIGNIFICANT CHANGE UP
L MONOCYTOG DNA BLD POS QL NAA+NON-PROBE: SIGNIFICANT CHANGE UP
LACTATE BLDV-MCNC: 2.4 MMOL/L — HIGH (ref 0.5–1.6)
LACTATE SERPL-SCNC: 1.1 MMOL/L — SIGNIFICANT CHANGE UP (ref 0.7–2)
LEUKOCYTE ESTERASE UR-ACNC: ABNORMAL
LEUKOCYTE ESTERASE UR-ACNC: NEGATIVE — SIGNIFICANT CHANGE UP
LEUKOCYTE ESTERASE UR-ACNC: NEGATIVE — SIGNIFICANT CHANGE UP
LIDOCAIN IGE QN: <3 U/L — LOW (ref 7–60)
LIPID PNL WITH DIRECT LDL SERPL: 50 MG/DL — SIGNIFICANT CHANGE UP (ref 4–129)
LYMPHOCYTES # BLD AUTO: 0.5 K/UL — LOW (ref 1.2–3.4)
LYMPHOCYTES # BLD AUTO: 0.55 K/UL — LOW (ref 1.2–3.4)
LYMPHOCYTES # BLD AUTO: 0.57 K/UL — LOW (ref 1.2–3.4)
LYMPHOCYTES # BLD AUTO: 0.59 K/UL — LOW (ref 1.2–3.4)
LYMPHOCYTES # BLD AUTO: 0.64 K/UL — LOW (ref 1.2–3.4)
LYMPHOCYTES # BLD AUTO: 0.67 K/UL — LOW (ref 1.2–3.4)
LYMPHOCYTES # BLD AUTO: 0.76 K/UL — LOW (ref 1.2–3.4)
LYMPHOCYTES # BLD AUTO: 1.7 % — LOW (ref 20.5–51.1)
LYMPHOCYTES # BLD AUTO: 3.6 % — LOW (ref 20.5–51.1)
LYMPHOCYTES # BLD AUTO: 3.7 % — LOW (ref 20.5–51.1)
LYMPHOCYTES # BLD AUTO: 4 % — LOW (ref 20.5–51.1)
LYMPHOCYTES # BLD AUTO: 4.5 % — LOW (ref 20.5–51.1)
LYMPHOCYTES # BLD AUTO: 4.5 % — LOW (ref 20.5–51.1)
LYMPHOCYTES # BLD AUTO: 4.7 % — LOW (ref 20.5–51.1)
MAGNESIUM SERPL-MCNC: 1.7 MG/DL — LOW (ref 1.8–2.4)
MAGNESIUM SERPL-MCNC: 1.7 MG/DL — LOW (ref 1.8–2.4)
MAGNESIUM SERPL-MCNC: 1.8 MG/DL — SIGNIFICANT CHANGE UP (ref 1.8–2.4)
MCHC RBC-ENTMCNC: 28.4 PG — SIGNIFICANT CHANGE UP (ref 27–31)
MCHC RBC-ENTMCNC: 28.5 PG
MCHC RBC-ENTMCNC: 28.5 PG — SIGNIFICANT CHANGE UP (ref 27–31)
MCHC RBC-ENTMCNC: 28.7 PG — SIGNIFICANT CHANGE UP (ref 27–31)
MCHC RBC-ENTMCNC: 28.7 PG — SIGNIFICANT CHANGE UP (ref 27–31)
MCHC RBC-ENTMCNC: 28.9 PG — SIGNIFICANT CHANGE UP (ref 27–31)
MCHC RBC-ENTMCNC: 29.5 PG — SIGNIFICANT CHANGE UP (ref 27–31)
MCHC RBC-ENTMCNC: 29.5 PG — SIGNIFICANT CHANGE UP (ref 27–31)
MCHC RBC-ENTMCNC: 29.6 PG — SIGNIFICANT CHANGE UP (ref 27–31)
MCHC RBC-ENTMCNC: 30.1 PG — SIGNIFICANT CHANGE UP (ref 27–31)
MCHC RBC-ENTMCNC: 30.1 PG — SIGNIFICANT CHANGE UP (ref 27–31)
MCHC RBC-ENTMCNC: 30.3 PG — SIGNIFICANT CHANGE UP (ref 27–31)
MCHC RBC-ENTMCNC: 31.5 G/DL — LOW (ref 32–37)
MCHC RBC-ENTMCNC: 31.6 G/DL — LOW (ref 32–37)
MCHC RBC-ENTMCNC: 31.6 G/DL — LOW (ref 32–37)
MCHC RBC-ENTMCNC: 31.7 G/DL — LOW (ref 32–37)
MCHC RBC-ENTMCNC: 31.7 G/DL — LOW (ref 32–37)
MCHC RBC-ENTMCNC: 32 G/DL — SIGNIFICANT CHANGE UP (ref 32–37)
MCHC RBC-ENTMCNC: 32.3 G/DL
MCHC RBC-ENTMCNC: 32.5 G/DL — SIGNIFICANT CHANGE UP (ref 32–37)
MCHC RBC-ENTMCNC: 32.6 G/DL — SIGNIFICANT CHANGE UP (ref 32–37)
MCHC RBC-ENTMCNC: 32.8 G/DL — SIGNIFICANT CHANGE UP (ref 32–37)
MCHC RBC-ENTMCNC: 32.8 G/DL — SIGNIFICANT CHANGE UP (ref 32–37)
MCHC RBC-ENTMCNC: 34.3 G/DL — SIGNIFICANT CHANGE UP (ref 32–37)
MCV RBC AUTO: 87.8 FL — SIGNIFICANT CHANGE UP (ref 81–99)
MCV RBC AUTO: 88.2 FL
MCV RBC AUTO: 88.8 FL — SIGNIFICANT CHANGE UP (ref 81–99)
MCV RBC AUTO: 90.4 FL — SIGNIFICANT CHANGE UP (ref 81–99)
MCV RBC AUTO: 90.4 FL — SIGNIFICANT CHANGE UP (ref 81–99)
MCV RBC AUTO: 90.6 FL — SIGNIFICANT CHANGE UP (ref 81–99)
MCV RBC AUTO: 90.9 FL — SIGNIFICANT CHANGE UP (ref 81–99)
MCV RBC AUTO: 91 FL — SIGNIFICANT CHANGE UP (ref 81–99)
MCV RBC AUTO: 91.6 FL — SIGNIFICANT CHANGE UP (ref 81–99)
MCV RBC AUTO: 91.8 FL — SIGNIFICANT CHANGE UP (ref 81–99)
MCV RBC AUTO: 92.3 FL — SIGNIFICANT CHANGE UP (ref 81–99)
MCV RBC AUTO: 93.2 FL — SIGNIFICANT CHANGE UP (ref 81–99)
METHOD TYPE: SIGNIFICANT CHANGE UP
MONOCYTES # BLD AUTO: 0.18 K/UL — SIGNIFICANT CHANGE UP (ref 0.1–0.6)
MONOCYTES # BLD AUTO: 0.64 K/UL — HIGH (ref 0.1–0.6)
MONOCYTES # BLD AUTO: 0.92 K/UL — HIGH (ref 0.1–0.6)
MONOCYTES # BLD AUTO: 0.97 K/UL — HIGH (ref 0.1–0.6)
MONOCYTES # BLD AUTO: 0.98 K/UL — HIGH (ref 0.1–0.6)
MONOCYTES # BLD AUTO: 1.12 K/UL — HIGH (ref 0.1–0.6)
MONOCYTES # BLD AUTO: 1.2 K/UL — HIGH (ref 0.1–0.6)
MONOCYTES NFR BLD AUTO: 1.2 % — LOW (ref 1.7–9.3)
MONOCYTES NFR BLD AUTO: 4.2 % — SIGNIFICANT CHANGE UP (ref 1.7–9.3)
MONOCYTES NFR BLD AUTO: 4.7 % — SIGNIFICANT CHANGE UP (ref 1.7–9.3)
MONOCYTES NFR BLD AUTO: 4.8 % — SIGNIFICANT CHANGE UP (ref 1.7–9.3)
MONOCYTES NFR BLD AUTO: 6.6 % — SIGNIFICANT CHANGE UP (ref 1.7–9.3)
MONOCYTES NFR BLD AUTO: 6.7 % — SIGNIFICANT CHANGE UP (ref 1.7–9.3)
MONOCYTES NFR BLD AUTO: 7.8 % — SIGNIFICANT CHANGE UP (ref 1.7–9.3)
MRSA PCR RESULT.: NEGATIVE — SIGNIFICANT CHANGE UP
MRSA SPEC QL CULT: SIGNIFICANT CHANGE UP
MSSA DNA SPEC QL NAA+PROBE: SIGNIFICANT CHANGE UP
N MEN ISLT CULT: SIGNIFICANT CHANGE UP
NEUTROPHILS # BLD AUTO: 11.82 K/UL — HIGH (ref 1.4–6.5)
NEUTROPHILS # BLD AUTO: 11.95 K/UL — HIGH (ref 1.4–6.5)
NEUTROPHILS # BLD AUTO: 12.48 K/UL — HIGH (ref 1.4–6.5)
NEUTROPHILS # BLD AUTO: 12.94 K/UL — HIGH (ref 1.4–6.5)
NEUTROPHILS # BLD AUTO: 13.93 K/UL — HIGH (ref 1.4–6.5)
NEUTROPHILS # BLD AUTO: 19.04 K/UL — HIGH (ref 1.4–6.5)
NEUTROPHILS # BLD AUTO: 26.63 K/UL — HIGH (ref 1.4–6.5)
NEUTROPHILS NFR BLD AUTO: 86.9 % — HIGH (ref 42.2–75.2)
NEUTROPHILS NFR BLD AUTO: 87.4 % — HIGH (ref 42.2–75.2)
NEUTROPHILS NFR BLD AUTO: 87.4 % — HIGH (ref 42.2–75.2)
NEUTROPHILS NFR BLD AUTO: 89.4 % — HIGH (ref 42.2–75.2)
NEUTROPHILS NFR BLD AUTO: 90.6 % — HIGH (ref 42.2–75.2)
NEUTROPHILS NFR BLD AUTO: 93 % — HIGH (ref 42.2–75.2)
NEUTROPHILS NFR BLD AUTO: 94.3 % — HIGH (ref 42.2–75.2)
NITRITE UR-MCNC: NEGATIVE — SIGNIFICANT CHANGE UP
NRBC # BLD: 0 /100 WBCS — SIGNIFICANT CHANGE UP (ref 0–0)
NT-PROBNP SERPL-SCNC: 181 PG/ML — SIGNIFICANT CHANGE UP (ref 0–300)
NT-PROBNP SERPL-SCNC: 590 PG/ML — HIGH (ref 0–300)
ORGANISM # SPEC MICROSCOPIC CNT: SIGNIFICANT CHANGE UP
ORGANISM # SPEC MICROSCOPIC CNT: SIGNIFICANT CHANGE UP
P AERUGINOSA DNA BLD POS NAA+NON-PROBE: SIGNIFICANT CHANGE UP
PCO2 BLDV: 36 MMHG — LOW (ref 41–51)
PH BLDV: 7.39 — SIGNIFICANT CHANGE UP (ref 7.26–7.43)
PH UR: 6 — SIGNIFICANT CHANGE UP (ref 5–8)
PLATELET # BLD AUTO: 283 K/UL — SIGNIFICANT CHANGE UP (ref 130–400)
PLATELET # BLD AUTO: 287 K/UL — SIGNIFICANT CHANGE UP (ref 130–400)
PLATELET # BLD AUTO: 295 K/UL — SIGNIFICANT CHANGE UP (ref 130–400)
PLATELET # BLD AUTO: 302 K/UL — SIGNIFICANT CHANGE UP (ref 130–400)
PLATELET # BLD AUTO: 305 K/UL — SIGNIFICANT CHANGE UP (ref 130–400)
PLATELET # BLD AUTO: 317 K/UL — SIGNIFICANT CHANGE UP (ref 130–400)
PLATELET # BLD AUTO: 328 K/UL — SIGNIFICANT CHANGE UP (ref 130–400)
PLATELET # BLD AUTO: 342 K/UL — SIGNIFICANT CHANGE UP (ref 130–400)
PLATELET # BLD AUTO: 348 K/UL — SIGNIFICANT CHANGE UP (ref 130–400)
PLATELET # BLD AUTO: 371 K/UL — SIGNIFICANT CHANGE UP (ref 130–400)
PLATELET # BLD AUTO: 421 K/UL — HIGH (ref 130–400)
PLATELET # BLD AUTO: 469 K/UL
PMV BLD: 9.8 FL
PO2 BLDV: 39 MMHG — SIGNIFICANT CHANGE UP (ref 20–40)
POTASSIUM BLDV-SCNC: 3.9 MMOL/L — SIGNIFICANT CHANGE UP (ref 3.3–5.6)
POTASSIUM SERPL-MCNC: 3.4 MMOL/L — LOW (ref 3.5–5)
POTASSIUM SERPL-MCNC: 3.7 MMOL/L — SIGNIFICANT CHANGE UP (ref 3.5–5)
POTASSIUM SERPL-MCNC: 3.9 MMOL/L — SIGNIFICANT CHANGE UP (ref 3.5–5)
POTASSIUM SERPL-MCNC: 4.1 MMOL/L — SIGNIFICANT CHANGE UP (ref 3.5–5)
POTASSIUM SERPL-MCNC: 4.2 MMOL/L — SIGNIFICANT CHANGE UP (ref 3.5–5)
POTASSIUM SERPL-MCNC: 4.2 MMOL/L — SIGNIFICANT CHANGE UP (ref 3.5–5)
POTASSIUM SERPL-MCNC: 4.3 MMOL/L — SIGNIFICANT CHANGE UP (ref 3.5–5)
POTASSIUM SERPL-MCNC: 4.6 MMOL/L — SIGNIFICANT CHANGE UP (ref 3.5–5)
POTASSIUM SERPL-MCNC: 4.8 MMOL/L — SIGNIFICANT CHANGE UP (ref 3.5–5)
POTASSIUM SERPL-SCNC: 3.4 MMOL/L — LOW (ref 3.5–5)
POTASSIUM SERPL-SCNC: 3.7 MMOL/L — SIGNIFICANT CHANGE UP (ref 3.5–5)
POTASSIUM SERPL-SCNC: 3.9 MMOL/L — SIGNIFICANT CHANGE UP (ref 3.5–5)
POTASSIUM SERPL-SCNC: 4.1 MMOL/L — SIGNIFICANT CHANGE UP (ref 3.5–5)
POTASSIUM SERPL-SCNC: 4.2 MMOL/L — SIGNIFICANT CHANGE UP (ref 3.5–5)
POTASSIUM SERPL-SCNC: 4.2 MMOL/L — SIGNIFICANT CHANGE UP (ref 3.5–5)
POTASSIUM SERPL-SCNC: 4.3 MMOL/L — SIGNIFICANT CHANGE UP (ref 3.5–5)
POTASSIUM SERPL-SCNC: 4.6 MMOL/L — SIGNIFICANT CHANGE UP (ref 3.5–5)
POTASSIUM SERPL-SCNC: 4.8 MMOL/L — SIGNIFICANT CHANGE UP (ref 3.5–5)
PROCALCITONIN SERPL-MCNC: 0.23 NG/ML — HIGH (ref 0.02–0.1)
PROCALCITONIN SERPL-MCNC: 4.35 NG/ML — HIGH (ref 0.02–0.1)
PROT SERPL-MCNC: 4.3 G/DL — LOW (ref 6–8)
PROT SERPL-MCNC: 4.5 G/DL — LOW (ref 6–8)
PROT SERPL-MCNC: 4.6 G/DL — LOW (ref 6–8)
PROT SERPL-MCNC: 4.7 G/DL — LOW (ref 6–8)
PROT SERPL-MCNC: 4.7 G/DL — LOW (ref 6–8)
PROT UR-MCNC: ABNORMAL
PROT UR-MCNC: ABNORMAL MG/DL
PROT UR-MCNC: SIGNIFICANT CHANGE UP
PROTEUS SP DNA BLD POS QL NAA+NON-PROBE: SIGNIFICANT CHANGE UP
PROTHROM AB SERPL-ACNC: 14.2 SEC — HIGH (ref 9.95–12.87)
PROTHROM AB SERPL-ACNC: 15.8 SEC — HIGH (ref 9.95–12.87)
PROTHROM AB SERPL-ACNC: 16.3 SEC — HIGH (ref 9.95–12.87)
RAPID RVP RESULT: SIGNIFICANT CHANGE UP
RBC # BLD: 2.3 M/UL — LOW (ref 4.2–5.4)
RBC # BLD: 2.77 M/UL — LOW (ref 4.2–5.4)
RBC # BLD: 2.79 M/UL — LOW (ref 4.2–5.4)
RBC # BLD: 2.84 M/UL — LOW (ref 4.2–5.4)
RBC # BLD: 2.87 M/UL — LOW (ref 4.2–5.4)
RBC # BLD: 3.12 M/UL — LOW (ref 4.2–5.4)
RBC # BLD: 3.31 M/UL — LOW (ref 4.2–5.4)
RBC # BLD: 3.32 M/UL — LOW (ref 4.2–5.4)
RBC # BLD: 3.56 M/UL — LOW (ref 4.2–5.4)
RBC # BLD: 3.56 M/UL — LOW (ref 4.2–5.4)
RBC # BLD: 3.7 M/UL — LOW (ref 4.2–5.4)
RBC # BLD: 4.39 M/UL
RBC # FLD: 16.2 % — HIGH (ref 11.5–14.5)
RBC # FLD: 16.3 % — HIGH (ref 11.5–14.5)
RBC # FLD: 16.4 %
RBC # FLD: 16.5 % — HIGH (ref 11.5–14.5)
RBC # FLD: 16.8 % — HIGH (ref 11.5–14.5)
RBC # FLD: 16.9 % — HIGH (ref 11.5–14.5)
RBC # FLD: 16.9 % — HIGH (ref 11.5–14.5)
RBC # FLD: 17.4 % — HIGH (ref 11.5–14.5)
RBC # FLD: 17.5 % — HIGH (ref 11.5–14.5)
RBC # FLD: 17.6 % — HIGH (ref 11.5–14.5)
RBC # FLD: 17.6 % — HIGH (ref 11.5–14.5)
RBC # FLD: 18.6 % — HIGH (ref 11.5–14.5)
RBC CASTS # UR COMP ASSIST: 3 /HPF — SIGNIFICANT CHANGE UP (ref 0–4)
RBC CASTS # UR COMP ASSIST: 34 /HPF — HIGH (ref 0–4)
RBC CASTS # UR COMP ASSIST: >50 /HPF
RSV RESULT: NEGATIVE — SIGNIFICANT CHANGE UP
RSV RNA RESP QL NAA+PROBE: NEGATIVE — SIGNIFICANT CHANGE UP
S MARCESCENS DNA BLD POS NAA+NON-PROBE: SIGNIFICANT CHANGE UP
S PNEUM DNA BLD POS QL NAA+NON-PROBE: SIGNIFICANT CHANGE UP
S PYO DNA BLD POS QL NAA+NON-PROBE: SIGNIFICANT CHANGE UP
SAO2 % BLDV: 67 % — SIGNIFICANT CHANGE UP
SARS-COV-2 RNA SPEC QL NAA+PROBE: SIGNIFICANT CHANGE UP
SODIUM SERPL-SCNC: 129 MMOL/L — LOW (ref 135–146)
SODIUM SERPL-SCNC: 133 MMOL/L — LOW (ref 135–146)
SODIUM SERPL-SCNC: 134 MMOL/L — LOW (ref 135–146)
SODIUM SERPL-SCNC: 135 MMOL/L — SIGNIFICANT CHANGE UP (ref 135–146)
SODIUM SERPL-SCNC: 135 MMOL/L — SIGNIFICANT CHANGE UP (ref 135–146)
SODIUM SERPL-SCNC: 137 MMOL/L — SIGNIFICANT CHANGE UP (ref 135–146)
SODIUM SERPL-SCNC: 138 MMOL/L — SIGNIFICANT CHANGE UP (ref 135–146)
SP GR SPEC: 1.02 — SIGNIFICANT CHANGE UP (ref 1.01–1.03)
SP GR SPEC: 1.03 — HIGH (ref 1.01–1.02)
SP GR SPEC: 1.03 — HIGH (ref 1.01–1.02)
SPECIMEN SOURCE: SIGNIFICANT CHANGE UP
TIBC SERPL-MCNC: 228 UG/DL — SIGNIFICANT CHANGE UP (ref 220–430)
TOTAL CHOLESTEROL/HDL RATIO MEASUREMENT: 4.1 RATIO — SIGNIFICANT CHANGE UP (ref 4–5.5)
TRANSFERRIN SERPL-MCNC: 182 MG/DL — LOW (ref 200–360)
TRIGL SERPL-MCNC: 102 MG/DL — SIGNIFICANT CHANGE UP (ref 10–149)
TROPONIN T SERPL-MCNC: 0.02 NG/ML — HIGH
TROPONIN T SERPL-MCNC: <0.01 NG/ML — SIGNIFICANT CHANGE UP
TROPONIN T SERPL-MCNC: <0.01 NG/ML — SIGNIFICANT CHANGE UP
UIBC SERPL-MCNC: 214 UG/DL — SIGNIFICANT CHANGE UP (ref 110–370)
UROBILINOGEN FLD QL: 1 MG/DL (ref 0.2–0.2)
UROBILINOGEN FLD QL: ABNORMAL
UROBILINOGEN FLD QL: ABNORMAL
VIT B12 SERPL-MCNC: 1883 PG/ML — HIGH (ref 232–1245)
WBC # BLD: 13.21 K/UL — HIGH (ref 4.8–10.8)
WBC # BLD: 13.68 K/UL — HIGH (ref 4.8–10.8)
WBC # BLD: 14.36 K/UL — HIGH (ref 4.8–10.8)
WBC # BLD: 14.78 K/UL — HIGH (ref 4.8–10.8)
WBC # BLD: 14.8 K/UL — HIGH (ref 4.8–10.8)
WBC # BLD: 20.32 K/UL — HIGH (ref 4.8–10.8)
WBC # BLD: 21.01 K/UL — HIGH (ref 4.8–10.8)
WBC # BLD: 28.65 K/UL — HIGH (ref 4.8–10.8)
WBC # BLD: 8.58 K/UL — SIGNIFICANT CHANGE UP (ref 4.8–10.8)
WBC # BLD: 8.7 K/UL — SIGNIFICANT CHANGE UP (ref 4.8–10.8)
WBC # BLD: 9.46 K/UL — SIGNIFICANT CHANGE UP (ref 4.8–10.8)
WBC # FLD AUTO: 13.21 K/UL — HIGH (ref 4.8–10.8)
WBC # FLD AUTO: 13.68 K/UL — HIGH (ref 4.8–10.8)
WBC # FLD AUTO: 14.36 K/UL — HIGH (ref 4.8–10.8)
WBC # FLD AUTO: 14.78 K/UL — HIGH (ref 4.8–10.8)
WBC # FLD AUTO: 14.8 K/UL — HIGH (ref 4.8–10.8)
WBC # FLD AUTO: 16.18 K/UL
WBC # FLD AUTO: 20.32 K/UL — HIGH (ref 4.8–10.8)
WBC # FLD AUTO: 21.01 K/UL — HIGH (ref 4.8–10.8)
WBC # FLD AUTO: 28.65 K/UL — HIGH (ref 4.8–10.8)
WBC # FLD AUTO: 8.58 K/UL — SIGNIFICANT CHANGE UP (ref 4.8–10.8)
WBC # FLD AUTO: 8.7 K/UL — SIGNIFICANT CHANGE UP (ref 4.8–10.8)
WBC # FLD AUTO: 9.46 K/UL — SIGNIFICANT CHANGE UP (ref 4.8–10.8)
WBC UR QL: 14 /HPF — HIGH (ref 0–5)
WBC UR QL: 4 /HPF — SIGNIFICANT CHANGE UP (ref 0–5)
WBC UR QL: SIGNIFICANT CHANGE UP /HPF

## 2020-01-01 PROCEDURE — 71045 X-RAY EXAM CHEST 1 VIEW: CPT | Mod: 26

## 2020-01-01 PROCEDURE — 71045 X-RAY EXAM CHEST 1 VIEW: CPT | Mod: 26,77

## 2020-01-01 PROCEDURE — 70450 CT HEAD/BRAIN W/O DYE: CPT | Mod: 26

## 2020-01-01 PROCEDURE — 99291 CRITICAL CARE FIRST HOUR: CPT

## 2020-01-01 PROCEDURE — 99222 1ST HOSP IP/OBS MODERATE 55: CPT

## 2020-01-01 PROCEDURE — 70496 CT ANGIOGRAPHY HEAD: CPT | Mod: 26

## 2020-01-01 PROCEDURE — 76937 US GUIDE VASCULAR ACCESS: CPT | Mod: 26

## 2020-01-01 PROCEDURE — 99232 SBSQ HOSP IP/OBS MODERATE 35: CPT

## 2020-01-01 PROCEDURE — 36556 INSERT NON-TUNNEL CV CATH: CPT

## 2020-01-01 PROCEDURE — 99285 EMERGENCY DEPT VISIT HI MDM: CPT

## 2020-01-01 PROCEDURE — 93010 ELECTROCARDIOGRAM REPORT: CPT

## 2020-01-01 PROCEDURE — 99239 HOSP IP/OBS DSCHRG MGMT >30: CPT

## 2020-01-01 PROCEDURE — 31500 INSERT EMERGENCY AIRWAY: CPT

## 2020-01-01 PROCEDURE — 76770 US EXAM ABDO BACK WALL COMP: CPT | Mod: 26

## 2020-01-01 PROCEDURE — 71275 CT ANGIOGRAPHY CHEST: CPT | Mod: 26

## 2020-01-01 PROCEDURE — 93010 ELECTROCARDIOGRAM REPORT: CPT | Mod: 77

## 2020-01-01 PROCEDURE — 99291 CRITICAL CARE FIRST HOUR: CPT | Mod: 25

## 2020-01-01 PROCEDURE — 93306 TTE W/DOPPLER COMPLETE: CPT | Mod: 26

## 2020-01-01 PROCEDURE — ZZZZZ: CPT

## 2020-01-01 PROCEDURE — 99233 SBSQ HOSP IP/OBS HIGH 50: CPT

## 2020-01-01 PROCEDURE — 95819 EEG AWAKE AND ASLEEP: CPT | Mod: 26

## 2020-01-01 PROCEDURE — 99214 OFFICE O/P EST MOD 30 MIN: CPT

## 2020-01-01 PROCEDURE — 70498 CT ANGIOGRAPHY NECK: CPT | Mod: 26

## 2020-01-01 PROCEDURE — 93971 EXTREMITY STUDY: CPT | Mod: 26,RT

## 2020-01-01 PROCEDURE — 93970 EXTREMITY STUDY: CPT | Mod: 26

## 2020-01-01 PROCEDURE — 99223 1ST HOSP IP/OBS HIGH 75: CPT | Mod: AI

## 2020-01-01 PROCEDURE — 71045 X-RAY EXAM CHEST 1 VIEW: CPT | Mod: 26,76

## 2020-01-01 RX ORDER — VANCOMYCIN HCL 1 G
1250 VIAL (EA) INTRAVENOUS ONCE
Refills: 0 | Status: COMPLETED | OUTPATIENT
Start: 2020-01-01 | End: 2020-01-01

## 2020-01-01 RX ORDER — DEXTROSE 50 % IN WATER 50 %
15 SYRINGE (ML) INTRAVENOUS ONCE
Refills: 0 | Status: DISCONTINUED | OUTPATIENT
Start: 2020-01-01 | End: 2020-01-01

## 2020-01-01 RX ORDER — IRON SUCROSE 20 MG/ML
200 INJECTION, SOLUTION INTRAVENOUS
Refills: 0 | Status: DISCONTINUED | OUTPATIENT
Start: 2020-01-01 | End: 2020-01-01

## 2020-01-01 RX ORDER — INSULIN LISPRO 100/ML
5 VIAL (ML) SUBCUTANEOUS
Refills: 0 | Status: DISCONTINUED | OUTPATIENT
Start: 2020-01-01 | End: 2020-01-01

## 2020-01-01 RX ORDER — CHLORHEXIDINE GLUCONATE 213 G/1000ML
15 SOLUTION TOPICAL
Refills: 0 | Status: DISCONTINUED | OUTPATIENT
Start: 2020-01-01 | End: 2020-01-01

## 2020-01-01 RX ORDER — MAGNESIUM OXIDE 400 MG ORAL TABLET 241.3 MG
400 TABLET ORAL DAILY
Refills: 0 | Status: DISCONTINUED | OUTPATIENT
Start: 2020-01-01 | End: 2020-01-01

## 2020-01-01 RX ORDER — INSULIN GLARGINE 100 [IU]/ML
15 INJECTION, SOLUTION SUBCUTANEOUS AT BEDTIME
Refills: 0 | Status: DISCONTINUED | OUTPATIENT
Start: 2020-01-01 | End: 2020-01-01

## 2020-01-01 RX ORDER — BACLOFEN 100 %
0 POWDER (GRAM) MISCELLANEOUS
Qty: 0 | Refills: 0 | DISCHARGE

## 2020-01-01 RX ORDER — CISATRACURIUM BESYLATE 2 MG/ML
10 INJECTION INTRAVENOUS ONCE
Refills: 0 | Status: COMPLETED | OUTPATIENT
Start: 2020-01-01 | End: 2020-01-01

## 2020-01-01 RX ORDER — CEFEPIME 1 G/1
2000 INJECTION, POWDER, FOR SOLUTION INTRAMUSCULAR; INTRAVENOUS ONCE
Refills: 0 | Status: COMPLETED | OUTPATIENT
Start: 2020-01-01 | End: 2020-01-01

## 2020-01-01 RX ORDER — VANCOMYCIN HCL 1 G
VIAL (EA) INTRAVENOUS
Refills: 0 | Status: DISCONTINUED | OUTPATIENT
Start: 2020-01-01 | End: 2020-01-01

## 2020-01-01 RX ORDER — PANTOPRAZOLE SODIUM 20 MG/1
40 TABLET, DELAYED RELEASE ORAL
Refills: 0 | Status: DISCONTINUED | OUTPATIENT
Start: 2020-01-01 | End: 2020-01-01

## 2020-01-01 RX ORDER — ROPINIROLE 8 MG/1
1 TABLET, FILM COATED, EXTENDED RELEASE ORAL
Qty: 0 | Refills: 0 | DISCHARGE

## 2020-01-01 RX ORDER — CIPROFLOXACIN LACTATE 400MG/40ML
1 VIAL (ML) INTRAVENOUS
Qty: 4 | Refills: 0
Start: 2020-01-01 | End: 2020-01-01

## 2020-01-01 RX ORDER — MAGNESIUM SULFATE 500 MG/ML
2 VIAL (ML) INJECTION ONCE
Refills: 0 | Status: COMPLETED | OUTPATIENT
Start: 2020-01-01 | End: 2020-01-01

## 2020-01-01 RX ORDER — PROPOFOL 10 MG/ML
10.01 INJECTION, EMULSION INTRAVENOUS
Qty: 1000 | Refills: 0 | Status: DISCONTINUED | OUTPATIENT
Start: 2020-01-01 | End: 2020-01-01

## 2020-01-01 RX ORDER — SUCCINYLCHOLINE CHLORIDE 100 MG/5ML
100 SYRINGE (ML) INTRAVENOUS ONCE
Refills: 0 | Status: COMPLETED | OUTPATIENT
Start: 2020-01-01 | End: 2020-01-01

## 2020-01-01 RX ORDER — ONDANSETRON 8 MG/1
8 TABLET, FILM COATED ORAL THREE TIMES A DAY
Refills: 0 | Status: DISCONTINUED | OUTPATIENT
Start: 2020-01-01 | End: 2020-01-01

## 2020-01-01 RX ORDER — ETOMIDATE 2 MG/ML
20 INJECTION INTRAVENOUS ONCE
Refills: 0 | Status: COMPLETED | OUTPATIENT
Start: 2020-01-01 | End: 2020-01-01

## 2020-01-01 RX ORDER — NOREPINEPHRINE BITARTRATE/D5W 8 MG/250ML
0.04 PLASTIC BAG, INJECTION (ML) INTRAVENOUS
Qty: 8 | Refills: 0 | Status: DISCONTINUED | OUTPATIENT
Start: 2020-01-01 | End: 2020-01-01

## 2020-01-01 RX ORDER — DEXTROSE 50 % IN WATER 50 %
12.5 SYRINGE (ML) INTRAVENOUS ONCE
Refills: 0 | Status: DISCONTINUED | OUTPATIENT
Start: 2020-01-01 | End: 2020-01-01

## 2020-01-01 RX ORDER — MAGNESIUM OXIDE 400 MG ORAL TABLET 241.3 MG
1 TABLET ORAL
Qty: 0 | Refills: 0 | DISCHARGE
Start: 2020-01-01

## 2020-01-01 RX ORDER — AZITHROMYCIN 500 MG/1
500 TABLET, FILM COATED ORAL ONCE
Refills: 0 | Status: COMPLETED | OUTPATIENT
Start: 2020-01-01 | End: 2020-01-01

## 2020-01-01 RX ORDER — METOCLOPRAMIDE HCL 10 MG
5 TABLET ORAL EVERY 6 HOURS
Refills: 0 | Status: DISCONTINUED | OUTPATIENT
Start: 2020-01-01 | End: 2020-01-01

## 2020-01-01 RX ORDER — FUROSEMIDE 40 MG
1 TABLET ORAL
Qty: 15 | Refills: 0
Start: 2020-01-01

## 2020-01-01 RX ORDER — FUROSEMIDE 40 MG
20 TABLET ORAL ONCE
Refills: 0 | Status: DISCONTINUED | OUTPATIENT
Start: 2020-01-01 | End: 2020-01-01

## 2020-01-01 RX ORDER — ROPINIROLE 8 MG/1
0.5 TABLET, FILM COATED, EXTENDED RELEASE ORAL THREE TIMES A DAY
Refills: 0 | Status: DISCONTINUED | OUTPATIENT
Start: 2020-01-01 | End: 2020-01-01

## 2020-01-01 RX ORDER — DULOXETINE HYDROCHLORIDE 30 MG/1
60 CAPSULE, DELAYED RELEASE ORAL DAILY
Refills: 0 | Status: DISCONTINUED | OUTPATIENT
Start: 2020-01-01 | End: 2020-01-01

## 2020-01-01 RX ORDER — FOLIC ACID 0.8 MG
1 TABLET ORAL DAILY
Refills: 0 | Status: DISCONTINUED | OUTPATIENT
Start: 2020-01-01 | End: 2020-01-01

## 2020-01-01 RX ORDER — FOLIC ACID 0.8 MG
1 TABLET ORAL
Qty: 0 | Refills: 0 | DISCHARGE

## 2020-01-01 RX ORDER — ONDANSETRON 8 MG/1
4 TABLET, FILM COATED ORAL ONCE
Refills: 0 | Status: COMPLETED | OUTPATIENT
Start: 2020-01-01 | End: 2020-01-01

## 2020-01-01 RX ORDER — GLUCAGON INJECTION, SOLUTION 0.5 MG/.1ML
1 INJECTION, SOLUTION SUBCUTANEOUS ONCE
Refills: 0 | Status: DISCONTINUED | OUTPATIENT
Start: 2020-01-01 | End: 2020-01-01

## 2020-01-01 RX ORDER — COLLAGENASE CLOSTRIDIUM HIST. 250 UNIT/G
1 OINTMENT (GRAM) TOPICAL DAILY
Refills: 0 | Status: DISCONTINUED | OUTPATIENT
Start: 2020-01-01 | End: 2020-01-01

## 2020-01-01 RX ORDER — SODIUM CHLORIDE 9 MG/ML
1000 INJECTION, SOLUTION INTRAVENOUS ONCE
Refills: 0 | Status: COMPLETED | OUTPATIENT
Start: 2020-01-01 | End: 2020-01-01

## 2020-01-01 RX ORDER — DULOXETINE HYDROCHLORIDE 30 MG/1
1 CAPSULE, DELAYED RELEASE ORAL
Qty: 0 | Refills: 0 | DISCHARGE

## 2020-01-01 RX ORDER — SODIUM CHLORIDE 9 MG/ML
1000 INJECTION, SOLUTION INTRAVENOUS
Refills: 0 | Status: DISCONTINUED | OUTPATIENT
Start: 2020-01-01 | End: 2020-01-01

## 2020-01-01 RX ORDER — HEPARIN SODIUM 5000 [USP'U]/ML
5000 INJECTION INTRAVENOUS; SUBCUTANEOUS EVERY 8 HOURS
Refills: 0 | Status: DISCONTINUED | OUTPATIENT
Start: 2020-01-01 | End: 2020-01-01

## 2020-01-01 RX ORDER — DEXTROSE 50 % IN WATER 50 %
25 SYRINGE (ML) INTRAVENOUS ONCE
Refills: 0 | Status: DISCONTINUED | OUTPATIENT
Start: 2020-01-01 | End: 2020-01-01

## 2020-01-01 RX ORDER — MORPHINE SULFATE 50 MG/1
1 CAPSULE, EXTENDED RELEASE ORAL
Qty: 100 | Refills: 0 | Status: DISCONTINUED | OUTPATIENT
Start: 2020-01-01 | End: 2020-01-01

## 2020-01-01 RX ORDER — DEXMEDETOMIDINE HYDROCHLORIDE IN 0.9% SODIUM CHLORIDE 4 UG/ML
0.2 INJECTION INTRAVENOUS
Qty: 400 | Refills: 0 | Status: DISCONTINUED | OUTPATIENT
Start: 2020-01-01 | End: 2020-01-01

## 2020-01-01 RX ORDER — EPINEPHRINE 0.3 MG/.3ML
0.03 INJECTION INTRAMUSCULAR; SUBCUTANEOUS
Qty: 8 | Refills: 0 | Status: DISCONTINUED | OUTPATIENT
Start: 2020-01-01 | End: 2020-01-01

## 2020-01-01 RX ORDER — CHLORHEXIDINE GLUCONATE 213 G/1000ML
1 SOLUTION TOPICAL
Refills: 0 | Status: DISCONTINUED | OUTPATIENT
Start: 2020-01-01 | End: 2020-01-01

## 2020-01-01 RX ORDER — GLIMEPIRIDE 1 MG
1 TABLET ORAL
Qty: 30 | Refills: 0
Start: 2020-01-01 | End: 2020-04-28

## 2020-01-01 RX ORDER — ACETAMINOPHEN 500 MG
650 TABLET ORAL EVERY 6 HOURS
Refills: 0 | Status: DISCONTINUED | OUTPATIENT
Start: 2020-01-01 | End: 2020-01-01

## 2020-01-01 RX ORDER — SENNA PLUS 8.6 MG/1
2 TABLET ORAL AT BEDTIME
Refills: 0 | Status: DISCONTINUED | OUTPATIENT
Start: 2020-01-01 | End: 2020-01-01

## 2020-01-01 RX ORDER — CEFTRIAXONE 500 MG/1
1000 INJECTION, POWDER, FOR SOLUTION INTRAMUSCULAR; INTRAVENOUS ONCE
Refills: 0 | Status: COMPLETED | OUTPATIENT
Start: 2020-01-01 | End: 2020-01-01

## 2020-01-01 RX ORDER — ATORVASTATIN CALCIUM 80 MG/1
40 TABLET, FILM COATED ORAL AT BEDTIME
Refills: 0 | Status: DISCONTINUED | OUTPATIENT
Start: 2020-01-01 | End: 2020-01-01

## 2020-01-01 RX ORDER — FUROSEMIDE 40 MG
20 TABLET ORAL ONCE
Refills: 0 | Status: COMPLETED | OUTPATIENT
Start: 2020-01-01 | End: 2020-01-01

## 2020-01-01 RX ORDER — VASOPRESSIN 20 [USP'U]/ML
0.02 INJECTION INTRAVENOUS
Qty: 50 | Refills: 0 | Status: DISCONTINUED | OUTPATIENT
Start: 2020-01-01 | End: 2020-01-01

## 2020-01-01 RX ORDER — ONDANSETRON 8 MG/1
1 TABLET, FILM COATED ORAL
Qty: 0 | Refills: 0 | DISCHARGE

## 2020-01-01 RX ORDER — METOCLOPRAMIDE HCL 10 MG
1 TABLET ORAL
Qty: 0 | Refills: 0 | DISCHARGE

## 2020-01-01 RX ORDER — INSULIN LISPRO 100/ML
VIAL (ML) SUBCUTANEOUS
Refills: 0 | Status: DISCONTINUED | OUTPATIENT
Start: 2020-01-01 | End: 2020-01-01

## 2020-01-01 RX ORDER — SODIUM BICARBONATE 1 MEQ/ML
50 SYRINGE (ML) INTRAVENOUS ONCE
Refills: 0 | Status: COMPLETED | OUTPATIENT
Start: 2020-01-01 | End: 2020-01-01

## 2020-01-01 RX ORDER — SODIUM CHLORIDE 9 MG/ML
1000 INJECTION INTRAMUSCULAR; INTRAVENOUS; SUBCUTANEOUS
Refills: 0 | Status: DISCONTINUED | OUTPATIENT
Start: 2020-01-01 | End: 2020-01-01

## 2020-01-01 RX ORDER — ERGOCALCIFEROL 1.25 MG/1
50000 CAPSULE ORAL
Refills: 0 | Status: DISCONTINUED | OUTPATIENT
Start: 2020-01-01 | End: 2020-01-01

## 2020-01-01 RX ORDER — COLLAGENASE CLOSTRIDIUM HIST. 250 UNIT/G
1 OINTMENT (GRAM) TOPICAL
Qty: 1 | Refills: 0
Start: 2020-01-01 | End: 2020-01-01

## 2020-01-01 RX ORDER — FERROUS SULFATE 325(65) MG
325 TABLET ORAL DAILY
Refills: 0 | Status: DISCONTINUED | OUTPATIENT
Start: 2020-01-01 | End: 2020-01-01

## 2020-01-01 RX ORDER — METOCLOPRAMIDE HCL 10 MG
1 TABLET ORAL
Qty: 0 | Refills: 0 | DISCHARGE
Start: 2020-01-01

## 2020-01-01 RX ORDER — CEFEPIME 1 G/1
2000 INJECTION, POWDER, FOR SOLUTION INTRAMUSCULAR; INTRAVENOUS EVERY 8 HOURS
Refills: 0 | Status: DISCONTINUED | OUTPATIENT
Start: 2020-01-01 | End: 2020-01-01

## 2020-01-01 RX ORDER — ASPIRIN/CALCIUM CARB/MAGNESIUM 324 MG
81 TABLET ORAL DAILY
Refills: 0 | Status: DISCONTINUED | OUTPATIENT
Start: 2020-01-01 | End: 2020-01-01

## 2020-01-01 RX ORDER — HYDROXYCHLOROQUINE SULFATE 200 MG
400 TABLET ORAL EVERY 24 HOURS
Refills: 0 | Status: CANCELLED | OUTPATIENT
Start: 2020-04-14 | End: 2020-01-01

## 2020-01-01 RX ORDER — BACLOFEN 100 %
20 POWDER (GRAM) MISCELLANEOUS
Refills: 0 | Status: DISCONTINUED | OUTPATIENT
Start: 2020-01-01 | End: 2020-01-01

## 2020-01-01 RX ORDER — GLIMEPIRIDE 1 MG
1 TABLET ORAL
Qty: 0 | Refills: 0 | DISCHARGE

## 2020-01-01 RX ORDER — VANCOMYCIN HCL 1 G
1250 VIAL (EA) INTRAVENOUS EVERY 12 HOURS
Refills: 0 | Status: DISCONTINUED | OUTPATIENT
Start: 2020-01-01 | End: 2020-01-01

## 2020-01-01 RX ORDER — DIPHENHYDRAMINE HCL 50 MG
25 CAPSULE ORAL ONCE
Refills: 0 | Status: COMPLETED | OUTPATIENT
Start: 2020-01-01 | End: 2020-01-01

## 2020-01-01 RX ORDER — ERGOCALCIFEROL 1.25 MG/1
1 CAPSULE ORAL
Qty: 0 | Refills: 0 | DISCHARGE

## 2020-01-01 RX ORDER — DIPHENHYDRAMINE HCL 50 MG
50 CAPSULE ORAL ONCE
Refills: 0 | Status: COMPLETED | OUTPATIENT
Start: 2020-01-01 | End: 2020-01-01

## 2020-01-01 RX ORDER — MORPHINE SULFATE 50 MG/1
1 CAPSULE, EXTENDED RELEASE ORAL
Qty: 250 | Refills: 0 | Status: DISCONTINUED | OUTPATIENT
Start: 2020-01-01 | End: 2020-01-01

## 2020-01-01 RX ORDER — IRON SUCROSE 20 MG/ML
100 INJECTION, SOLUTION INTRAVENOUS EVERY 24 HOURS
Refills: 0 | Status: DISCONTINUED | OUTPATIENT
Start: 2020-01-01 | End: 2020-01-01

## 2020-01-01 RX ORDER — METOCLOPRAMIDE HCL 10 MG
10 TABLET ORAL
Refills: 0 | Status: DISCONTINUED | OUTPATIENT
Start: 2020-01-01 | End: 2020-01-01

## 2020-01-01 RX ORDER — FUROSEMIDE 40 MG
20 TABLET ORAL DAILY
Refills: 0 | Status: DISCONTINUED | OUTPATIENT
Start: 2020-01-01 | End: 2020-01-01

## 2020-01-01 RX ORDER — ENOXAPARIN SODIUM 100 MG/ML
40 INJECTION SUBCUTANEOUS DAILY
Refills: 0 | Status: DISCONTINUED | OUTPATIENT
Start: 2020-01-01 | End: 2020-01-01

## 2020-01-01 RX ORDER — HYDROXYCHLOROQUINE SULFATE 200 MG
800 TABLET ORAL EVERY 24 HOURS
Refills: 0 | Status: DISCONTINUED | OUTPATIENT
Start: 2020-01-01 | End: 2020-01-01

## 2020-01-01 RX ORDER — HYDROXYCHLOROQUINE SULFATE 200 MG
TABLET ORAL
Refills: 0 | Status: DISCONTINUED | OUTPATIENT
Start: 2020-01-01 | End: 2020-01-01

## 2020-01-01 RX ORDER — FERROUS SULFATE 325(65) MG
1 TABLET ORAL
Qty: 60 | Refills: 0
Start: 2020-01-01 | End: 2020-04-28

## 2020-01-01 RX ORDER — VANCOMYCIN HCL 1 G
1000 VIAL (EA) INTRAVENOUS EVERY 12 HOURS
Refills: 0 | Status: DISCONTINUED | OUTPATIENT
Start: 2020-01-01 | End: 2020-01-01

## 2020-01-01 RX ORDER — COLLAGENASE CLOSTRIDIUM HIST. 250 UNIT/G
1 OINTMENT (GRAM) TOPICAL
Qty: 0 | Refills: 0 | DISCHARGE
Start: 2020-01-01

## 2020-01-01 RX ORDER — PROCHLORPERAZINE MALEATE 5 MG
5 TABLET ORAL ONCE
Refills: 0 | Status: COMPLETED | OUTPATIENT
Start: 2020-01-01 | End: 2020-01-01

## 2020-01-01 RX ORDER — PANTOPRAZOLE SODIUM 20 MG/1
40 TABLET, DELAYED RELEASE ORAL DAILY
Refills: 0 | Status: DISCONTINUED | OUTPATIENT
Start: 2020-01-01 | End: 2020-01-01

## 2020-01-01 RX ORDER — SODIUM CHLORIDE 9 MG/ML
1500 INJECTION, SOLUTION INTRAVENOUS ONCE
Refills: 0 | Status: COMPLETED | OUTPATIENT
Start: 2020-01-01 | End: 2020-01-01

## 2020-01-01 RX ADMIN — Medication 166.67 MILLIGRAM(S): at 06:00

## 2020-01-01 RX ADMIN — ENOXAPARIN SODIUM 40 MILLIGRAM(S): 100 INJECTION SUBCUTANEOUS at 12:13

## 2020-01-01 RX ADMIN — Medication 5 MILLIGRAM(S): at 17:48

## 2020-01-01 RX ADMIN — SODIUM CHLORIDE 1500 MILLILITER(S): 9 INJECTION, SOLUTION INTRAVENOUS at 13:00

## 2020-01-01 RX ADMIN — Medication 25 MILLIGRAM(S): at 16:35

## 2020-01-01 RX ADMIN — Medication 10 MILLIGRAM(S): at 17:52

## 2020-01-01 RX ADMIN — Medication 1 MILLIGRAM(S): at 12:01

## 2020-01-01 RX ADMIN — SODIUM CHLORIDE 1000 MILLILITER(S): 9 INJECTION, SOLUTION INTRAVENOUS at 11:35

## 2020-01-01 RX ADMIN — DULOXETINE HYDROCHLORIDE 60 MILLIGRAM(S): 30 CAPSULE, DELAYED RELEASE ORAL at 18:43

## 2020-01-01 RX ADMIN — Medication 5 MILLIGRAM(S): at 12:13

## 2020-01-01 RX ADMIN — ONDANSETRON 8 MILLIGRAM(S): 8 TABLET, FILM COATED ORAL at 08:14

## 2020-01-01 RX ADMIN — CEFEPIME 100 MILLIGRAM(S): 1 INJECTION, POWDER, FOR SOLUTION INTRAMUSCULAR; INTRAVENOUS at 05:21

## 2020-01-01 RX ADMIN — Medication 50 MILLIEQUIVALENT(S): at 07:48

## 2020-01-01 RX ADMIN — Medication 20 MILLIGRAM(S): at 05:28

## 2020-01-01 RX ADMIN — DULOXETINE HYDROCHLORIDE 60 MILLIGRAM(S): 30 CAPSULE, DELAYED RELEASE ORAL at 11:38

## 2020-01-01 RX ADMIN — Medication 1 APPLICATION(S): at 12:15

## 2020-01-01 RX ADMIN — ENOXAPARIN SODIUM 40 MILLIGRAM(S): 100 INJECTION SUBCUTANEOUS at 11:38

## 2020-01-01 RX ADMIN — Medication 20 MILLIGRAM(S): at 05:24

## 2020-01-01 RX ADMIN — PANTOPRAZOLE SODIUM 40 MILLIGRAM(S): 20 TABLET, DELAYED RELEASE ORAL at 05:29

## 2020-01-01 RX ADMIN — DEXMEDETOMIDINE HYDROCHLORIDE IN 0.9% SODIUM CHLORIDE 3.34 MICROGRAM(S)/KG/HR: 4 INJECTION INTRAVENOUS at 08:39

## 2020-01-01 RX ADMIN — PANTOPRAZOLE SODIUM 40 MILLIGRAM(S): 20 TABLET, DELAYED RELEASE ORAL at 05:12

## 2020-01-01 RX ADMIN — ONDANSETRON 8 MILLIGRAM(S): 8 TABLET, FILM COATED ORAL at 21:27

## 2020-01-01 RX ADMIN — PANTOPRAZOLE SODIUM 40 MILLIGRAM(S): 20 TABLET, DELAYED RELEASE ORAL at 06:04

## 2020-01-01 RX ADMIN — VASOPRESSIN 1.2 UNIT(S)/MIN: 20 INJECTION INTRAVENOUS at 08:40

## 2020-01-01 RX ADMIN — Medication 10 MILLIGRAM(S): at 12:12

## 2020-01-01 RX ADMIN — Medication 20 MILLIGRAM(S): at 17:19

## 2020-01-01 RX ADMIN — CEFEPIME 100 MILLIGRAM(S): 1 INJECTION, POWDER, FOR SOLUTION INTRAMUSCULAR; INTRAVENOUS at 21:47

## 2020-01-01 RX ADMIN — Medication 20 MILLIGRAM(S): at 05:22

## 2020-01-01 RX ADMIN — ENOXAPARIN SODIUM 40 MILLIGRAM(S): 100 INJECTION SUBCUTANEOUS at 12:01

## 2020-01-01 RX ADMIN — ENOXAPARIN SODIUM 40 MILLIGRAM(S): 100 INJECTION SUBCUTANEOUS at 19:55

## 2020-01-01 RX ADMIN — MAGNESIUM OXIDE 400 MG ORAL TABLET 400 MILLIGRAM(S): 241.3 TABLET ORAL at 12:24

## 2020-01-01 RX ADMIN — Medication 10 MILLIGRAM(S): at 05:13

## 2020-01-01 RX ADMIN — Medication 5 UNIT(S): at 12:19

## 2020-01-01 RX ADMIN — ONDANSETRON 8 MILLIGRAM(S): 8 TABLET, FILM COATED ORAL at 08:15

## 2020-01-01 RX ADMIN — HEPARIN SODIUM 5000 UNIT(S): 5000 INJECTION INTRAVENOUS; SUBCUTANEOUS at 05:33

## 2020-01-01 RX ADMIN — PANTOPRAZOLE SODIUM 40 MILLIGRAM(S): 20 TABLET, DELAYED RELEASE ORAL at 12:23

## 2020-01-01 RX ADMIN — CEFTRIAXONE 100 MILLIGRAM(S): 500 INJECTION, POWDER, FOR SOLUTION INTRAMUSCULAR; INTRAVENOUS at 12:25

## 2020-01-01 RX ADMIN — CISATRACURIUM BESYLATE 10 MILLIGRAM(S): 2 INJECTION INTRAVENOUS at 07:15

## 2020-01-01 RX ADMIN — ROPINIROLE 0.5 MILLIGRAM(S): 8 TABLET, FILM COATED, EXTENDED RELEASE ORAL at 21:03

## 2020-01-01 RX ADMIN — ROPINIROLE 0.5 MILLIGRAM(S): 8 TABLET, FILM COATED, EXTENDED RELEASE ORAL at 21:02

## 2020-01-01 RX ADMIN — SODIUM CHLORIDE 1500 MILLILITER(S): 9 INJECTION, SOLUTION INTRAVENOUS at 12:04

## 2020-01-01 RX ADMIN — Medication 5 MILLIGRAM(S): at 05:23

## 2020-01-01 RX ADMIN — ONDANSETRON 8 MILLIGRAM(S): 8 TABLET, FILM COATED ORAL at 08:16

## 2020-01-01 RX ADMIN — Medication 20 MILLIGRAM(S): at 18:43

## 2020-01-01 RX ADMIN — Medication 1 MILLIGRAM(S): at 12:20

## 2020-01-01 RX ADMIN — ROPINIROLE 0.5 MILLIGRAM(S): 8 TABLET, FILM COATED, EXTENDED RELEASE ORAL at 05:23

## 2020-01-01 RX ADMIN — Medication 2.5 MICROGRAM(S)/KG/MIN: at 08:39

## 2020-01-01 RX ADMIN — SODIUM CHLORIDE 75 MILLILITER(S): 9 INJECTION, SOLUTION INTRAVENOUS at 18:40

## 2020-01-01 RX ADMIN — ONDANSETRON 8 MILLIGRAM(S): 8 TABLET, FILM COATED ORAL at 11:59

## 2020-01-01 RX ADMIN — PANTOPRAZOLE SODIUM 40 MILLIGRAM(S): 20 TABLET, DELAYED RELEASE ORAL at 17:19

## 2020-01-01 RX ADMIN — ROPINIROLE 0.5 MILLIGRAM(S): 8 TABLET, FILM COATED, EXTENDED RELEASE ORAL at 15:06

## 2020-01-01 RX ADMIN — Medication 10 MILLIGRAM(S): at 08:14

## 2020-01-01 RX ADMIN — DULOXETINE HYDROCHLORIDE 60 MILLIGRAM(S): 30 CAPSULE, DELAYED RELEASE ORAL at 12:16

## 2020-01-01 RX ADMIN — MAGNESIUM OXIDE 400 MG ORAL TABLET 400 MILLIGRAM(S): 241.3 TABLET ORAL at 12:16

## 2020-01-01 RX ADMIN — ROPINIROLE 0.5 MILLIGRAM(S): 8 TABLET, FILM COATED, EXTENDED RELEASE ORAL at 21:52

## 2020-01-01 RX ADMIN — Medication 20 MILLIGRAM(S): at 17:46

## 2020-01-01 RX ADMIN — ONDANSETRON 8 MILLIGRAM(S): 8 TABLET, FILM COATED ORAL at 21:26

## 2020-01-01 RX ADMIN — Medication 5 MILLIGRAM(S): at 00:13

## 2020-01-01 RX ADMIN — ROPINIROLE 0.5 MILLIGRAM(S): 8 TABLET, FILM COATED, EXTENDED RELEASE ORAL at 05:28

## 2020-01-01 RX ADMIN — MAGNESIUM OXIDE 400 MG ORAL TABLET 400 MILLIGRAM(S): 241.3 TABLET ORAL at 11:35

## 2020-01-01 RX ADMIN — Medication 5 MILLIGRAM(S): at 17:19

## 2020-01-01 RX ADMIN — ROPINIROLE 0.5 MILLIGRAM(S): 8 TABLET, FILM COATED, EXTENDED RELEASE ORAL at 21:22

## 2020-01-01 RX ADMIN — CEFEPIME 100 MILLIGRAM(S): 1 INJECTION, POWDER, FOR SOLUTION INTRAMUSCULAR; INTRAVENOUS at 13:42

## 2020-01-01 RX ADMIN — Medication 20 MILLIGRAM(S): at 16:47

## 2020-01-01 RX ADMIN — ROPINIROLE 0.5 MILLIGRAM(S): 8 TABLET, FILM COATED, EXTENDED RELEASE ORAL at 05:06

## 2020-01-01 RX ADMIN — CEFEPIME 100 MILLIGRAM(S): 1 INJECTION, POWDER, FOR SOLUTION INTRAMUSCULAR; INTRAVENOUS at 21:23

## 2020-01-01 RX ADMIN — ROPINIROLE 0.5 MILLIGRAM(S): 8 TABLET, FILM COATED, EXTENDED RELEASE ORAL at 12:23

## 2020-01-01 RX ADMIN — ERGOCALCIFEROL 50000 UNIT(S): 1.25 CAPSULE ORAL at 18:43

## 2020-01-01 RX ADMIN — Medication 1 MILLIGRAM(S): at 18:43

## 2020-01-01 RX ADMIN — CHLORHEXIDINE GLUCONATE 1 APPLICATION(S): 213 SOLUTION TOPICAL at 05:15

## 2020-01-01 RX ADMIN — CHLORHEXIDINE GLUCONATE 1 APPLICATION(S): 213 SOLUTION TOPICAL at 04:48

## 2020-01-01 RX ADMIN — CHLORHEXIDINE GLUCONATE 1 APPLICATION(S): 213 SOLUTION TOPICAL at 05:21

## 2020-01-01 RX ADMIN — CEFEPIME 100 MILLIGRAM(S): 1 INJECTION, POWDER, FOR SOLUTION INTRAMUSCULAR; INTRAVENOUS at 05:29

## 2020-01-01 RX ADMIN — ONDANSETRON 8 MILLIGRAM(S): 8 TABLET, FILM COATED ORAL at 22:06

## 2020-01-01 RX ADMIN — Medication 20 MILLIGRAM(S): at 05:31

## 2020-01-01 RX ADMIN — Medication 10 MILLIGRAM(S): at 11:38

## 2020-01-01 RX ADMIN — Medication 20 MILLIGRAM(S): at 05:06

## 2020-01-01 RX ADMIN — Medication 5 MILLIGRAM(S): at 06:46

## 2020-01-01 RX ADMIN — Medication 10 MILLIGRAM(S): at 06:05

## 2020-01-01 RX ADMIN — PANTOPRAZOLE SODIUM 40 MILLIGRAM(S): 20 TABLET, DELAYED RELEASE ORAL at 06:11

## 2020-01-01 RX ADMIN — SODIUM CHLORIDE 100 MILLILITER(S): 9 INJECTION INTRAMUSCULAR; INTRAVENOUS; SUBCUTANEOUS at 09:49

## 2020-01-01 RX ADMIN — Medication 2.5 MICROGRAM(S)/KG/MIN: at 01:48

## 2020-01-01 RX ADMIN — Medication 5 MILLIGRAM(S): at 11:37

## 2020-01-01 RX ADMIN — CHLORHEXIDINE GLUCONATE 1 APPLICATION(S): 213 SOLUTION TOPICAL at 05:23

## 2020-01-01 RX ADMIN — ROPINIROLE 0.5 MILLIGRAM(S): 8 TABLET, FILM COATED, EXTENDED RELEASE ORAL at 05:12

## 2020-01-01 RX ADMIN — ETOMIDATE 20 MILLIGRAM(S): 2 INJECTION INTRAVENOUS at 01:02

## 2020-01-01 RX ADMIN — PANTOPRAZOLE SODIUM 40 MILLIGRAM(S): 20 TABLET, DELAYED RELEASE ORAL at 18:43

## 2020-01-01 RX ADMIN — Medication 20 MILLIGRAM(S): at 17:48

## 2020-01-01 RX ADMIN — Medication 5 MILLIGRAM(S): at 17:45

## 2020-01-01 RX ADMIN — ROPINIROLE 0.5 MILLIGRAM(S): 8 TABLET, FILM COATED, EXTENDED RELEASE ORAL at 05:22

## 2020-01-01 RX ADMIN — CEFEPIME 100 MILLIGRAM(S): 1 INJECTION, POWDER, FOR SOLUTION INTRAMUSCULAR; INTRAVENOUS at 13:32

## 2020-01-01 RX ADMIN — PROPOFOL 2 MICROGRAM(S)/KG/MIN: 10 INJECTION, EMULSION INTRAVENOUS at 01:02

## 2020-01-01 RX ADMIN — Medication 5 MILLIGRAM(S): at 05:12

## 2020-01-01 RX ADMIN — AZITHROMYCIN 255 MILLIGRAM(S): 500 TABLET, FILM COATED ORAL at 14:50

## 2020-01-01 RX ADMIN — CEFEPIME 100 MILLIGRAM(S): 1 INJECTION, POWDER, FOR SOLUTION INTRAMUSCULAR; INTRAVENOUS at 05:23

## 2020-01-01 RX ADMIN — Medication 325 MILLIGRAM(S): at 17:45

## 2020-01-01 RX ADMIN — DULOXETINE HYDROCHLORIDE 60 MILLIGRAM(S): 30 CAPSULE, DELAYED RELEASE ORAL at 12:24

## 2020-01-01 RX ADMIN — PANTOPRAZOLE SODIUM 40 MILLIGRAM(S): 20 TABLET, DELAYED RELEASE ORAL at 05:22

## 2020-01-01 RX ADMIN — PANTOPRAZOLE SODIUM 40 MILLIGRAM(S): 20 TABLET, DELAYED RELEASE ORAL at 17:36

## 2020-01-01 RX ADMIN — PANTOPRAZOLE SODIUM 40 MILLIGRAM(S): 20 TABLET, DELAYED RELEASE ORAL at 05:31

## 2020-01-01 RX ADMIN — Medication 20 MILLIGRAM(S): at 05:29

## 2020-01-01 RX ADMIN — Medication 20 MILLIGRAM(S): at 18:42

## 2020-01-01 RX ADMIN — Medication 50 MILLIGRAM(S): at 16:03

## 2020-01-01 RX ADMIN — IRON SUCROSE 110 MILLIGRAM(S): 20 INJECTION, SOLUTION INTRAVENOUS at 17:36

## 2020-01-01 RX ADMIN — Medication 166.67 MILLIGRAM(S): at 12:27

## 2020-01-01 RX ADMIN — Medication 5 MILLIGRAM(S): at 23:25

## 2020-01-01 RX ADMIN — CHLORHEXIDINE GLUCONATE 1 APPLICATION(S): 213 SOLUTION TOPICAL at 05:30

## 2020-01-01 RX ADMIN — ROPINIROLE 0.5 MILLIGRAM(S): 8 TABLET, FILM COATED, EXTENDED RELEASE ORAL at 14:57

## 2020-01-01 RX ADMIN — PANTOPRAZOLE SODIUM 40 MILLIGRAM(S): 20 TABLET, DELAYED RELEASE ORAL at 17:48

## 2020-01-01 RX ADMIN — DULOXETINE HYDROCHLORIDE 60 MILLIGRAM(S): 30 CAPSULE, DELAYED RELEASE ORAL at 11:59

## 2020-01-01 RX ADMIN — Medication 2.5 MICROGRAM(S)/KG/MIN: at 09:17

## 2020-01-01 RX ADMIN — ENOXAPARIN SODIUM 40 MILLIGRAM(S): 100 INJECTION SUBCUTANEOUS at 12:20

## 2020-01-01 RX ADMIN — Medication 50 GRAM(S): at 13:56

## 2020-01-01 RX ADMIN — ONDANSETRON 8 MILLIGRAM(S): 8 TABLET, FILM COATED ORAL at 13:31

## 2020-01-01 RX ADMIN — CHLORHEXIDINE GLUCONATE 1 APPLICATION(S): 213 SOLUTION TOPICAL at 05:28

## 2020-01-01 RX ADMIN — SENNA PLUS 2 TABLET(S): 8.6 TABLET ORAL at 21:51

## 2020-01-01 RX ADMIN — CEFEPIME 100 MILLIGRAM(S): 1 INJECTION, POWDER, FOR SOLUTION INTRAMUSCULAR; INTRAVENOUS at 15:40

## 2020-01-01 RX ADMIN — PANTOPRAZOLE SODIUM 40 MILLIGRAM(S): 20 TABLET, DELAYED RELEASE ORAL at 05:28

## 2020-01-01 RX ADMIN — Medication 1: at 12:28

## 2020-01-01 RX ADMIN — Medication 1: at 06:17

## 2020-01-01 RX ADMIN — CHLORHEXIDINE GLUCONATE 1 APPLICATION(S): 213 SOLUTION TOPICAL at 05:31

## 2020-01-01 RX ADMIN — Medication 5 MILLIGRAM(S): at 05:07

## 2020-01-01 RX ADMIN — INSULIN GLARGINE 15 UNIT(S): 100 INJECTION, SOLUTION SUBCUTANEOUS at 22:04

## 2020-01-01 RX ADMIN — SODIUM CHLORIDE 1000 MILLILITER(S): 9 INJECTION, SOLUTION INTRAVENOUS at 12:35

## 2020-01-01 RX ADMIN — ONDANSETRON 8 MILLIGRAM(S): 8 TABLET, FILM COATED ORAL at 21:47

## 2020-01-01 RX ADMIN — PANTOPRAZOLE SODIUM 40 MILLIGRAM(S): 20 TABLET, DELAYED RELEASE ORAL at 06:50

## 2020-01-01 RX ADMIN — DULOXETINE HYDROCHLORIDE 60 MILLIGRAM(S): 30 CAPSULE, DELAYED RELEASE ORAL at 12:10

## 2020-01-01 RX ADMIN — Medication 125 MILLIGRAM(S): at 16:03

## 2020-01-01 RX ADMIN — CEFEPIME 100 MILLIGRAM(S): 1 INJECTION, POWDER, FOR SOLUTION INTRAMUSCULAR; INTRAVENOUS at 13:12

## 2020-01-01 RX ADMIN — SODIUM CHLORIDE 1000 MILLILITER(S): 9 INJECTION, SOLUTION INTRAVENOUS at 13:56

## 2020-01-01 RX ADMIN — CEFEPIME 100 MILLIGRAM(S): 1 INJECTION, POWDER, FOR SOLUTION INTRAMUSCULAR; INTRAVENOUS at 21:27

## 2020-01-01 RX ADMIN — CHLORHEXIDINE GLUCONATE 1 APPLICATION(S): 213 SOLUTION TOPICAL at 18:42

## 2020-01-01 RX ADMIN — ROPINIROLE 0.5 MILLIGRAM(S): 8 TABLET, FILM COATED, EXTENDED RELEASE ORAL at 21:19

## 2020-01-01 RX ADMIN — CEFEPIME 100 MILLIGRAM(S): 1 INJECTION, POWDER, FOR SOLUTION INTRAMUSCULAR; INTRAVENOUS at 05:07

## 2020-01-01 RX ADMIN — Medication 10 MILLIGRAM(S): at 11:59

## 2020-01-01 RX ADMIN — Medication 1 MILLIGRAM(S): at 11:38

## 2020-01-01 RX ADMIN — PANTOPRAZOLE SODIUM 40 MILLIGRAM(S): 20 TABLET, DELAYED RELEASE ORAL at 17:45

## 2020-01-01 RX ADMIN — ONDANSETRON 8 MILLIGRAM(S): 8 TABLET, FILM COATED ORAL at 12:12

## 2020-01-01 RX ADMIN — Medication 10 MILLIGRAM(S): at 11:32

## 2020-01-01 RX ADMIN — ROPINIROLE 0.5 MILLIGRAM(S): 8 TABLET, FILM COATED, EXTENDED RELEASE ORAL at 11:38

## 2020-01-01 RX ADMIN — ROPINIROLE 0.5 MILLIGRAM(S): 8 TABLET, FILM COATED, EXTENDED RELEASE ORAL at 18:44

## 2020-01-01 RX ADMIN — Medication 10 MILLIGRAM(S): at 17:16

## 2020-01-01 RX ADMIN — Medication 166.67 MILLIGRAM(S): at 21:06

## 2020-01-01 RX ADMIN — Medication 20 MILLIGRAM(S): at 05:30

## 2020-01-01 RX ADMIN — CEFEPIME 100 MILLIGRAM(S): 1 INJECTION, POWDER, FOR SOLUTION INTRAMUSCULAR; INTRAVENOUS at 21:04

## 2020-01-01 RX ADMIN — Medication 5 MILLIGRAM(S): at 12:19

## 2020-01-01 RX ADMIN — Medication 2.5 MICROGRAM(S)/KG/MIN: at 04:27

## 2020-01-01 RX ADMIN — IRON SUCROSE 110 MILLIGRAM(S): 20 INJECTION, SOLUTION INTRAVENOUS at 05:12

## 2020-01-01 RX ADMIN — ONDANSETRON 8 MILLIGRAM(S): 8 TABLET, FILM COATED ORAL at 11:37

## 2020-01-01 RX ADMIN — Medication 1 MILLIGRAM(S): at 12:16

## 2020-01-01 RX ADMIN — SODIUM CHLORIDE 1000 MILLILITER(S): 9 INJECTION, SOLUTION INTRAVENOUS at 08:32

## 2020-01-01 RX ADMIN — Medication 1 MILLIGRAM(S): at 12:12

## 2020-01-01 RX ADMIN — CEFEPIME 100 MILLIGRAM(S): 1 INJECTION, POWDER, FOR SOLUTION INTRAMUSCULAR; INTRAVENOUS at 05:00

## 2020-01-01 RX ADMIN — PANTOPRAZOLE SODIUM 40 MILLIGRAM(S): 20 TABLET, DELAYED RELEASE ORAL at 05:23

## 2020-01-01 RX ADMIN — MAGNESIUM OXIDE 400 MG ORAL TABLET 400 MILLIGRAM(S): 241.3 TABLET ORAL at 12:10

## 2020-01-01 RX ADMIN — CEFTRIAXONE 1000 MILLIGRAM(S): 500 INJECTION, POWDER, FOR SOLUTION INTRAMUSCULAR; INTRAVENOUS at 13:00

## 2020-01-01 RX ADMIN — SODIUM CHLORIDE 75 MILLILITER(S): 9 INJECTION, SOLUTION INTRAVENOUS at 03:18

## 2020-01-01 RX ADMIN — Medication 5 UNIT(S): at 12:28

## 2020-01-01 RX ADMIN — ONDANSETRON 8 MILLIGRAM(S): 8 TABLET, FILM COATED ORAL at 05:07

## 2020-01-01 RX ADMIN — Medication 1: at 12:14

## 2020-01-01 RX ADMIN — ONDANSETRON 4 MILLIGRAM(S): 8 TABLET, FILM COATED ORAL at 19:51

## 2020-01-01 RX ADMIN — Medication 5 UNIT(S): at 07:30

## 2020-01-01 RX ADMIN — DULOXETINE HYDROCHLORIDE 60 MILLIGRAM(S): 30 CAPSULE, DELAYED RELEASE ORAL at 11:32

## 2020-01-01 RX ADMIN — CHLORHEXIDINE GLUCONATE 15 MILLILITER(S): 213 SOLUTION TOPICAL at 05:30

## 2020-01-01 RX ADMIN — CHLORHEXIDINE GLUCONATE 1 APPLICATION(S): 213 SOLUTION TOPICAL at 05:20

## 2020-01-01 RX ADMIN — Medication 100 MILLIGRAM(S): at 01:01

## 2020-01-01 RX ADMIN — DULOXETINE HYDROCHLORIDE 60 MILLIGRAM(S): 30 CAPSULE, DELAYED RELEASE ORAL at 12:12

## 2020-01-01 RX ADMIN — Medication 5 MILLIGRAM(S): at 05:28

## 2020-01-01 RX ADMIN — MORPHINE SULFATE 1 MG/HR: 50 CAPSULE, EXTENDED RELEASE ORAL at 09:55

## 2020-01-01 RX ADMIN — Medication 5 MILLIGRAM(S): at 12:24

## 2020-01-01 RX ADMIN — ROPINIROLE 0.5 MILLIGRAM(S): 8 TABLET, FILM COATED, EXTENDED RELEASE ORAL at 05:31

## 2020-01-01 RX ADMIN — Medication 250 MILLIGRAM(S): at 05:35

## 2020-01-01 RX ADMIN — DULOXETINE HYDROCHLORIDE 60 MILLIGRAM(S): 30 CAPSULE, DELAYED RELEASE ORAL at 11:35

## 2020-01-01 RX ADMIN — Medication 10 MILLIGRAM(S): at 17:35

## 2020-01-01 RX ADMIN — Medication 20 MILLIGRAM(S): at 17:36

## 2020-01-01 RX ADMIN — Medication 25 GRAM(S): at 11:31

## 2020-01-01 RX ADMIN — Medication 10 MILLIGRAM(S): at 16:23

## 2020-01-01 RX ADMIN — PANTOPRAZOLE SODIUM 40 MILLIGRAM(S): 20 TABLET, DELAYED RELEASE ORAL at 05:06

## 2020-01-20 PROBLEM — C16.3 MALIGNANT NEOPLASM OF PYLORIC ANTRUM: Status: ACTIVE | Noted: 2019-01-01

## 2020-01-23 NOTE — PHYSICAL EXAM
done [Normal] : normoactive bowel sounds, soft and nontender, no hepatosplenomegaly or masses appreciated [de-identified] : mild LLQ  tenderness. significant protuberance on the right , no evidence of hernia .  [de-identified] : significant scoliosis like spine malalignment .

## 2020-01-23 NOTE — ASSESSMENT
[FreeTextEntry1] : 65 y/o F returns for f/u visit of her gastric cancer S/P partial gastrectomy in 5/2017. Final pathology showed PT3N2\par HER 2 -ve, PD-L1 >1%, MMR deficient (HMSH2, HMSH6) poorly differentiated gastric adenocarcinoma.\par S/P adjuvant FOLFOX x 6 finished in Dec 2017\par Course c/b recurrence in regional LNs s/p CRT (4 cycles of taxol + 6 weeks of RT, finished in Mar 2018)\par \par \par A/P:\par #Gastric cancer:  Last EGD in 10/2018 did not show any e/o malignancy. Last PET in 3/2019 continued to show Persistent focal increased FDG uptake at peripancreatic/hepatoduodenal ligament station, max SUV 8, previously 4.4, suggestive of lymphadenopathy. \par --Pt now c/o projectile vomiting of 2 weeks duration with no relief from zofran. HIDA negative\par --Scheduled for repeat EGD on 10/1/19 by Dr Velazquez\par --Repeat CEA and PET, routine bloodwork\par \par #Vomiting: Unclear etiology (H/o hiatal hernia vs gastroparesis)\par --On PPI and zofran. Consider reglan\par --HIDA negative. F/U with GI for EGD\par --PET scan, labs\par \par #Anemia: Check iron studies, B12, folate\par \par #Fatty liver with hepatomegaly: F/U with GI

## 2020-01-23 NOTE — HISTORY OF PRESENT ILLNESS
[de-identified] : 62 year old female with gastric cancer on adjuvant folfox , well tolerated except for one episode of mild diarrhea, she denies numbness , mouth sores , nausea or vomiting . She feels slightly worse from neurologic standpoint , mobility and gait more impaired.  She denies numbness.  [de-identified] : Had PET CT Dec 2017: Multiple FDG avid upper abdominal lymph nodes, SUV max 8.5 compatible with biological tumor activity. \par  \par Patient status post FOLFOX, still residual disease on PET CT.\par \par Patient feels ok. No nausea or vomiting. weight is stable. \par Saw Dr Arzola, plan for six weeks of RT therapy.\par \par 03/19/2018:\par \par Patient returns after completion of chemoRT, last Monday. received 4 cycles of Taxol and 6 weeks of RT.\par Feels tired and dizzy. Tolerated treatment well, with no esophagitis or vomiting.\par Has some nausea for which she takes zofran.\par Also complains of headache, could be a part of MS, due to see Dr Vance soon.\par Other review of system is negative.\par \par 04/30/18\par Pt is here for follow up for a repeat PET scan. She feels well with no symptoms of nausea, vomiting. She is able to tolerate diet\par She has c/o constipation, not better with colace, senna, miralax. Also feels tired. She passes gas. \par \par 06/04/2018:\par Since last visit, patient had a PET CT: Near complete resolution of all previously seen FDG uptake in the abdomen  consistent with good treatment response.  No new sites of disease.  One residual gideon pancreatic lymph node demonstrates a 55% decrease in  FDG uptake (SUV 3.9, previously 8.5).\par \par Colonoscopy: Normal, done last month.\par Constipation is better, on Linzess now.\par Some abdominal pain on deep palpation.\par No weight loss. No new symptoms. \par biopsy test showed MMR deficienct tumor  and PDL1 >1 % .\par \par 08/09/2018 : Mariaa returns for follow up complaining of vomiting mainly on awakening . also notes slight increase in abdominal girth. no abdominal pain  or weight loss. \par \par 08/30/2018 : Patient returns for follow up , she feels better however she continues to have mild epigastric discomfort , nausea and intermittent vomiting . There is no further weight loss. CT scan shows abnormal  duodenum and new soft tissue density around  common hepatic artery   , LFTs and tumor markers are slightly elevated . \par \par 12/20/2018 : Patient returns for follow up , she feels well and gained weight .EGD was negative , she continues to complain of dumping symptoms alternating with constipation . \par She also notes persistent painless  swelling and protuberance of the abdomen on the right side. Neuro status and mobility slightly improved . \par \par 03/21/2019 Patient returns for routine follow up , PET scan is stable with persistent FDG avid peripancreatic adenopathy . she complains of recurrence of diarrhea, worse after meals , present also at night and relieved with imodium . also reports episodes of vomiting , mild LLQ pain and tenderness. Weight is stable . \par \par 06/17/2019 Patient returns for follow up for recurrent gastric cancer s/p chemoradiation . Two weeks ago she had a self limited episode of diarrhea. sonogram showed hepatomegaly with steatosis . Her weight is stable , she remains neurologically stable on Iv IG . PET scan from march showed hypermetabolically active peripancreatic and hepatoduodenal LNs , slightly increased from prior study, CA 19.9 is slightly elevated and stable . She is on po iron 2X daily , no B12 , CBC shows mild anemia. \par \par 9/16/19: Pt returns for a f/u visit. She c/o projectile vomiting not preceded nausea. No relief with zofran. Pt had a HIDA scan which was negative. She will also undergo EGD by Dr Velazquez on Oct 1st, 2019. Last EGD in 10/2018 did not show any e/o malignancy. Last PET in 3/2019 continued to show Persistent focal increased FDG uptake at peripancreatic/hepatoduodenal ligament station, max SUV 8, previously 4.4, suggestive of lymphadenopathy.\par  \par

## 2020-01-23 NOTE — RESULTS/DATA
[FreeTextEntry1] :  EXAM:  CT ABDOMEN AND PELVIS      \par \par \par PROCEDURE DATE:  12/16/2019  \par \par \par \par \par INTERPRETATION:  CLINICAL STATEMENT: Multiple sclerosis and history of \par gastric cancer post partial gastrectomy. Acute on chronic anemia. \par Evaluate for bleed.\par \par TECHNIQUE: Contiguous axial CT images were obtained from the lower chest \par to the pubic symphysis without intravenous contrast.  Oral contrast was \par not administered.  Reformatted images in the coronal and sagittal planes \par were acquired.\par \par COMPARISON CT: December 11, 2019\par \par OTHER STUDIES USED FOR CORRELATION: None. \par \par \par FINDINGS:\par Limited evaluation of solid organs and vascular structures secondary to \par lack of intravenous contrast. \par \par LOWER CHEST: Increased bilateral pleural effusions and bibasilar \par atelectasis. Increased fluid-filled distention of the distal esophagus..\par \par HEPATOBILIARY: Diffuse hepatic steatosis and hepatomegaly measuring up to \par 22 cm in craniocaudal dimension. Contracted gallbladder.\par \par SPLEEN: Unremarkable.\par \par PANCREAS: Unchanged.\par \par ADRENAL GLANDS: Unremarkable.\par \par KIDNEYS: No nephroureteral calculi or hydronephrosis bilaterally.\par \par ABDOMINOPELVIC NODES: No lymphadenopathy.\par \par PELVIC ORGANS: Post hysterectomy.\par \par PERITONEUM/MESENTERY/BOWEL: Post distal gastrectomy with Billroth II \par anatomy. Increased debris filled distention of the stomach without \par evidence of obstruction at the level of the gastrojejunostomy. Small \par volume right lower quadrant ascites. Colonic diverticulosis, without \par evidence for acute diverticulitis. No evidence of an intraperitoneal or \par retroperitoneal hematoma.\par \par BONES/SOFT TISSUES: Increased diffuse soft tissue anasarca, most \par pronounced in the right lower chest wall. Stable visualized osseous \par structures. No acute osseous abnormality or suspicious lesions. Anterior \par chest wall/breast skin thickening. \par \par OTHER: Atherosclerotic vascular calcifications.\par \par \par IMPRESSION: \par 1.  Since December 11, 2019, no evidence of an intraperitoneal or \par retroperitoneal hematoma.\par \par 2.  Increased debris-filled distention of the stomach and distal \par esophagus without definite evidence for obstruction, nonspecific.\par \par 3.  New small volume ascites.\par \par 4.  New diffuse soft tissue anasarca.\par \par 5.  Increased small bilateral pleural effusions and bibasilar atelectasis.\par

## 2020-01-23 NOTE — CONSULT LETTER
[Dear  ___] : Dear  [unfilled], [Consult Letter:] : I had the pleasure of evaluating your patient, [unfilled]. [Please see my note below.] : Please see my note below. [Consult Closing:] : Thank you very much for allowing me to participate in the care of this patient.  If you have any questions, please do not hesitate to contact me. [Sincerely,] : Sincerely, [FreeTextEntry3] : Dr. Toussaint

## 2020-02-01 NOTE — H&P ADULT - HISTORY OF PRESENT ILLNESS
64F with PMHx of MS, DM type II, Gastric cancer s/p resection and chemo currently in remission (Dr Toussaint), presented to the hospital for bilateral upper extremity weakness for last 3 days. Pt was admitted 12/14-12/24 for fall and LE weakness was found at that time to have UTI was treated with IV ceftriaxone was discharged to rehab and has been home for past 2 weeks and followed by a home  health aid. Daughter states since Wednesday patient has been more fatigue with decreased oral intake and progressively worsening bilateral UE weakness. Family denies any recent fevers, chills, cough, sob, abdominal pain, melena, hematochezia. No blood thinners. Of note patient no longer on treatment of MS. Last IVIG was November 28th.     In the ED pt was found to have BP of 85/64, , Afebrile, 96% O2 on 4L NC. She was given I/V fluid bolus 2.5 L NS however BP was unresponsive In ED Central line was placed. Given initially azithromycin and Rocephin however she developed a allergic reaction with erythematous skin over her chest and face. Azithromycin was discontinued. She however states she has previously taken the medication. Unsure if this is true allergy. After ICU upgrade patient started on vancomycin and cefepime. 64F with PMHx of MS, DM type II, Gastric cancer s/p resection and chemo currently in remission (Dr Toussaint), presented to the hospital for bilateral upper extremity weakness for last 3 days. Pt was admitted 12/14-12/24 for fall and LE weakness was found at that time to have UTI was treated with IV ceftriaxone was discharged to rehab and has been home for past 2 weeks and followed by a home  health aid. Daughter states since Wednesday patient has been more fatigue with decreased oral intake and progressively worsening bilateral UE weakness. Family denies any recent fevers, chills, cough, sob, abdominal pain, melena, hematochezia. No blood thinners. Of note patient no longer on treatment of MS. Last IVIG was November 28th.     In the ED pt was found to have BP of 85/64, , Afebrile, 96% O2 on 4L NC. WBC 22K. UA bacteuria with neg nitrites and LE. CT Chest with no PE and R sided opacity. She was given IV fluid bolus 2.5 L NS however BP was unresponsive In ED Central line was placed. Given initially azithromycin and Rocephin however she developed an allergic reaction after azithromycin given with erythematous skin over her chest and face. Azithromycin was discontinued. She however states she has previously taken the medication. Unsure if this is true allergy. After ICU eval patient started on vancomycin and cefepime.

## 2020-02-01 NOTE — ED PROVIDER NOTE - CARE PLAN
Principal Discharge DX:	Septic shock  Secondary Diagnosis:	UTI (urinary tract infection) Principal Discharge DX:	Septic shock  Secondary Diagnosis:	UTI (urinary tract infection)  Secondary Diagnosis:	Pneumonia

## 2020-02-01 NOTE — H&P ADULT - NSICDXPASTMEDICALHX_GEN_ALL_CORE_FT
PAST MEDICAL HISTORY:  DM type 2 (diabetes mellitus, type 2)     Multiple sclerosis     Stomach cancer in remission for 2 years.

## 2020-02-01 NOTE — H&P ADULT - ASSESSMENT
ASSESSEMENT  Septic shock 2/2 likely complicated UTI vs GNR Pneumonia   Hx of MS ASSESSEMENT  Septic shock 2/2 likely complicated UTI vs GNR Pneumonia   Acute hypoxic respiratory failure likely due to pneumonia/ ? aspiration  Hx of MS     PLAN:    CNS: Consider neuro eval for MS. Holding baclofen and ropinirole in light of hypotension    HEENT: Oral care    PULMONARY:  HOB @ 45 degrees. Wean O2. F/u CXR in AM     CARDIOVASCULAR: Check echo. Maintain MAP >60. Can start levophed if BP not maintaining.     GI: GI prophylaxis.     RENAL:  Follow up lytes.  Correct as needed.     INFECTIOUS DISEASE: Follow up cultures. Continue Ricardo / Vanc     HEMATOLOGICAL: DVT prophylaxis. Fu CBC and Coags     ENDOCRINE:  Follow up FS.  Insulin protocol if needed.    MUSCULOSKELETAL: f/u bilateral UE venous duplex ASSESSEMENT  Sepsis 2/2 possible complicated UTI vs GNR Pneumonia   Acute hypoxic respiratory failure likely due to pneumonia/ ? aspiration  Hx of MS     PLAN:    CNS: Consider neuro eval for MS. Holding baclofen and ropinirole in light of hypotension. Low bp can be due to MS medications    HEENT: Oral care    PULMONARY:  HOB @ 45 degrees. Wean O2. F/u CXR in AM     CARDIOVASCULAR: Check echo. Maintain MAP >60. Can start levophed if BP low    GI: GI prophylaxis.     RENAL:  Follow up lytes.  Correct as needed. check US renal     INFECTIOUS DISEASE: Follow up cultures. Continue Ricardo / Vanc     HEMATOLOGICAL: DVT prophylaxis. Fu CBC and Coags     ENDOCRINE:  Follow up FS.  Insulin protocol if needed.    MUSCULOSKELETAL: f/u bilateral UE venous duplex ASSESSEMENT  Sepsis 2/2 possible complicated UTI vs GNR Pneumonia   Acute hypoxic respiratory failure likely due to pneumonia/ ? aspiration  Hx of MS     PLAN:    CNS: Consider neuro eval for MS. Holding baclofen and ropinirole in light of hypotension. Low bp can be due to her MS medications    HEENT: Oral care    PULMONARY:  HOB @ 45 degrees. Wean O2. F/u CXR in AM     CARDIOVASCULAR: Check echo. Maintain MAP >60. Can start levophed if BP low    GI: GI prophylaxis.     RENAL:  Follow up lytes.  Correct as needed. check US renal bladder with hx of retention.    INFECTIOUS DISEASE: Follow up cultures. Continue Ricardo / Vanc     HEMATOLOGICAL: DVT prophylaxis. Fu CBC and Coags     ENDOCRINE:  Follow up FS.  Insulin protocol if needed.    MUSCULOSKELETAL: f/u bilateral UE venous duplex for edema

## 2020-02-01 NOTE — ED PROVIDER NOTE - OBJECTIVE STATEMENT
65 yo F with PMHx of DM, MS, gastric cancer s/p resection/chemo/radiation who presents with BUE swelling/weakness x 2 days. Pt was admitted 12/14-12/24 for UTI and weakness, was d/c'ed to rehab and has been home since. Daughter notes that she has been deconditioned since, with weakness in BLE progressing to BUE. Today pt could not hold up her spoon    Neuro: Dr. Thomas 65 yo F with PMHx of DM, MS, gastric cancer s/p resection/chemo/radiation who presents with BUE swelling/weakness x 2 days. Pt was admitted 12/14-12/24 for UTI and weakness, was d/c'ed to rehab and then home for past 2 wks. Daughter notes that pt has been deconditioned since, with weakness in BLE progressing to BUE that was worse over past 2 days. Today pt could not hold up her spoon and also appeared confused. Also with dark brown vomiting for past 2 days. No fever, cough, cp, sob, abd pain, melena, hematochezia. No blood thinners.     Neuro: Dr. Thomas

## 2020-02-01 NOTE — ED ADULT TRIAGE NOTE - CHIEF COMPLAINT QUOTE
BIBA from home. Hx of MS. As per daughter, pt has gotten weaker, swelling to b/l upper and lower extremities, vomiting. Symptoms started this morning.

## 2020-02-01 NOTE — ED PROVIDER NOTE - CLINICAL SUMMARY MEDICAL DECISION MAKING FREE TEXT BOX
64 y.o. F, PMH of DM, MS, gastric cancer s/p resection/chemo/radiation, BIBA after family called 911 for worsening weakness since yesterday and LE UE swelling. No fever/ chills. (+) vomiting yesterday and today. Pt could not hold her stoon up and appeared confused at home. No SOB/CP, diarrhea. No blood in the diaper. Stool is not bloody or black. Vomit is dark brown. No blood thinners. On exam, pt in NAD, AAOx3, answers questions appropriately, lungs CTA B/L, CV S1S2 regular, abdomen soft/NT/ND/(+)BS, ext (-) pitting edema in all 4 extremities, moving UE against gravity, pt is bed bound, rectal exam (-) gross blood. Will admit pt for UTI and pneumonia. 64 y.o. F, PMH of DM, MS, gastric cancer s/p resection/chemo/radiation, BIBA after family called 911 for worsening weakness since yesterday and LE UE swelling. No fever/ chills. (+) vomiting yesterday and today. Pt could not hold her stoon up and appeared confused at home. No SOB/CP, diarrhea. No blood in the diaper. Stool is not bloody or black. Vomit is dark brown. No blood thinners. On exam, pt in NAD, AAOx3, answers questions appropriately, lungs CTA B/L, CV S1S2 regular, abdomen soft/NT/ND/(+)BS, ext (-) pitting edema in all 4 extremities, moving UE against gravity, pt is bed bound, rectal exam (-) gross blood. Will admit pt for UTI, pneumonia, septic shock.

## 2020-02-01 NOTE — ED ADULT NURSE NOTE - NSIMPLEMENTINTERV_GEN_ALL_ED
Implemented All Fall with Harm Risk Interventions:  [place] to call system. Call bell, personal items and telephone within reach. Instruct patient to call for assistance. Room bathroom lighting operational. Non-slip footwear when patient is off stretcher. Physically safe environment: no spills, clutter or unnecessary equipment. Stretcher in lowest position, wheels locked, appropriate side rails in place. Provide visual cue, wrist band, yellow gown, etc. Monitor gait and stability. Monitor for mental status changes and reorient to person, place, and time. Review medications for side effects contributing to fall risk. Reinforce activity limits and safety measures with patient and family. Provide visual clues: red socks.

## 2020-02-01 NOTE — H&P ADULT - NSHPLABSRESULTS_GEN_ALL_CORE
Vitals:    Vital Signs Last 24 Hrs  T(C): 36.7 (2020 15:33), Max: 37.1 (2020 11:25)  T(F): 98 (2020 15:33), Max: 98.8 (2020 11:25)  HR: 107 (2020 16:55) (103 - 123)  BP: 80/56 (2020 16:55) (73/53 - 102/57)  BP(mean): 72 (2020 13:15) (72 - 77)  RR: 16 (2020 16:55) (16 - 20)  SpO2: 93% (2020 16:55) (93% - 99%)    Labs:         137  |  103  |  26<H>  ----------------------------<  168<H>  4.1   |  23  |  0.5<L>    Ca    7.7<L>      :50    TPro  4.6<L>  /  Alb  2.0<L>  /  TBili  0.3  /  DBili  0.2  /  AST  53<H>  /  ALT  55<H>  /  AlkPhos  251<H>                            9.8    21.01 )-----------( 371      ( :50 )             30.2     CARDIAC MARKERS ( :50 )  x     / <0.01 ng/mL / x     / x     / x          LIVER FUNCTIONS - ( 50 )  Alb: 2.0 g/dL / Pro: 4.6 g/dL / ALK PHOS: 251 U/L / ALT: 55 U/L / AST: 53 U/L / GGT: x           PT/INR - ( :50 )   PT: 16.30 sec;   INR: 1.42 ratio         PTT - ( 50 )  PTT:103.9 sec  Urinalysis Basic - (  )    Color: Yellow / Appearance: Slightly Turbid / S.032 / pH: x  Gluc: x / Ketone: Trace  / Bili: Negative / Urobili: 3 mg/dL   Blood: x / Protein: 30 mg/dL / Nitrite: Negative   Leuk Esterase: Negative / RBC: 34 /HPF / WBC 4 /HPF   Sq Epi: x / Non Sq Epi: 6 /HPF / Bacteria: Many    RADIOLOGY      < from: CT Angio Chest w/ IV Cont (20 @ 14:18) >      IMPRESSION:    No evidence of pulmonary embolism.    Increased bilateral effusions, right more so than left.  Increased adjacent atelectasis, with a somewhat patchy appearance in the right lower lobe, infection is not excluded.    Increased esophageal dilatation, with mucosal hyperemia.    < end of copied text >

## 2020-02-01 NOTE — H&P ADULT - NSHPPHYSICALEXAM_GEN_ALL_CORE
PHYSICAL EXAM:  GEN: No acute distress  LUNGS: Clear to auscultation bilaterally; no wheeze  HEART: S1/S2 present. RRR.  ABD: Soft, non-tender, non-distended. Bowel sounds present  MSK: NC/NC/NE/2+PP/CRUZ  NEURO: AAOX3, non-focal PHYSICAL EXAM:  GEN: No acute distress  LUNGS: Clear to auscultation bilaterally; no wheeze. R sided port   HEART: S1/S2 present. RRR.  ABD: Soft, non-tender, non-distended. Bowel sounds present  MSK: 4/5 bilateral UE strength. Bilateral edema   NEURO: AAOX3, non-focal

## 2020-02-01 NOTE — ED ADULT NURSE NOTE - OBJECTIVE STATEMENT
Pt presents to ED with c/o weakness and swelling x2 days. Family states pt was admitted to hospital last month for a UTI, discharged to [facility] for rehabilitation and has been home for 2 weeks. Family states pt health has been decompensating with increased weakness and swelling to right upper and lower extremities. Additionally, family states that pt appeared confused and had dark vomiting.

## 2020-02-01 NOTE — ED PROVIDER NOTE - ATTENDING CONTRIBUTION TO CARE
64 y.o. F, PMH of DM, MS, gastric cancer s/p resection/chemo/radiation, BIBA after family called 911 for worsening weakness since yesterday and LE UE swelling. No fever/ chills. (+) vomiting yesterday and today. Pt could not hold her stoon up and appeared confused at home. No SOB/CP, diarrhea. No blood in the diaper. Stool is not bloody or black. Vomit is dark brown. No blood thinners. On exam, pt in NAD, AAOx3, answers questions appropriately, lungs CTA B/L, CV S1S2 regular, abdomen soft/NT/ND/(+)BS, ext (-) pitting edema in all 4 extremities, moving UE against gravity, pt is bed bound, rectal exam (-0 gross blood. Will do labs, CXR, EKG, UA, CT and admit. Abx and IVF ordered.

## 2020-02-01 NOTE — ED PROVIDER NOTE - PHYSICAL EXAMINATION
CONSTITUTIONAL: well developed, nontoxic appearing, in no acute distress, speaking in full sentences  SKIN: warm, dry, no rash, cap refill < 2 seconds  HEENT: normocephalic, atraumatic, no conjunctival erythema, dry mucous membranes, dark brown/black vomitus near mouth, patent airway  NECK: supple, no masses  CV:  tachycardic, 2+ radial pulses bilaterally  RESP: no wheezes, no rales, no rhonchi, normal work of breathing, R sided chest port in place  ABD: soft, nontender, nondistended, no rebound, no guarding, midline surgical scar  RECTAL: small amount of red blood tinged mucus per rectum, no melena, hard stool in vault, female chaperone Dr. Mon present  MSK: bilateral pedal edema with overlying erythema, edema to BUE extending from fingers to elbows with overlying erythema, 2+ radial pulses, 1+ DP pulses, 4/5 motor strength in BUE, 0/5 motor strength in BLE  NEURO: alert, oriented, CN 2-12 grossly intact  PSYCH: cooperative, appropriate CONSTITUTIONAL: well developed, nontoxic appearing, in no acute distress, speaking in full sentences  SKIN: warm, dry, no rash, cap refill < 2 seconds  HEENT: normocephalic, atraumatic, no conjunctival erythema, dry mucous membranes, dark brown/black vomitus near mouth, patent airway  NECK: supple, no masses  CV:  tachycardic, 2+ radial pulses bilaterally  RESP: no wheezes, no rales, no rhonchi, normal work of breathing, R sided chest port in place  ABD: soft, nontender, nondistended, no rebound, no guarding, midline surgical scar  RECTAL: small amount of red blood tinged mucus per rectum, no melena, hard stool in vault, female chaperone Dr. Mon present  MSK: bilateral pedal edema with overlying erythema, edema to BUE extending from fingers to elbows with overlying erythema, 1+ radial pulses, 1+ DP pulses, 4/5 motor strength in BUE, 0/5 motor strength in BLE  NEURO: alert, oriented, CN 2-12 grossly intact  PSYCH: cooperative, appropriate

## 2020-02-01 NOTE — ED PROVIDER NOTE - NS ED ROS FT
GEN:  no fever, no chills, + generalized weakness  NEURO:  no headache, no dizziness  ENT: no sore throat, no runny nose  CV:  no chest pain, no palpitations  RESP:  no sob, no cough  GI:  + nausea, + vomiting, no abdominal pain, no diarrhea  :  no dysuria  MSK:  no joint pain, no edema  SKIN:  no rash, no bruising  HEME: no hematochezia, no melena

## 2020-02-01 NOTE — ED PROVIDER NOTE - PROGRESS NOTE DETAILS
TC TC: 63 yo F with PMHx of DM, MS, gastric cancer s/p resection/chemo/radiation who presents with BUE weakness/swelling x 2 days. In ED, tachy in 120s, BP 80s/40s, satting 89% on RA with improvement to 96% on 4L NC, rectal temp 98.8. Dark brown/black vomitus seen around mouth. Rectal exam shows small amount of blood tinged mucous. Ordered labs, ekg, ua, urine cx, cxr, CTPE. TC: Sepsis suspected at this time. Given 30cc/kg of IVF, ceftriaxone. Will reassess. TC: Reassessed pt, no complaints at this time, denies pain anywhere. MAP improved to 69. TC: Reassessed pt, no complaints at this time, denies pain anywhere. MAP improved to 69, HR down to 110s. Labs notable for WBC 21 (baseline 7-8, not on steroids), hgb 9.8 (previously 9.4 on 12/24), BUN/Cr 26/0.5. Cardiac markers normal, lactate 1.1. UA with many bacteria. Per chart review, previous urine cx grew E.coli sensitive to ceftriaxone. Pending CTPE. TC: CT negative for PE. Shows R > L pleural effusion with R sided opacity. Added azithromycin for CAP. HR down to 100s, MAP > 70s. TC: Ordered BUE duplex to r/o thrombosis. KEREN Jane aware and will f/u results. TC: Pt developed erythematous rash to chest/face after receiving azithromycin. No sob, wheezing, lip/tongue swelling, itchiness, nausea, vomiting. Azithromycin stopped and MAR notified. BP now 80s/50s. Call placed for ICU eval. TC: Spoke with Dr. Pelaez, approved to ICU. Recommended adding adia and vanc. TC: Spoke with Dr. Pelaez, approved to ICU. Recommended adding adia and vanc. Requested central line.

## 2020-02-02 NOTE — CONSULT NOTE ADULT - SUBJECTIVE AND OBJECTIVE BOX
Patient is a 64y old  Female who presents with a chief complaint of weakness(2020 17:27)      HPI:  64F with PMHx of MS, DM type II, Gastric cancer s/p resection and chemo currently in remission (Dr Toussaint), presented to the hospital for bilateral upper extremity weakness for last 3 days. Pt was admitted - for fall and LE weakness was found at that time to have UTI was treated with IV ceftriaxone was discharged to rehab and has been home for past 2 weeks and followed by a home  health aid. Daughter states since Wednesday patient has been more fatigue with decreased oral intake and progressively worsening bilateral UE weakness. Family denies any recent fevers, chills, cough, sob, abdominal pain, melena, hematochezia. No blood thinners. Of note patient no longer on treatment of MS. Last IVIG was .     In the ED pt was found to have BP of 85/64, , Afebrile, 96% O2 on 4L NC. WBC 22K. UA bacteuria with neg nitrites and LE. CT Chest with no PE and R sided opacity. She was given IV fluid bolus 2.5 L NS however BP was unresponsive In ED Central line was placed. Given initially azithromycin and Rocephin however she developed an allergic reaction after azithromycin given with erythematous skin over her chest and face. Azithromycin was discontinued. She however states she has previously taken the medication. Unsure if this is true allergy. After ICU eval patient started on vancomycin and cefepime. (2020 17:27), events overnight noted, feel sbtter      PAST MEDICAL & SURGICAL HISTORY:  Multiple sclerosis  DM type 2 (diabetes mellitus, type 2)  Stomach cancer: in remission for 2 years.  Portacath in place  S/P partial gastrectomy  H/O total hysterectomy with bilateral salpingo-oophorectomy (BSO)  H/O partial thyroidectomy      SOCIAL HX:   Smoking   -    FAMILY HISTORY: uto      REVIEW OF SYSTEMS see hpi      Allergies    Zithromax (Hives)    Intolerances        cefepime   IVPB 2000 milliGRAM(s) IV Intermittent every 8 hours  chlorhexidine 4% Liquid 1 Application(s) Topical <User Schedule>  dextrose 40% Gel 15 Gram(s) Oral once PRN  dextrose 5%. 1000 milliLiter(s) IV Continuous <Continuous>  dextrose 50% Injectable 12.5 Gram(s) IV Push once  dextrose 50% Injectable 25 Gram(s) IV Push once  dextrose 50% Injectable 25 Gram(s) IV Push once  DULoxetine 60 milliGRAM(s) Oral daily  enoxaparin Injectable 40 milliGRAM(s) SubCutaneous daily  folic acid 1 milliGRAM(s) Oral daily  glucagon  Injectable 1 milliGRAM(s) IntraMuscular once PRN  insulin glargine Injectable (LANTUS) 15 Unit(s) SubCutaneous at bedtime  insulin lispro (HumaLOG) corrective regimen sliding scale   SubCutaneous three times a day before meals  insulin lispro Injectable (HumaLOG) 5 Unit(s) SubCutaneous three times a day before meals  lactated ringers. 1000 milliLiter(s) IV Continuous <Continuous>  magnesium sulfate  IVPB 2 Gram(s) IV Intermittent once  metoclopramide 10 milliGRAM(s) Oral three times a day before meals  ondansetron    Tablet 8 milliGRAM(s) Oral three times a day  pantoprazole    Tablet 40 milliGRAM(s) Oral before breakfast  vancomycin  IVPB      vancomycin  IVPB 1250 milliGRAM(s) IV Intermittent every 12 hours  : Home Meds:      PHYSICAL EXAM    ICU Vital Signs Last 24 Hrs  T(C): 36.4 (2020 04:00), Max: 37.2 (2020 00:00)  T(F): 97.6 (2020 04:00), Max: 98.9 (2020 00:00)  HR: 92 (2020 07:00) (86 - 123)  BP: 92/50 (2020 07:00) (73/53 - 107/66)  BP(mean): 64 (2020 07:00) (64 - 82)  RR: 13 (2020 07:00) (13 - 30)  SpO2: 100% (2020 07:00) (93% - 100%)      General: chronically ill looking  HEENT:  RACHEL              Lymph Nodes: No cervical LN   Lungs: Bilateral BS, r nbasilar crackles  Cardiovascular: Regular  Abdomen: Soft, Positive BS  Extremities: No clubbing  Skin: Warm/ UE swelling  Neurological: Non focal       20 @ 07:01  -  20 @ 07:00  --------------------------------------------------------  IN:    IV PiggyBack: 600 mL    lactated ringers.: 300 mL  Total IN: 900 mL    OUT:    Indwelling Catheter - Urethral: 185 mL    Intermittent Catheterization - Urethral: 875 mL  Total OUT: 1060 mL    Total NET: -160 mL          LABS:                          8.4    14.78 )-----------( 302      ( 2020 04:30 )             25.6                                               02    134<L>  |  101  |  23<H>  ----------------------------<  173<H>  4.2   |  22  |  <0.5<L>    Ca    7.8<L>      2020 04:30  Mg     1.7         TPro  4.5<L>  /  Alb  2.2<L>  /  TBili  0.3  /  DBili  x   /  AST  40  /  ALT  51<H>  /  AlkPhos  217<H>        PT/INR - ( 2020 04:30 )   PT: 14.20 sec;   INR: 1.23 ratio         PTT - ( 2020 04:30 )  PTT:31.7 sec                                       Urinalysis Basic - ( 2020 11:50 )    Color: Yellow / Appearance: Slightly Turbid / S.032 / pH: x  Gluc: x / Ketone: Trace  / Bili: Negative / Urobili: 3 mg/dL   Blood: x / Protein: 30 mg/dL / Nitrite: Negative   Leuk Esterase: Negative / RBC: 34 /HPF / WBC 4 /HPF   Sq Epi: x / Non Sq Epi: 6 /HPF / Bacteria: Many        CARDIAC MARKERS ( 2020 11:50 )  x     / <0.01 ng/mL / x     / x     / x                                                LIVER FUNCTIONS - ( 2020 04:30 )  Alb: 2.2 g/dL / Pro: 4.5 g/dL / ALK PHOS: 217 U/L / ALT: 51 U/L / AST: 40 U/L / GGT: x                                                                                                                                   ABG - ( 2020 12:27 )  pH, Arterial: 7.48  pH, Blood: x     /  pCO2: 32    /  pO2: 86    / HCO3: 23    / Base Excess: 0.1   /  SaO2: 96                  X-Rays  reviewed/ CT chest reviewed    MEDICATIONS  (STANDING):  cefepime   IVPB 2000 milliGRAM(s) IV Intermittent every 8 hours  chlorhexidine 4% Liquid 1 Application(s) Topical <User Schedule>  dextrose 5%. 1000 milliLiter(s) (50 mL/Hr) IV Continuous <Continuous>  dextrose 50% Injectable 12.5 Gram(s) IV Push once  dextrose 50% Injectable 25 Gram(s) IV Push once  dextrose 50% Injectable 25 Gram(s) IV Push once  DULoxetine 60 milliGRAM(s) Oral daily  enoxaparin Injectable 40 milliGRAM(s) SubCutaneous daily  folic acid 1 milliGRAM(s) Oral daily  insulin glargine Injectable (LANTUS) 15 Unit(s) SubCutaneous at bedtime  insulin lispro (HumaLOG) corrective regimen sliding scale   SubCutaneous three times a day before meals  insulin lispro Injectable (HumaLOG) 5 Unit(s) SubCutaneous three times a day before meals  lactated ringers. 1000 milliLiter(s) (75 mL/Hr) IV Continuous <Continuous>  magnesium sulfate  IVPB 2 Gram(s) IV Intermittent once  metoclopramide 10 milliGRAM(s) Oral three times a day before meals  ondansetron    Tablet 8 milliGRAM(s) Oral three times a day  pantoprazole    Tablet 40 milliGRAM(s) Oral before breakfast  vancomycin  IVPB      vancomycin  IVPB 1250 milliGRAM(s) IV Intermittent every 12 hours    MEDICATIONS  (PRN):  dextrose 40% Gel 15 Gram(s) Oral once PRN Blood Glucose LESS THAN 70 milliGRAM(s)/deciliter  glucagon  Injectable 1 milliGRAM(s) IntraMuscular once PRN Glucose LESS THAN 70 milligrams/deciliter

## 2020-02-02 NOTE — PROGRESS NOTE ADULT - SUBJECTIVE AND OBJECTIVE BOX
PATIENT:  LEFTY CARRASQUILLO  [id number]    CHIEF COMPLAINT:  Patient is a 64y old  Female who presents with a chief complaint of Sepsis (2020 08:20)      INTERVAL HISTORYOVERNIGHT EVENTS:  No overnight events, BP MAP now in 50s, but patient does not feel lightheaded, no other complaints, feels better        MEDICATIONS:  MEDICATIONS  (STANDING):  cefepime   IVPB 2000 milliGRAM(s) IV Intermittent every 8 hours  chlorhexidine 4% Liquid 1 Application(s) Topical <User Schedule>  dextrose 5%. 1000 milliLiter(s) (50 mL/Hr) IV Continuous <Continuous>  dextrose 50% Injectable 12.5 Gram(s) IV Push once  dextrose 50% Injectable 25 Gram(s) IV Push once  dextrose 50% Injectable 25 Gram(s) IV Push once  DULoxetine 60 milliGRAM(s) Oral daily  enoxaparin Injectable 40 milliGRAM(s) SubCutaneous daily  folic acid 1 milliGRAM(s) Oral daily  insulin glargine Injectable (LANTUS) 15 Unit(s) SubCutaneous at bedtime  insulin lispro (HumaLOG) corrective regimen sliding scale   SubCutaneous three times a day before meals  insulin lispro Injectable (HumaLOG) 5 Unit(s) SubCutaneous three times a day before meals  lactated ringers. 1000 milliLiter(s) (75 mL/Hr) IV Continuous <Continuous>  metoclopramide 10 milliGRAM(s) Oral three times a day before meals  ondansetron    Tablet 8 milliGRAM(s) Oral three times a day  pantoprazole    Tablet 40 milliGRAM(s) Oral before breakfast    MEDICATIONS  (PRN):  dextrose 40% Gel 15 Gram(s) Oral once PRN Blood Glucose LESS THAN 70 milliGRAM(s)/deciliter  glucagon  Injectable 1 milliGRAM(s) IntraMuscular once PRN Glucose LESS THAN 70 milligrams/deciliter      ALLERGIES:  Allergies    Zithromax (Hives)    Intolerances        OBJECTIVE:  ICU Vital Signs Last 24 Hrs  T(C): 36.6 (2020 09:00), Max: 37.2 (2020 00:00)  T(F): 97.9 (2020 09:00), Max: 98.9 (2020 00:00)  HR: 94 (2020 14:00) (84 - 107)  BP: 98/51 (2020 14:00) (80/56 - 107/66)  BP(mean): 66 (2020 14:00) (62 - 82)  ABP: --  ABP(mean): --  RR: 25 (2020 14:00) (13 - 31)  SpO2: 98% (2020 14:00) (93% - 100%)      Adult Advanced Hemodynamics Last 24 Hrs  CVP(mm Hg): --  CVP(cm H2O): --  CO: --  CI: --  PA: --  PA(mean): --  PCWP: --  SVR: --  SVRI: --  PVR: --  PVRI: --  CAPILLARY BLOOD GLUCOSE      POCT Blood Glucose.: 95 mg/dL (2020 11:51)  POCT Blood Glucose.: 168 mg/dL (2020 07:21)  POCT Blood Glucose.: 183 mg/dL (2020 06:10)    CAPILLARY BLOOD GLUCOSE      POCT Blood Glucose.: 95 mg/dL (2020 11:51)    I&O's Summary    2020 07:01  -  2020 07:00  --------------------------------------------------------  IN: 900 mL / OUT: 1060 mL / NET: -160 mL    2020 07:01  -  2020 16:08  --------------------------------------------------------  IN: 660 mL / OUT: 190 mL / NET: 470 mL      Daily Height in cm: 157.48 (2020 20:00)    Daily     PHYSICAL EXAMINATION:  General: WN/WD NAD  HEENT: PERRLA, EOMI, moist mucous membranes  Neurology: A&Ox3, nonfocal, CRUZ x 4  Respiratory: CTA B/L, normal respiratory effort, no wheezes, crackles, rales  CV: RRR, S1S2, no murmurs, rubs or gallops  Abdominal: Soft, NT, ND +BS, Last BM  Extremities: UE edema b/l, + peripheral pulses    LABS:  ABG - ( 2020 12:27 )  pH, Arterial: 7.48  pH, Blood: x     /  pCO2: 32    /  pO2: 86    / HCO3: 23    / Base Excess: 0.1   /  SaO2: 96                                      8.4    14.78 )-----------( 302      ( 2020 04:30 )             25.6     02-    134<L>  |  101  |  23<H>  ----------------------------<  173<H>  4.2   |  22  |  <0.5<L>    Ca    7.8<L>      2020 04:30  Mg     1.7     -    TPro  4.5<L>  /  Alb  2.2<L>  /  TBili  0.3  /  DBili  x   /  AST  40  /  ALT  51<H>  /  AlkPhos  217<H>  02-    LIVER FUNCTIONS - ( 2020 04:30 )  Alb: 2.2 g/dL / Pro: 4.5 g/dL / ALK PHOS: 217 U/L / ALT: 51 U/L / AST: 40 U/L / GGT: x           PT/INR - ( 2020 04:30 )   PT: 14.20 sec;   INR: 1.23 ratio         PTT - ( 2020 04:30 )  PTT:31.7 sec    CARDIAC MARKERS ( 2020 11:50 )  x     / <0.01 ng/mL / x     / x     / x          Urinalysis Basic - ( 2020 11:50 )    Color: Yellow / Appearance: Slightly Turbid / S.032 / pH: x  Gluc: x / Ketone: Trace  / Bili: Negative / Urobili: 3 mg/dL   Blood: x / Protein: 30 mg/dL / Nitrite: Negative   Leuk Esterase: Negative / RBC: 34 /HPF / WBC 4 /HPF   Sq Epi: x / Non Sq Epi: 6 /HPF / Bacteria: Many        TELEMETRY:     EKG:     IMAGING: PATIENT:  LEFTY CARRASQUILLO  [id number]    CHIEF COMPLAINT:  Patient is a 64y old  Female who presents with a chief complaint of Sepsis (2020 08:20)      INTERVAL HISTORYOVERNIGHT EVENTS:  No overnight events, BP MAP now in 50s, but patient does not feel lightheaded, no other complaints, feels better        MEDICATIONS:  MEDICATIONS  (STANDING):  cefepime   IVPB 2000 milliGRAM(s) IV Intermittent every 8 hours  chlorhexidine 4% Liquid 1 Application(s) Topical <User Schedule>  dextrose 5%. 1000 milliLiter(s) (50 mL/Hr) IV Continuous <Continuous>  dextrose 50% Injectable 12.5 Gram(s) IV Push once  dextrose 50% Injectable 25 Gram(s) IV Push once  dextrose 50% Injectable 25 Gram(s) IV Push once  DULoxetine 60 milliGRAM(s) Oral daily  enoxaparin Injectable 40 milliGRAM(s) SubCutaneous daily  folic acid 1 milliGRAM(s) Oral daily  insulin glargine Injectable (LANTUS) 15 Unit(s) SubCutaneous at bedtime  insulin lispro (HumaLOG) corrective regimen sliding scale   SubCutaneous three times a day before meals  insulin lispro Injectable (HumaLOG) 5 Unit(s) SubCutaneous three times a day before meals  lactated ringers. 1000 milliLiter(s) (75 mL/Hr) IV Continuous <Continuous>  metoclopramide 10 milliGRAM(s) Oral three times a day before meals  ondansetron    Tablet 8 milliGRAM(s) Oral three times a day  pantoprazole    Tablet 40 milliGRAM(s) Oral before breakfast    MEDICATIONS  (PRN):  dextrose 40% Gel 15 Gram(s) Oral once PRN Blood Glucose LESS THAN 70 milliGRAM(s)/deciliter  glucagon  Injectable 1 milliGRAM(s) IntraMuscular once PRN Glucose LESS THAN 70 milligrams/deciliter      ALLERGIES:  Allergies    Zithromax (Hives)    Intolerances        OBJECTIVE:  ICU Vital Signs Last 24 Hrs  T(C): 36.6 (2020 09:00), Max: 37.2 (2020 00:00)  T(F): 97.9 (2020 09:00), Max: 98.9 (2020 00:00)  HR: 94 (2020 14:00) (84 - 107)  BP: 98/51 (2020 14:00) (80/56 - 107/66)  BP(mean): 66 (2020 14:00) (62 - 82)  ABP: --  ABP(mean): --  RR: 25 (2020 14:00) (13 - 31)  SpO2: 98% (2020 14:00) (93% - 100%)      Adult Advanced Hemodynamics Last 24 Hrs  CVP(mm Hg): --  CVP(cm H2O): --  CO: --  CI: --  PA: --  PA(mean): --  PCWP: --  SVR: --  SVRI: --  PVR: --  PVRI: --  CAPILLARY BLOOD GLUCOSE      POCT Blood Glucose.: 95 mg/dL (2020 11:51)  POCT Blood Glucose.: 168 mg/dL (2020 07:21)  POCT Blood Glucose.: 183 mg/dL (2020 06:10)    CAPILLARY BLOOD GLUCOSE      POCT Blood Glucose.: 95 mg/dL (2020 11:51)    I&O's Summary    2020 07:01  -  2020 07:00  --------------------------------------------------------  IN: 900 mL / OUT: 1060 mL / NET: -160 mL    2020 07:01  -  2020 16:08  --------------------------------------------------------  IN: 660 mL / OUT: 190 mL / NET: 470 mL      Daily Height in cm: 157.48 (2020 20:00)    Daily     PHYSICAL EXAMINATION:  General: WN/WD NAD  HEENT: PERRLA, EOMI, moist mucous membranes  Neurology: A&Ox3, nonfocal, CRUZ x 4  Respiratory: CTA B/L, normal respiratory effort, no wheezes, crackles, rales  CV: RRR, S1S2, no murmurs, rubs or gallops  Abdominal: Soft, NT, ND +BS, Last BM  Extremities: UE edema b/l, + peripheral pulses    LABS:  ABG - ( 2020 12:27 )  pH, Arterial: 7.48  pH, Blood: x     /  pCO2: 32    /  pO2: 86    / HCO3: 23    / Base Excess: 0.1   /  SaO2: 96                                      8.4    14.78 )-----------( 302      ( 2020 04:30 )             25.6     02-    134<L>  |  101  |  23<H>  ----------------------------<  173<H>  4.2   |  22  |  <0.5<L>    Ca    7.8<L>      2020 04:30  Mg     1.7     -    TPro  4.5<L>  /  Alb  2.2<L>  /  TBili  0.3  /  DBili  x   /  AST  40  /  ALT  51<H>  /  AlkPhos  217<H>  02    LIVER FUNCTIONS - ( 2020 04:30 )  Alb: 2.2 g/dL / Pro: 4.5 g/dL / ALK PHOS: 217 U/L / ALT: 51 U/L / AST: 40 U/L / GGT: x           PT/INR - ( 2020 04:30 )   PT: 14.20 sec;   INR: 1.23 ratio         PTT - ( 2020 04:30 )  PTT:31.7 sec    CARDIAC MARKERS ( 2020 11:50 )  x     / <0.01 ng/mL / x     / x     / x          Urinalysis Basic - ( 2020 11:50 )    Color: Yellow / Appearance: Slightly Turbid / S.032 / pH: x  Gluc: x / Ketone: Trace  / Bili: Negative / Urobili: 3 mg/dL   Blood: x / Protein: 30 mg/dL / Nitrite: Negative   Leuk Esterase: Negative / RBC: 34 /HPF / WBC 4 /HPF   Sq Epi: x / Non Sq Epi: 6 /HPF / Bacteria: Many        TELEMETRY:     EKG:     IMAGING:      Assessment: PATIENT:  LEFTY CARRASQUILLO  [id number]    CHIEF COMPLAINT:  Patient is a 64y old  Female who presents with a chief complaint of Sepsis (2020 08:20)      INTERVAL HISTORYOVERNIGHT EVENTS:  No overnight events, BP MAP now in 50s, but patient does not feel lightheaded, no other complaints, feels better        MEDICATIONS:  MEDICATIONS  (STANDING):  cefepime   IVPB 2000 milliGRAM(s) IV Intermittent every 8 hours  chlorhexidine 4% Liquid 1 Application(s) Topical <User Schedule>  dextrose 5%. 1000 milliLiter(s) (50 mL/Hr) IV Continuous <Continuous>  dextrose 50% Injectable 12.5 Gram(s) IV Push once  dextrose 50% Injectable 25 Gram(s) IV Push once  dextrose 50% Injectable 25 Gram(s) IV Push once  DULoxetine 60 milliGRAM(s) Oral daily  enoxaparin Injectable 40 milliGRAM(s) SubCutaneous daily  folic acid 1 milliGRAM(s) Oral daily  insulin glargine Injectable (LANTUS) 15 Unit(s) SubCutaneous at bedtime  insulin lispro (HumaLOG) corrective regimen sliding scale   SubCutaneous three times a day before meals  insulin lispro Injectable (HumaLOG) 5 Unit(s) SubCutaneous three times a day before meals  lactated ringers. 1000 milliLiter(s) (75 mL/Hr) IV Continuous <Continuous>  metoclopramide 10 milliGRAM(s) Oral three times a day before meals  ondansetron    Tablet 8 milliGRAM(s) Oral three times a day  pantoprazole    Tablet 40 milliGRAM(s) Oral before breakfast    MEDICATIONS  (PRN):  dextrose 40% Gel 15 Gram(s) Oral once PRN Blood Glucose LESS THAN 70 milliGRAM(s)/deciliter  glucagon  Injectable 1 milliGRAM(s) IntraMuscular once PRN Glucose LESS THAN 70 milligrams/deciliter      ALLERGIES:  Allergies    Zithromax (Hives)    Intolerances        OBJECTIVE:  ICU Vital Signs Last 24 Hrs  T(C): 36.6 (2020 09:00), Max: 37.2 (2020 00:00)  T(F): 97.9 (2020 09:00), Max: 98.9 (2020 00:00)  HR: 94 (2020 14:00) (84 - 107)  BP: 98/51 (2020 14:00) (80/56 - 107/66)  BP(mean): 66 (2020 14:00) (62 - 82)  ABP: --  ABP(mean): --  RR: 25 (2020 14:00) (13 - 31)  SpO2: 98% (2020 14:00) (93% - 100%)      Adult Advanced Hemodynamics Last 24 Hrs  CVP(mm Hg): --  CVP(cm H2O): --  CO: --  CI: --  PA: --  PA(mean): --  PCWP: --  SVR: --  SVRI: --  PVR: --  PVRI: --  CAPILLARY BLOOD GLUCOSE      POCT Blood Glucose.: 95 mg/dL (2020 11:51)  POCT Blood Glucose.: 168 mg/dL (2020 07:21)  POCT Blood Glucose.: 183 mg/dL (2020 06:10)    CAPILLARY BLOOD GLUCOSE      POCT Blood Glucose.: 95 mg/dL (2020 11:51)    I&O's Summary    2020 07:01  -  2020 07:00  --------------------------------------------------------  IN: 900 mL / OUT: 1060 mL / NET: -160 mL    2020 07:01  -  2020 16:08  --------------------------------------------------------  IN: 660 mL / OUT: 190 mL / NET: 470 mL      Daily Height in cm: 157.48 (2020 20:00)    Daily     PHYSICAL EXAMINATION:  General: WN/WD NAD  HEENT: PERRLA, EOMI, moist mucous membranes  Neurology: A&Ox3, nonfocal, CRUZ x 4  Respiratory: CTA B/L, normal respiratory effort, no wheezes, crackles, rales  CV: RRR, S1S2, no murmurs, rubs or gallops  Abdominal: Soft, NT, ND +BS, Last BM  Extremities: UE edema b/l, + peripheral pulses    LABS:  ABG - ( 2020 12:27 )  pH, Arterial: 7.48  pH, Blood: x     /  pCO2: 32    /  pO2: 86    / HCO3: 23    / Base Excess: 0.1   /  SaO2: 96                                      8.4    14.78 )-----------( 302      ( 2020 04:30 )             25.6     02-    134<L>  |  101  |  23<H>  ----------------------------<  173<H>  4.2   |  22  |  <0.5<L>    Ca    7.8<L>      2020 04:30  Mg     1.7     -    TPro  4.5<L>  /  Alb  2.2<L>  /  TBili  0.3  /  DBili  x   /  AST  40  /  ALT  51<H>  /  AlkPhos  217<H>  02    LIVER FUNCTIONS - ( 2020 04:30 )  Alb: 2.2 g/dL / Pro: 4.5 g/dL / ALK PHOS: 217 U/L / ALT: 51 U/L / AST: 40 U/L / GGT: x           PT/INR - ( 2020 04:30 )   PT: 14.20 sec;   INR: 1.23 ratio         PTT - ( 2020 04:30 )  PTT:31.7 sec    CARDIAC MARKERS ( 2020 11:50 )  x     / <0.01 ng/mL / x     / x     / x          Urinalysis Basic - ( 2020 11:50 )    Color: Yellow / Appearance: Slightly Turbid / S.032 / pH: x  Gluc: x / Ketone: Trace  / Bili: Negative / Urobili: 3 mg/dL   Blood: x / Protein: 30 mg/dL / Nitrite: Negative   Leuk Esterase: Negative / RBC: 34 /HPF / WBC 4 /HPF   Sq Epi: x / Non Sq Epi: 6 /HPF / Bacteria: Many        TELEMETRY:     EKG:     IMAGING:      Assessment:  64F with PMHx of MS, DM type II, Gastric cancer s/p resection and chemo currently in remission (Dr Toussaint), presented to the hospital for bilateral upper extremity weakness for last 3 days. Pt was admitted - for fall and LE weakness was found at that time to have UTI was treated with IV ceftriaxone was discharged to rehab and has been home for past 2 weeks and followed by a home  health aid. Daughter states since Wednesday patient has been more fatigue with decreased oral intake and progressively worsening bilateral UE weakness. Family denies any recent fevers, chills, cough, sob, abdominal pain, melena, hematochezia. No blood thinners. Of note patient no longer on treatment of MS. Last IVIG was .     In the ED pt was found to have BP of 85/64, , Afebrile, 96% O2 on 4L NC. WBC 22K. UA bacteuria with neg nitrites and LE. CT Chest with no PE and R sided opacity. She was given IV fluid bolus 2.5 L NS however BP was unresponsive In ED Central line was placed. Given initially azithromycin and Rocephin however she developed an allergic reaction after azithromycin given with erythematous skin over her chest and face. Azithromycin was discontinued. She however states she has previously taken the medication. Unsure if this is true allergy. After ICU eval patient started on vancomycin and cefepime.     2/2: Patient with low BP with MAP in 50s, went up to 70s, baseline BP according to patient and family is low, patient denies lightheadedness, feels better    Plan:    Sepsis 2/2 possible GNR Pneumonia  - UTI appears unremarkable  - xray shows right pleural effusion  - WBC 14 from 21  - On cefepime, d/c vancomycin  - PATIENT:  LEFTY CARRASQUILLO  [id number]    CHIEF COMPLAINT:  Patient is a 64y old  Female who presents with a chief complaint of Sepsis (2020 08:20)      INTERVAL HISTORYOVERNIGHT EVENTS:  No overnight events, BP MAP now in 50s, but patient does not feel lightheaded, no other complaints, feels better        MEDICATIONS:  MEDICATIONS  (STANDING):  cefepime   IVPB 2000 milliGRAM(s) IV Intermittent every 8 hours  chlorhexidine 4% Liquid 1 Application(s) Topical <User Schedule>  dextrose 5%. 1000 milliLiter(s) (50 mL/Hr) IV Continuous <Continuous>  dextrose 50% Injectable 12.5 Gram(s) IV Push once  dextrose 50% Injectable 25 Gram(s) IV Push once  dextrose 50% Injectable 25 Gram(s) IV Push once  DULoxetine 60 milliGRAM(s) Oral daily  enoxaparin Injectable 40 milliGRAM(s) SubCutaneous daily  folic acid 1 milliGRAM(s) Oral daily  insulin glargine Injectable (LANTUS) 15 Unit(s) SubCutaneous at bedtime  insulin lispro (HumaLOG) corrective regimen sliding scale   SubCutaneous three times a day before meals  insulin lispro Injectable (HumaLOG) 5 Unit(s) SubCutaneous three times a day before meals  lactated ringers. 1000 milliLiter(s) (75 mL/Hr) IV Continuous <Continuous>  metoclopramide 10 milliGRAM(s) Oral three times a day before meals  ondansetron    Tablet 8 milliGRAM(s) Oral three times a day  pantoprazole    Tablet 40 milliGRAM(s) Oral before breakfast    MEDICATIONS  (PRN):  dextrose 40% Gel 15 Gram(s) Oral once PRN Blood Glucose LESS THAN 70 milliGRAM(s)/deciliter  glucagon  Injectable 1 milliGRAM(s) IntraMuscular once PRN Glucose LESS THAN 70 milligrams/deciliter      ALLERGIES:  Allergies    Zithromax (Hives)    Intolerances        OBJECTIVE:  ICU Vital Signs Last 24 Hrs  T(C): 36.6 (2020 09:00), Max: 37.2 (2020 00:00)  T(F): 97.9 (2020 09:00), Max: 98.9 (2020 00:00)  HR: 94 (2020 14:00) (84 - 107)  BP: 98/51 (2020 14:00) (80/56 - 107/66)  BP(mean): 66 (2020 14:00) (62 - 82)  ABP: --  ABP(mean): --  RR: 25 (2020 14:00) (13 - 31)  SpO2: 98% (2020 14:00) (93% - 100%)      Adult Advanced Hemodynamics Last 24 Hrs  CVP(mm Hg): --  CVP(cm H2O): --  CO: --  CI: --  PA: --  PA(mean): --  PCWP: --  SVR: --  SVRI: --  PVR: --  PVRI: --  CAPILLARY BLOOD GLUCOSE      POCT Blood Glucose.: 95 mg/dL (2020 11:51)  POCT Blood Glucose.: 168 mg/dL (2020 07:21)  POCT Blood Glucose.: 183 mg/dL (2020 06:10)    CAPILLARY BLOOD GLUCOSE      POCT Blood Glucose.: 95 mg/dL (2020 11:51)    I&O's Summary    2020 07:01  -  2020 07:00  --------------------------------------------------------  IN: 900 mL / OUT: 1060 mL / NET: -160 mL    2020 07:01  -  2020 16:08  --------------------------------------------------------  IN: 660 mL / OUT: 190 mL / NET: 470 mL      Daily Height in cm: 157.48 (2020 20:00)    Daily     PHYSICAL EXAMINATION:  General: WN/WD NAD  HEENT: PERRLA, EOMI, moist mucous membranes  Neurology: A&Ox3, nonfocal, CRUZ x 4  Respiratory: CTA B/L, normal respiratory effort, no wheezes, crackles, rales  CV: RRR, S1S2, no murmurs, rubs or gallops  Abdominal: Soft, NT, ND +BS, Last BM  Extremities: UE edema b/l, + peripheral pulses    LABS:  ABG - ( 2020 12:27 )  pH, Arterial: 7.48  pH, Blood: x     /  pCO2: 32    /  pO2: 86    / HCO3: 23    / Base Excess: 0.1   /  SaO2: 96                                      8.4    14.78 )-----------( 302      ( 2020 04:30 )             25.6     02-    134<L>  |  101  |  23<H>  ----------------------------<  173<H>  4.2   |  22  |  <0.5<L>    Ca    7.8<L>      2020 04:30  Mg     1.7     -    TPro  4.5<L>  /  Alb  2.2<L>  /  TBili  0.3  /  DBili  x   /  AST  40  /  ALT  51<H>  /  AlkPhos  217<H>  02    LIVER FUNCTIONS - ( 2020 04:30 )  Alb: 2.2 g/dL / Pro: 4.5 g/dL / ALK PHOS: 217 U/L / ALT: 51 U/L / AST: 40 U/L / GGT: x           PT/INR - ( 2020 04:30 )   PT: 14.20 sec;   INR: 1.23 ratio         PTT - ( 2020 04:30 )  PTT:31.7 sec    CARDIAC MARKERS ( 2020 11:50 )  x     / <0.01 ng/mL / x     / x     / x          Urinalysis Basic - ( 2020 11:50 )    Color: Yellow / Appearance: Slightly Turbid / S.032 / pH: x  Gluc: x / Ketone: Trace  / Bili: Negative / Urobili: 3 mg/dL   Blood: x / Protein: 30 mg/dL / Nitrite: Negative   Leuk Esterase: Negative / RBC: 34 /HPF / WBC 4 /HPF   Sq Epi: x / Non Sq Epi: 6 /HPF / Bacteria: Many        TELEMETRY:     EKG:     IMAGING:      Assessment:  64F with PMHx of MS, DM type II, Gastric cancer s/p resection and chemo currently in remission (Dr Toussaint), presented to the hospital for bilateral upper extremity weakness for last 3 days. Pt was admitted - for fall and LE weakness was found at that time to have UTI was treated with IV ceftriaxone was discharged to rehab and has been home for past 2 weeks and followed by a home  health aid. Daughter states since Wednesday patient has been more fatigue with decreased oral intake and progressively worsening bilateral UE weakness. Family denies any recent fevers, chills, cough, sob, abdominal pain, melena, hematochezia. No blood thinners. Of note patient no longer on treatment of MS. Last IVIG was .     In the ED pt was found to have BP of 85/64, , Afebrile, 96% O2 on 4L NC. WBC 22K. UA bacteuria with neg nitrites and LE. CT Chest with no PE and R sided opacity. She was given IV fluid bolus 2.5 L NS however BP was unresponsive In ED Central line was placed. Given initially azithromycin and Rocephin however she developed an allergic reaction after azithromycin given with erythematous skin over her chest and face. Azithromycin was discontinued. She however states she has previously taken the medication. Unsure if this is true allergy. After ICU eval patient started on vancomycin and cefepime.     2/2: Patient with low BP with MAP in 50s, went up to 70s, baseline BP according to patient and family is low, patient denies lightheadedness, feels better    Plan:    #Sepsis 2/2 possible GNR Pneumonia  - UTI appears unremarkable  - xray shows right pleural effusion  - WBC 14 from 21  - On cefepime, d/c vancomycin  - BP MAP in 50s but now in 70s  - On LR at 75  - Cx neg  - ECHO EF 70%  - MRSA neg, flu neg, rsv neg    MS  HX of stomach cancer s/p surgery s/p chemo PATIENT:  LEFTY CARRASQUILLO  [id number]    CHIEF COMPLAINT:  Patient is a 64y old  Female who presents with a chief complaint of Sepsis (2020 08:20)      INTERVAL HISTORYOVERNIGHT EVENTS:  No overnight events, BP MAP now in 50s, but patient does not feel lightheaded, no other complaints, feels better        MEDICATIONS:  MEDICATIONS  (STANDING):  cefepime   IVPB 2000 milliGRAM(s) IV Intermittent every 8 hours  chlorhexidine 4% Liquid 1 Application(s) Topical <User Schedule>  dextrose 5%. 1000 milliLiter(s) (50 mL/Hr) IV Continuous <Continuous>  dextrose 50% Injectable 12.5 Gram(s) IV Push once  dextrose 50% Injectable 25 Gram(s) IV Push once  dextrose 50% Injectable 25 Gram(s) IV Push once  DULoxetine 60 milliGRAM(s) Oral daily  enoxaparin Injectable 40 milliGRAM(s) SubCutaneous daily  folic acid 1 milliGRAM(s) Oral daily  insulin glargine Injectable (LANTUS) 15 Unit(s) SubCutaneous at bedtime  insulin lispro (HumaLOG) corrective regimen sliding scale   SubCutaneous three times a day before meals  insulin lispro Injectable (HumaLOG) 5 Unit(s) SubCutaneous three times a day before meals  lactated ringers. 1000 milliLiter(s) (75 mL/Hr) IV Continuous <Continuous>  metoclopramide 10 milliGRAM(s) Oral three times a day before meals  ondansetron    Tablet 8 milliGRAM(s) Oral three times a day  pantoprazole    Tablet 40 milliGRAM(s) Oral before breakfast    MEDICATIONS  (PRN):  dextrose 40% Gel 15 Gram(s) Oral once PRN Blood Glucose LESS THAN 70 milliGRAM(s)/deciliter  glucagon  Injectable 1 milliGRAM(s) IntraMuscular once PRN Glucose LESS THAN 70 milligrams/deciliter      ALLERGIES:  Allergies    Zithromax (Hives)    Intolerances        OBJECTIVE:  ICU Vital Signs Last 24 Hrs  T(C): 36.6 (2020 09:00), Max: 37.2 (2020 00:00)  T(F): 97.9 (2020 09:00), Max: 98.9 (2020 00:00)  HR: 94 (2020 14:00) (84 - 107)  BP: 98/51 (2020 14:00) (80/56 - 107/66)  BP(mean): 66 (2020 14:00) (62 - 82)  ABP: --  ABP(mean): --  RR: 25 (2020 14:00) (13 - 31)  SpO2: 98% (2020 14:00) (93% - 100%)      Adult Advanced Hemodynamics Last 24 Hrs  CVP(mm Hg): --  CVP(cm H2O): --  CO: --  CI: --  PA: --  PA(mean): --  PCWP: --  SVR: --  SVRI: --  PVR: --  PVRI: --  CAPILLARY BLOOD GLUCOSE      POCT Blood Glucose.: 95 mg/dL (2020 11:51)  POCT Blood Glucose.: 168 mg/dL (2020 07:21)  POCT Blood Glucose.: 183 mg/dL (2020 06:10)    CAPILLARY BLOOD GLUCOSE      POCT Blood Glucose.: 95 mg/dL (2020 11:51)    I&O's Summary    2020 07:01  -  2020 07:00  --------------------------------------------------------  IN: 900 mL / OUT: 1060 mL / NET: -160 mL    2020 07:01  -  2020 16:08  --------------------------------------------------------  IN: 660 mL / OUT: 190 mL / NET: 470 mL      Daily Height in cm: 157.48 (2020 20:00)    Daily     PHYSICAL EXAMINATION:  General: WN/WD NAD  HEENT: PERRLA, EOMI, moist mucous membranes  Neurology: A&Ox3, nonfocal, CRUZ x 4  Respiratory: CTA B/L, normal respiratory effort, no wheezes, crackles, rales  CV: RRR, S1S2, no murmurs, rubs or gallops  Abdominal: Soft, NT, ND +BS, Last BM  Extremities: UE edema b/l, + peripheral pulses    LABS:  ABG - ( 2020 12:27 )  pH, Arterial: 7.48  pH, Blood: x     /  pCO2: 32    /  pO2: 86    / HCO3: 23    / Base Excess: 0.1   /  SaO2: 96                                      8.4    14.78 )-----------( 302      ( 2020 04:30 )             25.6     02-    134<L>  |  101  |  23<H>  ----------------------------<  173<H>  4.2   |  22  |  <0.5<L>    Ca    7.8<L>      2020 04:30  Mg     1.7     -    TPro  4.5<L>  /  Alb  2.2<L>  /  TBili  0.3  /  DBili  x   /  AST  40  /  ALT  51<H>  /  AlkPhos  217<H>  02    LIVER FUNCTIONS - ( 2020 04:30 )  Alb: 2.2 g/dL / Pro: 4.5 g/dL / ALK PHOS: 217 U/L / ALT: 51 U/L / AST: 40 U/L / GGT: x           PT/INR - ( 2020 04:30 )   PT: 14.20 sec;   INR: 1.23 ratio         PTT - ( 2020 04:30 )  PTT:31.7 sec    CARDIAC MARKERS ( 2020 11:50 )  x     / <0.01 ng/mL / x     / x     / x          Urinalysis Basic - ( 2020 11:50 )    Color: Yellow / Appearance: Slightly Turbid / S.032 / pH: x  Gluc: x / Ketone: Trace  / Bili: Negative / Urobili: 3 mg/dL   Blood: x / Protein: 30 mg/dL / Nitrite: Negative   Leuk Esterase: Negative / RBC: 34 /HPF / WBC 4 /HPF   Sq Epi: x / Non Sq Epi: 6 /HPF / Bacteria: Many        TELEMETRY:     EKG:     IMAGING:      Assessment:  64F with PMHx of MS, DM type II, Gastric cancer s/p resection and chemo currently in remission (Dr Toussaint), presented to the hospital for bilateral upper extremity weakness for last 3 days. Pt was admitted - for fall and LE weakness was found at that time to have UTI was treated with IV ceftriaxone was discharged to rehab and has been home for past 2 weeks and followed by a home  health aid. Daughter states since Wednesday patient has been more fatigue with decreased oral intake and progressively worsening bilateral UE weakness. Family denies any recent fevers, chills, cough, sob, abdominal pain, melena, hematochezia. No blood thinners. Of note patient no longer on treatment of MS. Last IVIG was .     In the ED pt was found to have BP of 85/64, , Afebrile, 96% O2 on 4L NC. WBC 22K. UA bacteuria with neg nitrites and LE. CT Chest with no PE and R sided opacity. She was given IV fluid bolus 2.5 L NS however BP was unresponsive In ED Central line was placed. Given initially azithromycin and Rocephin however she developed an allergic reaction after azithromycin given with erythematous skin over her chest and face. Azithromycin was discontinued. She however states she has previously taken the medication. Unsure if this is true allergy. After ICU eval patient started on vancomycin and cefepime.     2/2: Patient with low BP with MAP in 50s, went up to 70s, baseline BP according to patient and family is low, patient denies lightheadedness, feels better    Plan:    #Sepsis 2/2 possible GNR Pneumonia  - UTI appears unremarkable  - xray shows right pleural effusion  - WBC 14 from 21  - On cefepime, d/c vancomycin  - BP MAP in 50s but now in 70s  - On LR at 75  - Cx neg  - ECHO EF 70%  - MRSA neg, flu neg, rsv neg      #MS  - No treatment for now      #HX of stomach cancer s/p surgery s/p chemo  - In remission      DVT: Lovenox  GI: Pantoprazole  Diet: Consistent carbs  Activity: Increase as tolerated  Code: full

## 2020-02-02 NOTE — CONSULT NOTE ADULT - ASSESSMENT
IMPRESSION:    severe sepsis present on admission/ RLL opacity  MS  HX of stomach cancer s/p surgery s/p chemo  need home assitance    PLAN:    CNS: avoid CNS depressant    HEENT:  Oral care    PULMONARY:  HOB @ 45 degrees, aspiration precaution    CARDIOVASCULAR: LR 75 cc/h    GI: GI prophylaxis                                          Feeding po    RENAL:  F/u  lytes.  Correct as needed. accurate I/O    INFECTIOUS DISEASE:    HEMATOLOGICAL:  DVT prophylaxis.    ENDOCRINE:  Follow up FS.  Insulin protocol if needed.    MUSCULOSKELETAL: UE doppler    CODE STATUS: FULL CODE    DISPOSITION: Pt requires continued monitoring in the MICU    dc TLC  possible downgrade in pm

## 2020-02-02 NOTE — CHART NOTE - NSCHARTNOTEFT_GEN_A_CORE
64F with PMHx of MS, DM type II, Gastric cancer s/p resection and chemo currently in remission (Dr Toussaint), presented to the hospital for bilateral upper extremity weakness for last 3 days. Pt was admitted 12/14-12/24 for fall and LE weakness was found at that time to have UTI was treated with IV ceftriaxone was discharged to rehab and has been home for past 2 weeks and followed by a home  health aid. Daughter states since Wednesday patient has been more fatigue with decreased oral intake and progressively worsening bilateral UE weakness. Family denies any recent fevers, chills, cough, sob, abdominal pain, melena, hematochezia. No blood thinners. Of note patient no longer on treatment of MS. Last IVIG was November 28th.     In the ED pt was found to have BP of 85/64, , Afebrile, 96% O2 on 4L NC. WBC 22K. UA bacteuria with neg nitrites and LE. CT Chest with no PE and R sided opacity. She was given IV fluid bolus 2.5 L NS however BP was unresponsive In ED Central line was placed. Given initially azithromycin and Rocephin however she developed an allergic reaction after azithromycin given with erythematous skin over her chest and face. Azithromycin was discontinued. She however states she has previously taken the medication. Unsure if this is true allergy. After ICU eval patient started on vancomycin and cefepime.     2/2: Patient with low BP with MAP in 50s, went up to 70s, baseline BP according to patient and family is low, patient denies lightheadedness, feels better    Plan:    #Sepsis 2/2 possible GNR Pneumonia  - UTI appears unremarkable  - xray shows right pleural effusion  - WBC 14 from 21  - On cefepime, d/c vancomycin  - BP MAP in 50s but now in 70s  - On LR at 75  - Cx neg  - ECHO EF 70%  - MRSA neg, flu neg, rsv neg      #MS  - No treatment for now      #HX of stomach cancer s/p surgery s/p chemo  - In remission      DVT: Lovenox  GI: Pantoprazole  Diet: Consistent carbs  Activity: Increase as tolerated  Code: full

## 2020-02-03 NOTE — DIETITIAN INITIAL EVALUATION ADULT. - DIET TYPE
Consistent Carbohydrate with evening snack. Per staff, pt with reduced appetite at this time, consuming 50% of meals.

## 2020-02-03 NOTE — DIETITIAN INITIAL EVALUATION ADULT. - OTHER INFO
Pertinent Medical Information: p/w sepsis 2/2 possible GNR Pneumonia. Noted h/o stomach cancer s/p surgery s/p chemo.    Pertinent Subjective Information: Unable to obtain nutrition history at this time. Pt unable to provide nutrition history at this time. No response from emergency contact, despite multiple attempts. NKFA per chart at this time.

## 2020-02-03 NOTE — PROGRESS NOTE ADULT - SUBJECTIVE AND OBJECTIVE BOX
LEFTY CARRASQUILLO 64y Female  MRN#: [id number]   CODE STATUS:__FULL______      SUBJECTIVE  Patient is a 64y old Female who presents with a chief complaint of Sepsis (2020 16:07)  Currently admitted to medicine with the primary diagnosis of Septic shock    Patient feeling much better. No SOB, fevers, cough.    OBJECTIVE  PAST MEDICAL & SURGICAL HISTORY  Multiple sclerosis  DM type 2 (diabetes mellitus, type 2)  Stomach cancer: in remission for 2 years.  Portacath in place  S/P partial gastrectomy  H/O total hysterectomy with bilateral salpingo-oophorectomy (BSO)  H/O partial thyroidectomy    ALLERGIES:  Zithromax (Hives)    MEDICATIONS:  STANDING MEDICATIONS  cefepime   IVPB 2000 milliGRAM(s) IV Intermittent every 8 hours  chlorhexidine 4% Liquid 1 Application(s) Topical <User Schedule>  dextrose 5%. 1000 milliLiter(s) IV Continuous <Continuous>  dextrose 50% Injectable 12.5 Gram(s) IV Push once  dextrose 50% Injectable 25 Gram(s) IV Push once  dextrose 50% Injectable 25 Gram(s) IV Push once  DULoxetine 60 milliGRAM(s) Oral daily  enoxaparin Injectable 40 milliGRAM(s) SubCutaneous daily  folic acid 1 milliGRAM(s) Oral daily  insulin glargine Injectable (LANTUS) 15 Unit(s) SubCutaneous at bedtime  insulin lispro (HumaLOG) corrective regimen sliding scale   SubCutaneous three times a day before meals  insulin lispro Injectable (HumaLOG) 5 Unit(s) SubCutaneous three times a day before meals  metoclopramide 10 milliGRAM(s) Oral three times a day before meals  ondansetron    Tablet 8 milliGRAM(s) Oral three times a day  pantoprazole    Tablet 40 milliGRAM(s) Oral before breakfast    PRN MEDICATIONS  dextrose 40% Gel 15 Gram(s) Oral once PRN  glucagon  Injectable 1 milliGRAM(s) IntraMuscular once PRN      VITAL SIGNS: Last 24 Hours  T(C): 36.7 (2020 05:45), Max: 36.8 (2020 23:30)  T(F): 98 (2020 05:45), Max: 98.2 (2020 23:30)  HR: 73 (2020 05:45) (73 - 106)  BP: 99/51 (2020 05:45) (76/39 - 99/51)  BP(mean): 67 (2020 22:00) (51 - 75)  RR: 18 (2020 05:45) (18 - 34)  SpO2: 96% (2020 08:13) (92% - 99%)    LABS:                        8.4    14.78 )-----------( 302      ( 2020 04:30 )             25.6     02-    134<L>  |  101  |  23<H>  ----------------------------<  173<H>  4.2   |  22  |  <0.5<L>    Ca    7.8<L>      2020 04:30  Mg     1.7         TPro  4.5<L>  /  Alb  2.2<L>  /  TBili  0.3  /  DBili  x   /  AST  40  /  ALT  51<H>  /  AlkPhos  217<H>  02    PT/INR - ( 2020 04:30 )   PT: 14.20 sec;   INR: 1.23 ratio         PTT - ( 2020 04:30 )  PTT:31.7 sec  Urinalysis Basic - ( 2020 11:50 )    Color: Yellow / Appearance: Slightly Turbid / S.032 / pH: x  Gluc: x / Ketone: Trace  / Bili: Negative / Urobili: 3 mg/dL   Blood: x / Protein: 30 mg/dL / Nitrite: Negative   Leuk Esterase: Negative / RBC: 34 /HPF / WBC 4 /HPF   Sq Epi: x / Non Sq Epi: 6 /HPF / Bacteria: Many      ABG - ( 2020 12:27 )  pH, Arterial: 7.48  pH, Blood: x     /  pCO2: 32    /  pO2: 86    / HCO3: 23    / Base Excess: 0.1   /  SaO2: 96                    Culture - Urine (collected 2020 11:50)  Source: .Urine Clean Catch (Midstream)  Final Report (2020 14:12):    No growth    Culture - Blood (collected 2020 11:50)  Source: .Blood Blood-Peripheral  Gram Stain (2020 10:05):    Growth in aerobic bottle: Gram Positive Cocci in Clusters  Preliminary Report (2020 10:05):    Growth in aerobic bottle: Gram Positive Cocci in Clusters    ***Blood Panel PCR results on this specimen are available    approximately 3 hours after the Gram stain result.***    Gram stain, PCR, and/or culture results may not always    correspond due to difference in methodologies.    ************************************************************    This PCR assay was performed using [employer].    The following targets are tested for: Enterococcus,    vancomycin resistant enterococci, Listeria monocytogenes,    coagulase negative staphylococci, S. aureus,    methicillin resistant S. aureus, Streptococcus agalactiae    (Group B), S. pneumoniae, S. pyogenes (Group A),    Acinetobacter baumannii, Enterobacter cloacae, E. coli,    Klebsiella oxytoca, K. pneumoniae, Proteus sp.,    Serratia marcescens, Haemophilus influenzae,    Neisseria meningitidis, Pseudomonas aeruginosa, Candida    albicans, C. glabrata, C krusei, C parapsilosis,    C. tropicalis and the KPC resistance gene.    Culture - Blood (collected 2020 11:50)  Source: .Blood Blood-Peripheral  Preliminary Report (2020 19:01):    No growth to date.      CARDIAC MARKERS ( 2020 11:50 )  x     / <0.01 ng/mL / x     / x     / x          RADIOLOGY:      PHYSICAL EXAM:    GENERAL: NAD, well-developed, AAOx3  HEENT:  Atraumatic, Normocephalic. EOMI, PERRLA, conjunctiva and sclera clear, No JVD  PULMONARY: Clear to auscultation bilaterally; No wheeze  CARDIOVASCULAR: Regular rate and rhythm; No murmurs, rubs, or gallops  GASTROINTESTINAL: Soft, Nontender, Nondistended; Bowel sounds present  MUSCULOSKELETAL:  2+ Peripheral Pulses, 1+ edema  NEUROLOGY: non-focal  SKIN: No rashes or lesions      ADMISSION SUMMARY  Patient is a 64y old Female who presents with a chief complaint of Sepsis (2020 16:07)  Currently admitted to medicine with the primary diagnosis of Septic shock      ASSESSMENT & PLAN    64 year old F MS, DM type II, gastric cancer s/p resection and chemo now in remission who presented to hospital for upper extremity weakness, fatigue, decreased oral intake.  Patient recently admitted for UTI in December and was disharged to rehab.  Has now been home 2 weeks prior to admission with A.     In ED, hypotensive (80/40), tachy to 123. WBC 25558. CT chest showed R sided opacity. S/p IV fluid 2.5 L bolus. BP unresponsive- central line placed.  Started on iv abx- azithro and rocephin but developed rxn to azithromycin and abx changed to vanc and cefepime.  Admitted to ICU and downgraded last night ()    #Sepsis 2/ possible GNR Pneumonia  - Clinically appearing much better, lung sounds clear  - RLL opacity on CXR  - UTI  unremarkable  - WBC 14 from 21, now afebrile  - On cefepime, d/c vancomycin  - Will switch to PO levaquin on d/c  - BP MAP now in the 70s  - D/bairon LR as patient is eating and drinking  - MRSA neg, flu neg, rsv neg    #MS  - No longer receiving treatment  - Last IVIG     #HX of stomach cancer s/p surgery s/p chemo  - In remission      DVT: Lovenox  GI: Pantoprazole  Diet: Consistent carbs  Activity: Increase as tolerated  Code: full.

## 2020-02-03 NOTE — DIETITIAN INITIAL EVALUATION ADULT. - ADD RECOMMEND
Recommendation: Order Ensure Compact q24hrs (edema noted, plus may be easier to consume lower volume supplement). Remove Consistent Carbohydrate diet modifier & provide Regular diet (latest HgbA1C 5.3% 12/16/19.

## 2020-02-03 NOTE — DIETITIAN INITIAL EVALUATION ADULT. - PERTINENT LABORATORY DATA
2/3: RBC-2.77, H/H-7.9/25.1, Na-133, BUN-21, creat- <0.5; per previous admit records: 12/16/19: LklL1S-2.3%

## 2020-02-03 NOTE — DIETITIAN INITIAL EVALUATION ADULT. - ENERGY NEEDS
Energy: 3508-5697 kcal/day (MSJx1.2-1.3 AF)    Protein: 60-72 g/day (1-1.2 g/kg ABW)    Fluids: 1 mL/kcal or per LIP

## 2020-02-03 NOTE — DIETITIAN INITIAL EVALUATION ADULT. - PHYSICAL APPEARANCE
BMI: 24.4. 2+ edema (L arm), 3+ edema (R arm). Alert & oriented per progress notes. Sleeping, unarousable at time of RD visit despite multiple attempts. Last BM 2/2. No chewing/swallowing difficulty reported. Skin: Ecchymosis & rash noted.

## 2020-02-03 NOTE — DISCHARGE NOTE NURSING/CASE MANAGEMENT/SOCIAL WORK - PATIENT PORTAL LINK FT
You can access the FollowMyHealth Patient Portal offered by [facility] by registering at the following website: [email or web address]. By joining [employer]’s FollowMyHealth portal, you will also be able to view your health information using other applications (apps) compatible with our system.

## 2020-02-04 NOTE — DISCHARGE NOTE PROVIDER - NSDCFUADDINST_GEN_ALL_CORE_FT
Follow up with your PMD in 1-2 weeks. Please discuss with your PMD your overall condition specifically surrounding your current situation.

## 2020-02-04 NOTE — PHYSICAL THERAPY INITIAL EVALUATION ADULT - IMPAIRMENTS FOUND, PT EVAL
ROM/aerobic capacity/endurance/ergonomics and body mechanics/integumentary integrity/joint integrity and mobility/poor safety awareness/gait, locomotion, and balance/gross motor/muscle strength

## 2020-02-04 NOTE — CHART NOTE - NSCHARTNOTEFT_GEN_A_CORE
<<<RESIDENT DISCHARGE NOTE>>>     LEFTY CARRASQUILLO  MRN-[id number]    VITAL SIGNS:  T(F): 97.4 (02-04-20 @ 05:17), Max: 98 (02-03-20 @ 14:00)  HR: 91 (02-04-20 @ 05:17)  BP: 105/65 (02-04-20 @ 05:17)  SpO2: 96% (02-04-20 @ 08:16)      PHYSICAL EXAMINATION:  GENERAL: NAD, well-developed, AAOx3  HEENT:  Atraumatic, Normocephalic. EOMI, PERRLA, conjunctiva and sclera clear, No JVD  PULMONARY: Clear to auscultation bilaterally; No wheeze  CARDIOVASCULAR: Regular rate and rhythm; No murmurs, rubs, or gallops  GASTROINTESTINAL: Soft, Nontender, Nondistended; Bowel sounds present  MUSCULOSKELETAL:  2+ Peripheral Pulses, 1+ edema  NEUROLOGY: non-focal  SKIN: No rashes or lesions:    TEST RESULTS:                        8.3    8.58  )-----------( 317      ( 04 Feb 2020 06:14 )             26.3       02-04    135  |  102  |  17  ----------------------------<  95  4.6   |  22  |  0.5<L>    Ca    7.4<L>      04 Feb 2020 06:14    TPro  4.3<L>  /  Alb  2.1<L>  /  TBili  <0.2  /  DBili  x   /  AST  37  /  ALT  42<H>  /  AlkPhos  188<H>  02-03      FINAL DISCHARGE INTERVIEW:  Resident(s) Present: (Name:_____Jose Ho________), RN Present: (Name:  ___________)    DISCHARGE MEDICATION RECONCILIATION  reviewed with Attending (Name:Ramon_________)    DISPOSITION:   [  ] Home,    [  ] Home with Visiting Nursing Services,   [    ]  SNF/ NH,    [   ] Acute Rehab (4A),   [   ] Other (Specify:_________)

## 2020-02-04 NOTE — PHYSICAL THERAPY INITIAL EVALUATION ADULT - LEVEL OF INDEPENDENCE: SIT/STAND, REHAB EVAL
dependent (less than 25% patients effort)/2 attempts performed. Pt required PT anterior for pt to be able to stand. Pt unable to stand greater than 15 seconds.

## 2020-02-04 NOTE — DISCHARGE NOTE PROVIDER - NSDCFUSCHEDAPPT_GEN_ALL_CORE_FT
LEFTY CARRASQUILLO ; 02/13/2020 ; [facility] LEFTY Arviz[facility] ; 03/12/2020 ; [facility] LEFTY Arvizu ; 03/13/2020 ; [facility] [address] [place]  LEFTY CARRASQUILLO ; 04/09/2020 ; [facility] LEFTY CARRASQUILLO ; 02/13/2020 ; [facility] LEFTY Arvizu ; 03/12/2020 ; [facility] LEFTY Arv[facility]u ; 03/13/2020 ; [facility] [address] [place]  LEFTY CARRASQUILLO ; 04/09/2020 ; [facility] LEFTY CARRASQUILLO ; 02/13/2020 ; [facility] LEFTY Arvizu ; 03/12/2020 ; [facility] LEFTY Arvizu ; 03/13/2020 ; NPP Rad [facility] [address] [place]  LEFTY CARRASQUILLO ; 04/09/2020 ; [facility] LEFTY CARRASQUILLO ; 02/13/2020 ; [facility] LEFTY Arvizu ; 03/12/2020 ; [facility] LEFTY Arvizu ; 03/13/2020 ; NPP Rad [facility] [address] [place]  LEFTY CARRASQUILLO ; 04/09/2020 ; [facility] LEFTY CARRASQUILLO ; 02/13/2020 ; [facility] LEFTY Arvizu ; 03/12/2020 ; [facility] LEFTY Arvizu ; 03/13/2020 ; NPP Rad [facility] [address] [place]  LEFTY CARRASQUILLO ; 04/09/2020 ; [facility]

## 2020-02-04 NOTE — DISCHARGE NOTE PROVIDER - HOSPITAL COURSE
64 year old F MS, DM type II, gastric cancer s/p resection and chemo now in remission who presented to hospital for upper extremity weakness, fatigue, decreased oral intake.  Patient recently admitted for UTI in December and was discharged to rehab.  Has now been home 2 weeks prior to admission with [facility].         In ED, hypotensive (80/40), tachy to 123. WBC 70548. CT chest showed R sided opacity. She was admitted to the ICU for septic shock secondary to pneumonia and then downgraded to the medical floor in 24 hours. She was started on cefepime.     Planned to be discharged on Levofloxacin

## 2020-02-04 NOTE — PHYSICAL THERAPY INITIAL EVALUATION ADULT - RANGE OF MOTION EXAMINATION, REHAB EVAL
B/L UEs 50% ROM compared to normal 2 to swelling in B/L UEs. RLE No AROM, PROM WFLs, LLE 50% ROM compared to normal

## 2020-02-04 NOTE — PHYSICAL THERAPY INITIAL EVALUATION ADULT - GENERAL OBSERVATIONS, REHAB EVAL
Pt seen [id number] for a total of 40 minutes. Pt encountered semifowlers in bed, no apparent distress, agreeable to physical therapy. +dtg present bedside, b/l ues swollen R>L.

## 2020-02-04 NOTE — PROGRESS NOTE ADULT - ATTENDING COMMENTS
Patient improved significantly and hemodynamically stable except weakness.   White count normalizes.   D/W Family.   Home care assessment and form completed.   Patient with MS, walks with walker and need assistance for ADL.   Car D/C Today. However family realizes it is difficult to take care of her at home and so plan to d/c to SNF.     D/W Daughter
Patient improved significantly and hemodynamically stable except weakness.   White count normalizes.   D/W Family.   Home care assessment and form completed.   Patient with MS, walks with walker and need assistance for ADL.   Anticipating d/c tomorrow

## 2020-02-04 NOTE — DISCHARGE NOTE PROVIDER - CARE PROVIDER_API CALL
Johnson Brasher ()  Internal Medicine  [address] [place] [address]  Phone: [phone]  Fax: [phone]  Follow Up Time:

## 2020-02-04 NOTE — PHYSICAL THERAPY INITIAL EVALUATION ADULT - ADDITIONAL COMMENTS
Pt reports she has a few steps to enter, no steps inside. Pt lives alone. She has rolling walker at home to use although she reports she cannot move her RLE.

## 2020-02-04 NOTE — PROGRESS NOTE ADULT - SUBJECTIVE AND OBJECTIVE BOX
LEFTY CARRASQUILLO 64y Female  MRN#: [id number]   CODE STATUS:__FULL______      SUBJECTIVE  Patient is a 64y old Female who presents with a chief complaint of Sepsis (2020 16:07)  Currently admitted to medicine with the primary diagnosis of Septic shock    Patient feeling much better. No SOB, fevers, cough.    OBJECTIVE  PAST MEDICAL & SURGICAL HISTORY  Multiple sclerosis  DM type 2 (diabetes mellitus, type 2)  Stomach cancer: in remission for 2 years.  Portacath in place  S/P partial gastrectomy  H/O total hysterectomy with bilateral salpingo-oophorectomy (BSO)  H/O partial thyroidectomy    ALLERGIES:  Zithromax (Hives)    MEDICATIONS:  STANDING MEDICATIONS  cefepime   IVPB 2000 milliGRAM(s) IV Intermittent every 8 hours  chlorhexidine 4% Liquid 1 Application(s) Topical <User Schedule>  dextrose 5%. 1000 milliLiter(s) IV Continuous <Continuous>  dextrose 50% Injectable 12.5 Gram(s) IV Push once  dextrose 50% Injectable 25 Gram(s) IV Push once  dextrose 50% Injectable 25 Gram(s) IV Push once  DULoxetine 60 milliGRAM(s) Oral daily  enoxaparin Injectable 40 milliGRAM(s) SubCutaneous daily  folic acid 1 milliGRAM(s) Oral daily  insulin glargine Injectable (LANTUS) 15 Unit(s) SubCutaneous at bedtime  insulin lispro (HumaLOG) corrective regimen sliding scale   SubCutaneous three times a day before meals  insulin lispro Injectable (HumaLOG) 5 Unit(s) SubCutaneous three times a day before meals  metoclopramide 10 milliGRAM(s) Oral three times a day before meals  ondansetron    Tablet 8 milliGRAM(s) Oral three times a day  pantoprazole    Tablet 40 milliGRAM(s) Oral before breakfast    PRN MEDICATIONS  dextrose 40% Gel 15 Gram(s) Oral once PRN  glucagon  Injectable 1 milliGRAM(s) IntraMuscular once PRN    Vital Signs Last 24 Hrs  T(C): 35.7 (2020 12:05), Max: 36.7 (2020 20:59)  T(F): 96.2 (2020 12:05), Max: 98 (2020 20:59)  HR: 87 (2020 12:05) (87 - 95)  BP: 102/55 (2020 12:05) (100/51 - 105/65)  BP(mean): --  RR: 18 (2020 12:05) (18 - 18)  SpO2: 96% (2020 08:16) (88% - 96%)    LABS:                          8.3    8.58  )-----------( 317      ( 2020 06:14 )             26.3                           8.4    14.78 )-----------( 302      ( 2020 04:30 )             25.6     02-02    134<L>  |  101  |  23<H>  ----------------------------<  173<H>  4.2   |  22  |  <0.5<L>    Ca    7.8<L>      2020 04:30  Mg     1.7     02-02    TPro  4.5<L>  /  Alb  2.2<L>  /  TBili  0.3  /  DBili  x   /  AST  40  /  ALT  51<H>  /  AlkPhos  217<H>  0202    PT/INR - ( 2020 04:30 )   PT: 14.20 sec;   INR: 1.23 ratio         PTT - ( 2020 04:30 )  PTT:31.7 sec  Urinalysis Basic - ( 2020 11:50 )    Color: Yellow / Appearance: Slightly Turbid / S.032 / pH: x  Gluc: x / Ketone: Trace  / Bili: Negative / Urobili: 3 mg/dL   Blood: x / Protein: 30 mg/dL / Nitrite: Negative   Leuk Esterase: Negative / RBC: 34 /HPF / WBC 4 /HPF   Sq Epi: x / Non Sq Epi: 6 /HPF / Bacteria: Many      ABG - ( 2020 12:27 )  pH, Arterial: 7.48  pH, Blood: x     /  pCO2: 32    /  pO2: 86    / HCO3: 23    / Base Excess: 0.1   /  SaO2: 96                    Culture - Urine (collected 2020 11:50)  Source: .Urine Clean Catch (Midstream)  Final Report (2020 14:12):    No growth    Culture - Blood (collected 2020 11:50)  Source: .Blood Blood-Peripheral  Gram Stain (2020 10:05):    Growth in aerobic bottle: Gram Positive Cocci in Clusters  Preliminary Report (2020 10:05):    Growth in aerobic bottle: Gram Positive Cocci in Clusters    ***Blood Panel PCR results on this specimen are available    approximately 3 hours after the Gram stain result.***    Gram stain, PCR, and/or culture results may not always    correspond due to difference in methodologies.    ************************************************************    This PCR assay was performed using [email or web address].    The following targets are tested for: Enterococcus,    vancomycin resistant enterococci, Listeria monocytogenes,    coagulase negative staphylococci, S. aureus,    methicillin resistant S. aureus, Streptococcus agalactiae    (Group B), S. pneumoniae, S. pyogenes (Group A),    Acinetobacter baumannii, Enterobacter cloacae, E. coli,    Klebsiella oxytoca, K. pneumoniae, Proteus sp.,    Serratia marcescens, Haemophilus influenzae,    Neisseria meningitidis, Pseudomonas aeruginosa, Candida    albicans, C. glabrata, C krusei, C parapsilosis,    C. tropicalis and the KPC resistance gene.    Culture - Blood (collected 2020 11:50)  Source: .Blood Blood-Peripheral  Preliminary Report (2020 19:01):    No growth to date.      CARDIAC MARKERS ( 2020 11:50 )  x     / <0.01 ng/mL / x     / x     / x          RADIOLOGY:      PHYSICAL EXAM:    GENERAL: NAD, well-developed, AAOx3  HEENT:  Atraumatic, Normocephalic. EOMI, PERRLA, conjunctiva and sclera clear, No JVD  PULMONARY: Clear to auscultation bilaterally; No wheeze  CARDIOVASCULAR: Regular rate and rhythm; No murmurs, rubs, or gallops  GASTROINTESTINAL: Soft, Nontender, Nondistended; Bowel sounds present  MUSCULOSKELETAL:  2+ Peripheral Pulses, 1+ edema  NEUROLOGY: non-focal  SKIN: No rashes or lesions      ADMISSION SUMMARY  Patient is a 64y old Female who presents with a chief complaint of Sepsis (2020 16:07)  Currently admitted to medicine with the primary diagnosis of Septic shock      ASSESSMENT & PLAN    64 year old F MS, DM type II, gastric cancer s/p resection and chemo now in remission who presented to hospital for upper extremity weakness, fatigue, decreased oral intake.  Patient recently admitted for UTI in December and was disharged to rehab.  Has now been home 2 weeks prior to admission with HHA.     In ED, hypotensive (80/40), tachy to 123. WBC 55431. CT chest showed R sided opacity. S/p IV fluid 2.5 L bolus. BP unresponsive- central line placed.  Started on iv abx- azithro and rocephin but developed rxn to azithromycin and abx changed to vanc and cefepime.  Admitted to ICU and downgraded last night ()    #Sepsis 2/ possible GNR Pneumonia - RESOLVED   - RLL opacity on CXR  - UTI  unremarkable  - WBC 14 from 21, now afebrile - NORMAL  NOW   - On cefepime, d/c vancomycin  - Will switch to PO levaquin on d/c  - BP MAP now in the 70s  - D/bairon LR as patient is eating and drinking  - MRSA neg, flu neg, rsv neg    #MS  - No longer receiving treatment  - Last IVIG     #HX of stomach cancer s/p surgery s/p chemo  - In remission      DVT: Lovenox  GI: Pantoprazole  Diet: Consistent carbs  Activity: Increase as tolerated  Code: full.

## 2020-02-04 NOTE — PHYSICAL THERAPY INITIAL EVALUATION ADULT - PERTINENT HX OF CURRENT PROBLEM, REHAB EVAL
Pt adm for sepsis/septic shock. Pt was in rehab last fall, has been home for 2 weeks and presented to [facility] with weakness, fatigue and decreased PO intake.

## 2020-02-04 NOTE — DISCHARGE NOTE PROVIDER - NSDCMRMEDTOKEN_GEN_ALL_CORE_FT
baclofen 20 mg oral tablet: orally 2 times a day  dalfampridine 10 mg oral tablet, extended release: 1 tab(s) orally every 12 hours  DULoxetine 60 mg oral delayed release capsule: 1 cap(s) orally once a day  folic acid 1 mg oral tablet: 1 tab(s) orally once a day  furosemide 20 mg oral tablet: 1 tab(s) orally every 48 hours  glimepiride 4 mg oral tablet: 1 tab(s) orally once a day  pantoprazole 40 mg oral delayed release tablet: 1 tab(s) orally 2 times a day  Reglan 10 mg oral tablet: 1 tab(s) orally 3 times a day (before meals)  rOPINIRole 0.5 mg oral tablet: 1 tab(s) orally 3 times a day  Venofer 20 mg/mL intravenous solution: 10 milliliter(s) intravenous every 7 days  Vitamin D2 50,000 intl units (1.25 mg) oral capsule: 1 cap(s) orally once a week  Zofran 8 mg oral tablet: 1 tab(s) orally 3 times a day baclofen 20 mg oral tablet: orally 2 times a day  dalfampridine 10 mg oral tablet, extended release: 1 tab(s) orally every 12 hours  DULoxetine 60 mg oral delayed release capsule: 1 cap(s) orally once a day  folic acid 1 mg oral tablet: 1 tab(s) orally once a day  furosemide 20 mg oral tablet: 1 tab(s) orally every 48 hours  glimepiride 4 mg oral tablet: 1 tab(s) orally once a day  Levaquin 500 mg oral tablet: 1 tab(s) orally once a day for 4 more days  pantoprazole 40 mg oral delayed release tablet: 1 tab(s) orally 2 times a day  Reglan 10 mg oral tablet: 1 tab(s) orally 3 times a day (before meals)  rOPINIRole 0.5 mg oral tablet: 1 tab(s) orally 3 times a day  Venofer 20 mg/mL intravenous solution: 10 milliliter(s) intravenous every 7 days  Vitamin D2 50,000 intl units (1.25 mg) oral capsule: 1 cap(s) orally once a week  Zofran 8 mg oral tablet: 1 tab(s) orally 3 times a day

## 2020-02-04 NOTE — DISCHARGE NOTE PROVIDER - NSDCCPCAREPLAN_GEN_ALL_CORE_FT
PRINCIPAL DISCHARGE DIAGNOSIS  Diagnosis: Pneumonia  Assessment and Plan of Treatment: You were admitted with pneumonia and you required ICU stay for 24 hours for blood pressure control and antibiotics. You were downgraded to the medical floor and you improved.   Please take your medications as directed. Don’t skip doses. Follow up with your primary care physician within 7 days. Continue taking your antibiotics as directed until they are all gone—even if you start to feel better. This will prevent the pneumonia from  Coughing up mucus is normal. Don’t use medicines to suppress your cough unless your cough is dry, painful, or interferes with your sleep. Get plenty of rest until your fever, shortness of breath, and chest pain go away. Plan to get a flu shot every year. Ask your primary care doctor about pneumonia vaccines.  Seek immediate medical attention if you experience chest pain, trouble breathing, blue lips or fingernails, fever of 100.4°F  (38°C) or higher, yellow, green, bloody, or smelly sputum, more than normal mucus production, vomiting or diarrhea.      SECONDARY DISCHARGE DIAGNOSES  Diagnosis: Septic shock  Assessment and Plan of Treatment:

## 2020-02-05 NOTE — PROGRESS NOTE ADULT - SUBJECTIVE AND OBJECTIVE BOX
LEFTY CARRASQUILLO 64y Female  MRN#: [id number]     SUBJECTIVE  Patient is a 64y old Female who presents with a chief complaint of Sepsis (04 Feb 2020 14:54)  Currently admitted to medicine with the primary diagnosis of Pneumonia  Today is hospital day 4d, and this morning she is well. She has no complaints.     OBJECTIVE  PAST MEDICAL & SURGICAL HISTORY  Multiple sclerosis  DM type 2 (diabetes mellitus, type 2)  Stomach cancer: in remission for 2 years.  Portacath in place  S/P partial gastrectomy  H/O total hysterectomy with bilateral salpingo-oophorectomy (BSO)  H/O partial thyroidectomy    ALLERGIES:  Zithromax (Hives)    MEDICATIONS:  STANDING MEDICATIONS  cefepime   IVPB 2000 milliGRAM(s) IV Intermittent every 8 hours  chlorhexidine 4% Liquid 1 Application(s) Topical <User Schedule>  dextrose 5%. 1000 milliLiter(s) IV Continuous <Continuous>  dextrose 50% Injectable 12.5 Gram(s) IV Push once  dextrose 50% Injectable 25 Gram(s) IV Push once  dextrose 50% Injectable 25 Gram(s) IV Push once  DULoxetine 60 milliGRAM(s) Oral daily  enoxaparin Injectable 40 milliGRAM(s) SubCutaneous daily  folic acid 1 milliGRAM(s) Oral daily  insulin glargine Injectable (LANTUS) 15 Unit(s) SubCutaneous at bedtime  insulin lispro (HumaLOG) corrective regimen sliding scale   SubCutaneous three times a day before meals  metoclopramide 10 milliGRAM(s) Oral three times a day before meals  ondansetron    Tablet 8 milliGRAM(s) Oral three times a day  pantoprazole    Tablet 40 milliGRAM(s) Oral before breakfast    PRN MEDICATIONS  dextrose 40% Gel 15 Gram(s) Oral once PRN  glucagon  Injectable 1 milliGRAM(s) IntraMuscular once PRN      VITAL SIGNS: Last 24 Hours  T(C): 36.3 (05 Feb 2020 04:42), Max: 37.2 (04 Feb 2020 21:00)  T(F): 97.3 (05 Feb 2020 04:42), Max: 98.9 (04 Feb 2020 21:00)  HR: 88 (05 Feb 2020 04:42) (87 - 101)  BP: 109/61 (05 Feb 2020 04:42) (102/55 - 109/61)  BP(mean): --  RR: 18 (05 Feb 2020 04:42) (18 - 18)  SpO2: 93% (04 Feb 2020 19:50) (93% - 96%)    LABS:                        8.3    8.58  )-----------( 317      ( 04 Feb 2020 06:14 )             26.3     02-04    135  |  102  |  17  ----------------------------<  95  4.6   |  22  |  0.5<L>    Ca    7.4<L>      04 Feb 2020 06:14    TPro  4.3<L>  /  Alb  2.1<L>  /  TBili  <0.2  /  DBili  x   /  AST  37  /  ALT  42<H>  /  AlkPhos  188<H>  02-03    PHYSICAL EXAM:  GENERAL: NAD, well-developed, AAOx3  HEENT:  Atraumatic, Normocephalic. EOMI, PERRLA, conjunctiva and sclera clear, No JVD  PULMONARY: Clear to auscultation bilaterally; No wheeze  CARDIOVASCULAR: Regular rate and rhythm; No murmurs, rubs, or gallops  GASTROINTESTINAL: Soft, Nontender, Nondistended; Bowel sounds present  MUSCULOSKELETAL:  2+ Peripheral Pulses, 1+ edema  NEUROLOGY: non-focal  SKIN: No rashes or lesions      ASSESSMENT & PLAN    64 year old F MS, DM type II, gastric cancer s/p resection and chemo now in remission who presented to hospital for upper extremity weakness, fatigue, decreased oral intake.    Patient was admitted for septic shock secondary to pneumonia. She was in the ICU for 24 hours and downgraded to medical floor. Hospital stay has been uncomplicated. Pending authorization to nursing home.     #Sepsis 2/2 possible GNR Pneumonia  - RLL opacity on CXR  - On cefepime since 2/1/2020  - Will switch to PO levaquin on d/c    #Multiple sclerosis  - No longer receiving treatment  - Last IVIG 11/28  - patient is mostly bedridden    #HX of stomach cancer s/p surgery s/p chemo  - In remission    DVT: Lovenox  GI: Pantoprazole  Diet: Consistent carbs  Activity: Increase as tolerated  Code: full. LEFTY CARRASQUILLO 64y Female  MRN#: [id number]     SUBJECTIVE  Patient is a 64y old Female who presents with a chief complaint of Sepsis (04 Feb 2020 14:54)  Currently admitted to medicine with the primary diagnosis of Pneumonia  Today is hospital day 4d, and this morning she is well. She has no complaints.     OBJECTIVE  PAST MEDICAL & SURGICAL HISTORY  Multiple sclerosis  DM type 2 (diabetes mellitus, type 2)  Stomach cancer: in remission for 2 years.  Portacath in place  S/P partial gastrectomy  H/O total hysterectomy with bilateral salpingo-oophorectomy (BSO)  H/O partial thyroidectomy    ALLERGIES:  Zithromax (Hives)    MEDICATIONS:  STANDING MEDICATIONS  cefepime   IVPB 2000 milliGRAM(s) IV Intermittent every 8 hours  chlorhexidine 4% Liquid 1 Application(s) Topical <User Schedule>  dextrose 5%. 1000 milliLiter(s) IV Continuous <Continuous>  dextrose 50% Injectable 12.5 Gram(s) IV Push once  dextrose 50% Injectable 25 Gram(s) IV Push once  dextrose 50% Injectable 25 Gram(s) IV Push once  DULoxetine 60 milliGRAM(s) Oral daily  enoxaparin Injectable 40 milliGRAM(s) SubCutaneous daily  folic acid 1 milliGRAM(s) Oral daily  insulin glargine Injectable (LANTUS) 15 Unit(s) SubCutaneous at bedtime  insulin lispro (HumaLOG) corrective regimen sliding scale   SubCutaneous three times a day before meals  metoclopramide 10 milliGRAM(s) Oral three times a day before meals  ondansetron    Tablet 8 milliGRAM(s) Oral three times a day  pantoprazole    Tablet 40 milliGRAM(s) Oral before breakfast    PRN MEDICATIONS  dextrose 40% Gel 15 Gram(s) Oral once PRN  glucagon  Injectable 1 milliGRAM(s) IntraMuscular once PRN      VITAL SIGNS: Last 24 Hours  T(C): 36.3 (05 Feb 2020 04:42), Max: 37.2 (04 Feb 2020 21:00)  T(F): 97.3 (05 Feb 2020 04:42), Max: 98.9 (04 Feb 2020 21:00)  HR: 88 (05 Feb 2020 04:42) (87 - 101)  BP: 109/61 (05 Feb 2020 04:42) (102/55 - 109/61)  BP(mean): --  RR: 18 (05 Feb 2020 04:42) (18 - 18)  SpO2: 93% (04 Feb 2020 19:50) (93% - 96%)    LABS:                        8.6    9.46  )-----------( 287      ( 05 Feb 2020 06:41 )             26.2   02-05    133<L>  |  103  |  18  ----------------------------<  139<H>  4.8   |  22  |  0.5<L>    Ca    7.4<L>      05 Feb 2020 06:41    TPro  4.3<L>  /  Alb  2.1<L>  /  TBili  <0.2  /  DBili  x   /  AST  37  /  ALT  42<H>  /  AlkPhos  188<H>  02-03    PHYSICAL EXAM:  GENERAL: NAD, well-developed, AAOx3  HEENT:  Atraumatic, Normocephalic. EOMI, PERRLA, conjunctiva and sclera clear, No JVD  PULMONARY: Clear to auscultation bilaterally; No wheeze  CARDIOVASCULAR: Regular rate and rhythm; No murmurs, rubs, or gallops  GASTROINTESTINAL: Soft, Nontender, Nondistended; Bowel sounds present  MUSCULOSKELETAL:  2+ Peripheral Pulses, 1+ edema  NEUROLOGY: non-focal  SKIN: No rashes or lesions      ASSESSMENT & PLAN    64 year old F MS, DM type II, gastric cancer s/p resection and chemo now in remission who presented to hospital for upper extremity weakness, fatigue, decreased oral intake.    Patient was admitted for septic shock secondary to pneumonia. She was in the ICU for 24 hours and downgraded to medical floor. Hospital stay has been uncomplicated. Pending authorization to nursing home.     #Sepsis 2/2 possible GNR Pneumonia  - RLL opacity on CXR  - On cefepime since 2/1/2020  - Will switch to PO levaquin on d/c for total of 10 days     #Multiple sclerosis  - No longer receiving treatment  - Last IVIG 11/28  - patient is mostly bedridden    #HX of stomach cancer s/p surgery s/p chemo  - In remission    DVT: Lovenox  GI: Pantoprazole  Diet: Consistent carbs  Activity: Increase as tolerated  Code: full.

## 2020-02-05 NOTE — PROGRESS NOTE ADULT - ASSESSMENT
64 year old F MS, DM type II, gastric cancer s/p resection and chemo now in remission who presented to hospital for upper extremity weakness, fatigue, decreased oral intake.    Patient was admitted for septic shock secondary to pneumonia. She was in the ICU for 24 hours and downgraded to medical floor. Hospital stay has been uncomplicated. Pending authorization to nursing home.     #Sepsis 2/2 possible GNR Pneumonia  - RLL opacity on CXR  - On cefepime since 2/1/2020  - Will switch to PO levaquin on d/c for total of 10 days     #Multiple sclerosis  - No longer receiving treatment  - Last IVIG 11/28  - patient is mostly bedridden    #HX of stomach cancer s/p surgery s/p chemo  - In remission    # UE edema >>> possibly due to hypoalbuminemia.      2D Echo, Duplex >>> WNL.     DVT: Lovenox  GI: Pantoprazole  Diet: Consistent carbs, Would supplement with protein.   Activity: Increase as tolerated  Code: full.    #Progress Note Handoff  Pending (specify):    Family discussion: with the daughter / patient at bedside.   Disposition: SNF__

## 2020-02-05 NOTE — PROGRESS NOTE ADULT - SUBJECTIVE AND OBJECTIVE BOX
LEFTY CARRASQUILLO  64y  Female      Patient is a 64y old  Female who presents with a chief complaint of Sepsis (05 Feb 2020 07:58)      INTERVAL HPI/OVERNIGHT EVENTS:      ******************************* REVIEW OF SYSTEMS:**********************************************    resting in bed.   All other review of systems negative    *********************** VITALS ******************************************    T(F): 97.1 (02-05-20 @ 12:12)  HR: 87 (02-05-20 @ 12:12) (87 - 101)  BP: 101/57 (02-05-20 @ 12:12) (101/57 - 109/61)  RR: 17 (02-05-20 @ 12:12) (17 - 18)  SpO2: 90% (02-05-20 @ 08:03) (90% - 93%)    02-04-20 @ 07:01  -  02-05-20 @ 07:00  --------------------------------------------------------  IN: 0 mL / OUT: 540 mL / NET: -540 mL            02-04-20 @ 07:01  -  02-05-20 @ 07:00  --------------------------------------------------------  IN: 0 mL / OUT: 540 mL / NET: -540 mL        ******************************** PHYSICAL EXAM:**************************************************  GENERAL: NAD    PSYCH: no agitation, baseline mentation  HEENT:     NERVOUS SYSTEM:  Alert & Oriented X3,   PULMONARY: GERALD, CTA    CARDIOVASCULAR: S1S2 RRR    GI: Soft, NT, ND; BS present.    EXTREMITIES:  UE edema B/L  LYMPH: No lymphadenopathy noted    SKIN: No rashes or lesions    ******************************************************************************************        **************************** LABS *******************************************************                          8.6    9.46  )-----------( 287      ( 05 Feb 2020 06:41 )             26.2     02-05    133<L>  |  103  |  18  ----------------------------<  139<H>  4.8   |  22  |  0.5<L>    Ca    7.4<L>      05 Feb 2020 06:41            Lactate Trend        CAPILLARY BLOOD GLUCOSE      POCT Blood Glucose.: 178 mg/dL (05 Feb 2020 11:41)          **************************Active Medications *******************************************  Zithromax (Hives)      cefepime   IVPB 2000 milliGRAM(s) IV Intermittent every 8 hours  chlorhexidine 4% Liquid 1 Application(s) Topical <User Schedule>  dextrose 40% Gel 15 Gram(s) Oral once PRN  dextrose 5%. 1000 milliLiter(s) IV Continuous <Continuous>  dextrose 50% Injectable 12.5 Gram(s) IV Push once  dextrose 50% Injectable 25 Gram(s) IV Push once  dextrose 50% Injectable 25 Gram(s) IV Push once  DULoxetine 60 milliGRAM(s) Oral daily  enoxaparin Injectable 40 milliGRAM(s) SubCutaneous daily  folic acid 1 milliGRAM(s) Oral daily  glucagon  Injectable 1 milliGRAM(s) IntraMuscular once PRN  insulin glargine Injectable (LANTUS) 15 Unit(s) SubCutaneous at bedtime  insulin lispro (HumaLOG) corrective regimen sliding scale   SubCutaneous three times a day before meals  metoclopramide 10 milliGRAM(s) Oral three times a day before meals  ondansetron    Tablet 8 milliGRAM(s) Oral three times a day  pantoprazole    Tablet 40 milliGRAM(s) Oral before breakfast      ***************************************************  RADIOLOGY & ADDITIONAL TESTS:    Imaging Personally Reviewed:  [ ] YES  [ ] NO    HEALTH ISSUES - PROBLEM Dx:

## 2020-02-06 PROBLEM — C16.9 MALIGNANT NEOPLASM OF STOMACH, UNSPECIFIED: Chronic | Status: ACTIVE | Noted: 2018-05-09

## 2020-03-26 NOTE — ED ADULT NURSE NOTE - CHIEF COMPLAINT QUOTE
patient's daughter called ems for altered mental status and lethargy since waking up this morning. daughter phone number given to ems: [phone]. unknown name

## 2020-03-26 NOTE — H&P ADULT - HISTORY OF PRESENT ILLNESS
66 yo female  PMHx: MS (Not on active treatment), T2DM, Gastric cancer s/p resection and chemotherapy, currently in remission (Dr Toussaint)  CC: Confusion        ER Course: Vitals on admission were BP 97/61, HR 85, afebrile, SpO2 94% on 4L O2 via NC, RR 22. Pt received 1L LR bolus and 1 dose of MgSO4. Labs were significant were Hb of 6.6. EKG showed T wave inversions in lateral leads with prolonged QTc 488. 66 yo female  PMHx: MS (Not on active treatment), T2DM with diabetic gastroparesis, Gastric cancer s/p resection and chemotherapy, currently in remission (Dr Toussaint), esophageal ulcer s/p EGD last yr on PPI  CC: Confusion  History obtained from daughterChani over the phone.   Baseline Mental status: Very coherent, understands questions and takes part in conversation, no diagnosed dementia  Baseline ambulation: Pt has been mostly bedridden since december of last year. prior to that, used to walk with walker.   Living condition: Has 12 hour HHA  As per daughter, pt has been confused since the last 2 days. Pt is alert, but has been asking questions about the distant past as if it happened now. Pt has been making up scenarios, and does not answer appropriately at times. Pt does have family history of dementia in her mother, and has been very withdrawn and sad since her mother passed away two weeks ago. Otherwise, pt did not have any headaches, LOC, palpitations, falls, focal motor weakness, sensory changes.   Pt denied any chest pain, abdominal pain, nausea or diarrhea. Pt does have 1-2 episodes of non bloody non bilious vomiting after eating heavy and was taken off Reglan recently by dr Velazquez. Otherwise, denied any blood in stool or black stools.   ER Course: Vitals on admission were BP 97/61, HR 85, afebrile, SpO2 94% on 4L O2 via NC, RR 22. Pt received 1L LR bolus and 1 dose of MgSO4. Labs were significant were Hb of 6.6. EKG showed T wave inversions in lateral leads with prolonged QTc 488. 66 yo female  PMHx: MS (Not on active treatment), T2DM with diabetic gastroparesis, Gastric cancer s/p resection and chemotherapy, currently in remission (Dr Toussaint), esophageal ulcer s/p EGD last yr on PPI  CC: Confusion  History obtained from daughterChani over the phone.   Baseline Mental status: Very coherent, understands questions and takes part in conversation, no diagnosed dementia  Baseline ambulation: Pt has been mostly bedridden since december of last year. prior to that, used to walk with walker.   Living condition: Has 12 hour HHA  As per daughter, pt has been confused since the last 2 days. Pt is alert, but has been asking questions about the distant past as if it happened now. Pt has been making up scenarios, and does not answer appropriately at times. Pt does have family history of dementia in her mother, and has been very withdrawn and sad since her mother passed away two weeks ago. Otherwise, pt did not have any headaches, LOC, palpitations, falls, focal motor weakness, sensory changes.   Pt denied any chest pain, abdominal pain, nausea or diarrhea. Pt does have 1-2 episodes of non bloody non bilious vomiting after eating heavy and was taken off Reglan recently by dr Velazquez. Otherwise, denied any blood in stool or black stools.   ER Course: Vitals on admission were BP 97/61, HR 85, afebrile, SpO2 94% on 4L O2 via NC, RR 22. Pt received 1L LR bolus and 1 dose of MgSO4. Labs were significant were Hb of 6.6. EKG showed T wave inversions in lateral leads with prolonged QTc 488.     Attd: pt is normally awake, alert, oriented and can freely talk. She has been bed-bound since Dec 2019 2/2 MS.  She reports that her stomach cancer is still active, but is just being expectantly watched w/ PET scans by H/O; pt came in w/ MS changes, which appears to be from low hgb and appears to be better after bld tx; pt did report new swelling of rt arm and her portacath in right chest is not really getting a good blood return; pt does not move legs well at all; pt can eat and drink (though arm strength is limited; pt says her last EGD was in 2019 (reportedly showed esoph ulcer); she had 2 episodes of vomiting as well, but this has stopped; she has no abd pain;

## 2020-03-26 NOTE — H&P ADULT - NSHPREVIEWOFSYSTEMS_GEN_ALL_CORE
ROS: As per HPI    Vital Signs Last 24 Hrs  T(C): 35.6 (26 Mar 2020 14:37), Max: 36.6 (26 Mar 2020 09:13)  T(F): 96.1 (26 Mar 2020 14:37), Max: 97.8 (26 Mar 2020 09:13)  HR: 80 (26 Mar 2020 14:37) (80 - 95)  BP: 97/53 (26 Mar 2020 14:37) (97/53 - 99/62)  RR: 20 (26 Mar 2020 14:37) (16 - 22)  SpO2: 99% (26 Mar 2020 14:00) (94% - 99%)

## 2020-03-26 NOTE — H&P ADULT - NSHPLABSRESULTS_GEN_ALL_CORE
6.6    14.80 )-----------( 348      ( 26 Mar 2020 12:20 )             20.8       03-26    133<L>  |  99  |  22<H>  ----------------------------<  119<H>  3.9   |  22  |  0.5<L>    Ca    7.7<L>      26 Mar 2020 12:20  Mg     1.7     03-26    TPro  4.7<L>  /  Alb  2.2<L>  /  TBili  0.3  /  DBili  x   /  AST  24  /  ALT  23  /  AlkPhos  202<H>  03-26    Blood Gas Profile - Venous (12.14.19 @ 09:45)    pH, Venous: 7.41    pCO2, Venous: 43 mmHg    pO2, Venous: 23 mmHg    HCO3, Venous: 28 mmoL/L    Base Excess, Venous: 2.6 mmoL/L    Oxygen Saturation, Venous: 35 %    Iron with Total Binding Capacity (03.26.20 @ 14:05)    Iron - Total Binding Capacity.: 228 ug/dL    % Saturation, Iron: 6 %    Iron Total, Serum: 14 ug/dL    Unsaturated Iron Binding Capacity: 214: Hemolyzed. Interpret with caution ug/dL    CARDIAC MARKERS ( 26 Mar 2020 12:20 )  x     / <0.01 ng/mL / x     / x     / x        Serum Pro-Brain Natriuretic Peptide: 590 pg/mL (03.26.20 @ 12:20)    < from: 12 Lead ECG (03.26.20 @ 11:03) >  Ventricular Rate 86 BPM  Atrial Rate 86 BPM  P-R Interval 174 ms  QRS Duration 90 ms  Q-T Interval 402 ms  QTC Calculation(Bezet) 481 ms  P Axis 56 degrees  R Axis 21 degrees  T Axis 70 degrees    Diagnosis Line Normal sinus rhythm  Low voltage QRS  ST & T wave abnormality, consider anterior ischemia  Prolonged QT    < from: CT Head No Cont (03.26.20 @ 10:57) >    IMPRESSION:   1.  No evidence of acute intracranial pathology. Stable exam since 7/24/2016.  2.  Stable patchy white matter hypoattenuation in this patient with known clinical history of multiple sclerosis.      < from: Xray Chest 1 View- PORTABLE-Urgent (03.26.20 @ 11:37) >    Impression:    Chronic right basilar opacity/effusion.  Ill-defined left sided interstitial opacities.

## 2020-03-26 NOTE — ED PROVIDER NOTE - CLINICAL SUMMARY MEDICAL DECISION MAKING FREE TEXT BOX
64 yo F presented for AMS. Patient was found to be anemia and blood was ordered. no abnormality on CT head.   Patient admitted for further evaluation.

## 2020-03-26 NOTE — ED ADULT TRIAGE NOTE - CHIEF COMPLAINT QUOTE
patient's daughter called ems for altered mental status and lethargy patient's daughter called ems for altered mental status and lethargy since waking up this morning. daughter phone number given to ems: [phone]. unknown name

## 2020-03-26 NOTE — ED PROVIDER NOTE - PROGRESS NOTE DETAILS
labs sent before pt received blood.   no brbpr, no melena. NEURO: Alert, oriented, grossly unremarkable.  CN 2-12 intact. normal gait. normal romberg's.  sensory grossly intact to face, upper and lower extremity. extremities at baseline.    PSYCH: Cooperative, appropriate.

## 2020-03-26 NOTE — H&P ADULT - ASSESSMENT
64 yo female with PMHX of MS (Not on active treatment), T2DM with diabetic gastroparesis, Gastric cancer s/p resection and chemotherapy, currently in remission (Dr Toussaint), esophageal ulcer s/p EGD last yr on PPI, presented with confusion.     #Acute onset altered mental status  -multifactorial: Could be due to acute on chronic anemia, with possible dehydration (lives only, pt improved with gatorade/water as per daughter) vs MS relapse vs undiagnosed dementia (very acute for dementia though)  -CTH: Stable MS lesions; no acute intracranial pathology  -Check TSH, Ammonia, VitB12; f/u ferritin  -Treat underlying anemia  -Neurology eval for MS (pt was supposed to follow up outpt)  -Check REEG    #Acute on chronic normocytic anemia  -With hx of esophageal ulcers (As per daughter) with EGD last year; Likely SONIA  -As per ER, pt to receive 2U PRBC  -No gross source of GIB; no melena  -GI eval for Dr Velazquez- likely outpt EGD/colonoscopy  -Will start on venofer ; c/w folic acid supplementation  -PPI BID for now  -f/u ferritin    #T wave inversions (V1-V4) with prolonged QTc  -Likely due to acute anemia; No active chest pain  -1st trop neg; trend for 2 more sets  -repeat EKG in the AM  -Hold QTc prolonging meds (pt was on levaquin, zofran and reglan)  -keep Mg>2 and K>4    #T2DM with diabetic gastroparesis  -Hold home glimepride  -Start insulin basal bolus regimen. Will start sliding scale for now. Please document weight for insulin dosing  -Monitor FS AC/HS  -Will hold zofran for now given prolonged QTc    #Hx of gastric ca s/p resection -in remission  #Hx of fibroids-s/p MARY-BSO (in her 30s)  #Hx of thyroid polyps s/p partial thyroidectomy -stable     #DVT ppx: SCDs for now  #GI ppx: PPI  #Diet: Antireflux diabetic diet -small portions  #Activity: Bedridden; may be OOB to chair  #FULL CODE  #Dispo: Medical floor. 66 yo female with PMHX of MS (Not on active treatment), T2DM with diabetic gastroparesis, Gastric cancer s/p resection and chemotherapy, currently in remission (Dr Toussaint), esophageal ulcer s/p EGD last yr on PPI, presented with confusion.     #Acute onset altered mental status  -multifactorial: Could be due to acute on chronic anemia, with possible dehydration (lives only, pt improved with gatorade/water as per daughter) vs MS relapse vs undiagnosed dementia (very acute for dementia though)  -CTH: Stable MS lesions; no acute intracranial pathology  -Check TSH, Ammonia, VitB12; f/u ferritin  -Treat underlying anemia  -Neurology eval for MS (pt was supposed to follow up outpt)  -Check REEG    #Acute on chronic normocytic anemia  -With hx of esophageal ulcers (As per daughter) with EGD last year; Likely SONIA  -As per ER, pt to receive 2U PRBC  -No gross source of GIB; no melena  -GI eval for Dr Velazquez- likely outpt EGD/colonoscopy  -Will start on venofer ; c/w folic acid supplementation  -PPI BID for now  -f/u ferritin    #T wave inversions (V1-V4) with prolonged QTc  #Hypomagnesemia  -Likely due to acute anemia; No active chest pain  -1st trop neg; trend for 2 more sets  -repeat EKG in the AM  -Hold QTc prolonging meds (pt was on levaquin, zofran and reglan)  -keep Mg>2 and K>4    #T2DM with diabetic gastroparesis  -Hold home glimepride  -Start insulin basal bolus regimen. Will start sliding scale for now. Please document weight for insulin dosing  -Monitor FS AC/HS  -Will hold zofran for now given prolonged QTc    #Stage 2 pressure ulcer -barrier cream; wound care as per RN    #Hx of gastric ca s/p resection -in remission  #Hx of fibroids-s/p MARY-BSO (in her 30s)  #Hx of thyroid polyps s/p partial thyroidectomy -stable     #DVT ppx: SCDs for now  #GI ppx: PPI  #Diet: Antireflux diabetic diet -small portions  #Activity: Bedridden; may be OOB to chair  #FULL CODE  #Dispo: Medical floor. 64 yo female with PMHX of MS (Not on active treatment), T2DM with diabetic gastroparesis, Gastric cancer s/p resection and chemotherapy, currently in remission (Dr Toussaint), esophageal ulcer s/p EGD last yr on PPI, presented with confusion.     # AMS 2/2 metab enceph prob 2/2 signif, symptomatic anemia - seems better now  CTH: Stable MS lesions; no acute intracranial pathology  f/u TSH, Ammonia  no need for neuro eval for now, provide MS stays at baseline  REEG seems unnecessary, but it was already performed - f/u results (if there is epileptiform activity, then will ask neuro to see)    # Acute on chronic normocytic anemia  hx of esophageal ulcers with EGD last year  hx of active gastric cancer  s/p 2U PRBC  No gross GIB; no melena  GI eval for Dr Velazquez  iron studies c/w SONIA - give venofer 200mg IV q48 x 5 doses (or until d/c)  will use oral iron upon d/c  c/w folic acid supplementation  check B12 and Fol  PPI BID for now    # T wave inversions (V1-V4)  QTc of 481 is not a concern  ; trop x1 neg -> send 1 more troponin (if abnl, then tele and cardio eval)  ECG is different from Feb 2020 and likely related to low hgb  once hgb > 8, rpt ECG to see if back to baseline  < from: Transthoracic Echocardiogram (02.02.20 @ 10:58) >  Summary:   1. Normal global left ventricular systolic function.   2. LV Ejection Fraction by Palacio's Method with a biplane EF of 70 %.   3. Mild asymmetric left ventricular hypertrophy involving the septal wall.   4. Mild mitral valve regurgitation.   5. Mild thickening of the anterior and posterior mitral valve leaflets.  < end of copied text >  if ECG does not go back to baseline, then check new echo -> if nl, then fine; if EF is abnl or bad valve dz, then cardio eval  no need for tele  no asa or BB  consider lipid panel    # Hypomagnesemia  can replete w/ Mg Ox 400mg po q24  recheck lvl in 2-3 days    # T2 DM with diabetic gastroparesis  CAPILLARY BLOOD GLUCOSE  POCT Blood Glucose.: 77 (03-27-20 @ 11:48)  POCT Blood Glucose.: 118 (03-26-20 @ 21:10)  Hold home glimepiride  FS qac/hs  diabetic diet  if FS > 180, will start insulin    # incr AP is likely 2/2 cholestasis from DM  check GGT  follow LFT 1-2x/wk    # Stage 2 pressure ulcer - present on admission  barrier cream w/ RN    # Hx of fibroids  s/p MARY-BSO (in her 30s)    # Hx of thyroid polyps  s/p partial thyroidectomy     # DVT ppx: SCDs for now    # GI ppx: PPI    # Activity: Bedridden; may be OOB to chair w/ berry    # FULL CODE     # Dispo: obtain GI eval; IV iron; check cbc; f/u ECG; check 1 trop; f/u B12/Fol; f/u EEG report; repl Mg; f/u TSH  pt has 12hr aide at home - will likely send back home (though was in [place] in Feb) - f/u CM 64 yo female with PMHX of MS (Not on active treatment), T2DM with diabetic gastroparesis, Gastric cancer s/p resection and chemotherapy, currently in remission (Dr Toussaint), esophageal ulcer s/p EGD last yr on PPI, presented with confusion.     # AMS 2/2 metab enceph prob 2/2 signif, symptomatic anemia - seems better now  CTH: Stable MS lesions; no acute intracranial pathology  f/u TSH, Ammonia  no need for neuro eval for now, provide MS stays at baseline  REEG seems unnecessary, but it was already performed - f/u results (if there is epileptiform activity, then will ask neuro to see)    # signif rt arm swelling  check v dupl to r/o DVT of upper ext    # Acute on chronic normocytic anemia  hx of esophageal ulcers with EGD last year  hx of active gastric cancer  s/p 2U PRBC  No gross GIB; no melena  GI eval for Dr Velazquez  iron studies c/w SONIA - give venofer 200mg IV q48 x 5 doses (or until d/c)  will use oral iron upon d/c  c/w folic acid supplementation  check B12 and Fol  PPI BID for now    # T wave inversions (V1-V4)  QTc of 481 is not a concern  ; trop x1 neg -> send 1 more troponin (if abnl, then tele and cardio eval)  ECG is different from Feb 2020 and likely related to low hgb  once hgb > 8, rpt ECG to see if back to baseline  < from: Transthoracic Echocardiogram (02.02.20 @ 10:58) >  Summary:   1. Normal global left ventricular systolic function.   2. LV Ejection Fraction by Palacio's Method with a biplane EF of 70 %.   3. Mild asymmetric left ventricular hypertrophy involving the septal wall.   4. Mild mitral valve regurgitation.   5. Mild thickening of the anterior and posterior mitral valve leaflets.  < end of copied text >  if ECG does not go back to baseline, then check new echo -> if nl, then fine; if EF is abnl or bad valve dz, then cardio eval  no need for tele  no asa or BB  consider lipid panel    # Hypomagnesemia  can replete w/ Mg Ox 400mg po q24  recheck lvl in 2-3 days    # T2 DM with diabetic gastroparesis  CAPILLARY BLOOD GLUCOSE  POCT Blood Glucose.: 77 (03-27-20 @ 11:48)  POCT Blood Glucose.: 118 (03-26-20 @ 21:10)  Hold home glimepiride  FS qac/hs  diabetic diet  if FS > 180, will start insulin    # incr AP is likely 2/2 cholestasis from DM  check GGT  follow LFT 1-2x/wk    # Stage 2 pressure ulcer - present on admission  barrier cream w/ RN    # Hx of fibroids  s/p MARY-BSO (in her 30s)    # Hx of thyroid polyps  s/p partial thyroidectomy     # DVT ppx: SCDs for now    # GI ppx: PPI    # Activity: Bedridden; may be OOB to chair w/ berry    # FULL CODE     # Contact:  [place] [phone]: we spoke today about plan as above; dtr also asked me to eval for depression as appropriate    # Dispo: obtain GI eval; IV iron; check cbc; f/u ECG; check 1 trop; f/u B12/Fol; f/u EEG report; repl Mg; f/u TSH; f/u v real BROWN  pt has 12hr aide at home - will likely send back home (though was in NH in Feb) - f/u CM

## 2020-03-26 NOTE — ED PROVIDER NOTE - ATTENDING CONTRIBUTION TO CARE
64 yo F with PMH of gastric CA, DM, MS presents to ED for confusion. According to patient's daughter she is not acting herself. Patient seems more lethargic. She has also noticed that her extremities are more swollen. No nausea, vomiting, abdominal pain.     On PE patient is alert. Cardio RRR. Lungs CTA, Abdomen SNTND.     Concern for infection vs intracranial pathology

## 2020-03-26 NOTE — H&P ADULT - NSHPOUTPATIENTPROVIDERS_GEN_ALL_CORE
PCP: Dr Brasher PCP: Dr Brasher  Neurology: Dr Vance  GI: Dr Velazquez PCP: Dr Brasher; Neurology: Dr Vance; H/O: Dr Toussaint  GI: Dr Velazquez

## 2020-03-26 NOTE — ED PROVIDER NOTE - PHYSICAL EXAMINATION
VITAL SIGNS: I have reviewed nursing notes and confirm.  CONSTITUTIONAL: Well-developed; well-nourished; in no acute distress. pt comfortable. a+ox3  SKIN: skin exam is warm and dry, no acute rash.   HEAD: Normocephalic; atraumatic.  EYES:  EOM intact; conjunctiva and sclera clear.  ENT: No nasal discharge; airway clear. moist oral mucosa;    NECK: Supple; non tender.  CARD: S1, S2 normal; no murmurs, gallops, or rubs. Regular rate and rhythm. posterior tibial and radial pulses 2+  RESP: No wheezes, rales or rhonchi. cta b/l. no use of accessory muscles. no retractions  ABD: Normal bowel sounds; soft; non-distended; non-tender; no rebound. .  EXT: Normal ROM. No  cyanosis. edematous extremities at baseline since admission  BACK: No cva tenderness  LYMPH: No acute cervical adenopathy.  NEURO: Alert, oriented, grossly unremarkable.  CN 2-12 intact. normal gait. normal romberg's.  sensory grossly intact to face, upper and lower extremity.  flexion to extremities at baseline as per pt and daughter  PSYCH: Cooperative, appropriate.

## 2020-03-26 NOTE — H&P ADULT - NSHPPHYSICALEXAM_GEN_ALL_CORE
PHYSICAL EXAM:  GENERAL: NAD, speaks in full sentences, no signs of respiratory distress  HEAD:  Atraumatic, Normocephalic  EYES: EOMI, PERRLA, conjunctiva and sclera clear  NECK: Supple, No JVD  CHEST/LUNG: Clear to auscultation bilaterally; No wheeze; No crackles; No accessory muscles used  HEART: Regular rate and rhythm; No murmurs;   ABDOMEN: Soft, Nontender, Nondistended; Bowel sounds present; No guarding  EXTREMITIES:  2+ Peripheral Pulses, No cyanosis or edema  PSYCH: AAOx3  NEUROLOGY: non-focal  SKIN: No rashes or lesions PHYSICAL EXAM:  GENERAL: NAD, speaks in full sentences, no signs of respiratory distress  HEAD:  Atraumatic, Normocephalic  EYES: EOMI, PERRLA, conjunctiva and sclera clear  NECK: Supple, No JVD  CHEST/LUNG: Clear to auscultation bilaterally; No wheeze; No crackles; No accessory muscles used  HEART: Regular rate and rhythm; No murmurs;   ABDOMEN: Soft, Nontender, Nondistended; Bowel sounds present; No guarding  EXTREMITIES:  2+ Peripheral Pulses, No cyanosis or edema  PSYCH: AAOx2 (only name, place)  NEUROLOGY: generalized weak but no focal deficit; gait not assessed  SKIN: pressure ulcer right side + PHYSICAL EXAM:  GENERAL: NAD, speaks in full sentences, no signs of respiratory distress  HEAD:  Atraumatic, Normocephalic  EYES: EOMI, PERRL, sclera white; mouth clr; tongue nl; nose clear  NECK: no nodes, No JVD  CHEST/LUNG: Clear to auscultation bilaterally; No wheeze; No crackles; No accessory muscles used  HEART: s1 s2, Regular rate and rhythm; No murmurs;   ABDOMEN: Soft, Nontender, Nondistended; Bowel sounds present; No guarding  EXTREMITIES:  rt arm is very swollen 4+; lt arm has some swelling 1+; there is 1-2+ edema in legs; feet are cold and somewhat cyanotic; DP pulses are not palp  PSYCH: AAOx3 (did not know full date, but did know mo and yr)  NEUROLOGY: CN seem intact; almost no mvmt in legs and minor wiggle of toes b/l (1-/5); can slightly lift arms, but not completely (2/5); sensation intact  SKIN: pressure ulcer right side +

## 2020-03-26 NOTE — ED PROVIDER NOTE - OBJECTIVE STATEMENT
66y/o F w/ hx DM, MS, stomach cancer s/p remission for 2 years, s/p partial gastrectomy, s/p total hysterectomy is being sent by daughter for concerns of confusion that started 2 days ago.  as per daughter pt woke up 2 days ago lethargic, not acting as active, no asymmetry.  pt lethargic throughout the day, but responding 66y/o F w/ hx DM, MS, stomach cancer s/p remission for 2 years, s/p partial gastrectomy, s/p total hysterectomy is being sent by daughter for concerns of confusion that started 2 days ago.  as per daughter pt woke up 2 days ago lethargic, not acting as active, no asymmetry.  pt lethargic throughout the day, but responding with no asymmetry.  today pt woke up with difficulty getting her words out and unsure where she was. denies any rectal bleeding. no fevers or chills. no cough and congestion. no vomiting or diarrhea.

## 2020-03-26 NOTE — ED ADULT NURSE NOTE - NSIMPLEMENTINTERV_GEN_ALL_ED
Implemented All Fall with Harm Risk Interventions:  [place] to call system. Call bell, personal items and telephone within reach. Instruct patient to call for assistance. Room bathroom lighting operational. Non-slip footwear when patient is off stretcher. Physically safe environment: no spills, clutter or unnecessary equipment. Stretcher in lowest position, wheels locked, appropriate side rails in place. Provide visual cue, wrist band, yellow gown, etc. Monitor gait and stability. Monitor for mental status changes and reorient to person, place, and time. Review medications for side effects contributing to fall risk. Reinforce activity limits and safety measures with patient and family. Provide visual clues: red socks.

## 2020-03-26 NOTE — H&P ADULT - NSICDXPASTSURGICALHX_GEN_ALL_CORE_FT
PAST SURGICAL HISTORY:  H/O partial thyroidectomy     H/O total hysterectomy with bilateral salpingo-oophorectomy (BSO)     Portacath in place     S/P partial gastrectomy PAST SURGICAL HISTORY:  H/O partial thyroidectomy thyroid polyps    H/O total hysterectomy with bilateral salpingo-oophorectomy (BSO) in her 30s for fibroids    Portacath in place     S/P partial gastrectomy

## 2020-03-26 NOTE — ED ADULT NURSE NOTE - PMH
DM type 2 (diabetes mellitus, type 2)    Multiple sclerosis    Stomach cancer  in remission for 2 years.

## 2020-03-26 NOTE — H&P ADULT - NSICDXPASTMEDICALHX_GEN_ALL_CORE_FT
PAST MEDICAL HISTORY:  DM type 2 (diabetes mellitus, type 2)     Multiple sclerosis     Stomach cancer in remission for 2 years. PAST MEDICAL HISTORY:  DM type 2 (diabetes mellitus, type 2)     Multiple sclerosis not on any treatment    Stomach cancer in remission for 2 years.

## 2020-03-27 NOTE — DIETITIAN INITIAL EVALUATION ADULT. - OTHER INFO
Pt p/w AMS x2 days with primary dx: anemia and lethargy. AMS 2/2 metabolic encephalopathy likely 2/2 significant symptomatic anemia, improving, f/u TSH, ammonia and REEG. Significant R arm swelling, r/o DVT. Acute on chronic normocytic anemia s/p 2units PRBC, no active GIB. Awaiting GI eval. HypoMg, repleted. T2DM with gastroparesis. h/o Gastric cancer s/p resection and chemotherapy, currently in remission per h&p.

## 2020-03-27 NOTE — DIETITIAN INITIAL EVALUATION ADULT. - ENERGY INTAKE
As per PCA, pt did not consume any breakfast this morning. Pt reported poor appetite but agreeable to "try again at lunch" and to Glucerna at this time.

## 2020-03-27 NOTE — DIETITIAN INITIAL EVALUATION ADULT. - FEEDING SKILL
Spoke to pt daughter, Chani, via phone as pt having difficulty recalling detailed info. Notes that intake less than baseline d/t recent AMS x2 days PTA. At baseline pt consumes 5-6 small meals per day d/t h/o gastroparesis and partial gastrectomy x3yrs PTA d/t gastric cancer. NKFA noted but pt follows low fat, bland/GERD diet d/t noted severe reflux. Occasionally will consume Ensure as meal or snack if pt not interested in meal. Pt relies on daughter or HHA for meal procurement. Taking daily vitamin D and multivitamin. intermittent vomiting PTA noted but not daily.

## 2020-03-27 NOTE — DIETITIAN INITIAL EVALUATION ADULT. - PERTINENT LABORATORY DATA
(3/27/2020) WBC 13.68, RBC 3.12, H/H 9.4/27.4.   (3/26/2020) Na 133, BUN 22, Cr 0.5, glucose 119, corrected Ca 9.14, Mg 1.7

## 2020-03-27 NOTE — DIETITIAN INITIAL EVALUATION ADULT. - CONTINUE CURRENT NUTRITION CARE PLAN
1. Add Ensure compact TID d/t recommendations for small/frequent meal intake. 2. DASH/TLC, GERD, CHO Consistent diet order with emphasis on small/frequent meal intake. 1:1 feeds. 1. Add Ensure compact TID d/t recommendations for small/frequent meal intake. 2. DASH/TLC, GERD, CHO Consistent diet order with emphasis on small/frequent meal intake. 1:1 feeds. 3. Consider adding daily multivitamin.

## 2020-03-27 NOTE — DIETITIAN INITIAL EVALUATION ADULT. - PHYSICAL APPEARANCE
stated ht 62in, 138# per bedscale wt. As per EMR wt hx, pt 133# on 2/3/2020 RD assessment. Daughter states wt stable 130-135#. stage II pressure ulcer to R upper back. 3+ edema b/l hands.

## 2020-03-27 NOTE — DIETITIAN INITIAL EVALUATION ADULT. - FACTORS AFF FOOD INTAKE
change in mental status/fecal incontinence, no BM since admit. no vomiting per RN. unsure of swallowing ability at this time as pt with little to no intake this morning.

## 2020-03-27 NOTE — DIETITIAN INITIAL EVALUATION ADULT. - ENERGY NEEDS
estimated energy needs = 2772-3830 kcal/day (30-35 kcal/kg CBW d/t stage II pressure ulcer, aim toward low-middle end of range given gastroparesis).   estimated protein needs = 79-95 g/day (1.25-1.5 g/kg CBW, reason mentioned above).   estimated fluid needs = 1mL/kcal or per LIP

## 2020-03-27 NOTE — CHART NOTE - NSCHARTNOTEFT_GEN_A_CORE
Patient had episode of nonbilious vomitus this evening. Will give 4mg IV Zofran to see if nausea improves. May consider metoclopramide if nausea refractory (concern for hypokinetic bowels in light of MS/DM)    QTc 480 from EKG dated today, will need monitoring if given multiple doses

## 2020-03-28 NOTE — PROGRESS NOTE ADULT - ASSESSMENT
66 yo female with PMHX of MS (Not on active treatment), T2DM with diabetic gastroparesis, Gastric cancer s/p resection and chemotherapy, currently in remission (Dr Toussaint), esophageal ulcer s/p EGD last yr on PPI, presented with confusion.     # AMS 2/2 metab enceph prob 2/2 signif, symptomatic anemia - seems better now and back to baseline  CTH: Stable MS lesions; no acute intracranial pathology  f/u TSH, Ammonia  no need for neuro eval for now, provide MS stays at baseline  REEG: expected slowing, no sz activity    # signif rt arm swelling - prob from portacath  v dupl of UE: no DVT   elevate arm on pillow  cont lasix 20mg po q24    # Acute on chronic normocytic anemia  hx of esophageal ulcers with EGD last year  hx of active gastric cancer  s/p 2U PRBC  No gross GIB; no melena  hgb stable and rising  GI eval for Dr Velazquez (spoke w/ Dr Suarez - nothing urgent, no urgent need for EGD, f/u w/ Dr Velazquez Mon)  iron studies c/w SONIA - give venofer 200mg IV q48 x 5 doses (or until d/c)  will use oral iron upon d/c  B12 and Fol are nl - can d/c folate  PPI BID for now    # T wave inversions (V1-V4)  QTc of 481 is not a concern - f/u is 435  ; trop x2 neg   ECG is different from Feb 2020 and likely related to low hgb  rpt ECG shows NS T changes in ant leads - I do not suspect ACS, pt very stable and has no CP  < from: Transthoracic Echocardiogram (02.02.20 @ 10:58) >  Summary:   1. Normal global left ventricular systolic function.   2. LV Ejection Fraction by Palacio's Method with a biplane EF of 70 %.   3. Mild asymmetric left ventricular hypertrophy involving the septal wall.   4. Mild mitral valve regurgitation.   5. Mild thickening of the anterior and posterior mitral valve leaflets.  < end of copied text >  no need for tele  no asa or BB  lipid panel - LDL 50; HDL 24; TG nl    # Hypomagnesemia  can replete w/ Mg Ox 400mg po q24  recheck lvl in 2 days    # T2 DM with diabetic gastroparesis w/ vomiting  Hold home glimepiride  FS qac/hs  diabetic diet  if FS > 180, will start insulin  try reglan 5mg iv q6 for now    # incr AP w/ nl GGT  AP has been high  could be from bone from being bedridden or from mets (but no bone pain)  could be from intestine (hx of gastric cancer)  no further w/u for now  could do bone scan as outpt if level gets signif higher or has bone pain    # Stage 2 pressure ulcer - present on admission  barrier cream w/ RN    # Hx of fibroids  s/p MARY-BSO (in her 30s)    # Hx of thyroid polyps  s/p partial thyroidectomy     # DVT ppx: SCDs for now    # GI ppx: PPI    # Activity: Bedridden; may be OOB to chair w/ berry    # FULL CODE     # Contact:  Ewa (dtr): [phone]  Chani (dtr): # in record under pt info    # Dispo: f/u GI eval (not urgent); IV iron; repl Mg; f/u TSH;   pt has 12hr aide at home - will likely send back home on Mon (after GI consult answered by Dr Velazquez)- f/u CM

## 2020-03-28 NOTE — CONSULT NOTE ADULT - SUBJECTIVE AND OBJECTIVE BOX
Gastroenterology Consultation:    Patient is a 65y old  Female who presents with a chief complaint of Altered mental status (28 Mar 2020 09:34)      Admitted on: 03-26-20    HPI:    64 yo mostly bedridden female with PMH of MS, T2DM with diabetic gastroparesis, Gastric cancer s/p Bilroth 2 and chemotherapy, currently in remission as in chart from Oncology in Dec 2019 as evaluated from PET scan, H/O ulcerative esophagitis/erosive gastritis is admitted with confusion likely due to metabolic encephalopathy and GI is being for acute drop in Hg and vomiting. Patient is c/o non bilious non bloody vomiting 2-3 episodes for the last few days. Patient states she has H/O gastroparesis and she was on and off on reglan with some help. Now she is having difficulty tolerating food. Denies melena, hematochezia, hematemesis. Patient was also found to have Hg of 6.6 on admission which adequately responded to PRBC.       Prior records Reviewed (Y/N): Y  History obtained from person other than patient (Y/N): N    Prior EGD: 10/2019: Solid food in stomach which is consistent with gastroparesis, Grade 3 erosive esophagitis, erosive gastritis.   Prior Colonoscopy: 2018, poor prep, showing int hemorrhoids, diverticulosis and asked to repeat Colonoscopy in 1yr.       PAST MEDICAL & SURGICAL HISTORY:  Multiple sclerosis: not on any treatment  DM type 2 (diabetes mellitus, type 2)  Stomach cancer: in remission for 2 years.  Portacath in place  S/P partial gastrectomy  H/O total hysterectomy with bilateral salpingo-oophorectomy (BSO): in her 30s for fibroids  H/O partial thyroidectomy: thyroid polyps      FAMILY HISTORY:  FH: dementia: mother      Social History:  No IV drug abuse    Home Medications:  baclofen 20 mg oral tablet: orally 2 times a day (26 Mar 2020 17:14)  dalfampridine 10 mg oral tablet, extended release: 1 tab(s) orally every 12 hours (26 Mar 2020 17:14)  DULoxetine 60 mg oral delayed release capsule: 1 cap(s) orally once a day (26 Mar 2020 17:14)  folic acid 1 mg oral tablet: 1 tab(s) orally once a day (26 Mar 2020 17:14)  furosemide 20 mg oral tablet: 1 tab(s) orally every 48 hours (26 Mar 2020 17:14)  glimepiride 2 mg oral tablet: 1 tab(s) orally once a day (26 Mar 2020 17:14)  pantoprazole 40 mg oral delayed release tablet: 1 tab(s) orally 2 times a day (26 Mar 2020 17:14)  rOPINIRole 0.5 mg oral tablet: 1 tab(s) orally 3 times a day (26 Mar 2020 17:14)  Vitamin D2 50,000 intl units (1.25 mg) oral capsule: 1 cap(s) orally once a week (26 Mar 2020 17:14)  Zofran 8 mg oral tablet: 1 tab(s) orally 3 times a day (26 Mar 2020 17:14)    MEDICATIONS  (STANDING):  baclofen 20 milliGRAM(s) Oral two times a day  chlorhexidine 4% Liquid 1 Application(s) Topical <User Schedule>  collagenase Ointment 1 Application(s) Topical daily  DULoxetine 60 milliGRAM(s) Oral daily  ergocalciferol 32323 Unit(s) Oral every week  folic acid 1 milliGRAM(s) Oral daily  furosemide    Tablet 20 milliGRAM(s) Oral daily  iron sucrose IVPB 200 milliGRAM(s) IV Intermittent every 48 hours  magnesium oxide 400 milliGRAM(s) Oral daily  metoclopramide Injectable 5 milliGRAM(s) IV Push every 6 hours  pantoprazole    Tablet 40 milliGRAM(s) Oral two times a day  rOPINIRole 0.5 milliGRAM(s) Oral three times a day    MEDICATIONS  (PRN):      Allergies  Zithromax (Hives)      Review of Systems:   Constitutional:  No Fever, No Chills  ENT/Mouth:  No Hearing Changes,  No Difficulty Swallowing  Eyes:  No Eye Pain, No Vision Changes  Cardiovascular:  No Chest Pain, No Palpitations  Respiratory:  No Cough, No Dyspnea  Gastrointestinal:  As described in HPI  Musculoskeletal:  No Joint Swelling, No Back Pain  Skin: No Jaundice  Neuro:  No Syncope          Physical Examination:  T(C): 35.9 (03-28-20 @ 04:47), Max: 35.9 (03-28-20 @ 04:47)  HR: 110 (03-28-20 @ 04:47) (105 - 110)  BP: 94/52 (03-28-20 @ 04:47) (94/52 - 102/53)  RR: 18 (03-28-20 @ 04:47) (18 - 18)  SpO2: --        Constitutional: Lying in bed  Eyes:. Conjunctivae are clear  Ears Nose and Throat: The external ears are normal appearing.  Respiratory:  No signs of respiratory distress  Cardiovascular:  S1 S2, Regular rate and rhythm.  GI: Abdomen is soft, symmetric, and non-tender without distention. No hepatomegaly or splenomegaly  Neuro: No Tremor, No involuntary movements  Skin: No rashes, No Jaundice.          Data: (reviewed by attending)                        10.1   14.36 )-----------( 305      ( 28 Mar 2020 07:07 )             31.6     Hgb Trend:  10.1  03-28-20 @ 07:07  9.4  03-27-20 @ 11:37  6.6  03-26-20 @ 12:20      03-28    135  |  101  |  21<H>  ----------------------------<  132<H>  3.7   |  22  |  0.5<L>    Ca    7.5<L>      28 Mar 2020 07:07      Liver panel trend:  TBili 0.3   /   AST 24   /   ALT 23   /   AlkP 202   /   Tptn 4.7   /   Alb 2.2    /   DBili --      03-26          Culture - Urine (collected 27 Mar 2020 01:20)  Source: .Urine Clean Catch (Midstream)  Final Report (28 Mar 2020 11:03):    <10,000 CFU/mL Normal Urogenital Gemini          Radiology:(reviewed by attending)

## 2020-03-28 NOTE — CONSULT NOTE ADULT - ASSESSMENT
64 yo mostly bedridden female with PMH of MS, T2DM with diabetic gastroparesis, Gastric cancer s/p Bilroth 2 and chemotherapy, currently in remission as in chart from Oncology in Dec 2019 as evaluated from PET scan, H/O ulcerative esophagitis/erosive gastritis is admitted with confusion likely due to metabolic encephalopathy and GI is being for acute drop in Hg and vomiting.    Prior EGD: 10/2019: Solid food in stomach which is consistent with gastroparesis, Grade 3 erosive esophagitis, erosive gastritis.   Prior Colonoscopy: 2018, poor prep, showing int hemorrhoids, diverticulosis and asked to repeat Colonoscopy in 1yr.     # Vomiting: Likely 2/2 Gastroparesis but R/O other etiology also like viral gastroenteritis vs recurrence of malignancy vs esophagitis vs gastritis.  No blood in vomiting    Rec:  Get CT abdomen and pelvis with IV contrast.  NPO as of now  Zofran 8mg TID IV (She takes orally at home)  Protonix 40mg IV BID (H/O erosive esophagitis)  Reglan 10mg TID (H/O Reglan use), monitor QTC, extrapyramidal symptoms and avoid long term use.  When patient feels better, slowly advance the diet to frequent small meals, low fat (Consider blenderize food)  Strict glucose control   IV hydration and monitor electrolytes.    Acute on chronic SONIA:  No active bleeding  hemodynamically stable    Rec:  PPI IV BID  No endoscopic intervention at this point due to Coronavirus pandemic and especially when patient is not actively bleeding.  Please follow up with Dr. Velazquez on MOnday and consider EGD and Colonoscopy as outpatient. 66 yo mostly bedridden female with PMH of MS, T2DM with diabetic gastroparesis, Gastric cancer s/p Bilroth 2 and chemotherapy, currently in remission as in chart from Oncology in Dec 2019 as evaluated from PET scan, H/O ulcerative esophagitis/erosive gastritis is admitted with confusion likely due to metabolic encephalopathy and GI is being for acute drop in Hg and vomiting.    Prior EGD: 10/2019: Solid food in stomach which is consistent with gastroparesis, Grade 3 erosive esophagitis, erosive gastritis.   Prior Colonoscopy: 2018, poor prep, showing int hemorrhoids, diverticulosis and asked to repeat Colonoscopy in 1yr.     # Vomiting: Likely 2/2 Gastroparesis but R/O other etiology also like viral gastroenteritis vs recurrence of malignancy vs esophagitis vs gastritis.  No blood in vomiting    Rec:  Get CT abdomen and pelvis with IV contrast.  NPO as of now  Zofran 8mg TID IV (She takes orally at home)  Protonix 40mg IV BID (H/O erosive esophagitis)  Reglan 10mg TID (H/O Reglan use), monitor QTC, extrapyramidal symptoms and avoid long term use.  When patient feels better, slowly advance the diet to frequent small meals, low fat (Consider blenderize food)  Strict glucose control   IV hydration and monitor electrolytes.    Acute on chronic SONIA:  No active bleeding  hemodynamically stable    Rec:  PPI IV BID  No endoscopic intervention at this point due to Coronavirus pandemic and especially when patient is not actively bleeding.  Iron supplements as H/O Bilroth surgery  Please follow up with Dr. Velazquez on MOnday and consider EGD and Colonoscopy as outpatient. 66 yo mostly bedridden female with PMH of MS, T2DM with diabetic gastroparesis, Gastric cancer s/p Bilroth 2 and chemotherapy, currently in remission as in chart from Oncology in Dec 2019 as evaluated from PET scan, H/O ulcerative esophagitis/erosive gastritis is admitted with confusion likely due to metabolic encephalopathy and GI is being for acute drop in Hg and vomiting.    Prior EGD: 10/2019: Solid food in stomach which is consistent with gastroparesis, Grade 3 erosive esophagitis, erosive gastritis.   Prior Colonoscopy: 2018, poor prep, showing int hemorrhoids, diverticulosis and asked to repeat Colonoscopy in 1yr.     # Vomiting: Likely 2/2 Gastroparesis but R/O other etiology also like viral gastroenteritis vs recurrence of malignancy vs esophagitis vs gastritis.  No blood in vomiting    Rec:  Get CT abdomen and pelvis with IV contrast.  NPO as of now  Zofran 8mg TID IV (She takes orally at home)  Protonix 40mg IV BID (H/O erosive esophagitis)  Reglan 10mg TID (H/O Reglan use), monitor QTC, extrapyramidal symptoms and avoid long term use.  Followup with Dr Wheeler Monday  When patient feels better, slowly advance the diet to frequent small meals, low fat (Consider blenderize food)  Strict glucose control   IV hydration and monitor electrolytes.    Acute on chronic SONIA:  No active bleeding  hemodynamically stable    Rec:  PPI IV BID  No endoscopic intervention at this point due to Coronavirus pandemic and especially when patient is not actively bleeding.  Iron supplements as H/O Bilroth surgery  Please follow up with Dr. Velazquez on MOnday and consider EGD and Colonoscopy as outpatient.

## 2020-03-28 NOTE — PROGRESS NOTE ADULT - ASSESSMENT
66 yo female with PMHX of MS (Not on active treatment), T2DM with diabetic gastroparesis, Gastric cancer s/p resection and chemotherapy (Dr Toussaint), esophageal ulcer s/p EGD last yr on PPI, presented with confusion.     # Non-bloody, non-bilious vomiting  - Patient with 3 episodes of vomiting  - No associated abdominal pain or tenderness  - No diarrhea overnight or this morning  - Patient has history of gastroparesis; will start Reglan 5mg IV q6h  - Consult placed for GI with Dr. Velazquez; multiple attempts made to reach him, no answer yet; will call again today    # Metabolic encephalopathy, likely secondary to symptomatic anemia - resolved  - patient now appears back to baseline after receiving 2 units PRBC  - hemoglobin now 10.1  - CTH shows stable MS lesions  - REEG done; showed diffuse slowing, but no epileptiform activity  - B12, folate wnl    # Right arm swelling  - patient is diffusely edematous  - per patient, right arm swelling is new  - right arm duplex performed, no read yet, will follow up result    # Acute on chronic normocytic anemia  - history of esophageal ulcers with EGD last year  - history of active gastric cancer  - s/p 2U PRBC; repeat Hb 9.4; Hb now 10.1  - No gross GIB; no melena  - iron studies consistent with iron deficiency anemia  - Consult placed for GI with Dr. Velazquez; multiple attempts made to reach him, no answer yet; will call again today  - venofer 200mg IV q48 x 5 doses; will switch to oral iron upon discharge  - folic acid 1mg po daily  - protonix 40mg po bid  - B12, folate wnl    # T wave inversions (V1-V4)  - QTc of 481 is not a concern  -   - trop negative x1; will   - Echo (Feb 2020): normal LV systolic function, EF 70%, mild asymmetric LVH involving septal wall, mild MR  - T wave inversions likely related to low hgb  - will repeat EKG today to ensure it has normalized; if not, will repeat echo    # Hypomagnesemia  - Mg Ox 400mg po q24  - recheck mag level in 2-3 days    # T2 DM with diabetic gastroparesis  - patient on glimepiride; hold for now  - follow FSG  - if FS > 180, will start insulin    # Elevated alk phos  - will check GGT  - will check LFT 1-2x/wk    # Stage 2 pressure ulcer - present on admission  - barrier cream w/ RN    # Hx of fibroids  - s/p MARY-BSO (in her 30s)    # Hx of thyroid polyps  - s/p partial thyroidectomy     #DVT ppx: SCDs for now  #GI ppx: protonix 40mg po bid  #Activity: Bedridden; may be OOB to chair w/ berry  #Dispo: from home; family wants to take patient home with home care once medically stable  FULL CODE 64 yo female with PMHX of MS (Not on active treatment), T2DM with diabetic gastroparesis, Gastric cancer s/p resection and chemotherapy (Dr Toussaint), esophageal ulcer s/p EGD last yr on PPI, presented with confusion.     # Non-bloody, non-bilious vomiting  - Patient with 3 episodes of vomiting  - No associated abdominal pain or tenderness  - No diarrhea overnight or this morning  - Patient has history of gastroparesis; will start Reglan 5mg IV q6h  - Consult placed for GI with Dr. Velazquez; multiple attempts made to reach him, no answer yet; will call again today    # Metabolic encephalopathy, likely secondary to symptomatic anemia - resolved  - patient now appears back to baseline after receiving 2 units PRBC  - hemoglobin now 10.1  - CTH shows stable MS lesions  - REEG done; showed diffuse slowing, but no epileptiform activity  - B12, folate wnl    # Right arm swelling  - patient is diffusely edematous  - per patient, right arm swelling is new  - right arm duplex performed, no read yet, will follow up result    # Acute on chronic normocytic anemia  - history of esophageal ulcers with EGD last year  - history of active gastric cancer  - s/p 2U PRBC; repeat Hb 9.4; Hb now 10.1  - No gross GIB; no melena  - iron studies consistent with iron deficiency anemia  - Consult placed for GI with Dr. Velazquez; multiple attempts made to reach him, no answer yet; will call again today  - venofer 200mg IV q48 x 5 doses; will switch to oral iron upon discharge  - folic acid 1mg po daily  - protonix 40mg po bid  - B12, folate wnl    # T wave inversions (V1-V4)  - QTc of 481 is not a concern  -   - trop negative x1; will check repeat this morning  - Echo (Feb 2020): normal LV systolic function, EF 70%, mild asymmetric LVH involving septal wall, mild MR  - T wave inversions likely related to low hgb  - Repeat EKG shows persistent T wave inversions; will check repeat troponin; may need repeat echo  - lipid panel shows normal TGs, normal total cholesterol and LDL, with low HDL    # Hypomagnesemia  - Mg Ox 400mg po q24  - recheck mag level in 2-3 days    # T2 DM with diabetic gastroparesis  - patient on glimepiride; hold for now  - follow FSG  - if FS > 180, will start insulin  - Started on Reglan 5mg IV q6h    # Elevated alk phos  - GGT wnl  - will check LFT 1-2x/wk    # Stage 2 pressure ulcer - present on admission  - barrier cream w/ RN    # Hx of fibroids  - s/p MARY-BSO (in her 30s)    # Hx of thyroid polyps  - s/p partial thyroidectomy     #DVT ppx: SCDs for now  #GI ppx: protonix 40mg po bid  #Activity: Bedridden; may be OOB to chair w/ berry  #Dispo: from home; family wants to take patient home with home care once medically stable; pending GI eval  FULL CODE

## 2020-03-29 NOTE — PROGRESS NOTE ADULT - ASSESSMENT
66 yo woman with PMHX of MS (Not on active treatment), T2DM with diabetic gastroparesis, Gastric cancer s/p resection and chemotherapy, currently in remission (Dr Toussaint), esophageal ulcer s/p EGD last yr on PPI, presented with confusion.     # AMS 2/2 metab enceph prob 2/2 signif, symptomatic anemia - seems better now and back to baseline  CTH: Stable MS lesions; no acute intracranial pathology  TSH, Ammonia could not be drawn and at this point are not necessary  no need for neuro eval for now  REEG: expected slowing, no sz activity    # signif rt arm swelling - prob from portacath  v dupl of UE: no DVT   elevate arm on pillow  d/c lasix - BP low     # Acute on chronic normocytic anemia  hx of esophageal ulcers with EGD last year  hx of active gastric cancer  s/p 2u PRBC  No gross GIB; no melena  hgb stable and rising  GI eval for Dr Velazquez - f/u Mon  iron studies c/w SONIA - give venofer 200mg IV q48 x 5 doses (or until d/c)  will use oral iron upon d/c  B12 and Fol are nl - can d/c folate  PPI BID for now    # T wave inversions (V1-V4)  QTc of 481 is not a concern - f/u is 435  ; trop x2 neg   ECG is different from Feb 2020 and likely related to low hgb  rpt ECG shows NS T changes in ant leads - I do not suspect ACS, pt very stable and has no CP  < from: Transthoracic Echocardiogram (02.02.20 @ 10:58) >  Summary:   1. Normal global left ventricular systolic function.   2. LV Ejection Fraction by Palacio's Method with a biplane EF of 70 %.   3. Mild asymmetric left ventricular hypertrophy involving the septal wall.   4. Mild mitral valve regurgitation.   5. Mild thickening of the anterior and posterior mitral valve leaflets.  < end of copied text >  no asa or BB  lipid panel - LDL 50; HDL 24; TG nl    # Hypomagnesemia  can replete w/ Mg Ox 400mg po q24  recheck lvl Mon    # T2 DM with diabetic gastroparesis w/ vomiting  Hold home glimepiride  FS have been fine, can d/c FS  diabetic diet  make reglan 5mg po q6 for now - on d/c will make prn before meals    # incr AP w/ nl GGT  AP has been high before  could be from bone from being bedridden or from mets (but no bone pain)  could be from intestine (hx of gastric cancer)  no further w/u for now  could do bone scan as outpt if level gets signif higher or has bone pain    # Stage 2 pressure ulcer - present on admission  barrier cream w/ RN    # Hx of fibroids  s/p AMRY-BSO (in her 30s)    # Hx of thyroid polyps  s/p partial thyroidectomy     # DVT ppx: SCDs for now    # GI ppx: PPI    # Activity: Bedridden; may be OOB to chair w/ berry    # FULL CODE     # Contact:  Ewa (dtr): [phone]  Chani (dtr): # in record under pt info    # Dispo: f/u GI eval mon; cont IV iron; f/u Mg;    pt has 12hr aide at home - will likely send back home on Mon (after GI consult answered by Dr Velazquez)- f/u CM

## 2020-03-30 NOTE — PROGRESS NOTE ADULT - ASSESSMENT
66 yo woman with PMHX of MS (Not on active treatment), T2DM with diabetic gastroparesis, gastric cancer s/p resection and chemotherapy, currently in remission (Dr Toussaint), esophageal ulcer s/p EGD last yr on PPI, presented with confusion.     # AMS 2/2 metab enceph prob 2/2 signif, symptomatic anemia - much better now and back to baseline  CTH: Stable MS lesions; no acute intracranial pathology  TSH, Ammonia could not be drawn and at this point are not necessary  no need for neuro eval for now  REEG: expected slowing, no sz activity    # signif rt arm swelling - prob from portacath  v dupl of UE: no DVT   elevate arm on pillow  can try lasix 20mg po q48-72    # Acute on chronic normocytic anemia  hx of esophageal ulcers with EGD last year  hx of active gastric cancer  s/p 2u PRBC  No gross GIB; no melena  hgb stable  spoke w/ GI fellow - outpt GI f/u for EGD  iron studies c/w SONIA - giving venofer 200mg IV q48 x 5 doses (or until d/c)  will use oral iron upon d/c (FeSO4 325mg po q12)  B12 and Fol are nl - can d/c folate  PPI BID for now    # T wave inversions (V1-V4)  QTc of 481 is not a concern - f/u is 435  ; trop x2 neg   ECG is different from Feb 2020 and likely related to low hgb  rpt ECG shows NS T changes in ant leads - I do not suspect ACS, pt very stable and has no CP  < from: Transthoracic Echocardiogram (02.02.20 @ 10:58) >  Summary:   1. Normal global left ventricular systolic function.   2. LV Ejection Fraction by Palacio's Method with a biplane EF of 70 %.   3. Mild asymmetric left ventricular hypertrophy involving the septal wall.   4. Mild mitral valve regurgitation.   5. Mild thickening of the anterior and posterior mitral valve leaflets.  < end of copied text >  no asa or BB  lipid panel - LDL 50; HDL 24; TG nl    # Hypomagnesemia  can replete w/ Mg Ox 400mg po q24  lvl 1.8 (good)    # T2 DM with diabetic gastroparesis w/ vomiting  Hold home glimepiride - can resume on d/c, but just at 1mg po q24  FS have been fine, can d/c FS  diabetic diet  make reglan 5mg po qac prn N/V     # incr AP w/ nl GGT  AP has been high before  could be from bone from being bedridden or from mets (but no bone pain)  could be from intestine (hx of gastric cancer)  no further w/u for now  could do bone scan as outpt if level gets signif higher or has bone pain    # Stage 2 pressure ulcer - present on admission  barrier cream w/ RN    # Hx of fibroids  s/p MARY-BSO (in her 30s)    # Hx of thyroid polyps  s/p partial thyroidectomy     # DVT ppx: SCDs for now    # GI ppx: PPI    # Activity: Bedridden; may be OOB to chair w/ berry    # FULL CODE     # Contact:  Ewa (dtr): [phone]  Chani (dtr): # in record under pt info    # Dispo: f/u GI outpt; d/c home today    pt has 12hr aide at home - will need set up w/ CM

## 2020-03-30 NOTE — DISCHARGE NOTE PROVIDER - PROVIDER TOKENS
PROVIDER:[TOKEN:[[id number]],FOLLOWUP:[2 weeks]],PROVIDER:[TOKEN:[[id number]],FOLLOWUP:[1 week]],PROVIDER:[TOKEN:[[id number]],FOLLOWUP:[1 week]]

## 2020-03-30 NOTE — DISCHARGE NOTE PROVIDER - NSDCMRMEDTOKEN_GEN_ALL_CORE_FT
baclofen 20 mg oral tablet: orally 2 times a day  dalfampridine 10 mg oral tablet, extended release: 1 tab(s) orally every 12 hours  DULoxetine 60 mg oral delayed release capsule: 1 cap(s) orally once a day  folic acid 1 mg oral tablet: 1 tab(s) orally once a day  furosemide 20 mg oral tablet: 1 tab(s) orally every 48 hours  glimepiride 2 mg oral tablet: 1 tab(s) orally once a day  pantoprazole 40 mg oral delayed release tablet: 1 tab(s) orally 2 times a day  rOPINIRole 0.5 mg oral tablet: 1 tab(s) orally 3 times a day  Venofer 20 mg/mL intravenous solution: 10 milliliter(s) intravenous every 7 days  Vitamin D2 50,000 intl units (1.25 mg) oral capsule: 1 cap(s) orally once a week  Zofran 8 mg oral tablet: 1 tab(s) orally 3 times a day baclofen 20 mg oral tablet: orally 2 times a day  collagenase 250 units/g topical ointment: 1 application topically once a day  dalfampridine 10 mg oral tablet, extended release: 1 tab(s) orally every 12 hours  DULoxetine 60 mg oral delayed release capsule: 1 cap(s) orally once a day  folic acid 1 mg oral tablet: 1 tab(s) orally once a day  furosemide 20 mg oral tablet: 1 tab(s) orally every 48 hours  glimepiride 2 mg oral tablet: 1 tab(s) orally once a day  magnesium oxide 400 mg (241.3 mg elemental magnesium) oral tablet: 1 tab(s) orally once a day  metoclopramide 5 mg oral tablet: 1 tab(s) orally every 6 hours  pantoprazole 40 mg oral delayed release tablet: 1 tab(s) orally 2 times a day  rOPINIRole 0.5 mg oral tablet: 1 tab(s) orally 3 times a day  Venofer 20 mg/mL intravenous solution: 10 milliliter(s) intravenous every 7 days  Vitamin D2 50,000 intl units (1.25 mg) oral capsule: 1 cap(s) orally once a week  Zofran 8 mg oral tablet: 1 tab(s) orally 3 times a day baclofen 20 mg oral tablet: orally 2 times a day  collagenase 250 units/g topical ointment: 1 application topically once a day  dalfampridine 10 mg oral tablet, extended release: 1 tab(s) orally every 12 hours  DULoxetine 60 mg oral delayed release capsule: 1 cap(s) orally once a day  ferrous sulfate 325 mg (65 mg elemental iron) oral delayed release tablet: 1 tab(s) orally 2 times a day   folic acid 1 mg oral tablet: 1 tab(s) orally once a day  furosemide 20 mg oral tablet: 1 tab(s) orally every 48 hours  glimepiride 1 mg oral tablet: 1 tab(s) orally once a day   magnesium oxide 400 mg (241.3 mg elemental magnesium) oral tablet: 1 tab(s) orally once a day  metoclopramide 5 mg oral tablet: 1 tab(s) orally every 6 hours before meals if needed  pantoprazole 40 mg oral delayed release tablet: 1 tab(s) orally 2 times a day  rOPINIRole 0.5 mg oral tablet: 1 tab(s) orally 3 times a day  Vitamin D2 50,000 intl units (1.25 mg) oral capsule: 1 cap(s) orally once a week amoxicillin-clavulanate 875 mg-125 mg oral tablet: 1 tab(s) orally 2 times a day   baclofen 20 mg oral tablet: orally 2 times a day  collagenase 250 units/g topical ointment: 1 application topically once a day  dalfampridine 10 mg oral tablet, extended release: 1 tab(s) orally every 12 hours  DULoxetine 60 mg oral delayed release capsule: 1 cap(s) orally once a day  ferrous sulfate 325 mg (65 mg elemental iron) oral delayed release tablet: 1 tab(s) orally 2 times a day   folic acid 1 mg oral tablet: 1 tab(s) orally once a day  furosemide 20 mg oral tablet: 1 tab(s) orally every 48 hours  glimepiride 1 mg oral tablet: 1 tab(s) orally once a day   magnesium oxide 400 mg (241.3 mg elemental magnesium) oral tablet: 1 tab(s) orally once a day  metoclopramide 5 mg oral tablet: 1 tab(s) orally every 6 hours before meals if needed  pantoprazole 40 mg oral delayed release tablet: 1 tab(s) orally 2 times a day  rOPINIRole 0.5 mg oral tablet: 1 tab(s) orally 3 times a day  Vitamin D2 50,000 intl units (1.25 mg) oral capsule: 1 cap(s) orally once a week

## 2020-03-30 NOTE — DISCHARGE NOTE NURSING/CASE MANAGEMENT/SOCIAL WORK - PATIENT PORTAL LINK FT
You can access the FollowMyHealth Patient Portal offered by [facility] by registering at the following website: [email or web address]. By joining [employer]’s FollowMyHealth portal, you will also be able to view your health information using other applications (apps) compatible with our system.

## 2020-03-30 NOTE — DISCHARGE NOTE PROVIDER - CARE PROVIDERS DIRECT ADDRESSES
,DirectAddress_Unknown,[email or web address],DirectAddress_Unknown

## 2020-03-30 NOTE — DISCHARGE NOTE PROVIDER - NSDCCPCAREPLAN_GEN_ALL_CORE_FT
PRINCIPAL DISCHARGE DIAGNOSIS  Diagnosis: Anemia  Assessment and Plan of Treatment: Anemia is a condition in which the concentration of red blood cells or hemoglobin in the blood is below normal. Hemoglobin is a substance in red blood cells that carries oxygen to the tissues of the body. Anemia results in not enough oxygen reaching these tissues which can cause symptoms such as weakness, dizziness/lightheadedness, shortness of breath, chest pain, paleness, or nausea. The cause of your anemia may or may not be determined immediately. If your hemoglobin was dangerously low, you may have received a blood transfusion. Usually reactions to transfusions occur immediately but monitor yourself for any fevers, rash, or shortness of breath.  SEEK IMMEDIATE MEDICAL CARE IF YOU HAVE ANY OF THE FOLLOWING SYMPTOMS: extreme weakness/chest pain/shortness of breath, black or bloody stools, vomiting blood, fainting, fever, or any signs of dehydration. PRINCIPAL DISCHARGE DIAGNOSIS  Diagnosis: Anemia  Assessment and Plan of Treatment: Anemia is a condition in which the concentration of red blood cells or hemoglobin in the blood is below normal. Hemoglobin is a substance in red blood cells that carries oxygen to the tissues of the body. Anemia results in not enough oxygen reaching these tissues which can cause symptoms such as weakness, dizziness/lightheadedness, shortness of breath, chest pain, paleness, or nausea. The cause of your anemia may or may not be determined immediately. If your hemoglobin was dangerously low, you may have received a blood transfusion. Usually reactions to transfusions occur immediately but monitor yourself for any fevers, rash, or shortness of breath.  SEEK IMMEDIATE MEDICAL CARE IF YOU HAVE ANY OF THE FOLLOWING SYMPTOMS: extreme weakness/chest pain/shortness of breath, black or bloody stools, vomiting blood, fainting, fever, or any signs of dehydration.  please follow up with your gastroenterologist as outpatient for continued care. PRINCIPAL DISCHARGE DIAGNOSIS  Diagnosis: Anemia  Assessment and Plan of Treatment: Anemia is a condition in which the concentration of red blood cells or hemoglobin in the blood is below normal. Hemoglobin is a substance in red blood cells that carries oxygen to the tissues of the body. Anemia results in not enough oxygen reaching these tissues which can cause symptoms such as weakness, dizziness/lightheadedness, shortness of breath, chest pain, paleness, or nausea. The cause of your anemia may or may not be determined immediately. If your hemoglobin was dangerously low, you may have received a blood transfusion. Usually reactions to transfusions occur immediately but monitor yourself for any fevers, rash, or shortness of breath.  SEEK IMMEDIATE MEDICAL CARE IF YOU HAVE ANY OF THE FOLLOWING SYMPTOMS: extreme weakness/chest pain/shortness of breath, black or bloody stools, vomiting blood, fainting, fever, or any signs of dehydration.  please follow up with your gastroenterologist as outpatient for continued care.      SECONDARY DISCHARGE DIAGNOSES  Diagnosis: Leukocytosis  Assessment and Plan of Treatment: Please take your antibiotic as prescribed.

## 2020-03-30 NOTE — PROGRESS NOTE ADULT - ASSESSMENT
66 yo mostly bedridden female with PMH of MS, T2DM with diabetic gastroparesis, Gastric cancer s/p Bilroth 2 and chemotherapy, currently in remission as in chart from Oncology in Dec 2019 as evaluated from PET scan, H/O ulcerative esophagitis/erosive gastritis is admitted with confusion likely due to metabolic encephalopathy and GI is being for acute drop in Hg and vomiting.    Prior EGD: 10/2019: Solid food in stomach which is consistent with gastroparesis, Grade 3 erosive esophagitis, erosive gastritis.   Prior Colonoscopy: 2018, poor prep, showing int hemorrhoids, diverticulosis and asked to repeat Colonoscopy in 1yr.     1- Vomiting likely 2/2 Gastroparesis improved on reglan and zofran, small frequent meals, low fat, consider repeat abdominal imaging as outpatient,  home PPI q12h for hx of severe esphagitis, c/w reglan and zofran, monitor QTC, extrapyramidal symptoms and avoid long term use, needs repeat egd as o/p for follow up for severe esophagitis       2- History of gastric ca s/p surgery and chemott, followed conservatively for now as per chart, PET scan showing FDG + lesion concerning for recurrence, previous EGDs limited because of food content in stomach 2/2 gastroparesis, consider repeat EGD as o/p after 2 days NPO and prokinetic agents, consider repeat imaging       3- Anemia, iron def (sat 6%), no signs of active GI bleed, Hb stable now, s/p 2 u PRBC 3/26, s/p iv iron, will get po iron upon d/c, add vit c to optimize absorption, might still need iv iron supplementation in view of GI anatomical change and possible malabsorption, check tsh, consider outpatient egd and colonoscopy with dr leo     4- HCM, poor prep on colonoscopy in 2018,repeat as o/p with dr leo 66 yo mostly bedridden female with PMH of MS, T2DM with diabetic gastroparesis, Gastric cancer s/p Bilroth 2 and chemotherapy, currently in remission as in chart from Oncology in Dec 2019 as evaluated from PET scan, H/O ulcerative esophagitis/erosive gastritis is admitted with confusion likely due to metabolic encephalopathy and GI is being for acute drop in Hg and vomiting.    Prior EGD: 10/2019: Solid food in stomach which is consistent with gastroparesis, Grade 3 erosive esophagitis, erosive gastritis.   Prior Colonoscopy: 2018, poor prep, showing int hemorrhoids, diverticulosis and asked to repeat Colonoscopy in 1yr.     1- Vomiting likely 2/2 Gastroparesis improved on reglan and zofran, small frequent meals, low fat, consider repeat abdominal imaging as outpatient,  home PPI q12h for hx of severe esphagitis, c/w reglan and zofran, monitor QTC, extrapyramidal symptoms and avoid long term use, needs repeat egd as o/p for follow up for severe esophagitis     2- History of gastric ca s/p surgery and chemott, followed conservatively for now as per chart, PET scan showing FDG + lesion concerning for recurrence, previous EGDs limited because of food content in stomach 2/2 gastroparesis, consider repeat EGD as o/p after 2 days NPO and prokinetic agents, consider repeat imaging       3- Anemia, iron def (sat 6%), no signs of active GI bleed, Hb stable now, s/p 2 u PRBC 3/26, s/p iv iron, will get po iron upon d/c, add vit c to optimize absorption, might still need iv iron supplementation in view of GI anatomical change and possible malabsorption, check tsh, consider outpatient egd and colonoscopy with dr leo     4- HCM, poor prep on colonoscopy in 2018,repeat as o/p with dr leo

## 2020-03-30 NOTE — DISCHARGE NOTE PROVIDER - HOSPITAL COURSE
The patient is a 66 yo female w/ PMHx of  MS (Not on active treatment), T2DM with diabetic gastroparesis, Gastric cancer s/p resection and chemotherapy, currently in remission (Dr Toussaint), esophageal ulcer s/p EGD last yr on PPI came to the hospital w/ chief complaint of confusion.        As per daughter his baseline mental status is very coherent, understands questions and takes part in conversation, no diagnosed dementia. Baseline ambulation: Pt has been mostly bedridden since december of last year. prior to that, used to walk with walker and lives w/ 12 hour HHA.        CTH showed Stable MS lesions; no acute intracranial pathology. REEG showed sowing, w/ no seizure activity.        Patient had significant rt arm swelling - probably from portacath, venous duplex of UE showed no DVT.        Patient had Hb of 6.6 on admission and was transfused 2 units PRBC. Hb remained stable. There was no evidence of acute bleeding.        GI saw patient and recommended _________________. The patient is a 64 yo female w/ PMHx of  MS (Not on active treatment), T2DM with diabetic gastroparesis, Gastric cancer s/p resection and chemotherapy, currently in remission (Dr Toussaint), esophageal ulcer s/p EGD last yr on PPI came to the hospital w/ chief complaint of confusion.        As per daughter his baseline mental status is very coherent, understands questions and takes part in conversation, no diagnosed dementia. Baseline ambulation: Pt has been mostly bedridden since december of last year. prior to that, used to walk with walker and lives w/ 12 hour HHA.        CTH showed Stable MS lesions; no acute intracranial pathology. REEG showed sowing, w/ no seizure activity.        Patient had significant rt arm swelling - probably from portacath, venous duplex of UE showed no DVT.        Patient had Hb of 6.6 on admission and was transfused 2 units PRBC. Hb remained stable. There was no evidence of acute bleeding.        GI saw patient and recommended outpatient follow up.

## 2020-03-30 NOTE — DISCHARGE NOTE PROVIDER - CARE PROVIDER_API CALL
Johnson Brasher (DO)  Internal Medicine  [address] [place] [address]  Phone: [phone]  Fax: [phone]  Follow Up Time: 2 weeks    Andi Toussaint (MD)  Internal Medicine; Medical Oncology  [address] [place] [id number]  Phone: [phone]  Fax: [phone]  Follow Up Time: 1 week    Jc Velazquez (DO)  Gastroenterology  [address] [place] [id number]  Phone: [phone]  Fax: [phone]  Follow Up Time: 1 week

## 2020-03-30 NOTE — DISCHARGE NOTE PROVIDER - NSDCFUSCHEDAPPT_GEN_ALL_CORE_FT
LEFTY CARRASQUILLO ; 04/09/2020 ; NP [facility] LEFTY Arvizu ; 05/07/2020 ; [facility] LEFTY Flores ; 05/07/2020 ; [facility] [address] Kevin CARLOS LEFTY CARRASQUILLO ; 04/09/2020 ; NP Chem[facility] LEFTY Arvizu ; 05/07/2020 ; [facility] [address] LEFTY Flores ; 05/07/2020 ; [facility] Kevin CARLOS LEFTY CARRASQUILLO ; 04/09/2020 ; NP Chemo [facility] Inf[facility] [address] LEFTY Arvizu ; 05/07/2020 ; [facility] [address] LEFTY Flores ; 05/07/2020 ; [facility] Kevin CARLOS LEFTY CARRASQUILLO ; 04/09/2020 ; NP Chem[facility] LEFTY Arvizu ; 05/07/2020 ; [facility] LEFTY Flores ; 05/07/2020 ; [facility] Kevin CARLOS

## 2020-03-31 NOTE — CHART NOTE - NSCHARTNOTEFT_GEN_A_CORE
<<<RESIDENT DISCHARGE NOTE>>>     LEFTY CARRASQUILLO  MRN-[id number]    Vital Signs:  T(C): 36.5, Max: 36.5 (03-30 @ 20:41)  T(F): 97.7, Max: 97.7 (03-30 @ 20:41)  HR: 97 (92 - 99)  BP: 105/57 (87/55 - 124/73)  RR: 18 (18 - 18)  SpO2: 96% (89% - 96%)    Physical Exam:  General: Awake, Alert. Not in acute distress.  Heart: Regular rate and rhythm; S1, S2; No murmurs.  Lungs: Clear to auscultation bilaterally.  Abdomen: Soft, nontender, nondistended.  Extremities: Edema in upper & lower extremities.  Neuro: No focal deficits.    TEST RESULTS:  CBC (03-31 @ 10:32)                        Hgb: 10.2   WBC: 20.32 )-----------------( Plts: 342                              Hct: 32.2             FINAL DISCHARGE INTERVIEW:    Resident Present: (Name: Renuka Angel MD)     DISCHARGE MEDICATION RECONCILIATION    Reviewed with Attending (Name: Yelena)    DISPOSITION:     [] Home,    [x] Home with Home Aid,   []  SNF/ NH,    [] Acute Rehab (4A),   [] Other (Specify: )

## 2020-03-31 NOTE — PROGRESS NOTE ADULT - SUBJECTIVE AND OBJECTIVE BOX
FOREIGNLEFTY  65y  Female      Patient is a 65y old  Female who presents with a chief complaint of Altered mental status (30 Mar 2020 14:01)      INTERVAL HPI/OVERNIGHT EVENTS:  She feels ok, she wants to go home. No chest pain or SOB, no fever.   Vital Signs Last 24 Hrs  T(C): 36.5 (30 Mar 2020 20:41), Max: 36.5 (30 Mar 2020 20:41)  T(F): 97.7 (30 Mar 2020 20:41), Max: 97.7 (30 Mar 2020 20:41)  HR: 97 (31 Mar 2020 06:41) (92 - 97)  BP: 105/57 (31 Mar 2020 06:41) (87/55 - 106/55)  BP(mean): --  RR: --  SpO2: 96% (31 Mar 2020 07:49) (89% - 96%)            Consultant(s) Notes Reviewed:  [x ] YES  [ ] NO          MEDICATIONS  (STANDING):  baclofen 20 milliGRAM(s) Oral two times a day  chlorhexidine 4% Liquid 1 Application(s) Topical <User Schedule>  collagenase Ointment 1 Application(s) Topical daily  DULoxetine 60 milliGRAM(s) Oral daily  ergocalciferol 60888 Unit(s) Oral every week  iron sucrose IVPB 200 milliGRAM(s) IV Intermittent every 48 hours  magnesium oxide 400 milliGRAM(s) Oral daily  metoclopramide 5 milliGRAM(s) Oral every 6 hours  pantoprazole    Tablet 40 milliGRAM(s) Oral two times a day  rOPINIRole 0.5 milliGRAM(s) Oral three times a day    MEDICATIONS  (PRN):      LABS                          10.2   20.32 )-----------( 342      ( 31 Mar 2020 10:32 )             32.2                 Lactate Trend        CAPILLARY BLOOD GLUCOSE      POCT Blood Glucose.: 159 mg/dL (31 Mar 2020 11:24)      Culture - Urine (collected 03-27-20 @ 01:20)  Source: .Urine Clean Catch (Midstream)  Final Report (03-28-20 @ 11:03):    <10,000 CFU/mL Normal Urogenital Gemini        RADIOLOGY & ADDITIONAL TESTS:    Imaging Personally Reviewed:  [ ] YES  [ ] NO    HEALTH ISSUES - PROBLEM Dx:        PHYSICAL EXAM:  GENERAL: Bed bound  HEAD:  Atraumatic, Normocephalic  EYES: EOMI, PERRLA, conjunctiva and sclera clear  NECK: Supple, No JVD  CHEST/LUNG: bibasilar crackles.   HEART: Regular rate and rhythm; S1 S2  ABDOMEN: Soft, Nontender, Nondistended; Bowel sounds present  EXTREMITIES:  Upper extremities edema R>L  PSYCH: AAOx3  NEUROLOGY: weakness in LE 2/5  SKIN: No rashes or lesions
FOREIGNLEFTY  65y  Female  ***My note supersedes ALL resident notes that I sign.  My corrections for their notes are in my note.***    I can be reached directly on [id number]. My office number is [phone]. My personal cell number is [phone].    INTERVAL EVENTS: Here for f/u of vomiting. Pt doing better no vomiting. Pt is awake/alert. She is able to eat/drink.    T(F): 96.2 (03-29-20 @ 04:52), Max: 97.2 (03-28-20 @ 14:43)  HR: 83 (03-29-20 @ 04:52) (80 - 89)  BP: 92/50 (03-29-20 @ 04:52) (90/51 - 92/56)  RR: 18 (03-29-20 @ 04:52) (18 - 18)  SpO2: 95% (03-29-20 @ 00:31) (95% - 95%)    GENERAL: NAD, no signs of respiratory distress  HEENT: EOMI, PERRL, sclera white; mouth clr; tongue nl; nose clear  NECK: no nodes, No JVD  CHEST/LUNG: Clear to auscultation bilaterally  HEART: s1 s2, Regular rate and rhythm; No murmurs;   ABDOMEN: Soft, Nontender, Nondistended; Bowel sounds present; No guarding  EXTREMITIES:  rt arm is swollen 3+; lt arm has some swelling 1+; there is 1-2+ edema in legs; feet are cold - no cyanosis; DP pulses are not palp  NEUROLOGY: aa ox3; CN seem intact; almost no mvmt in legs and minor wiggle of toes b/l (1-/5); can slightly lift arms, but not completely (2/5); sensation intact  SKIN: pressure ulcer right side + (present on admit)    LABS:                        9.8     (    90.9   13.21 )-----------( ---------      295      ( 29 Mar 2020 12:06 )             30.1    (    16.3     WBC Count: 13.21 K/uL (03-29-20 @ 12:06) - stable (could be related to malign or iron def)  WBC Count: 14.36 K/uL (03-28-20 @ 07:07)  WBC Count: 13.68 K/uL (03-27-20 @ 11:37)  WBC Count: 14.80 K/uL (03-26-20 @ 12:20)    CARDIAC MARKERS ( 28 Mar 2020 11:31 )  x     / 0.02 ng/mL / x     / x     / x        CAPILLARY BLOOD GLUCOSE  POCT Blood Glucose.: 171 (03-29-20 @ 11:35)  POCT Blood Glucose.: 96 (03-29-20 @ 07:41)  POCT Blood Glucose.: 104 (03-28-20 @ 21:08)  POCT Blood Glucose.: 109 (03-28-20 @ 16:45)  POCT Blood Glucose.: 93 (03-28-20 @ 16:36)  POCT Blood Glucose.: 151 (03-28-20 @ 11:39)  POCT Blood Glucose.: 130 (03-28-20 @ 07:54)  POCT Blood Glucose.: 120 (03-27-20 @ 21:09)    RADIOLOGY & ADDITIONAL TESTS:      MEDICATIONS:    baclofen 20 milliGRAM(s) Oral two times a day  chlorhexidine 4% Liquid 1 Application(s) Topical <User Schedule>  collagenase Ointment 1 Application(s) Topical daily  DULoxetine 60 milliGRAM(s) Oral daily  ergocalciferol 83666 Unit(s) Oral every week  furosemide    Tablet 20 milliGRAM(s) Oral daily  iron sucrose IVPB 200 milliGRAM(s) IV Intermittent every 48 hours  magnesium oxide 400 milliGRAM(s) Oral daily  metoclopramide 5 milliGRAM(s) Oral every 6 hours  pantoprazole    Tablet 40 milliGRAM(s) Oral two times a day  rOPINIRole 0.5 milliGRAM(s) Oral three times a day
FOREIGNLEFTY  65y  Female  ***My note supersedes ALL resident notes that I sign.  My corrections for their notes are in my note.***    I can be reached directly on [id number]. My office number is [phone]. My personal cell number is [phone].    INTERVAL EVENTS: Here for f/u of AMS. MS back to baseline. Pt is vomiting x3, prob related to gastroparesis. No bleeding. Pt OK for now.    T(F): 96.6 (20 @ 04:47), Max: 96.6 (20 @ 04:47)  HR: 110 (20 @ 04:47) (105 - 110)  BP: 94/52 (20 @ 04:47) (94/52 - 102/53)  RR: 18 (20 @ 04:47) (18 - 18)  SpO2: --    GENERAL: NAD, no signs of respiratory distress  HEENT: EOMI, PERRL, sclera white; mouth clr; tongue nl; nose clear  NECK: no nodes, No JVD  CHEST/LUNG: Clear to auscultation bilaterally  HEART: s1 s2, Regular rate and rhythm; No murmurs;   ABDOMEN: Soft, Nontender, Nondistended; Bowel sounds present; No guarding  EXTREMITIES:  rt arm is swollen 3+; lt arm has some swelling 1+; there is 1-2+ edema in legs; feet are cold - no cyanosis today; DP pulses are not palp  NEUROLOGY: aa ox3; CN seem intact; almost no mvmt in legs and minor wiggle of toes b/l (1-/5); can slightly lift arms, but not completely (2/5); sensation intact  SKIN: pressure ulcer right side + (present on admit)    LABS:                        10.1    (    88.8   14.36 )-----------( ---------      305      ( 28 Mar 2020 07:07 )             31.6    (    16.5     WBC Count: 14.36 K/uL (20 @ 07:07)  WBC Count: 13.68 K/uL (20 @ 11:37)  WBC Count: 14.80 K/uL (20 @ 12:20)    Hemoglobin: 10.1 g/dL ( @ 07:07)  Hemoglobin: 9.4 g/dL ( @ 11:37)  Hemoglobin: 6.6 g/dL ( @ 12:20) - got 2 u prbc    135   (   101   (   132      20 @ 07:07  ----------------------               3.7   (   22   (   21                             -----                        0.5  Ca  7.5   Mg  --    P   --     LFT  4.7  (  0.3  (  24       20 @ 12:20  -------------------------  2.2  (  202  (  23    CARDIAC MARKERS ( 28 Mar 2020 11:31 )  x     / 0.02 ng/mL / x     / x     / x        Urinalysis Basic - ( 27 Mar 2020 01:20 )    Color: Yellow / Appearance: Clear / S.034 / pH: x  Gluc: x / Ketone: Negative  / Bili: Negative / Urobili: 3 mg/dL   Blood: x / Protein: Trace / Nitrite: Negative   Leuk Esterase: Moderate / RBC: 3 /HPF / WBC 14 /HPF   Sq Epi: x / Non Sq Epi: 2 /HPF / Bacteria: Negative    RADIOLOGY & ADDITIONAL TESTS:  < from: [facility] Duplex Upper Ext Vein Scan, Right (20 @ 13:33) >    Impression:    No evidence of deep or superficial thrombosis in the bilateral upper extremities.    < end of copied text >      MEDICATIONS:    baclofen 20 milliGRAM(s) Oral two times a day  chlorhexidine 4% Liquid 1 Application(s) Topical <User Schedule>  collagenase Ointment 1 Application(s) Topical daily  DULoxetine 60 milliGRAM(s) Oral daily  ergocalciferol 50251 Unit(s) Oral every week  folic acid 1 milliGRAM(s) Oral daily  furosemide    Tablet 20 milliGRAM(s) Oral daily  iron sucrose IVPB 200 milliGRAM(s) IV Intermittent every 48 hours  magnesium oxide 400 milliGRAM(s) Oral daily  metoclopramide Injectable 5 milliGRAM(s) IV Push every 6 hours  pantoprazole    Tablet 40 milliGRAM(s) Oral two times a day  rOPINIRole 0.5 milliGRAM(s) Oral three times a day  CAPILLARY BLOOD GLUCOSE      POCT Blood Glucose.: 151 (20 @ 11:39)  POCT Blood Glucose.: 130 (20 @ 07:54)  POCT Blood Glucose.: 120 (20 @ 21:09)  POCT Blood Glucose.: 86 (20 @ 16:36)  POCT Blood Glucose.: 77 (20 @ 11:48)  POCT Blood Glucose.: 118 (20 @ 21:10)
Hospital Day:  1d    Subjective:    Patient is a 65y old  Female who presents with a chief complaint of Altered mental status (26 Mar 2020 16:13)    The patient received 2 units PRBCs overnight. Tolerated well. The patient was seen and examined at the bedside. Patient is calm and comfortable this morning. She is alert and awake and answers questions appropriately. Denies any dizziness, shortness of breath, chest pain.     Past Medical Hx:   Multiple sclerosis  DM type 2 (diabetes mellitus, type 2)  Stomach cancer    Past Sx:  Portacath in place  S/P partial gastrectomy  H/O total hysterectomy with bilateral salpingo-oophorectomy (BSO)  H/O partial thyroidectomy    Allergies:  Zithromax (Hives)    Current Meds:   Stand Meds:  baclofen 20 milliGRAM(s) Oral two times a day  chlorhexidine 4% Liquid 1 Application(s) Topical <User Schedule>  collagenase Ointment 1 Application(s) Topical daily  DULoxetine 60 milliGRAM(s) Oral daily  ergocalciferol 29943 Unit(s) Oral every week  folic acid 1 milliGRAM(s) Oral daily  furosemide    Tablet 20 milliGRAM(s) Oral daily  iron sucrose IVPB 100 milliGRAM(s) IV Intermittent every 24 hours  pantoprazole    Tablet 40 milliGRAM(s) Oral two times a day  rOPINIRole 0.5 milliGRAM(s) Oral three times a day    PRN Meds:    HOME MEDICATIONS:  baclofen 20 mg oral tablet: orally 2 times a day  dalfampridine 10 mg oral tablet, extended release: 1 tab(s) orally every 12 hours  DULoxetine 60 mg oral delayed release capsule: 1 cap(s) orally once a day  folic acid 1 mg oral tablet: 1 tab(s) orally once a day  furosemide 20 mg oral tablet: 1 tab(s) orally every 48 hours  glimepiride 2 mg oral tablet: 1 tab(s) orally once a day  pantoprazole 40 mg oral delayed release tablet: 1 tab(s) orally 2 times a day  rOPINIRole 0.5 mg oral tablet: 1 tab(s) orally 3 times a day  Vitamin D2 50,000 intl units (1.25 mg) oral capsule: 1 cap(s) orally once a week  Zofran 8 mg oral tablet: 1 tab(s) orally 3 times a day      Vital Signs:   T(F): 97.2 (20 @ 13:42), Max: 97.9 (20 @ 21:30)  HR: 92 (20 @ 13:42) (80 - 98)  BP: 98/70 (20 @ 13:42) (82/53 - 116/59)  RR: 18 (20 @ 13:42) (16 - 20)  SpO2: 97% (20 @ 08:46) (97% - 99%)        Physical Exam:   General: Patient resting comfortably in bed, NAD  HEENT: NCAT, conjunctiva clear, sclera white, PEERLA, EOMI, moist mucous membranes, normal orophaynx  Neck: No masses, no JVD, no cervical lymphadenopathy; peripheral IV IJ access in place  Chest: right-sided port in place  Respiratory: lungs clear to auscultation bilaterally  CV: Normal rate, regular rhythm, normal S1/S2  GI: abdomen soft, non-tender, non-distended, no masses, normal bowel sounds  Extremities: Well-perfused, no clubbing, no cyanosis, bilateral upper and lower extremity edema; per patient, right arm edema is new  Skin: warm and dry  Neurology: AAOx3, mentating appropriately, follows commands, nonfocal    Labs:                         9.4    13.68 )-----------( 283      ( 27 Mar 2020 11:37 )             27.4     Neutophil% 87.4, Lymphocyte% 4.7, Monocyte% 6.7, Bands% 1.0 20 @ 11:37    26 Mar 2020 12:20    133    |  99     |  22     ----------------------------<  119    3.9     |  22     |  0.5      Ca    7.7        26 Mar 2020 12:20  Mg     1.7       26 Mar 2020 12:20    TPro  4.7    /  Alb  2.2    /  TBili  0.3    /  DBili  x      /  AST  24     /  ALT  23     /  AlkPhos  202    26 Mar 2020 12:20        Amylase --, Lipase <3, 20 @ 12:20      Serum Pro-Brain Natriuretic Peptide: 590 pg/mL (20 @ 12:20)    Troponin <0.01, CKMB --, CK -- 20 @ 12:20    Iron 14, TIBC 228, %Sat 6 Ferritin 25 20 @ 14:05      Urinalysis Basic - ( 27 Mar 2020 01:20 )    Color: Yellow / Appearance: Clear / S.034 / pH: x  Gluc: x / Ketone: Negative  / Bili: Negative / Urobili: 3 mg/dL   Blood: x / Protein: Trace / Nitrite: Negative   Leuk Esterase: Moderate / RBC: 3 /HPF / WBC 14 /HPF   Sq Epi: x / Non Sq Epi: 2 /HPF / Bacteria: Negative            Radiology:   < from: Xray Chest 1 View- PORTABLE-Urgent (20 @ 11:37) >    EXAM:  XR CHEST PORTABLE URGENT 1V            PROCEDURE DATE:  2020            INTERPRETATION:  Clinical History / Reason for exam: Altered mental status    Comparison : Chest radiograph February 3, 2020.    Technique/Positioning: Single AP view of the chest.    Findings:    Support devices: Right-sided Mediport is unchanged.    Cardiac/mediastinum/hilum: Unchanged.    Lung parenchyma/Pleura: Chronic right basilar opacity/effusion. Ill-defined left-sided interstitial opacities. No pneumothorax.    Skeleton/soft tissues: Unchanged.    Impression:      Chronic right basilar opacity/effusion.    Ill-defined left sided interstitial opacities.                  ELLIOT LANDAU M.D., ATTENDING RADIOLOGIST  This document has been electronicallysigned. Mar 26 2020 11:43AM                < end of copied text >
Hospital Day:  2d    Subjective:    Patient is a 65y old  Female who presents with a chief complaint of Altered mental status (27 Mar 2020 13:49)    Patient had 2 episodes of non-bloody, non-bilious vomiting overnight and 1 episode this morning. The patient was seen and examined at the bedside. States that she has bouts of nausea that is relieved with vomiting. Not experiencing nausea at the time of exam. Denies abdominal pain and diarrhea. Denies fever, chills, headache, dizziness, chest pain, palpitations, shortness of breath.    Past Medical Hx:   Multiple sclerosis  DM type 2 (diabetes mellitus, type 2)  Stomach cancer    Past Sx:  Portacath in place  S/P partial gastrectomy  H/O total hysterectomy with bilateral salpingo-oophorectomy (BSO)  H/O partial thyroidectomy    Allergies:  Zithromax (Hives)    Current Meds:   Standng Meds:  baclofen 20 milliGRAM(s) Oral two times a day  chlorhexidine 4% Liquid 1 Application(s) Topical <User Schedule>  collagenase Ointment 1 Application(s) Topical daily  DULoxetine 60 milliGRAM(s) Oral daily  ergocalciferol 75751 Unit(s) Oral every week  folic acid 1 milliGRAM(s) Oral daily  furosemide    Tablet 20 milliGRAM(s) Oral daily  iron sucrose IVPB 200 milliGRAM(s) IV Intermittent every 48 hours  magnesium oxide 400 milliGRAM(s) Oral daily  metoclopramide Injectable 5 milliGRAM(s) IV Push every 6 hours  pantoprazole    Tablet 40 milliGRAM(s) Oral two times a day  rOPINIRole 0.5 milliGRAM(s) Oral three times a day    PRN Meds:    HOME MEDICATIONS:  baclofen 20 mg oral tablet: orally 2 times a day  dalfampridine 10 mg oral tablet, extended release: 1 tab(s) orally every 12 hours  DULoxetine 60 mg oral delayed release capsule: 1 cap(s) orally once a day  folic acid 1 mg oral tablet: 1 tab(s) orally once a day  furosemide 20 mg oral tablet: 1 tab(s) orally every 48 hours  glimepiride 2 mg oral tablet: 1 tab(s) orally once a day  pantoprazole 40 mg oral delayed release tablet: 1 tab(s) orally 2 times a day  rOPINIRole 0.5 mg oral tablet: 1 tab(s) orally 3 times a day  Vitamin D2 50,000 intl units (1.25 mg) oral capsule: 1 cap(s) orally once a week  Zofran 8 mg oral tablet: 1 tab(s) orally 3 times a day      Vital Signs:   T(F): 96.6 (20 @ 04:47), Max: 97.2 (20 @ 13:42)  HR: 110 (20 @ 04:47) (92 - 110)  BP: 94/52 (20 @ 04:47) (94/52 - 102/53)  RR: 18 (20 @ 04:47) (18 - 18)  SpO2: --        Physical Exam:   General: Patient resting comfortably in bed, NAD  HEENT: NCAT, conjunctiva clear, sclera white, PEERLA, EOMI, moist mucous membranes, normal orophaynx  Neck: No masses, no JVD, no cervical lymphadenopathy; peripheral IV IJ access in place  Chest: right-sided port in place  Respiratory: lungs clear to auscultation bilaterally  CV: Normal rate, regular rhythm, normal S1/S2  GI: abdomen soft, non-tender, non-distended, no masses, normal bowel sounds  Extremities: Well-perfused, no clubbing, no cyanosis, bilateral upper and lower extremity edema; per patient, right arm edema is new  Skin: warm and dry  Neurology: AAOx3, mentating appropriately, follows commands, nonfocal    Labs:                         10.1   14.36 )-----------( 305      ( 28 Mar 2020 07:07 )             31.6     Neutophil% 86.9, Lymphocyte% 4.0, Monocyte% 7.8, Bands% 1.0 20 @ 07:07    28 Mar 2020 07:07    135    |  101    |  21     ----------------------------<  132    3.7     |  22     |  0.5      Ca    7.5        28 Mar 2020 07:07  Mg     1.7       26 Mar 2020 12:20    TPro  4.7    /  Alb  2.2    /  TBili  0.3    /  DBili  x      /  AST  24     /  ALT  23     /  AlkPhos  202    26 Mar 2020 12:20      Chol 98, LDL 50, HDL 24, VLDL --,  20 @ 07:07    Amylase --, Lipase <3, 20 @ 12:20      Serum Pro-Brain Natriuretic Peptide: 590 pg/mL (20 @ 12:20)    Troponin <0.01, CKMB --, CK -- 20 @ 12:20    Iron 14, TIBC 228, %Sat 6 Ferritin 25 20 @ 14:05      Urinalysis Basic - ( 27 Mar 2020 01:20 )    Color: Yellow / Appearance: Clear / S.034 / pH: x  Gluc: x / Ketone: Negative  / Bili: Negative / Urobili: 3 mg/dL   Blood: x / Protein: Trace / Nitrite: Negative   Leuk Esterase: Moderate / RBC: 3 /HPF / WBC 14 /HPF   Sq Epi: x / Non Sq Epi: 2 /HPF / Bacteria: Negative
INTERVAL HPI/OVERNIGHT EVENTS:    66 yo mostly bedridden female with PMH of MS, T2DM with diabetic gastroparesis, Gastric cancer s/p Bilroth 2 and chemotherapy, currently in remission as in chart from Oncology in Dec 2019 as evaluated from PET scan, H/O ulcerative esophagitis/erosive gastritis is admitted with confusion likely due to metabolic encephalopathy and GI is being for acute drop in Hg and vomiting. Patient is c/o non bilious non bloody vomiting 2-3 episodes for the last few days. Patient states she has H/O gastroparesis and she was on and off on reglan with some help. Now she is having difficulty tolerating food. Denies melena, hematochezia, hematemesis. Patient was also found to have Hg of 6.6 on admission which adequately responded to PRBC.       3/30 clinically better, vomiting improved, no bms    PAST MEDICAL & SURGICAL HISTORY:  Multiple sclerosis: not on any treatment  DM type 2 (diabetes mellitus, type 2)  Stomach cancer: in remission for 2 years.  Portacath in place  S/P partial gastrectomy  H/O total hysterectomy with bilateral salpingo-oophorectomy (BSO): in her 30s for fibroids  H/O partial thyroidectomy: thyroid polyps      Home Medications:  baclofen 20 mg oral tablet: orally 2 times a day (26 Mar 2020 17:14)  collagenase 250 units/g topical ointment: 1 application topically once a day (30 Mar 2020 13:41)  dalfampridine 10 mg oral tablet, extended release: 1 tab(s) orally every 12 hours (26 Mar 2020 17:14)  DULoxetine 60 mg oral delayed release capsule: 1 cap(s) orally once a day (26 Mar 2020 17:14)  folic acid 1 mg oral tablet: 1 tab(s) orally once a day (26 Mar 2020 17:14)  furosemide 20 mg oral tablet: 1 tab(s) orally every 48 hours (26 Mar 2020 17:14)  glimepiride 2 mg oral tablet: 1 tab(s) orally once a day (26 Mar 2020 17:14)  magnesium oxide 400 mg (241.3 mg elemental magnesium) oral tablet: 1 tab(s) orally once a day (30 Mar 2020 13:41)  metoclopramide 5 mg oral tablet: 1 tab(s) orally every 6 hours (30 Mar 2020 13:41)  pantoprazole 40 mg oral delayed release tablet: 1 tab(s) orally 2 times a day (26 Mar 2020 17:14)  rOPINIRole 0.5 mg oral tablet: 1 tab(s) orally 3 times a day (26 Mar 2020 17:14)  Vitamin D2 50,000 intl units (1.25 mg) oral capsule: 1 cap(s) orally once a week (26 Mar 2020 17:14)  Zofran 8 mg oral tablet: 1 tab(s) orally 3 times a day (26 Mar 2020 17:14)      MEDICATIONS  (STANDING):  baclofen 20 milliGRAM(s) Oral two times a day  chlorhexidine 4% Liquid 1 Application(s) Topical <User Schedule>  collagenase Ointment 1 Application(s) Topical daily  DULoxetine 60 milliGRAM(s) Oral daily  ergocalciferol 39204 Unit(s) Oral every week  furosemide    Tablet 20 milliGRAM(s) Oral once  iron sucrose IVPB 200 milliGRAM(s) IV Intermittent every 48 hours  magnesium oxide 400 milliGRAM(s) Oral daily  metoclopramide 5 milliGRAM(s) Oral every 6 hours  pantoprazole    Tablet 40 milliGRAM(s) Oral two times a day  rOPINIRole 0.5 milliGRAM(s) Oral three times a day    MEDICATIONS  (PRN):      Allergies    Zithromax (Hives)    Intolerances        Review of systems  General: mild fatigue   Skin: no new rash   Ophtalmologic: no new visual changes   Respiratory: no new shortness of breath   Cardiovascular: no new chest pain   Gastrointestinal: as per H&P   Genitourinary: no new dysuria   Neurological: no new weakness   otherwise as described above     PHYSICAL EXAM:   Vital Signs:  Vital Signs Last 24 Hrs  T(C): 35.8 (30 Mar 2020 13:00), Max: 36.5 (30 Mar 2020 05:54)  T(F): 96.5 (30 Mar 2020 13:00), Max: 97.7 (30 Mar 2020 05:54)  HR: 99 (30 Mar 2020 13:00) (88 - 99)  BP: 124/73 (30 Mar 2020 13:00) (89/59 - 124/73)  BP(mean): --  RR: 18 (30 Mar 2020 13:00) (18 - 18)  SpO2: 96% (30 Mar 2020 08:57) (96% - 96%)  Daily     Daily     GENERAL:  cachectic  SKIN: no new changes   HEENT:  NC/AT,  anicteric  CHEST:   no new increased effort, breath sounds clear  HEART:  Regular rhythm  ABDOMEN:  Soft, positive bowel sounds, non-tender, no distension, rigidity, rebound, guarding, ascites   EXTREMITIES:  no new cyanosis  PSYCHIATRIC: no change, aao x3       LABS:                        9.8    13.21 )-----------( 295      ( 29 Mar 2020 12:06 )             30.1       Hemoglobin: 9.8 g/dL (03-29-20 @ 12:06)  Hemoglobin: 10.1 g/dL (03-28-20 @ 07:07)      03-29    138  |  104  |  18  ----------------------------<  159<H>  3.4<L>   |  24  |  <0.5<L>    Ca    7.1<L>      29 Mar 2020 12:06  Mg     1.8     03-29                      RADIOLOGY & ADDITIONAL TESTS:
LEFTY CARRASQUILLO  65y  Female  ***My note supersedes ALL resident notes that I sign.  My corrections for their notes are in my note.***    I can be reached directly on [employer] [id number]. My office number is [phone]. My personal cell number is [phone].    INTERVAL EVENTS: Here for f/u of anemia. Pt doing quite well. MS is good. No vomiting. Can eat/drink. Did like the lasix for edema.     T(F): 96.5 (03-30-20 @ 13:00), Max: 97.7 (03-30-20 @ 05:54)  HR: 99 (03-30-20 @ 13:00) (88 - 99)  BP: 124/73 (03-30-20 @ 13:00) (89/59 - 124/73)  RR: 18 (03-30-20 @ 13:00) (18 - 18)  SpO2: 96% (03-30-20 @ 08:57) (96% - 96%)    GENERAL: NAD, no signs of respiratory distress  HEENT: EOMI, PERRL, sclera white; mouth clr; tongue nl; nose clear  NECK: no nodes, No JVD  CHEST/LUNG: Clear to auscultation bilaterally  HEART: s1 s2, Regular rate and rhythm; No murmurs;   ABDOMEN: Soft, Nontender, Nondistended; Bowel sounds present; No guarding  EXTREMITIES:  rt arm is swollen 2+; lt arm has some swelling 1+; there is 1+ edema in legs; feet are cold - no cyanosis; DP pulses are not palp    LABS:                        9.8     (    90.9   13.21 )-----------( ---------      295      ( 29 Mar 2020 12:06 )             30.1    (    16.3     WBC Count: 13.21 K/uL (03-29-20 @ 12:06)  WBC Count: 14.36 K/uL (03-28-20 @ 07:07)  WBC Count: 13.68 K/uL (03-27-20 @ 11:37)  WBC Count: 14.80 K/uL (03-26-20 @ 12:20)    Hemoglobin: 9.8 g/dL (03-29 @ 12:06)  Hemoglobin: 10.1 g/dL (03-28 @ 07:07)  Hemoglobin: 9.4 g/dL (03-27 @ 11:37)  Hemoglobin: 6.6 g/dL (03-26 @ 12:20)    Basic Metabolic Panel (03.29.20 @ 12:06)    Sodium, Serum: 138 mmol/L    Potassium, Serum: 3.4 mmol/L    Chloride, Serum: 104 mmol/L    Carbon Dioxide, Serum: 24 mmol/L    Anion Gap, Serum: 10 mmol/L    Blood Urea Nitrogen, Serum: 18 mg/dL    Creatinine, Serum: <0.5 mg/dL    Glucose, Serum: 159 mg/dL    Calcium, Total Serum: 7.1 mg/dL    eGFR if Non : 111    Alb 2.2, heidi Ca = 8.6    Magnesium, Serum (03.29.20 @ 12:06)    Magnesium, Serum: 1.8 mg/dL    CAPILLARY BLOOD GLUCOSE  POCT Blood Glucose.: 213 (03-30-20 @ 11:50)  POCT Blood Glucose.: 166 (03-30-20 @ 08:12)  POCT Blood Glucose.: 162 (03-29-20 @ 21:07)  POCT Blood Glucose.: 120 (03-29-20 @ 16:48)  POCT Blood Glucose.: 171 (03-29-20 @ 11:35)  POCT Blood Glucose.: 96 (03-29-20 @ 07:41)  POCT Blood Glucose.: 104 (03-28-20 @ 21:08)  POCT Blood Glucose.: 109 (03-28-20 @ 16:45)    RADIOLOGY & ADDITIONAL TESTS:    MEDICATIONS:    baclofen 20 milliGRAM(s) Oral two times a day  chlorhexidine 4% Liquid 1 Application(s) Topical <User Schedule>  collagenase Ointment 1 Application(s) Topical daily  DULoxetine 60 milliGRAM(s) Oral daily  ergocalciferol 22171 Unit(s) Oral every week  iron sucrose IVPB 200 milliGRAM(s) IV Intermittent every 48 hours  magnesium oxide 400 milliGRAM(s) Oral daily  metoclopramide 5 milliGRAM(s) Oral every 6 hours  pantoprazole    Tablet 40 milliGRAM(s) Oral two times a day  rOPINIRole 0.5 milliGRAM(s) Oral three times a day
JOINT SWELLING

## 2020-03-31 NOTE — PROGRESS NOTE ADULT - ASSESSMENT
A 64 yo female with PMH of MS (Not on active treatment), T2DM with diabetic gastroparesis, gastric cancer s/p resection and chemotherapy, currently in remission, esophageal ulcer s/p EGD last yr on PPI, presented with confusion.     A/P:   Acute encephalopathy: possible metabolic from anemia.   Improved. Back to baseline.   Head CT showed Stable MS lesions.   EEG showed no seizure activity.     Acute on chronic normocytic anemia:   History of esophageal ulcer:   2 RBCs given. No signs of active bleeding.   Hb is stable.   Discharge on ferrous sulfate. GI follow up outpatient.   Continue Protonix 40mg BID.     Upper extremities edema: Right>Left.   right Venous duplex is negative for DVT. On Feb 2020 she has bilateral venous duplex was negative as well.   Likely from lymphedema.     Leukocytosis:   WBC 20K. CXR showed stable infiltrates. No fever  Discharge on short course of oral antibiotics for possible aspiration Augmentin 875/125mg BID for 7 days.     Abnormal EKG  New T waves inversion V1-V4, old low voltage.   Troponin x2 negative.       T2 DM with diabetic gastroparesis w/ vomiting  Hold home glimepiride - can resume on d/c, but just at 1mg po q24  FS have been fine, can d/c FS  diabetic diet  make reglan 5mg po qac prn N/V     Stage 2 pressure ulcer - present on admission  barrier cream w/ RN    DVT ppx: SCDs for now    GI ppx: PPI    Activity: Bedridden; may be OOB to chair w/ berry    # FULL CODE     Discharge home today

## 2020-03-31 NOTE — CHART NOTE - NSCHARTNOTEFT_GEN_A_CORE
Registered Dietitian Follow-Up     ****Scroll for RD Recommendations at bottom of note****    Patient Profile Reviewed                           Yes [x]   No []     Nutrition History Previously Obtained        Yes [x]  No []       Pertinent Subjective Information: Pt is eating well, % documented. small frequent meals encouraged.     Pertinent Medical Interventions: Pt to be d/c home today or tomorrow pending HHA set up  AMS- back to baseline.   Pt has significant R arm swelling.   Acute on chronic normocytic anemia s/p transfusion.   Hypomagnesemia, replete PRN.   DMII with diabetic gastroparesis with vomiting, no episodes reported at present. Noted hx gastric cancer.     Diet order: DASH/TLC, CHO consistent, GERD, Ensure Compact TID     Anthropometrics:  - Ht. 157.48cm  - Wt. 63.5kg 3/30 vs 63kg per bed scale taken by RD in initial assessment -- stable  - %wt change  - BMI  - IBW 50kg+/-10%     Pertinent Lab Data: (3/31) H/H 10.2/32.2 (3/29) K 3.4, Cr <0.5, s gluc 159  recent  159 178 162 154     Pertinent Meds: FeSO4, reglan, [id number], vit D, protonix     Physical Findings:  - Appearance: bedbound, edema 3+ generalized, 4+ b/l arm/hands  - GI function: last BM 3/31  - Tubes:  - Oral/Mouth cavity: WDL  - Skin: pressure injury stage II R buttocks     Nutrition Requirements  Weight Used: 63kg, per initial assessment, can continue:     estimated energy needs = 5920-4895 kcal/day (30-35 kcal/kg CBW d/t stage II pressure ulcer, aim toward low-middle end of range given gastroparesis).   estimated protein needs = 79-95 g/day (1.25-1.5 g/kg CBW, reason mentioned above).   estimated fluid needs = 1mL/kcal or per LIP     Nutrient Intake: likely meeting est needs        [] Previous Nutrition Diagnosis: inadequate oral intake            [x] Ongoing          [] Resolved    [] No active nutrition diagnosis identified at this time     Nutrition Intervention meals and snacks, medical good supplement  1. Continue current diet and 1:1 feeds. No further diet interventions.     Goal/Expected Outcome: Pt to maintain PO intake >50% of meals and PO supplements within 3 days     Indicator/Monitoring: RD to monitor diet order, energy intake, NFPF, body comp, glucose and renal profile.    Pt not at risk f/u 7 days

## 2020-04-12 NOTE — ED ADULT NURSE NOTE - NSIMPLEMENTINTERV_GEN_ALL_ED
Implemented All Universal Safety Interventions:  Drift to call system. Call bell, personal items and telephone within reach. Instruct patient to call for assistance. Room bathroom lighting operational. Non-slip footwear when patient is off stretcher. Physically safe environment: no spills, clutter or unnecessary equipment. Stretcher in lowest position, wheels locked, appropriate side rails in place.

## 2020-04-12 NOTE — ED ADULT NURSE NOTE - PSH
H/O partial thyroidectomy  thyroid polyps  H/O total hysterectomy with bilateral salpingo-oophorectomy (BSO)  in her 30s for fibroids  Portacath in place    S/P partial gastrectomy

## 2020-04-12 NOTE — ED ADULT NURSE NOTE - PMH
DM type 2 (diabetes mellitus, type 2)    Multiple sclerosis  not on any treatment  Stomach cancer  in remission for 2 years.

## 2020-04-13 PROBLEM — G35 MULTIPLE SCLEROSIS: Chronic | Status: ACTIVE | Noted: 2018-05-09

## 2020-04-13 NOTE — CONSULT NOTE ADULT - SUBJECTIVE AND OBJECTIVE BOX
Neurology  Consult Note  04-13-20    Name:  LEFTY CARRASQUILLO; 65y (1955)    Reason for Admission: ALTERED MENTAL STATE;HYPOXIA;HYPOTENSION      Chief Complaint:  HPI:  66yo F from nursing home w/ PMH of MS (Not on active treatment), T2DM with diabetic gastroparesis, Gastric cancer s/p resection and chemotherapy, currently in remission (Dr Toussaint), esophageal ulcer s/p EGD last yr on PPI p/w AMS. Patient suddenly became AMS at nursing home on 4/12 at 8 pm and was sent to ED. Pt baseline AAO x 3, fully alert and oriented, but has bed bound since her fall last december. In ED, pt was noted to be still confused. Stroke code called. Upon arrival patient was confused and oriented x1. CT head showed no evidence of acute infarct or hemorrhage. She was intubated for airway protection. WBC 28k noted. CXR shows opacities as well as R pleural effusion. Covid19 PCR sent. (13 Apr 2020 01:26)/ Overnight CT angiogram of head and neck showed no evidence of large vessel occlusion. Patient is currently intubated. Eyes open but no regards.      Review of Systems:  Unable to obtain     Zithromax (Hives)      PMHx:   Multiple sclerosis  DM type 2 (diabetes mellitus, type 2)  Stomach cancer    PFHx:   FH: dementia    PSuHx:   Portacath in place  S/P partial gastrectomy  H/O total hysterectomy with bilateral salpingo-oophorectomy (BSO)  H/O partial thyroidectomy    PSoHx:   No illicit drug         Medications:  MEDICATIONS  (STANDING):  aspirin  chewable 81 milliGRAM(s) Oral daily  atorvastatin 40 milliGRAM(s) Oral at bedtime  cefepime   IVPB 2000 milliGRAM(s) IV Intermittent every 8 hours  chlorhexidine 0.12% Liquid 15 milliLiter(s) Oral Mucosa two times a day  chlorhexidine 4% Liquid 1 Application(s) Topical <User Schedule>  collagenase Ointment 1 Application(s) Topical daily  dexMEDEtomidine Infusion 0.2 MICROgram(s)/kG/Hr (3.34 mL/Hr) IV Continuous <Continuous>  dextrose 5%. 1000 milliLiter(s) (50 mL/Hr) IV Continuous <Continuous>  dextrose 50% Injectable 12.5 Gram(s) IV Push once  dextrose 50% Injectable 25 Gram(s) IV Push once  dextrose 50% Injectable 25 Gram(s) IV Push once  EPINEPHrine    Infusion 0.03 MICROgram(s)/kG/Min (3.75 mL/Hr) IV Continuous <Continuous>  ergocalciferol 43921 Unit(s) Oral every week  ferrous    sulfate 325 milliGRAM(s) Oral daily  folic acid 1 milliGRAM(s) Oral daily  heparin  Injectable 5000 Unit(s) SubCutaneous every 8 hours  hydroxychloroquine   Oral   hydroxychloroquine 800 milliGRAM(s) Oral every 24 hours  insulin lispro (HumaLOG) corrective regimen sliding scale   SubCutaneous three times a day before meals  morphine  Infusion. 1 mG/Hr (1 mL/Hr) IV Continuous <Continuous>  norepinephrine Infusion 0.04 MICROgram(s)/kG/Min (2.5 mL/Hr) IV Continuous <Continuous>  pantoprazole   Suspension 40 milliGRAM(s) Oral daily  rOPINIRole 0.5 milliGRAM(s) Oral three times a day  senna 2 Tablet(s) Oral at bedtime  sodium chloride 0.9%. 1000 milliLiter(s) (100 mL/Hr) IV Continuous <Continuous>  vancomycin  IVPB 1000 milliGRAM(s) IV Intermittent every 12 hours  vasopressin Infusion 0.02 Unit(s)/Min (1.2 mL/Hr) IV Continuous <Continuous>    MEDICATIONS  (PRN):  acetaminophen   Tablet .. 650 milliGRAM(s) Oral every 6 hours PRN Temp greater or equal to 38C (100.4F)  dextrose 40% Gel 15 Gram(s) Oral once PRN Blood Glucose LESS THAN 70 milliGRAM(s)/deciliter  glucagon  Injectable 1 milliGRAM(s) IntraMuscular once PRN Glucose LESS THAN 70 milligrams/deciliter    Vitals:  T(C): 36.5 (04-13-20 @ 04:24), Max: 37.3 (04-13-20 @ 01:39)  HR: 109 (04-13-20 @ 09:01) (89 - 131)  BP: 71/47 (04-13-20 @ 09:01) (71/47 - 148/74)  RR: 18 (04-13-20 @ 04:24) (18 - 22)  SpO2: 100% (04-13-20 @ 07:45) (99% - 100%)    Labs:                        10.9   28.65 )-----------( 421      ( 12 Apr 2020 22:55 )             34.5     04-12    129<L>  |  94<L>  |  25<H>  ----------------------------<  145<H>  4.2   |  21  |  0.8    Ca    8.0<L>      12 Apr 2020 22:55    TPro  4.7<L>  /  Alb  2.0<L>  /  TBili  0.8  /  DBili  x   /  AST  46<H>  /  ALT  46<H>  /  AlkPhos  330<H>  04-12    CAPILLARY BLOOD GLUCOSE      POCT Blood Glucose.: 115 mg/dL (13 Apr 2020 08:15)    LIVER FUNCTIONS - ( 12 Apr 2020 22:55 )  Alb: 2.0 g/dL / Pro: 4.7 g/dL / ALK PHOS: 330 U/L / ALT: 46 U/L / AST: 46 U/L / GGT: x             PT/INR - ( 12 Apr 2020 22:55 )   PT: 15.80 sec;   INR: 1.37 ratio         PTT - ( 12 Apr 2020 22:55 )  PTT:30.8 sec  CSF:                      PHYSICAL EXAMINATION:  General: Intubated and sedated. Mildly toxic.   Eyes: Conjunctiva and sclera clear.  Neurologic:  - Mental Status:  Intubated. Not responsive    II:   Pupils are equal, round, and reactive to light. Cornea intact   VII:  Face is symmetric   - Motor:  No spontaneous antigravity movement. Mildly flaccid   - Plantar responses mute   - Sensory:  No response to noxious stimuli   - Coordination:  Unable to obtain   - Gait:  Unable to obtain     Radiology:    CT head:     IMPRESSION:     1. No evidence of acute intracranial pathology. Stable exam since 7/24/2016.     2. Stable patchy white matter hypoattenuation in this patient with known   clinical history of multiple sclerosis.         CT angiogram of head and neck:  Moderate bilateral pleural effusions and peripheral parenchymal groundglass   opacities in the visualized lung apices are compatible with atypical   pneumonia such as coronavirus COVID-19.     No evidence of high-grade stenosis, large vessel aneurysm, or large vessel   occlusion in the intracranial vessels.     The bilateral internal carotid arteries and cervical vertebral arteries are   patent.

## 2020-04-13 NOTE — ED PROVIDER NOTE - PHYSICAL EXAMINATION
CONSTITUTIONAL: ill appearing. poorly responsive.   EYES: PERRL. pupils 3mm b/l  ENT: dry lips and tongue. NRBM in place  CARDIOVASCULAR: + tachycardia. RRR. no murmur   RESPIRATORY: RR - 24. + mild accessory muscle use. decreased BS to right side. no wheezing or rhonchi.   GI/: round soft obese abdomen. no rebound. non-tender  MS: LE atrophy. UE b/l lymphedema noted.   SKIN: cyanotic to fingers and toes tips  NEURO/PSYCH: Alert but confused. oriented to place. keep repeating "I am okay". poor effort on neuro exam. no facial droop noted. unable to hold arms up for exam but able to move all her fingers.

## 2020-04-13 NOTE — DISCHARGE NOTE FOR THE EXPIRED PATIENT - HOSPITAL COURSE
64yo F from nursing home w/ PMH of MS (Not on active treatment), T2DM with diabetic gastroparesis, Gastric cancer s/p resection and chemotherapy, currently in remission (Dr Toussaint), esophageal ulcer s/p EGD last yr on PPI presented to the ED with  confusion, hallucination and acute respiratory failure. Patient became hypotensive and placed on pressors.. pt then became bradycardic and asystolic, after initiation of ACLS protocol family agreed to DNR and pt was pronounced dead.

## 2020-04-13 NOTE — H&P ADULT - NSICDXPASTSURGICALHX_GEN_ALL_CORE_FT
PAST SURGICAL HISTORY:  H/O partial thyroidectomy thyroid polyps    H/O total hysterectomy with bilateral salpingo-oophorectomy (BSO) in her 30s for fibroids    Portacath in place     S/P partial gastrectomy

## 2020-04-13 NOTE — CONSULT NOTE ADULT - SUBJECTIVE AND OBJECTIVE BOX
Patient is a 65y old  Female who presents with a chief complaint of Altered mental status (2020 01:26)        REVIEW OF SYSTEMS: Unable to obtain due to mental status unless mentioned in HPI       PAST MEDICAL & SURGICAL HISTORY:  Multiple sclerosis: not on any treatment  DM type 2 (diabetes mellitus, type 2)  Stomach cancer: in remission for 2 years.  Portacath in place  S/P partial gastrectomy  H/O total hysterectomy with bilateral salpingo-oophorectomy (BSO): in her 30s for fibroids  H/O partial thyroidectomy: thyroid polyps          SOCIAL HISTORY  Alcohol: Does not drink  Tobacco: Does not smoke  Illicit substance use: None      FAMILY HISTORY: Non contributory to the present illness        ALLERGIES: Zithromax (Hives)        ICU Vital Signs Last 24 Hrs  T(C): 36.5 (2020 04:24), Max: 37.3 (2020 01:39)  T(F): 97.7 (2020 04:24), Max: 99.2 (2020 01:39)  HR: 130 (2020 04:24) (89 - 131)  BP: 92/63 (2020 04:24) (92/63 - 148/74)  BP(mean): --  ABP: --  ABP(mean): --  RR: 18 (2020 04:24) (18 - 22)  SpO2: 100% (2020 04:24) (99% - 100%)        PHYSICAL EXAM:    see below      LABS:                        10.9   28.65 )-----------( 421      ( 2020 22:55 )             34.5         04-12    129<L>  |  94<L>  |  25<H>  ----------------------------<  145<H>  4.2   |  21  |  0.8    Ca    8.0<L>      2020 22:55    TPro  4.7<L>  /  Alb  2.0<L>  /  TBili  0.8  /  DBili  x   /  AST  46<H>  /  ALT  46<H>  /  AlkPhos  330<H>  04-12    PT/INR - ( 2020 22:55 )   PT: 15.80 sec;   INR: 1.37 ratio               CAPILLARY BLOOD GLUCOSE  POCT Blood Glucose.: 131 mg/dL (2020 22:21)      ABG - ( 2020 04:57 )  pH, Arterial: 7.33  pH, Blood: x     /  pCO2: 25    /  pO2: 140   / HCO3: 13    / Base Excess: -11.1 /  SaO2: 99                Urinalysis Basic - ( 2020 01:40 )  Color: Yellow / Appearance: Clear / S.025 / pH: x  Gluc: x / Ketone: Negative  / Bili: Moderate / Urobili: 1.0 mg/dL   Blood: x / Protein: Trace mg/dL / Nitrite: Negative   Leuk Esterase: Negative / RBC: >50 /HPF / WBC 1-2 /HPF   Sq Epi: x / Non Sq Epi: Moderate /HPF / Bacteria: Moderate        MEDICATIONS  (STANDING):  aspirin  chewable 81 milliGRAM(s) Oral daily  atorvastatin 40 milliGRAM(s) Oral at bedtime  cefepime   IVPB 2000 milliGRAM(s) IV Intermittent every 8 hours  chlorhexidine 0.12% Liquid 15 milliLiter(s) Oral Mucosa two times a day  chlorhexidine 4% Liquid 1 Application(s) Topical <User Schedule>  collagenase Ointment 1 Application(s) Topical daily  dextrose 5%. 1000 milliLiter(s) (50 mL/Hr) IV Continuous <Continuous>  dextrose 50% Injectable 12.5 Gram(s) IV Push once  dextrose 50% Injectable 25 Gram(s) IV Push once  dextrose 50% Injectable 25 Gram(s) IV Push once  ergocalciferol 18749 Unit(s) Oral every week  ferrous    sulfate 325 milliGRAM(s) Oral daily  folic acid 1 milliGRAM(s) Oral daily  heparin  Injectable 5000 Unit(s) SubCutaneous every 8 hours  hydroxychloroquine   Oral   hydroxychloroquine 800 milliGRAM(s) Oral every 24 hours  insulin lispro (HumaLOG) corrective regimen sliding scale   SubCutaneous three times a day before meals  norepinephrine Infusion 0.04 MICROgram(s)/kG/Min (2.5 mL/Hr) IV Continuous <Continuous>  pantoprazole   Suspension 40 milliGRAM(s) Oral daily  propofol Infusion 10.01 MICROgram(s)/kG/Min (2 mL/Hr) IV Continuous <Continuous>  rOPINIRole 0.5 milliGRAM(s) Oral three times a day  senna 2 Tablet(s) Oral at bedtime  sodium chloride 0.9%. 1000 milliLiter(s) (100 mL/Hr) IV Continuous <Continuous>  vancomycin  IVPB 1000 milliGRAM(s) IV Intermittent every 12 hours    MEDICATIONS  (PRN):  acetaminophen   Tablet .. 650 milliGRAM(s) Oral every 6 hours PRN Temp greater or equal to 38C (100.4F)  dextrose 40% Gel 15 Gram(s) Oral once PRN Blood Glucose LESS THAN 70 milliGRAM(s)/deciliter  glucagon  Injectable 1 milliGRAM(s) IntraMuscular once PRN Glucose LESS THAN 70 milligrams/deciliter          RADIOLOGY & ADDITIONAL TESTS: Patient is a 65y old  Female  from nursing home w/ PMH of MS (Not on active treatment), T2DM with diabetic gastroparesis, Gastric cancer s/p resection and chemotherapy, currently in remission (Dr Antony), esophageal ulcer s/p EGD last yr on PPI p/w AMS. Pt was already intubated and sedated when evaluated. As per ED physician, He suddenly became AMS at nursing home, sent pt to ED. Pt baseline AAO x 3, fully alert and oriented, but has bed bound since her fall last december. In ED, pt was noted to be still confused. Stroke code called. Pt was intubated for airway protection. The CTH non-contrast negative. WBC 28k noted. CXR shows opacities as well as R pleural effusion. Covid19 PCR sent.       REVIEW OF SYSTEMS: Unable to obtain due to mental status unless mentioned in HPI         PAST MEDICAL & SURGICAL HISTORY:  Multiple sclerosis: not on any treatment  DM type 2 (diabetes mellitus, type 2)  Stomach cancer: in remission for 2 years.  Portacath in place  S/P partial gastrectomy  H/O total hysterectomy with bilateral salpingo-oophorectomy (BSO): in her 30s for fibroids  H/O partial thyroidectomy: thyroid polyps          SOCIAL HISTORY  Alcohol: Does not drink  Tobacco: Does not smoke  Illicit substance use: None      FAMILY HISTORY: Non contributory to the present illness        ALLERGIES: Zithromax (Hives)        ICU Vital Signs Last 24 Hrs  T(C): 36.5 (2020 04:24), Max: 37.3 (2020 01:39)  T(F): 97.7 (2020 04:24), Max: 99.2 (2020 01:39)  HR: 130 (2020 04:24) (89 - 131)  BP: 92/63 (2020 04:24) (92/63 - 148/74)  BP(mean): --  ABP: --  ABP(mean): --  RR: 18 (2020 04:24) (18 - 22)  SpO2: 100% (2020 04:24) (99% - 100%)        PHYSICAL EXAM:    see below      LABS:                        10.9   28.65 )-----------( 421      ( 2020 22:55 )             34.5         04-12    129<L>  |  94<L>  |  25<H>  ----------------------------<  145<H>  4.2   |  21  |  0.8    Ca    8.0<L>      2020 22:55    TPro  4.7<L>  /  Alb  2.0<L>  /  TBili  0.8  /  DBili  x   /  AST  46<H>  /  ALT  46<H>  /  AlkPhos  330<H>  04-12    PT/INR - ( 2020 22:55 )   PT: 15.80 sec;   INR: 1.37 ratio               CAPILLARY BLOOD GLUCOSE  POCT Blood Glucose.: 131 mg/dL (2020 22:21)      ABG - ( 2020 04:57 )  pH, Arterial: 7.33  pH, Blood: x     /  pCO2: 25    /  pO2: 140   / HCO3: 13    / Base Excess: -11.1 /  SaO2: 99                Urinalysis Basic - ( 2020 01:40 )  Color: Yellow / Appearance: Clear / S.025 / pH: x  Gluc: x / Ketone: Negative  / Bili: Moderate / Urobili: 1.0 mg/dL   Blood: x / Protein: Trace mg/dL / Nitrite: Negative   Leuk Esterase: Negative / RBC: >50 /HPF / WBC 1-2 /HPF   Sq Epi: x / Non Sq Epi: Moderate /HPF / Bacteria: Moderate        MEDICATIONS  (STANDING):  aspirin  chewable 81 milliGRAM(s) Oral daily  atorvastatin 40 milliGRAM(s) Oral at bedtime  cefepime   IVPB 2000 milliGRAM(s) IV Intermittent every 8 hours  chlorhexidine 0.12% Liquid 15 milliLiter(s) Oral Mucosa two times a day  chlorhexidine 4% Liquid 1 Application(s) Topical <User Schedule>  collagenase Ointment 1 Application(s) Topical daily  dextrose 5%. 1000 milliLiter(s) (50 mL/Hr) IV Continuous <Continuous>  dextrose 50% Injectable 12.5 Gram(s) IV Push once  dextrose 50% Injectable 25 Gram(s) IV Push once  dextrose 50% Injectable 25 Gram(s) IV Push once  ergocalciferol 36422 Unit(s) Oral every week  ferrous    sulfate 325 milliGRAM(s) Oral daily  folic acid 1 milliGRAM(s) Oral daily  heparin  Injectable 5000 Unit(s) SubCutaneous every 8 hours  hydroxychloroquine   Oral   hydroxychloroquine 800 milliGRAM(s) Oral every 24 hours  insulin lispro (HumaLOG) corrective regimen sliding scale   SubCutaneous three times a day before meals  norepinephrine Infusion 0.04 MICROgram(s)/kG/Min (2.5 mL/Hr) IV Continuous <Continuous>  pantoprazole   Suspension 40 milliGRAM(s) Oral daily  propofol Infusion 10.01 MICROgram(s)/kG/Min (2 mL/Hr) IV Continuous <Continuous>  rOPINIRole 0.5 milliGRAM(s) Oral three times a day  senna 2 Tablet(s) Oral at bedtime  sodium chloride 0.9%. 1000 milliLiter(s) (100 mL/Hr) IV Continuous <Continuous>  vancomycin  IVPB 1000 milliGRAM(s) IV Intermittent every 12 hours    MEDICATIONS  (PRN):  acetaminophen   Tablet .. 650 milliGRAM(s) Oral every 6 hours PRN Temp greater or equal to 38C (100.4F)  dextrose 40% Gel 15 Gram(s) Oral once PRN Blood Glucose LESS THAN 70 milliGRAM(s)/deciliter  glucagon  Injectable 1 milliGRAM(s) IntraMuscular once PRN Glucose LESS THAN 70 milligrams/deciliter          RADIOLOGY & ADDITIONAL TESTS:    < from: CT Angio Neck w/ IV Cont (20 @ 04:04) >  Moderate bilateral pleural effusions and peripheral parenchymal groundglass opacities in the visualized lung apices are compatible with atypical pneumonia such as coronavirus COVID-19.    No evidence of high-grade stenosis, large vessel aneurysm, or large vessel occlusion in the intracranial vessels.    The bilateral internal carotid arteries and cervical vertebral arteries are patent.      < end of copied text >

## 2020-04-13 NOTE — ED PROVIDER NOTE - ATTENDING CONTRIBUTION TO CARE
I personally evaluated the patient. I reviewed the Resident’s or Physician Assistant’s note (as assigned above), and agree with the findings and plan except as documented in my note.  Chart reviewed. H/O MS, DM, presents with altered mental status and hallucinations. Noted to be hypoxic in ED. Exam shows awake patient in no distress, HEENT NCAT, bilateral rhonchi, RR S1S2, abdomen soft Nt +BS, +lymphedema, neuro alert, no focal weakness.

## 2020-04-13 NOTE — CONSULT NOTE ADULT - ASSESSMENT
66yo F from nursing home w/ PMH of MS (Not on active treatment), T2DM with diabetic gastroparesis, Gastric cancer s/p resection and chemotherapy, currently in remission (Dr Toussaint), esophageal ulcer s/p EGD last yr on PPI p/w sudden onset confusion, hallucination and acute respiratory failure. Patient is currently intubated. Her eyes are open but does not respond to pain. No movement in any extremities. Lab w/u including leukocytosis and chest Xray are suspected for PNA and COVID pneumonia. Upon arrival CT head showed no evidence of large territory infarct and CT angiogram of head and neck showed no evidence of large vessel occlusion. At this point differential include acute toxic metabolic encephalopathy, Covid related encephalitis or seizure. Acute CVA is less likely but still on the differential.     - Recommend Brain MRI wo contrast but if its not logistically possible, recommend to repeat CT head w/o tonight checking for acute infarct  - REEG checking for epileptiform discharges  - Consider CSF study if mental status did not improve   - Cotinue ASA and lipitor
Patient is a 65y old  Female  from nursing home w/ PMH of MS (Not on active treatment), T2DM with diabetic gastroparesis, Gastric cancer s/p resection and chemotherapy, currently in remission (Dr Antony), esophageal ulcer s/p EGD last yr on PPI p/w AMS. Pt was already intubated and sedated when evaluated. As per ED physician, He suddenly became AMS at nursing home, sent pt to ED. Pt baseline AAO x 3, fully alert and oriented, but has bed bound since her fall last december. In ED, pt was noted to be still confused. Stroke code called. Pt was intubated for airway protection. The CTH non-contrast negative. WBC 28k noted. CXR shows opacities as well as R pleural effusion. Covid19 PCR sent.     # Septic shock - on pressors  #  Suspected viral pneumonia - most likely due to COVID    would recommend:    1. Follow up cultures and COVID 19 PCR  2. Continue HCQ as long as QTc is less than 500  3. COVID precautions  4. Management of Vent. as per Critical  care  5. Continue Cefepime and Vancomycin until work up is done  6. Monitor WBC count    will follow the patient with you and make further recommendation based on the clinical course and Lab results  Thank you for the opportunity to participate in Ms. CARRASQUILLO's care      Attending Attestation:    Spent critical care 65 minutes on total encounter, more than 50 % of the visit was spent counseling and/or coordinating care by the Attending physician.

## 2020-04-13 NOTE — H&P ADULT - ASSESSMENT
66yo F from nursing home w/ PMH of MS (Not on active treatment), T2DM with diabetic gastroparesis, Gastric cancer s/p resection and chemotherapy, currently in remission (Dr Toussaint), esophageal ulcer s/p EGD last yr on PPI p/w AMS. Pt was already intubated and sedated when evaluated. As per ED physician, pt suddenly became AMS at nursing home, sent pt to ED. Pt baseline AAO x 3, fully functional. In ED, pt was noted to be still confused. Stroke code called. Pt was intubated for airway protection. CTH non-contrast negative. WBC 28k noted. CXR shows opacities as well as R pleural effusion. Covid19 PCR sent.       IMPRESSION:  Altered mental status, Stroke code  Acute Hypoxic Respiratory Failure, likely 2/2 PNA  r/o Covid19      PLAN:    CNS: intubated and sedated. CTH non-contrast negative. Will get CTA H/N as well. Aspirin, Lipitor. Lipid panel in am. Neurochecks q4h. Neurology consult.     HEENT: oral care    PULMONARY: ET tube position confirmed on CXR. Will get post-intubation ABG and readjust vent settings accordingly.     CARDIOVASCULAR: c/w IVF for now. Pt on PO lasix 40mg at home, will hold for now, re-evaluate in am (has R sided pleural effusion). Pt hypotensive, central line placed in, started on levophed. Strict I&Os. EKG, CXR, 2D echo.     GI: GI prophylaxis.  NPO for now.     RENAL: Jensen, monitor urine output. f/u BMP and replenish accordingly.     INFECTIOUS DISEASE: s/p cefepime and levaquin in ED. will start pt on cefepime and vanco. PAN culture. pt r/o Covid19. Start pt on HCQ. . Daily EKG. Procalcitonin, CRP, ferritin, d-dimer, fibrinogen. ID consult.     HEMATOLOGICAL:  DVT prophylaxis.    ENDOCRINE:  Follow up FS.  Insulin sliding scale.    MUSCULOSKELETAL:        CRITICAL CARE TIME SPENT: *** 64yo F from nursing home w/ PMH of MS (Not on active treatment), T2DM with diabetic gastroparesis, Gastric cancer s/p resection and chemotherapy, currently in remission (Dr Toussaint), esophageal ulcer s/p EGD last yr on PPI p/w AMS. Pt was already intubated and sedated when evaluated. As per ED physician, pt suddenly became AMS at nursing home, sent pt to ED. Pt baseline AAO x 3, fully alert and oriented, but has bed bound since her fall last december. In ED, pt was noted to be still confused. Stroke code called. Pt was intubated for airway protection. CTH non-contrast negative. WBC 28k noted. CXR shows opacities as well as R pleural effusion. Covid19 PCR sent.     IMPRESSION:  Altered mental status, Stroke code  Acute Hypoxic Respiratory Failure, likely 2/2 PNA  r/o Covid19      PLAN:    CNS: intubated and sedated. CTH non-contrast negative. Will get CTA H/N as well. Aspirin, Lipitor. Lipid panel in am. Neurochecks q4h. Neurology consult.     HEENT: oral care    PULMONARY: ET tube position confirmed on CXR. Will get post-intubation ABG and readjust vent settings accordingly.     CARDIOVASCULAR: c/w IVF for now. Pt on PO lasix 40mg at home, will hold for now, re-evaluate in am (has R sided pleural effusion). Pt hypotensive, central line placed in, started on levophed. Strict I&Os. EKG, CXR, 2D echo.     GI: GI prophylaxis.  NPO for now.     RENAL: Jensen, monitor urine output. f/u BMP and replenish accordingly.     INFECTIOUS DISEASE: s/p cefepime and levaquin in ED. will start pt on cefepime and vanco. PAN culture. pt r/o Covid19. Start pt on HCQ. . Daily EKG. Procalcitonin, CRP, ferritin, d-dimer, fibrinogen. ID consult.     HEMATOLOGICAL:  DVT prophylaxis.    ENDOCRINE:  Follow up FS.  Insulin sliding scale.    MUSCULOSKELETAL:        CRITICAL CARE TIME SPENT: ***

## 2020-04-13 NOTE — ED PROVIDER NOTE - PROGRESS NOTE DETAILS
stroke code was called 2/2 acute change of mental status. Neuro recommend CT head with CTA head/neck patient became hypotension. emergent central line was placed. patient persistent hypoxic despite 15L NRBM. will intubate Vital improved (improved tachycardia and hypoxia) post intubation Dr John approved ICU but wants to get CTA head/neck. d/w patient's daughter over the phone, patient has been experiencing off/on hallucinations (see other family members that were not at her place) over the past week. Patient was also seeing spider at her home that wasn't there but patient was able to self feed and speaking to her grand children normally earlier today. Patient became more acutely confused 2 hours prior to arrival and daughter called EMS.

## 2020-04-13 NOTE — ED PROVIDER NOTE - CARE PLAN
Principal Discharge DX:	Altered mental state  Secondary Diagnosis:	Hypoxia  Secondary Diagnosis:	Hypotension

## 2020-04-13 NOTE — H&P ADULT - NSHPLABSRESULTS_GEN_ALL_CORE
Mode: AC/ CMV (Assist Control/ Continuous Mandatory Ventilation)  RR (machine): 18  TV (machine): 400  FiO2: 100  PEEP: 5  MAP: 17  PIP: 36        I&O's Detail        LABS:                        10.9   28.65 )-----------( 421      ( 12 Apr 2020 22:55 )             34.5     12 Apr 2020 22:55    129    |  94     |  25     ----------------------------<  145    4.2     |  21     |  0.8      Ca    8.0        12 Apr 2020 22:55    TPro  4.7    /  Alb  2.0    /  TBili  0.8    /  DBili  x      /  AST  46     /  ALT  46     /  AlkPhos  330    12 Apr 2020 22:55  Amylase x     lipase x              CAPILLARY BLOOD GLUCOSE      POCT Blood Glucose.: 131 mg/dL (12 Apr 2020 22:21)        Culture        MEDICATIONS  (STANDING):  atorvastatin 40 milliGRAM(s) Oral at bedtime  cefepime   IVPB 2000 milliGRAM(s) IV Intermittent every 8 hours  cefepime   IVPB 2000 milliGRAM(s) IV Intermittent once  chlorhexidine 0.12% Liquid 15 milliLiter(s) Oral Mucosa two times a day  chlorhexidine 4% Liquid 1 Application(s) Topical <User Schedule>  heparin  Injectable 5000 Unit(s) SubCutaneous every 8 hours  hydroxychloroquine   Oral   levoFLOXacin IVPB 750 milliGRAM(s) IV Intermittent once  norepinephrine Infusion 0.04 MICROgram(s)/kG/Min (2.5 mL/Hr) IV Continuous <Continuous>  propofol Infusion 10.01 MICROgram(s)/kG/Min (2 mL/Hr) IV Continuous <Continuous>  sodium chloride 0.9%. 1000 milliLiter(s) (150 mL/Hr) IV Continuous <Continuous>  vancomycin  IVPB 1000 milliGRAM(s) IV Intermittent every 12 hours    MEDICATIONS  (PRN):  acetaminophen   Tablet .. 650 milliGRAM(s) Oral every 6 hours PRN Temp greater or equal to 38C (100.4F)        RADIOLOGY:   < from: CT Brain Stroke Protocol (04.12.20 @ 22:34) >    IMPRESSION:     Examination limited by motion and streak artifact.     No evidence for acute intracranial hemorrhage, midline shift, or mass effect.    Stable patchy white matter hypoattenuation in this patient with known clinical history of multiple sclerosis., stable since prior examination of 3/26/2020.    < end of copied text >

## 2020-04-13 NOTE — H&P ADULT - NSICDXPASTMEDICALHX_GEN_ALL_CORE_FT
PAST MEDICAL HISTORY:  DM type 2 (diabetes mellitus, type 2)     Multiple sclerosis not on any treatment    Stomach cancer in remission for 2 years.

## 2020-04-13 NOTE — CHART NOTE - NSCHARTNOTEFT_GEN_A_CORE
Spoke with patient's daughter this morning prior to death.  Clinical issues reviewed; DNR requested.

## 2020-04-13 NOTE — ED PROVIDER NOTE - CLINICAL SUMMARY MEDICAL DECISION MAKING FREE TEXT BOX
Stroke code called on arrival. Discussed with neurology. Patient noted to be hypoxic and hypotensive. Right IJ central line done. Intubated and placed on vent. Labs noted for WBC 28k. EKG no acute changes. CXR R sided infiltrate. Head CT no bleed. Given IVF, cefepime, levofloxacin, Levophed. Will admit to ICU.

## 2020-04-13 NOTE — PROGRESS NOTE ADULT - ASSESSMENT
64yo F from nursing home w/ PMH of MS (Not on active treatment), T2DM with diabetic gastroparesis, Gastric cancer s/p resection and chemotherapy, currently in remission (Dr Toussaint), esophageal ulcer s/p EGD last yr on PPI presented to the ED with altered mental status and acute respiratory failure admited to Unit now sedated on vent     - Acute respiratory failure / DM / MS /

## 2020-04-13 NOTE — ED PROVIDER NOTE - OBJECTIVE STATEMENT
64 yo female hx of stomach cancer on remission/ DM and MS (baseline non-ambulatory)/ chronic UE lymphedema BIBA 2/2 acute confusion and hallucinations 2 hour PTA. as per EMS, patient was confused when they found. Family denies other symptoms earlier today. patient was admitted to [facility] for similar complaints 2 weeks ago.

## 2020-04-13 NOTE — H&P ADULT - HISTORY OF PRESENT ILLNESS
66yo F from nursing home w/ PMH of MS (Not on active treatment), T2DM with diabetic gastroparesis, Gastric cancer s/p resection and chemotherapy, currently in remission (Dr Toussaint), esophageal ulcer s/p EGD last yr on PPI p/w AMS. Pt was already intubated and sedated when evaluated. As per ED physician, pt suddenly became AMS at nursing home, sent pt to ED. Pt baseline AAO x 3, fully functional. In ED, pt was noted to be still confused. Stroke code called. Pt was intubated for airway protection. CTH non-contrast negative. WBC 28k noted. CXR shows opacities as well as R pleural effusion. Covid19 PCR sent. 64yo F from nursing home w/ PMH of MS (Not on active treatment), T2DM with diabetic gastroparesis, Gastric cancer s/p resection and chemotherapy, currently in remission (Dr Toussaint), esophageal ulcer s/p EGD last yr on PPI p/w AMS. Pt was already intubated and sedated when evaluated. As per ED physician, pt suddenly became AMS at nursing home, sent pt to ED. Pt baseline AAO x 3, fully alert and oriented, but has bed bound since her fall last december. In ED, pt was noted to be still confused. Stroke code called. Pt was intubated for airway protection. CTH non-contrast negative. WBC 28k noted. CXR shows opacities as well as R pleural effusion. Covid19 PCR sent.

## 2020-04-13 NOTE — PROGRESS NOTE ADULT - SUBJECTIVE AND OBJECTIVE BOX
Patient is sedated on the ventilator, hypotensive now       T(F): 97.7 (04-13-20 @ 04:24), Max: 99.2 (04-13-20 @ 01:39)  HR: 109 (04-13-20 @ 09:01)  BP: 71/47 (04-13-20 @ 09:01)  RR: 18 (04-13-20 @ 04:24)  SpO2: 100% (04-13-20 @ 07:45) (99% - 100%)    PHYSICAL EXAM:  GENERAL: NAD  HEAD:  Atraumatic, Normocephalic  NERVOUS SYSTEM:  no focal deficits   CHEST/LUNG:  bilateral rales  HEART: Regular rate and rhythm; No murmurs, rubs, or gallops  ABDOMEN: Soft, Nontender, Nondistended; Bowel sounds present  EXTREMITIES:  2+ Peripheral Pulses, No clubbing, cyanosis, or edema  LYMPH: No lymphadenopathy noted  SKIN: No rashes or lesions    LABS  04-12    129<L>  |  94<L>  |  25<H>  ----------------------------<  145<H>  4.2   |  21  |  0.8    Ca    8.0<L>      12 Apr 2020 22:55    TPro  4.7<L>  /  Alb  2.0<L>  /  TBili  0.8  /  DBili  x   /  AST  46<H>  /  ALT  46<H>  /  AlkPhos  330<H>  04-12                          10.9   28.65 )-----------( 421      ( 12 Apr 2020 22:55 )             34.5     PT/INR - ( 12 Apr 2020 22:55 )   PT: 15.80 sec;   INR: 1.37 ratio         PTT - ( 12 Apr 2020 22:55 )  PTT:30.8 sec  Mode: AC/ CMV (Assist Control/ Continuous Mandatory Ventilation)  RR (machine): 18  TV (machine): 400  FiO2: 100  PEEP: 7.5  MAP: 12  PIP: 25      Culture Results:   <10,000 CFU/mL Normal Urogenital Gemini (03-27-20)    RADIOLOGY  < from: CT Angio Head w/ IV Cont (04.13.20 @ 04:04) >    IMPRESSION:    Moderate bilateral pleural effusions and peripheral parenchymal groundglass opacities in the visualized lung apices are compatible with atypical pneumonia such as coronavirus COVID-19.    No evidence of high-grade stenosis, large vessel aneurysm, or large vessel occlusion in the intracranial vessels.    The bilateral internal carotid arteries and cervical vertebral arteries are patent.      < end of copied text >    MEDICATIONS  (STANDING):  aspirin  chewable 81 milliGRAM(s) Oral daily  atorvastatin 40 milliGRAM(s) Oral at bedtime  cefepime   IVPB 2000 milliGRAM(s) IV Intermittent every 8 hours  chlorhexidine 0.12% Liquid 15 milliLiter(s) Oral Mucosa two times a day  chlorhexidine 4% Liquid 1 Application(s) Topical <User Schedule>  collagenase Ointment 1 Application(s) Topical daily  dexMEDEtomidine Infusion 0.2 MICROgram(s)/kG/Hr (3.34 mL/Hr) IV Continuous <Continuous>  EPINEPHrine    Infusion 0.03 MICROgram(s)/kG/Min (3.75 mL/Hr) IV Continuous <Continuous>  ergocalciferol 31302 Unit(s) Oral every week  ferrous    sulfate 325 milliGRAM(s) Oral daily  folic acid 1 milliGRAM(s) Oral daily  heparin  Injectable 5000 Unit(s) SubCutaneous every 8 hours  hydroxychloroquine   Oral   hydroxychloroquine 800 milliGRAM(s) Oral every 24 hours  insulin lispro (HumaLOG) corrective regimen sliding scale   SubCutaneous three times a day before meals  morphine  Infusion. 1 mG/Hr (1 mL/Hr) IV Continuous <Continuous>  norepinephrine Infusion 0.04 MICROgram(s)/kG/Min (2.5 mL/Hr) IV Continuous <Continuous>  pantoprazole   Suspension 40 milliGRAM(s) Oral daily  rOPINIRole 0.5 milliGRAM(s) Oral three times a day  senna 2 Tablet(s) Oral at bedtime  sodium chloride 0.9%. 1000 milliLiter(s) (100 mL/Hr) IV Continuous <Continuous>  vancomycin  IVPB 1000 milliGRAM(s) IV Intermittent every 12 hours  vasopressin Infusion 0.02 Unit(s)/Min (1.2 mL/Hr) IV Continuous <Continuous>    MEDICATIONS  (PRN):  acetaminophen   Tablet .. 650 milliGRAM(s) Oral every 6 hours PRN Temp greater or equal to 38C (100.4F)  dextrose 40% Gel 15 Gram(s) Oral once PRN Blood Glucose LESS THAN 70 milliGRAM(s)/deciliter  glucagon  Injectable 1 milliGRAM(s) IntraMuscular once PRN Glucose LESS THAN 70 milligrams/deciliter

## 2020-04-13 NOTE — ED PROCEDURE NOTE - CPROC ED POST RADIOGRAPHY1
gastric tube in stomach/duodenum/post-procedure radiography performed
central line located in the/SVC/post-procedure radiography performed/no pneumothorax

## 2020-04-13 NOTE — H&P ADULT - NSHPPHYSICALEXAM_GEN_ALL_CORE
ICU Vital Signs Last 24 Hrs  T(C): --  T(F): --  HR: 89 (13 Apr 2020 00:37) (89 - 131)  BP: 148/74 (13 Apr 2020 00:37) (148/74 - 148/74)  BP(mean): --  ABP: --  ABP(mean): --  RR: 22 (12 Apr 2020 22:24) (22 - 22)  SpO2: 100% (13 Apr 2020 00:37) (99% - 100%)        Physical Examination:    General: intubated and sedated    HEENT: Pupils dilated, reactive to light.  Symmetric.    PULM: b/l rhonchi noted    CVS: Regular rate and rhythm, no murmurs, rubs, or gallops    ABD: Soft, nondistended, nontender, normoactive bowel sounds, no masses    EXT: No edema, nontender    SKIN: Warm and well perfused, no rashes noted.

## 2020-04-14 LAB
-  CANDIDA ALBICANS: SIGNIFICANT CHANGE UP
-  CANDIDA GLABRATA: SIGNIFICANT CHANGE UP
-  CANDIDA KRUSEI: SIGNIFICANT CHANGE UP
-  CANDIDA PARAPSILOSIS: SIGNIFICANT CHANGE UP
-  CANDIDA TROPICALIS: SIGNIFICANT CHANGE UP
-  COAGULASE NEGATIVE STAPHYLOCOCCUS: SIGNIFICANT CHANGE UP
-  K. PNEUMONIAE GROUP: SIGNIFICANT CHANGE UP
-  KPC RESISTANCE GENE: SIGNIFICANT CHANGE UP
-  STREPTOCOCCUS SP. (NOT GRP A, B OR S PNEUMONIAE): SIGNIFICANT CHANGE UP
A BAUMANNII DNA SPEC QL NAA+PROBE: SIGNIFICANT CHANGE UP
E CLOAC COMP DNA BLD POS QL NAA+PROBE: SIGNIFICANT CHANGE UP
E COLI DNA BLD POS QL NAA+NON-PROBE: SIGNIFICANT CHANGE UP
ENTEROCOC DNA BLD POS QL NAA+NON-PROBE: SIGNIFICANT CHANGE UP
ENTEROCOC DNA BLD POS QL NAA+NON-PROBE: SIGNIFICANT CHANGE UP
GP B STREP DNA BLD POS QL NAA+NON-PROBE: SIGNIFICANT CHANGE UP
GRAM STN FLD: SIGNIFICANT CHANGE UP
HAEM INFLU DNA BLD POS QL NAA+NON-PROBE: SIGNIFICANT CHANGE UP
K OXYTOCA DNA BLD POS QL NAA+NON-PROBE: SIGNIFICANT CHANGE UP
L MONOCYTOG DNA BLD POS QL NAA+NON-PROBE: SIGNIFICANT CHANGE UP
METHOD TYPE: SIGNIFICANT CHANGE UP
MRSA SPEC QL CULT: SIGNIFICANT CHANGE UP
MSSA DNA SPEC QL NAA+PROBE: SIGNIFICANT CHANGE UP
N MEN ISLT CULT: SIGNIFICANT CHANGE UP
P AERUGINOSA DNA BLD POS NAA+NON-PROBE: SIGNIFICANT CHANGE UP
S MARCESCENS DNA BLD POS NAA+NON-PROBE: SIGNIFICANT CHANGE UP
S PNEUM DNA BLD POS QL NAA+NON-PROBE: SIGNIFICANT CHANGE UP
S PYO DNA BLD POS QL NAA+NON-PROBE: SIGNIFICANT CHANGE UP

## 2020-04-15 LAB
CULTURE RESULTS: SIGNIFICANT CHANGE UP
SPECIMEN SOURCE: SIGNIFICANT CHANGE UP

## 2020-04-16 LAB
-  AMIKACIN: SIGNIFICANT CHANGE UP
-  AMPICILLIN/SULBACTAM: SIGNIFICANT CHANGE UP
-  AMPICILLIN: SIGNIFICANT CHANGE UP
-  AZTREONAM: SIGNIFICANT CHANGE UP
-  CEFAZOLIN: SIGNIFICANT CHANGE UP
-  CEFEPIME: SIGNIFICANT CHANGE UP
-  CEFOXITIN: SIGNIFICANT CHANGE UP
-  CEFTRIAXONE: SIGNIFICANT CHANGE UP
-  CIPROFLOXACIN: SIGNIFICANT CHANGE UP
-  ERTAPENEM: SIGNIFICANT CHANGE UP
-  GENTAMICIN: SIGNIFICANT CHANGE UP
-  IMIPENEM: SIGNIFICANT CHANGE UP
-  LEVOFLOXACIN: SIGNIFICANT CHANGE UP
-  MEROPENEM: SIGNIFICANT CHANGE UP
-  PIPERACILLIN/TAZOBACTAM: SIGNIFICANT CHANGE UP
-  TOBRAMYCIN: SIGNIFICANT CHANGE UP
-  TRIMETHOPRIM/SULFAMETHOXAZOLE: SIGNIFICANT CHANGE UP
CULTURE RESULTS: SIGNIFICANT CHANGE UP
METHOD TYPE: SIGNIFICANT CHANGE UP
ORGANISM # SPEC MICROSCOPIC CNT: SIGNIFICANT CHANGE UP
SPECIMEN SOURCE: SIGNIFICANT CHANGE UP

## 2020-04-17 LAB
CULTURE RESULTS: SIGNIFICANT CHANGE UP
GRAM STN FLD: SIGNIFICANT CHANGE UP
SPECIMEN SOURCE: SIGNIFICANT CHANGE UP

## 2020-05-04 NOTE — ASU DISCHARGE PLAN (ADULT/PEDIATRIC). - ACTIVITY LEVEL
Patient notified of negative covid-19 test result. WILBER, states she is feeling better. Advised to stay home for 7 days from onset of symptoms and fever free for 72 hours. Patient WILBER   as tolerated

## 2020-05-07 ENCOUNTER — APPOINTMENT (OUTPATIENT)
Dept: HEMATOLOGY ONCOLOGY | Facility: CLINIC | Age: 65
End: 2020-05-07

## 2020-05-07 ENCOUNTER — APPOINTMENT (OUTPATIENT)
Dept: INFUSION THERAPY | Facility: CLINIC | Age: 65
End: 2020-05-07

## 2021-04-02 NOTE — PATIENT PROFILE ADULT - HOME ACCESSIBILITY CONCERNS
HISTORY OF PRESENT ILLNESS    This young lady returns to see me for her left foot.  We had a video visit several weeks ago and it was my presumptive diagnosis that she had a stress injury involving her foot.  She has been wearing a cam boot which she finds helpful and over the last several weeks her symptoms have greatly improved though not entirely resolved.  She is here for follow-up.  She is a student at [employer] and is home for the Easter holiday to be with her family here in [place].  Denies numbness tingling.      PAST MEDICAL, FAMILY AND SOCIAL HISTORY    The following histories were personally reviewed and updated.  Current medications, Allergies, Problem list, Past Medical History, Past Surgical History, Social History and Family History    REVIEW OF SYSTEMS    Review of Systems   Constitutional: Negative.    HENT: Negative.    Eyes: Negative.    Respiratory: Negative.    Cardiovascular: Negative.    Gastrointestinal: Negative.    Endocrine: Negative.    Genitourinary: Negative.    Musculoskeletal: Positive for joint swelling.   Skin: Negative.    Allergic/Immunologic: Negative.    Neurological: Negative.    Hematological: Negative.    Psychiatric/Behavioral: Negative.        PHYSICAL EXAM    Right Ankle Exam   Right ankle exam is normal.      Left Ankle Exam   Swelling: mild    Other   Sensation: normal  Pulse: present    Comments:  Left foot is mildly swollen.  She has mild discomfort with palpation of the forefoot over the second and third metatarsal necks.  Overall she says this area is considerably better.        XR FOOT MIN 3 VIEWS LEFT  Multiple views left foot fracture dislocation.  Normal bony trabecular   pattern.        ASSESSMENT/PLAN  Assessment   Left foot pain  - XR FOOT MIN 3 VIEWS LEFT    My general sense is that this young lady has stress reaction involving her left foot.  Her symptoms seem to be very much on the mend.  I am recommending transition from her cam boot back into shoes.   Home exercises issued.  We discussed the importance of bone health and recommended a regular regimen of 1200 mg of calcium and 2000 units of vitamin D per day.  May resume activities to tolerance for follow-up.     none

## 2022-02-19 NOTE — PATIENT PROFILE ADULT - DO YOU FEEL UNSAFE AT HOME, WORK, OR SCHOOL?
[facility] Orthopedic Surgery  Orthopedic Surgery  [address], 3rd & [address]th [place] [address]  Phone: [phone]  Fax:     [facility]  Orthopedic Surgery  [address] [address]  Phone: [phone]  Fax:     [facility]  Orthopedics  [address] [place] [address]  Phone: [phone]  Fax: [phone]    [facility] Sports Medicine  Sports Medicine  [facility] [place] [address]  Phone: [phone]  Fax:     
no

## 2022-04-09 NOTE — PATIENT PROFILE ADULT - NSPROGENANESREACTION_GEN_A_NUR
"Subjective   History of Present Illness  Leg edema  32-year-old male states had bilateral lower extremity leg edema over the last several weeks.  He states when he is doing his job as a  he states he has pain when he is walking up steps due to the swelling in his legs.  He states he has had this problem in the past and has seen primary care a few years ago and was on diuretics briefly for his edema.  He reports no shortness of breath or chest pain or trauma.  Reports no redness or fever.  Review of Systems   Constitutional: Negative.    HENT: Negative.    Eyes: Negative.    Respiratory: Negative.    Cardiovascular: Positive for leg swelling.   Gastrointestinal: Negative.    Genitourinary: Negative.    Musculoskeletal: Negative.    Skin: Negative.    Neurological: Negative.    Psychiatric/Behavioral: Negative.        Past Medical History:   Diagnosis Date   • Abnormal cardiac valve        No Known Allergies    Past Surgical History:   Procedure Laterality Date   • CARDIAC CATHETERIZATION         No family history on file.    Social History     Socioeconomic History   • Marital status: Single   Tobacco Use   • Smoking status: Current Every Day Smoker     Packs/day: 1.00   Substance and Sexual Activity   • Alcohol use: Not Currently   • Drug use: Not Currently     Types: Methamphetamines     Prior to Admission medications    Medication Sig Start Date End Date Taking? Authorizing Provider   buprenorphine-naloxone (SUBOXONE) 8-2 MG per SL tablet Place 1 tablet under the tongue Daily.    Provider, MD Carlotta     /80   Pulse 76   Temp 98.2 °F (36.8 °C) (Oral)   Resp 16   Ht 182.9 cm (72\")   Wt (!) 147 kg (324 lb 1.2 oz)   SpO2 100%   BMI 43.95 kg/m²   I examined the patient using the appropriate personal protective equipment.          Objective   Physical Exam  General: Obese male, well-appearing, no acute distress, alert and appropriate  Eyes:  sclera nonicteric  HEENT: Mucous membranes " moist, no mucosal swelling  Neck: Supple, no nuchal rigidity, no lymphadenopathy  Respirations: Respirations nonlabored, equal breath sounds bilaterally, clear lungs  Heart regular rate and rhythm, no murmurs rubs or gallops,   Abdomen soft nontender nondistended, no hepatosplenomegaly,   Extremities some moderate edema to bilateral lower extremities around the ankles, it is nonpitting, there is no overlying erythema or localized tenderness, calves are symmetric and nontender, he has normal pulses and sensorimotor function in the bilateral feet  Neuro cranial nerves grossly intact, no focal limb deficits  Psych oriented, pleasant affect  Skin no rash, brisk cap refill  Procedures           ED Course      Results for orders placed or performed during the hospital encounter of 04/08/22   Comprehensive Metabolic Panel    Specimen: Arm, Right; Blood   Result Value Ref Range    Glucose 84 65 - 99 mg/dL    BUN 14 6 - 20 mg/dL    Creatinine 0.82 0.76 - 1.27 mg/dL    Sodium 140 136 - 145 mmol/L    Potassium 4.1 3.5 - 5.2 mmol/L    Chloride 101 98 - 107 mmol/L    CO2 26.0 22.0 - 29.0 mmol/L    Calcium 9.3 8.6 - 10.5 mg/dL    Total Protein 7.4 6.0 - 8.5 g/dL    Albumin 4.30 3.50 - 5.20 g/dL    ALT (SGPT) 23 1 - 41 U/L    AST (SGOT) 23 1 - 40 U/L    Alkaline Phosphatase 91 39 - 117 U/L    Total Bilirubin <0.2 0.0 - 1.2 mg/dL    Globulin 3.1 gm/dL    A/G Ratio 1.4 g/dL    BUN/Creatinine Ratio 17.1 7.0 - 25.0    Anion Gap 13.0 5.0 - 15.0 mmol/L    eGFR 121.2 >60.0 mL/min/1.73   BNP    Specimen: Arm, Right; Blood   Result Value Ref Range    proBNP 17.6 0.0 - 450.0 pg/mL   CBC Auto Differential    Specimen: Blood   Result Value Ref Range    WBC 8.70 3.40 - 10.80 10*3/mm3    RBC 5.09 4.14 - 5.80 10*6/mm3    Hemoglobin 15.3 13.0 - 17.7 g/dL    Hematocrit 44.1 37.5 - 51.0 %    MCV 86.6 79.0 - 97.0 fL    MCH 30.1 26.6 - 33.0 pg    MCHC 34.8 31.5 - 35.7 g/dL    RDW 13.4 12.3 - 15.4 %    RDW-SD 40.3 37.0 - 54.0 fl    MPV 7.3 6.0 - 12.0  fL    Platelets 265 140 - 450 10*3/mm3    Neutrophil % 42.7 42.7 - 76.0 %    Lymphocyte % 42.5 19.6 - 45.3 %    Monocyte % 7.8 5.0 - 12.0 %    Eosinophil % 6.4 (H) 0.3 - 6.2 %    Basophil % 0.6 0.0 - 1.5 %    Neutrophils, Absolute 3.70 1.70 - 7.00 10*3/mm3    Lymphocytes, Absolute 3.70 (H) 0.70 - 3.10 10*3/mm3    Monocytes, Absolute 0.70 0.10 - 0.90 10*3/mm3    Eosinophils, Absolute 0.60 (H) 0.00 - 0.40 10*3/mm3    Basophils, Absolute 0.00 0.00 - 0.20 10*3/mm3    nRBC 0.0 0.0 - 0.2 /100 WBC   ECG 12 Lead   Result Value Ref Range    QT Interval 390 ms                                                MDM  My EKG interpretation sinus rhythm rate of 55, no acute ST or T wave abnormalities  I reviewed the lab results.  He has no signs of congestive heart failure or renal failure.  He was prescribed a low-dose Lasix.  He was also encouraged to consider support stockings for his dependent edema.  He lacked primary care and was given primary care referral from the emergency room.  He was given warning signs for return and is agreeable to discharge plan.  Notably has bilateral symptoms and this does not appear consistent with DVT.  Final diagnoses:   Leg edema       ED Disposition  ED Disposition     ED Disposition   Discharge    Condition   Stable    Comment   --             PATIENT CONNECTION - [facility] [address]  [phone]  Schedule an appointment as soon as possible for a visit in 1 week           Medication List      No changes were made to your prescriptions during this visit.          Misha Kat MD  04/09/22 0103     no previous reaction

## 2022-09-22 NOTE — ED PROVIDER NOTE - NS ED MD DISPO SPECIAL CONSIDERATION1
[address]    Chief Complaint   Patient presents with    3 Month Follow-Up     Flu vaccine        HPI  History was obtained from patient. Live uBrch is a 48 y.o. male with a PMHx as listed below who presents today for 3 month follow up no acute complaints. Tolerating farxiga  Glucose in am 150. On 60U tesiba   Bp excellent    Due for vaccines    1. Essential hypertension    2. Coronary artery disease involving native coronary artery of native heart without angina pectoris    3. Type 2 diabetes mellitus with other circulatory complication, without long-term current use of insulin ([address]    4. Need for immunization against influenza    5. Screening PSA (prostate specific antigen)    6. Lipid screening             REVIEW OF SYMPTOMS    Review of Systems   Constitutional:  Negative for chills and fatigue. HENT:  Negative for congestion and sore throat. Respiratory:  Negative for shortness of breath and wheezing. Cardiovascular:  Negative for chest pain and palpitations. Gastrointestinal:  Negative for abdominal pain and nausea. Genitourinary:  Negative for frequency and urgency. Neurological:  Negative for light-headedness. PAST MEDICAL HISTORY  Past Medical History:   Diagnosis Date    CAD (coronary artery disease)     H/O MI. Family history of coronary artery disease     H/O echocardiogram 6/08 6/08 EF>55% TRACE MR, TR    H/O echocardiogram 4/15/15    EF 50-55% Mild left atrial enlargement. Normal LV systolic function. Trace MR and TR. Normal sized abdominal aorta.      History of cardiac catheterization 03/01/2017    Stenting of the LAD    History of cardiovascular stress test 6/06 6/08 10/10 7/12     7/12 LAD territory ISCHEMIA, EF 62%    History of exercise stress test 03/20/2017    treadmill    History of myocardial infarction 4/2006    History of nuclear stress test 01/26/2017    lexiscan-scar,no ischemia,EF52%,apical hdyskinesis    History of PTCA 4/2006    STENT TO RCA History of PTCA     STENT TO LAD, RCA    Hyperlipidemia     Hypertension 2012    Hypertriglyceridemia     Type II or unspecified type diabetes mellitus without mention of complication, not stated as uncontrolled        FAMILY HISTORY  Family History   Problem Relation Age of Onset    High Blood Pressure Mother     High Cholesterol Mother     Heart Disease Father     Cancer Sister     Arthritis Maternal Grandmother     Cancer Maternal Grandfather        SOCIAL HISTORY  Social History     Socioeconomic History    Marital status:    Occupational History    Occupation:    Tobacco Use    Smoking status: Former     Packs/day: 1.00     Types: Cigarettes     Quit date: 12/3/2016     Years since quittin.8    Smokeless tobacco: Never   Substance and Sexual Activity    Alcohol use: Yes     Alcohol/week: 1.0 standard drink     Types: 1 Cans of beer per week     Comment: once a week beer liquor    Drug use: No    Sexual activity: Yes     Partners: Female        SURGICAL HISTORY  Past Surgical History:   Procedure Laterality Date    CORONARY ANGIOPLASTY WITH STENT PLACEMENT      HERNIA REPAIR                   CURRENT MEDICATIONS  Current Outpatient Medications   Medication Sig Dispense Refill    Insulin Degludec (TRESIBA FLEXTOUCH) 200 UNIT/ML SOPN INJECT 66 UNITS UNDER THE SKIN AT BEDTIME 9 mL 1    nitroGLYCERIN (NITROSTAT) 0.4 MG SL tablet Place 1 tablet under the tongue every 5 minutes as needed for Chest pain up to max of 3 total doses. If no relief after 1 dose, call 911. 25 tablet 1    atorvastatin (LIPITOR) 40 MG tablet Take 1 tablet by mouth daily 90 tablet 1    Insulin Pen Needle (PEN NEEDLES 3/16\") 31G X 5 MM MISC 1 each by Does not apply route daily Use one needle two times daily as directed on package.  500 each 1    cyclobenzaprine (FLEXERIL) 10 MG tablet Take 1 tablet by mouth 3 times daily as needed for Muscle spasms 30 tablet 0    dapagliflozin (FARXIGA) 10 MG tablet Take 1 medications for this visit. ALLERGIES  No Known Allergies    PHYSICAL EXAM    BP (!) 132/90 (Site: Left Upper Arm, Position: Sitting, Cuff Size: Large Adult)   Pulse 67   Resp 16   Ht 6' (1.829 m)   Wt 292 lb 1.6 oz (132.5 kg)   SpO2 96%   BMI 39.62 kg/m²     Physical Exam  Constitutional:       Appearance: Normal appearance. HENT:      Head: Normocephalic and atraumatic. Eyes:      Extraocular Movements: Extraocular movements intact. Pupils: Pupils are equal, round, and reactive to light. Cardiovascular:      Rate and Rhythm: Normal rate and regular rhythm. Pulses: Normal pulses. Heart sounds: No murmur heard. No friction rub. No gallop. Skin:     General: Skin is warm and dry. Neurological:      General: No focal deficit present. Mental Status: He is alert. Psychiatric:         Mood and Affect: Mood normal.         Behavior: Behavior normal.       ASSESSMENT & PLAN    1. Coronary artery disease involving native coronary artery of native heart without angina pectoris    - nitroGLYCERIN (NITROSTAT) 0.4 MG SL tablet; Place 1 tablet under the tongue every 5 minutes as needed for Chest pain up to max of 3 total doses. If no relief after 1 dose, call 911. Dispense: 25 tablet; Refill: 1  - atorvastatin (LIPITOR) 40 MG tablet; Take 1 tablet by mouth daily  Dispense: 90 tablet; Refill: 1  - LIPID PANEL; Future    2. Essential hypertension    - CBC with Auto Differential; Future  - Comprehensive Metabolic Panel; Future    3. Type 2 diabetes mellitus with other circulatory complication, without long-term current use of insulin (HCC)    - Insulin Degludec (TRESIBA FLEXTOUCH) 200 UNIT/ML SOPN; INJECT 66 UNITS UNDER THE SKIN AT BEDTIME  Dispense: 9 mL; Refill: 1  - Insulin Pen Needle (PEN NEEDLES 3/16\") 31G X 5 MM MISC; 1 each by Does not apply route daily Use one needle two times daily as directed on package. Dispense: 500 each; Refill: 1  - Hemoglobin A1C; Future    4.  Need for immunization against influenza    - Influenza, FLUCELVAX, (age 10 mo+), IM, Preservative Free, 0.5 mL    5. Screening PSA (prostate specific antigen)    - PSA Screening; Future    6. Lipid screening  - LIPID PANEL; Future    BP stable  DM2 not controlled we will go up on tresiba 66U and go up 1-2 U every 2-3 days until AM glucose near 120 call office if on 80U and not controlled  F/u labs prior to next appt    Hx. Of CAD strongly encouraged to establish with Cardiology, given number    Immunizations   Please obtain Tdap (once every 10 years), New COVID vaccination, and Shingrex (2 dose series 2nd dose 2-6 months after 1st)    Return in about 3 months (around 12/22/2022).          Electronically signed by Lina Pedraza DO on 9/22/2022 None

## 2023-01-12 NOTE — PATIENT PROFILE ADULT - DISASTER - FUNCTIONAL SCREEN CURRENT LEVEL: SWALLOWING (IF SCORE 2 OR MORE FOR ANY ITEM, CONSULT REHAB SERVICES), MLM)
2 = difficulty swallowing liquids Valtrex Pregnancy And Lactation Text: this medication is Pregnancy Category B and is considered safe during pregnancy. This medication is not directly found in breast milk but it's metabolite acyclovir is present.

## 2023-01-30 NOTE — ED ADULT NURSE NOTE - NS PRO PASSIVE SMOKE EXP

## 2023-02-27 NOTE — ASU PATIENT PROFILE, ADULT - PREMEDICATION REQUIRED
Daily Note     Today's date: 2023  Patient name: Nathan Gonzalez  : 1947  MRN: [id number]  Referring provider: Daniel Spencer  Dx:   Encounter Diagnosis     ICD-10-CM    1  Cubital tunnel syndrome on right  G56 21                      Subjective: I got my stitches out yesterday  Objective: See treatment diary below      Assessment: Tolerated treatment well  Patient demonstrated fatigue post treatment, exhibited good technique with therapeutic exercises and would benefit from continued OT  Denied pain pre and post tx  Initiated scar massage, incision really thick and lumpy  Instructed pt in same  Plan: Continue per plan of care  Progress treatment as tolerated         Precautions Cubital Tunnel Release  Initial Evaluation completed on: 02/10/2023  Re-Evaluation needs to be completed before: 03/10/2023  Insurance: Medicare  FOTO: 02/10/2023  Auth Visits: N/A          Manuals 02/10/2023 2023 2023 2023    IASTM   Manual Massage      Elbow Stretch   performed performed    Supinator Stretch   20 sec x 5 20 sec x 5    Scar Massage Dressing Changed Dressing Change  performed            Neuro Re-Ed  02/10/2023 2023 2023 2023                    Ther Ex 02/10/2023 2023 2023 2023    Elbow Flex  X 3 Ways HEP  X 20 X 20 X 20    Sup/pronation HEP X 20 w/ Dowel X 20 w/ dowel X 20 w/ dowel    Elbow Isometrics        Digi-Flex  Red x 20 Red x 20 Red x 20    Hand Power Pro    Green  X 20    Wrist Maze  X 5 X 5 X 5    Hammer Stretch        Shoulder  ER/IE Iso     X 15   X 15    Shoulder Pulley  X 20 X 20 X 20            Ther Activity 02/10/2023 2023 2023 2023    poms   RTP with all poms RTP with all poms                                    Modalities 02/10/2023 2023 2023 2023    Ultrasound        E-STIM w/ MH        CP Performed at end of session Performed at end of session      [address]
none

## 2023-04-24 NOTE — PROGRESS NOTE ADULT - SUBJECTIVE AND OBJECTIVE BOX
Progress Note:  Provider Speciality                            Hospitalist      LEFTY CARRASQUILLO [id number] 64y Female     CHIEF PRESENTING COMPLAINT:  Patient is a 64y old  Female who presents with a chief complaint of lower extremity weakness leading to mechanical fall (18 Dec 2019 12:10)        SUBJECTIVE:  Patient was seen and examined at bedside. Reports bilateral UE swelling  . No significant overnight events reported.     HISTORY OF PRESENTING ILLNESS:  HPI:  63 Y/O F with PMHx of MS on IVIG (last dose 3 wks ago), DM type II, Gastric cancer s/p resection and chemo currently in remission (Dr Toussaint), presented to the hospital s/p mechanical fall. Per pt she had progressively worsening lower extremity weakness that started about 2 wks ago, she has chronic RLE weakness and numbness, she ambulates with a walker but no she has weakness in her LLE associated with difficulty ambulation. Previously she would be able to get off the bed and walk with a walker but now she needs assistance with everything. This morning while she was going to use restroom, she felt dizzy described as spinning sensations, her legs gave way and she fell on the floor. Daughter is the HCP and she was helping her mother with walking while this happened. Pt hit her left knee and is complaining of pain in that area. She denied hitting her head, hx of LOC, blacking out, palpitations or any abnormal jerky movements of her limbs. Pt recently had worsening nausea and vomiting that she gets from gastroparesis and gastric resection, she ran out of her medications and it got worse so she came to ER 2 days ago. She was resuscitated with fluids and was sent home, her vomiting has improved since then. Pt had suspicious lesion in the duodenal loop, per daughter pt had EGD and Bx was -ve for malignancy? Pt denied any hx of fever, chills, nausea, vomiting, diarrhea, constipation, melena, pain in abdomen, sob, chest pain, cough, increased urinary frequency, dysuria, headache, any changes in weight, recent travel.     In the ED pt was found to have BP of 86/54, , RR 20/min, SPO2 96% on RA, Temp 96.7. She was given I/V fluid bolus 2.2 L NS and her vitals improved. Her UA showed nitrites so she was given Rocephin? (14 Dec 2019 11:23)        REVIEW OF SYSTEMS:  Patient denies any headache, any vision complaints, runny nose, fever, chills, sore throat. Denies chest pain, shortness of breath, palpitation. Denies nausea, vomiting, abdominal pain, diarrhoea, Denies urinary burning, urgency, frequency, dysuria. Denies weakness in any part of the body or numbness.   At least 10 systems were reviewed in ROS. All systems reviewed  are within normal limits except for the complaints as described in Subjective.    PAST MEDICAL & SURGICAL HISTORY:  PAST MEDICAL & SURGICAL HISTORY:  Multiple sclerosis  DM type 2 (diabetes mellitus, type 2)  Stomach cancer  Portacath in place  S/P partial gastrectomy  H/O total hysterectomy with bilateral salpingo-oophorectomy (BSO)  H/O partial thyroidectomy          VITAL SIGNS:  Vital Signs Last 24 Hrs  T(C): 36.7 (19 Dec 2019 06:27), Max: 36.7 (18 Dec 2019 20:43)  T(F): 98.1 (19 Dec 2019 06:27), Max: 98.1 (18 Dec 2019 20:43)  HR: 85 (19 Dec 2019 06:27) (85 - 93)  BP: 103/58 (19 Dec 2019 06:27) (98/54 - 103/58)  BP(mean): --  RR: 18 (19 Dec 2019 06:27) (18 - 18)  SpO2: 94% (18 Dec 2019 20:03) (94% - 94%)        PHYSICAL EXAMINATION:  Not in acute distress  General: + pallor, no icterus  HEENT:   EOMI, no JVD,.  Heart: S1+S2 audible  Lungs: bilateral  fair air entry, no wheezing, no crepitations.  Abdomen: Soft, non-tender, non-distended , no  rigidity or guarding.  CNS: AAOx3, CN  grossly intact.  Extremities:  No edema    , bilateral UE swelling        CONSULTS:  Consultant(s) Notes Reviewed by me.   Care Discussed with Consultants/Other Providers where required.        MEDICATIONS:  MEDICATIONS  (STANDING):  baclofen 20 milliGRAM(s) Oral two times a day  cefpodoxime 200 milliGRAM(s) Oral every 12 hours  chlorhexidine 4% Liquid 1 Application(s) Topical <User Schedule>  cholecalciferol 1000 Unit(s) Oral daily  dextrose 5%. 1000 milliLiter(s) (50 mL/Hr) IV Continuous <Continuous>  dextrose 50% Injectable 12.5 Gram(s) IV Push once  dextrose 50% Injectable 25 Gram(s) IV Push once  dextrose 50% Injectable 25 Gram(s) IV Push once  DULoxetine 60 milliGRAM(s) Oral daily  folic acid 1 milliGRAM(s) Oral daily  insulin glargine Injectable (LANTUS) 7 Unit(s) SubCutaneous at bedtime  insulin lispro (HumaLOG) corrective regimen sliding scale   SubCutaneous three times a day before meals  insulin lispro Injectable (HumaLOG) 3 Unit(s) SubCutaneous three times a day before meals  iron sucrose IVPB 200 milliGRAM(s) IV Intermittent every 7 days  metoclopramide 10 milliGRAM(s) Oral three times a day before meals  pantoprazole    Tablet 40 milliGRAM(s) Oral two times a day  rOPINIRole 0.5 milliGRAM(s) Oral three times a day  tamsulosin 0.4 milliGRAM(s) Oral at bedtime    MEDICATIONS  (PRN):  acetaminophen   Tablet .. 650 milliGRAM(s) Oral every 6 hours PRN Moderate Pain (4 - 6)  dextrose 40% Gel 15 Gram(s) Oral once PRN Blood Glucose LESS THAN 70 milliGRAM(s)/deciliter  glucagon  Injectable 1 milliGRAM(s) IntraMuscular once PRN Glucose LESS THAN 70 milligrams/deciliter  guaiFENesin   Syrup  (Sugar-Free) 100 milliGRAM(s) Oral every 6 hours PRN Cough  ondansetron Injectable 4 milliGRAM(s) IV Push every 8 hours PRN Nausea and/or Vomiting            ASSESSMENT:    Patient is a 65yo female with PMH of multiple sclerosis on IVIG, gastric cancer s/p gastrectomy s/p chemotherapy, and diabetes mellitus who presented to the hospital s/p mechanical fall. Patient states that she has always had right lower extremity weakness, but the left lower extremity has become progressively weaker over the past month.       ASSESSMENT:  Principal Diagnosis:  Low suspicion for MS flare  Urinary tract infection secondary to E coli  Acute on chronic vfxujw-puhpqvjhaxeerj-ylrcxvsm iron deficiancy- low suspicion for GIB  Iron deficiency  Gastroparesis  Urinary retention  L cephalic thrombus    Associated Active Comorbid Conditions:  chronic normocytic anmeia  Diabetes mellitis type 2   history of gastric CA, billroth procedure & chemo  Suspected vitamin D deficiency    PLAN:   - continue with Vantin x 5 days for Urinary tract infection secondary to E coli sensitive to cephalosporins  -no MS flare currently  -Low suspicion for GIB  -Iron deficiancy secondary to gastric surgery- Fe supplementation  -Keep Hb >7. Monitor hemoglobin/hematocrit   - Insulin sliding scale with coverage wih meals and QHS . keep current dose of lantus & lispro  -Electrolyte supplementation and follow up with BMP. Keep K+>4, Mg>2   -gastroparesis- RTC reglan  -Gastric cancer s/p gastrectomy s/p chemotherapy. Follows outpatient with Dr. Toussaint  -Suspected vitamin D deficiency- Continue with cholecalciferol 1000 units PO QD  -Urinary retention- voiding trial  -L cephalic thrombus- supportive care- no anti-coagulation     Progress note Handoff:   Discussion: Diagnosis, current management plan and further plan of care discussed with patient  and housestaff in morning  rounds.  Disposition: Anticipate discharge in AM pending symptom resolution Influenza

## 2023-07-11 NOTE — PATIENT PROFILE ADULT - BILL PAYMENT
• PT/OT Eval and treat  AM-PAC Basic Mobility:  Basic Mobility Inpatient Raw Score: 13    -[facility] Achieved: 3: Sit at edge of bed  -[facility] Goal: 4: Move to chair/commode  • Encourage appropriate mobility to achieve and improve upon [facility] Goals as noted    CM input appreciated - Patient lives at home alone; patient and family would like for patient to go to SNF as she would not be a safe discharge home - Preferred [facility] due to family member also there no

## 2024-01-01 NOTE — ASU PREOP CHECKLIST - BP NONINVASIVE DIASTOLIC (MM HG)
Brief Lactation Consult    Per bedside RN mother declines lactation support today, she feels comfortable with current feeding plan. Will request support if needed.     Anel Dill RN, IBCLC   Lactation Consultant  [place]: [facility] [phone]  Office: [phone]           64

## 2024-04-01 NOTE — PATIENT PROFILE ADULT - NSPROIMPLANTSMEDDEV_GEN_A_NUR
Addended by: SAVI ANAND on: 4/1/2024 03:39 PM     Modules accepted: Orders     Vascular access device

## 2025-01-09 NOTE — PROGRESS NOTE ADULT - ASSESSMENT
64 yo female with PMHX of MS (Not on active treatment), T2DM with diabetic gastroparesis, Gastric cancer s/p resection and chemotherapy (Dr Toussaint), esophageal ulcer s/p EGD last yr on PPI, presented with confusion.     # AMS 2/2 metab enceph prob 2/2 signif, symptomatic anemia - seems better now  CTH: Stable MS lesions; no acute intracranial pathology  f/u TSH, Ammonia  no need for neuro eval for now, provide MS stays at baseline  REEG seems unnecessary, but it was already performed - f/u results (if there is epileptiform activity, then will ask neuro to see)    # signif rt arm swelling  check v dupl to r/o DVT of upper ext    # Acute on chronic normocytic anemia  hx of esophageal ulcers with EGD last year  hx of active gastric cancer  s/p 2U PRBC  No gross GIB; no melena  GI eval for Dr Velazquez  iron studies c/w SONIA - give venofer 200mg IV q48 x 5 doses (or until d/c)  will use oral iron upon d/c  c/w folic acid supplementation  check B12 and Fol  PPI BID for now    # T wave inversions (V1-V4)  QTc of 481 is not a concern  ; trop x1 neg -> send 1 more troponin (if abnl, then tele and cardio eval)  ECG is different from Feb 2020 and likely related to low hgb  once hgb > 8, rpt ECG to see if back to baseline  < from: Transthoracic Echocardiogram (02.02.20 @ 10:58) >  Summary:   1. Normal global left ventricular systolic function.   2. LV Ejection Fraction by Palacio's Method with a biplane EF of 70 %.   3. Mild asymmetric left ventricular hypertrophy involving the septal wall.   4. Mild mitral valve regurgitation.   5. Mild thickening of the anterior and posterior mitral valve leaflets.  < end of copied text >  if ECG does not go back to baseline, then check new echo -> if nl, then fine; if EF is abnl or bad valve dz, then cardio eval  no need for tele  no asa or BB  consider lipid panel    # Hypomagnesemia  can replete w/ Mg Ox 400mg po q24  recheck lvl in 2-3 days    # T2 DM with diabetic gastroparesis  CAPILLARY BLOOD GLUCOSE  POCT Blood Glucose.: 77 (03-27-20 @ 11:48)  POCT Blood Glucose.: 118 (03-26-20 @ 21:10)  Hold home glimepiride  FS qac/hs  diabetic diet  if FS > 180, will start insulin    # incr AP is likely 2/2 cholestasis from DM  check GGT  follow LFT 1-2x/wk    # Stage 2 pressure ulcer - present on admission  barrier cream w/ RN    # Hx of fibroids  s/p MARY-BSO (in her 30s)    # Hx of thyroid polyps  s/p partial thyroidectomy     # DVT ppx: SCDs for now    # GI ppx: PPI    # Activity: Bedridden; may be OOB to chair w/ berry    # FULL CODE     # Contact:  [place] [phone]: we spoke today about plan as above; dtr also asked me to eval for depression as appropriate    # Dispo: obtain GI eval; IV iron; check cbc; f/u ECG; check 1 trop; f/u B12/Fol; f/u EEG report; repl Mg; f/u TSH; f/u v real BROWN  pt has 12hr aide at home - will likely send back home (though was in [facility] in Feb) - f/u CM 64 yo female with PMHX of MS (Not on active treatment), T2DM with diabetic gastroparesis, Gastric cancer s/p resection and chemotherapy (Dr Toussaint), esophageal ulcer s/p EGD last yr on PPI, presented with confusion.     # Metabolic encephalopathy, likely secondary to symptomatic anemia - improved  - patient now appears back to baseline after receiving 2 units PRBC  - hemoglobin now 9.4  - CTH shows stable MS lesions  - REEG done; showed diffuse slowing, but no epileptiform activity  - B12, folate levels sent, will follow up results    # Right arm swelling  - patient is diffusely edematous  - per patient, right arm swelling is new  - right arm duplex performed, will follow up result    # Acute on chronic normocytic anemia  - history of esophageal ulcers with EGD last year  - history of active gastric cancer  - s/p 2U PRBC; repeat Hb 9.4  - No gross GIB; no melena  - iron studies consistent with iron deficiency anemia  - GI eval; Dr Velazquez contacted, will follow up  - venofer 200mg IV q48 x 5 doses; will switch to oral iron upon discharge  - folic acid 1mg po daily  - protonix 40mg po bid  - B12, folate levels sent, will follow up results    # T wave inversions (V1-V4)  - QTc of 481 is not a concern  -   - trop negative x1  - Echo (Feb 2020): normal LV systolic function, EF 70%, mild asymmetric LVH involving septal wall, mild MR  - T wave inversions likely related to low hgb  - will repeat EKG today to ensure it has normalized; if not, will repeat echo    # Hypomagnesemia  - Mg Ox 400mg po q24  - recheck mag level in 2-3 days    # T2 DM with diabetic gastroparesis  - patient on glimepiride; hold for now  - follow FSG  - if FS > 180, will start insulin    # Elevated alk phos  - will check GGT  - will check LFT 1-2x/wk    # Stage 2 pressure ulcer - present on admission  - barrier cream w/ RN    # Hx of fibroids  - s/p MARY-BSO (in her 30s)    # Hx of thyroid polyps  - s/p partial thyroidectomy     #DVT ppx: SCDs for now  #GI ppx: protonix 40mg po bid  #Activity: Bedridden; may be OOB to chair w/ berry  #Dispo: from home; family wants to take patient home with home care once medically stable  FULL CODE English

## 2025-05-14 NOTE — PROGRESS NOTE ADULT - REASON FOR ADMISSION
Patient discharged back to [facility]. Report called to Annika, 
Altered mental status